# Patient Record
Sex: FEMALE | Race: WHITE | NOT HISPANIC OR LATINO | Employment: FULL TIME | URBAN - METROPOLITAN AREA
[De-identification: names, ages, dates, MRNs, and addresses within clinical notes are randomized per-mention and may not be internally consistent; named-entity substitution may affect disease eponyms.]

---

## 2017-03-16 ENCOUNTER — HOSPITAL ENCOUNTER (EMERGENCY)
Facility: HOSPITAL | Age: 52
Discharge: HOME | End: 2017-03-16
Payer: MEDICAID

## 2017-03-16 VITALS
BODY MASS INDEX: 41.15 KG/M2 | HEART RATE: 97 BPM | HEIGHT: 65 IN | OXYGEN SATURATION: 97 % | WEIGHT: 247 LBS | RESPIRATION RATE: 18 BRPM | SYSTOLIC BLOOD PRESSURE: 169 MMHG | TEMPERATURE: 99 F | DIASTOLIC BLOOD PRESSURE: 102 MMHG

## 2017-03-16 DIAGNOSIS — I10 ESSENTIAL HYPERTENSION: Primary | ICD-10-CM

## 2017-03-16 PROCEDURE — 99283 EMERGENCY DEPT VISIT LOW MDM: CPT | Mod: 25

## 2017-03-16 PROCEDURE — 93005 ELECTROCARDIOGRAM TRACING: CPT

## 2017-03-16 PROCEDURE — 93010 ELECTROCARDIOGRAM REPORT: CPT | Mod: ,,, | Performed by: INTERNAL MEDICINE

## 2017-03-16 RX ORDER — LOSARTAN POTASSIUM 25 MG/1
25 TABLET ORAL DAILY
Qty: 30 TABLET | Refills: 0 | Status: SHIPPED | OUTPATIENT
Start: 2017-03-16 | End: 2017-06-29 | Stop reason: SDUPTHER

## 2017-03-16 NOTE — ED PROVIDER NOTES
SCRIBE #1 NOTE: I, Santhosh Hollis, am scribing for, and in the presence of, ZACK Combs. I have scribed the entire note.      History      Chief Complaint   Patient presents with    Hypertension     pt states she has been having problems getting her BP under control all week, pt reports seen at [facility] and [facility] in the last week       Review of patient's allergies indicates:   Allergen Reactions    Codeine     Latex, natural rubber     Pcn [penicillins]     Tylox [oxycodone-acetaminophen]         HPI   HPI    3/16/2017, 5:20 PM   History obtained from the patient      History of Present Illness: Judy Ruelas is a 51 y.o. female patient who presents to the Emergency Department for hypertension which pt states she has had difficulty managing the past week. Pt states she has been seen at [facility] in [place] and  during this period. Sx are constant and moderate in severity. There are no mitigating or exacerbating factors noted. Pt denies any fever, chills, N/V, CP, SOB, cough, weakness or numbness, and all other sx at this time. She states she was put on atenolol and HCTZ when seen at [facility] last week.  Pt states she has been No further complaints or concerns at this time.       Arrival mode: Personal vehicle     PCP: [facility]       Past Medical History:  Past Medical History:   Diagnosis Date    Anxiety     Depression     Depression with anxiety     Hypertension     Irregular heart beat     Migraine headache        Past Surgical History:  Past Surgical History:   Procedure Laterality Date    APPENDECTOMY       SECTION      HYSTERECTOMY           Family History:  Family History   Problem Relation Age of Onset    Hypertension      Heart disease Father 70    Hypertension Father     Heart attack Mother 40     triple bypass    Diabetes Mother     Hypertension Mother     Stroke Mother     Heart attack Brother 40     CABG x 2       Social  History:  Social History     Social History Main Topics    Smoking status: Never Smoker    Smokeless tobacco: Never Used    Alcohol use No      Comment: denies    Drug use: No    Sexual activity: Not Currently       ROS   Review of Systems   Constitutional: Negative for chills and fever.        + HTN   HENT: Negative for sore throat and trouble swallowing.    Respiratory: Negative for cough and shortness of breath.    Cardiovascular: Negative for chest pain.   Gastrointestinal: Negative for abdominal pain, diarrhea, nausea and vomiting.   Genitourinary: Negative for dysuria.   Musculoskeletal: Negative for back pain.   Skin: Negative for rash and wound.   Neurological: Negative for weakness and numbness.   Hematological: Does not bruise/bleed easily.   All other systems reviewed and are negative.      Physical Exam    Initial Vitals   BP Pulse Resp Temp SpO2   03/16/17 1626 03/16/17 1626 03/16/17 1626 03/16/17 1626 03/16/17 1626   169/102 97 18 98.6 °F (37 °C) 97 %      Physical Exam  Nursing Notes and Vital Signs Reviewed.  Constitutional: Patient is in no acute distress. Awake and alert. Well-developed and well-nourished.  Head: Atraumatic. Normocephalic.  Eyes: PERRL. EOM intact. Conjunctivae are not pale. No scleral icterus.  ENT: Mucous membranes are moist. Oropharynx is clear and symmetric.    Neck: Supple. Full ROM. No lymphadenopathy.  Cardiovascular: Regular rate. Regular rhythm. No murmurs, rubs, or gallops. Distal pulses are 2+ and symmetric.  Pulmonary/Chest: No respiratory distress. Clear to auscultation bilaterally. No wheezing, rales, or rhonchi.  Abdominal: Soft and non-distended.  There is no tenderness.  No rebound, guarding, or rigidity.  Good bowel sounds.    Genitourinary: No CVA tenderness  Musculoskeletal: Moves all extremities. No obvious deformities. No edema. No calf tenderness.  Skin: Warm and dry.  Neurological:  Alert, awake, and appropriate.  Normal speech.  No acute focal  "neurological deficits are appreciated.  Psychiatric: Normal affect. Good eye contact. Appropriate in content.    ED Course    Procedures  ED Vital Signs:  Vitals:    03/16/17 1626   BP: (!) 169/102   Pulse: 97   Resp: 18   Temp: 98.6 °F (37 °C)   TempSrc: Oral   SpO2: 97%   Weight: 112 kg (247 lb)   Height: 5' 5" (1.651 m)        The EKG was ordered, reviewed, and independently interpreted by the ED provider.  Interpretation time: 16:46+  Rate: 94 BPM  Rhythm: normal sinus rhythm  Interpretation: RBBB. No STEMI.         The Emergency Provider reviewed the vital signs and test results, which are outlined above.    ED Discussion     Labs were reviewed from pt's visit at [facility] 2 days ago: BUN creatinine, and other lab work was within normal limits.        5:36 PM:  Discussed with pt all pertinent ED information and results. Discussed pt dx and plan of tx. Gave pt all f/u and return to the ED instructions. Instructed pt to keep log of blood pressure 3x daily for 1 month, and to eat a low salt diet. All questions and concerns were addressed at this time. Pt expresses understanding of information and instructions, and is comfortable with plan to discharge. Pt is stable for discharge.    I discussed with patient and/or family/caretaker that evaluation in the ED does not suggest any emergent or life threatening medical conditions requiring immediate intervention beyond what was provided in the ED, and I believe patient is safe for discharge.  Regardless, an unremarkable evaluation in the ED does not preclude the development or presence of a serious of life threatening condition. As such, patient was instructed to return immediately for any worsening or change in current symptoms.        Pre-hypertension/Hypertension: The pt has been informed that they may have pre-hypertension or hypertension based on a blood pressure reading in the ED. I recommend that the pt call the PCP listed on their discharge instructions or a " physician of their choice this week to arrange f/u for further evaluation of possible pre-hypertension or hypertension.     ED Medication(s):  Medications - No data to display    Discharge Medication List as of 3/16/2017  5:36 PM      START taking these medications    Details   losartan (COZAAR) 25 MG tablet Take 1 tablet (25 mg total) by mouth once daily., Starting 3/16/2017, Until Fri 3/16/18, Print             Follow-up Information     Follow up with [facility] In 2 days.    Contact information:    [address] [address] [id number] [phone]               Medical Decision Making    Medical Decision Making:   History:   Old Medical Records: I decided to obtain old medical records.  Old Records Summarized: records from clinic visits.  Clinical Tests:   Medical Tests: Ordered and Reviewed           Scribe Attestation:   Scribe #1: I performed the above scribed service and the documentation accurately describes the services I performed. I attest to the accuracy of the note.    APC:   APC Attestation Statement for Scribe #1: I, Rashad Hernandez NP, personally performed the services described in this documentation, as scribed by Santhosh Hollis in my presence, and it is both accurate and complete.          Clinical Impression       ICD-10-CM ICD-9-CM   1. Essential hypertension I10 401.9       Disposition:   Disposition: Discharged  Condition: Stable         Rashad Hernandez NP  03/17/17 0209

## 2017-03-16 NOTE — DISCHARGE INSTRUCTIONS
Controlling High Blood Pressure  High blood pressure (hypertension) is often called the silent killer. This is because many people who have it dont know it. High blood pressure is defined as 140/90 mm Hg or higher. Know your blood pressure and remember to check it regularly. Doing so can save your life. Here are some things you can do to help control your blood pressure.    Choose heart-healthy foods  · Select low-salt, low-fat foods. Limit sodium intake to 2,400 mg per day or the amount suggested by your healthcare provider.  · Limit canned, dried, cured, packaged, and fast foods. These can contain a lot of salt.  · Eat 8 to 10 servings of fruits and vegetables every day.  · Choose lean meats, fish, or chicken.  · Eat whole-grain pasta, brown rice, and beans.  · Eat 2 to 3 servings of low-fat or fat-free dairy products.  · Ask your doctor about the DASH eating plan. This plan helps reduce blood pressure.  · When you go to a restaurant, ask that your meal be prepared with no added salt.  Maintain a healthy weight  · Ask your healthcare provider how many calories to eat a day. Then stick to that number.  · Ask your healthcare provider what weight range is healthiest for you. If you are overweight, a weight loss of only 3% to 5% of your body weight can help lower blood pressure. Generally, a good weight loss goal is to lose 10% of your body weight in a year.  · Limit snacks and sweets.  · Get regular exercise.  Get up and get active  · Choose activities you enjoy. Find ones you can do with friends or family. This includes bicycling, dancing, walking, and jogging.  · Park farther away from building entrances.  · Use stairs instead of the elevator.  · When you can, walk or bike instead of driving.  · [place] leaves, garden, or do household repairs.  · Be active at a moderate to vigorous level of physical activity for at least 40 minutes for a minimum of 3 to 4 days a week.   Manage stress  · Make time to relax and enjoy  life. Find time to laugh.  · Communicate your concerns with your loved ones and your healthcare provider.  · Visit with family and friends, and keep up with hobbies.  Limit alcohol and quit smoking  · Men should have no more than 2 drinks per day.  · Women should have no more than 1 drink per day.  · Talk with your healthcare provider about quitting smoking. Smoking significantly increases your risk for heart disease and stroke. Ask your healthcare provider about community smoking cessation programs and other options.  Medicines  If lifestyle changes arent enough, your healthcare provider may prescribe high blood pressure medicine. Take all medicines as prescribed. If you have any questions about your medicines, ask your healthcare provider before stopping or changing them.   Date Last Reviewed: 4/27/2016 © 2000-2016 [employer]. [address] [place] [address], [place] [address]. All rights reserved. This information is not intended as a substitute for professional medical care. Always follow your healthcare professional's instructions.          Discharge Instructions for High Blood Pressure (Hypertension)  You have been diagnosed with high blood pressure (also called hypertension). This means the force of blood against your artery walls is too strong. It also means your heart is working hard to move blood. High blood pressure usually has no symptoms, but over time, it can damage your heart, blood vessels, eyes, kidneys, and other organs. With help from your doctor, you can manage your blood pressure and protect your health.  Taking medicine  · Learn to take your own blood pressure. Keep a record of your results. Ask your doctor which readings mean that you need medical attention.  · Take your blood pressure medicine exactly as directed. Dont skip doses. Missing doses can cause your blood pressure to get out of control.  · If you do miss a dose (or doses) check with your healthcare provider about what to  "do.  · Avoid medicine that contain heart stimulants, including over-the-counter drugs. Check for warnings about high blood pressure on the label. Ask the pharmacist before purchasing something you haven't used before  · Check with your doctor or pharmacist before taking a decongestant. Some decongestants can worsen high blood pressure.  Lifestyle changes  · Maintain a healthy weight. Get help to lose any extra pounds.  · Cut back on salt.  ¨ Limit canned, dried, packaged, and fast foods.  ¨ Dont add salt to your food at the table.  ¨ Season foods with herbs instead of salt when you cook.  ¨ Request no added salt when you go to a restaurant.  ¨ The American Heart Associations (AHA) "ideal" sodium intake recommendation is 1,500 milligrams per day.  However, since American's eat so much salt, the AHA says a positive change can occur by cutting back to even 2,400 milligrams of sodium a day.   · Follow the DASH (Dietary Approaches to Stop Hypertension) eating plan. This plan recommends vegetables, fruits, whole gains, and other heart healthy foods.  · Begin an exercise program. Ask your doctor how to get started. The American Heart Association recommends aerobic exercise 3 to 4 times a week for an average of 40 minutes at a time, with your doctor's approval. Simple activities like walking or gardening can help.  · Break the smoking habit. Enroll in a stop-smoking program to improve your chances of success. Ask your healthcare provider about programs and medicines to help you stop smoking.  · Limit drinks that contain caffeine (coffee, black or green tea, cola) to 2 per day.  · Never take stimulants such as amphetamines or cocaine; these drugs can be deadly for someone with high blood pressure.  · Control your stress. Learn stress-management techniques.  · Limit alcohol to no more than 1 drink a day for women and 2 drinks a day for men.  Follow-up care  Make a follow-up appointment as directed by our staff.     When to " seek medical care  Call your doctor immediately or seek emergency care if you have any of the following:  · Chest pain or shortness of breath (call 911)  · Moderate to severe headache  · Weakness in the muscles of your face, arms, or legs  · Trouble speaking  · Extreme drowsiness  · Confusion  · Fainting or dizziness  · Pulsating or rushing sound in your ears  · Unexplained nosebleed  · Weakness, tingling, or numbness of your face, arms, or legs  · Change in vision  · Blood pressure measured at home that is greater than 180/110   Date Last Reviewed: 4/27/2016  © 2372-8364 [employer]. [address] [place] [address], [place] [address]. All rights reserved. This information is not intended as a substitute for professional medical care. Always follow your healthcare professional's instructions.

## 2017-03-16 NOTE — ED AVS SNAPSHOT
[facility]  [id number] [facility] [address]               Judy Ruelas   3/16/2017  4:28 PM   ED    Description:  Female : 1965   Department:  [facility]            Your Care was Coordinated By:     Provider Role From To    Rashad Hernandez NP Nurse Practitioner [id number] --      Reason for Visit     Hypertension           Diagnoses this Visit        Comments    Essential hypertension    -  Primary       ED Disposition     None           To Do List           Follow-up Information     Follow up with [facility] In 2 days.    Contact information:    [address] [address] [id number] [phone]         These Medications        Disp Refills Start End    losartan (COZAAR) 25 MG tablet 30 tablet 0 3/16/2017 3/16/2018    Take 1 tablet (25 mg total) by mouth once daily. - Oral      [facility]     [facility] On Call Nurse Care Line -  Assistance  Registered nurses in the [facility] On Call Center provide clinical advisement, health education, appointment booking, and other advisory services.  Call for this free service at [phone].             Medications           Message regarding Medications     Verify the changes and/or additions to your medication regime listed below are the same as discussed with your clinician today.  If any of these changes or additions are incorrect, please notify your healthcare provider.        START taking these NEW medications        Refills    losartan (COZAAR) 25 MG tablet 0    Sig: Take 1 tablet (25 mg total) by mouth once daily.    Class: Print    Route: Oral      STOP taking these medications     lisinopril 10 MG tablet Take 10 mg by mouth once daily.           Verify that the below list of medications is an accurate representation of the medications you are currently taking.  If none reported, the list may be blank. If incorrect, please contact your  "healthcare provider. Carry this list with you in case of emergency.           Current Medications     amlodipine (NORVASC) 5 MG tablet Take 2 tablets (10 mg total) by mouth once daily.    aspirin 81 MG Chew Take 1 tablet (81 mg total) by mouth once daily.    atenolol (TENORMIN) 50 MG tablet Take 50 mg by mouth once daily.    busPIRone (BUSPAR) 7.5 MG tablet Take 7.5 mg by mouth 3 (three) times daily.    butalbital-acetaminophen-caffeine -40 mg (FIORICET, ESGIC) -40 mg per tablet Take 1 tablet by mouth every 6 (six) hours as needed for Pain or Headaches.    ferrous sulfate 324 mg (65 mg iron) TbEC Take 325 mg by mouth once daily.    fluoxetine (PROZAC) 20 MG capsule Take 20 mg by mouth once daily.    hydrocodone-acetaminophen 7.5-325mg (NORCO) 7.5-325 mg per tablet Take 1 tablet by mouth every 6 (six) hours as needed for Pain.    hydrOXYzine (VISTARIL) 50 MG Cap Take 50 mg by mouth 4 (four) times daily.    losartan (COZAAR) 25 MG tablet Take 1 tablet (25 mg total) by mouth once daily.    omeprazole (PRILOSEC) 40 MG capsule Take 1 capsule (40 mg total) by mouth once daily.    pravastatin (PRAVACHOL) 20 MG tablet Take 20 mg by mouth once daily.    promethazine (PHENERGAN) 25 MG tablet Take 1 tablet (25 mg total) by mouth every 6 (six) hours as needed for Nausea.    venlafaxine (EFFEXOR-XR) 75 MG 24 hr capsule Take 75 mg by mouth once daily.           Clinical Reference Information           Your Vitals Were     BP Pulse Temp Resp Height Weight    169/102 (BP Location: Right arm, Patient Position: Sitting) 97 98.6 °F (37 °C) (Oral) 18 5' 5" (1.651 m) 112 kg (247 lb)    SpO2 BMI             97% 41.1 kg/m2         Allergies as of 3/16/2017        Reactions    Codeine     Latex, Natural Rubber     Pcn [Penicillins]     Tylox [Oxycodone-acetaminophen]       Immunizations Administered on Date of Encounter - 3/16/2017     None      ED Micro, Lab, POCT     None      ED Imaging Orders     None        Discharge " Instructions         Controlling High Blood Pressure  High blood pressure (hypertension) is often called the silent killer. This is because many people who have it dont know it. High blood pressure is defined as 140/90 mm Hg or higher. Know your blood pressure and remember to check it regularly. Doing so can save your life. Here are some things you can do to help control your blood pressure.    Choose heart-healthy foods  · Select low-salt, low-fat foods. Limit sodium intake to 2,400 mg per day or the amount suggested by your healthcare provider.  · Limit canned, dried, cured, packaged, and fast foods. These can contain a lot of salt.  · Eat 8 to 10 servings of fruits and vegetables every day.  · Choose lean meats, fish, or chicken.  · Eat whole-grain pasta, brown rice, and beans.  · Eat 2 to 3 servings of low-fat or fat-free dairy products.  · Ask your doctor about the DASH eating plan. This plan helps reduce blood pressure.  · When you go to a restaurant, ask that your meal be prepared with no added salt.  Maintain a healthy weight  · Ask your healthcare provider how many calories to eat a day. Then stick to that number.  · Ask your healthcare provider what weight range is healthiest for you. If you are overweight, a weight loss of only 3% to 5% of your body weight can help lower blood pressure. Generally, a good weight loss goal is to lose 10% of your body weight in a year.  · Limit snacks and sweets.  · Get regular exercise.  Get up and get active  · Choose activities you enjoy. Find ones you can do with friends or family. This includes bicycling, dancing, walking, and jogging.  · Park farther away from building entrances.  · Use stairs instead of the elevator.  · When you can, walk or bike instead of driving.  · Roberts leaves, garden, or do household repairs.  · Be active at a moderate to vigorous level of physical activity for at least 40 minutes for a minimum of 3 to 4 days a week.   Manage stress  · Make time  to relax and enjoy life. Find time to laugh.  · Communicate your concerns with your loved ones and your healthcare provider.  · Visit with family and friends, and keep up with hobbies.  Limit alcohol and quit smoking  · Men should have no more than 2 drinks per day.  · Women should have no more than 1 drink per day.  · Talk with your healthcare provider about quitting smoking. Smoking significantly increases your risk for heart disease and stroke. Ask your healthcare provider about community smoking cessation programs and other options.  Medicines  If lifestyle changes arent enough, your healthcare provider may prescribe high blood pressure medicine. Take all medicines as prescribed. If you have any questions about your medicines, ask your healthcare provider before stopping or changing them.   Date Last Reviewed: 4/27/2016 © 2000-2016 [employer]. [address] [place] [address], [place] [address]. All rights reserved. This information is not intended as a substitute for professional medical care. Always follow your healthcare professional's instructions.          Discharge Instructions for High Blood Pressure (Hypertension)  You have been diagnosed with high blood pressure (also called hypertension). This means the force of blood against your artery walls is too strong. It also means your heart is working hard to move blood. High blood pressure usually has no symptoms, but over time, it can damage your heart, blood vessels, eyes, kidneys, and other organs. With help from your doctor, you can manage your blood pressure and protect your health.  Taking medicine  · Learn to take your own blood pressure. Keep a record of your results. Ask your doctor which readings mean that you need medical attention.  · Take your blood pressure medicine exactly as directed. Dont skip doses. Missing doses can cause your blood pressure to get out of control.  · If you do miss a dose (or doses) check with your healthcare  "provider about what to do.  · Avoid medicine that contain heart stimulants, including over-the-counter drugs. Check for warnings about high blood pressure on the label. Ask the pharmacist before purchasing something you haven't used before  · Check with your doctor or pharmacist before taking a decongestant. Some decongestants can worsen high blood pressure.  Lifestyle changes  · Maintain a healthy weight. Get help to lose any extra pounds.  · Cut back on salt.  ¨ Limit canned, dried, packaged, and fast foods.  ¨ Dont add salt to your food at the table.  ¨ Season foods with herbs instead of salt when you cook.  ¨ Request no added salt when you go to a restaurant.  ¨ The American Heart Associations (AHA) "ideal" sodium intake recommendation is 1,500 milligrams per day.  However, since American's eat so much salt, the AHA says a positive change can occur by cutting back to even 2,400 milligrams of sodium a day.   · Follow the DASH (Dietary Approaches to Stop Hypertension) eating plan. This plan recommends vegetables, fruits, whole gains, and other heart healthy foods.  · Begin an exercise program. Ask your doctor how to get started. The American Heart Association recommends aerobic exercise 3 to 4 times a week for an average of 40 minutes at a time, with your doctor's approval. Simple activities like walking or gardening can help.  · Break the smoking habit. Enroll in a stop-smoking program to improve your chances of success. Ask your healthcare provider about programs and medicines to help you stop smoking.  · Limit drinks that contain caffeine (coffee, black or green tea, cola) to 2 per day.  · Never take stimulants such as amphetamines or cocaine; these drugs can be deadly for someone with high blood pressure.  · Control your stress. Learn stress-management techniques.  · Limit alcohol to no more than 1 drink a day for women and 2 drinks a day for men.  Follow-up care  Make a follow-up appointment as directed by " our staff.     When to seek medical care  Call your doctor immediately or seek emergency care if you have any of the following:  · Chest pain or shortness of breath (call 911)  · Moderate to severe headache  · Weakness in the muscles of your face, arms, or legs  · Trouble speaking  · Extreme drowsiness  · Confusion  · Fainting or dizziness  · Pulsating or rushing sound in your ears  · Unexplained nosebleed  · Weakness, tingling, or numbness of your face, arms, or legs  · Change in vision  · Blood pressure measured at home that is greater than 180/110   Date Last Reviewed: 4/27/2016  © 7786-8102 [employer]. [address] [place] [address], [place] [address]. All rights reserved. This information is not intended as a substitute for professional medical care. Always follow your healthcare professional's instructions.          [facility] Sign-Up     Activating your [facility] account is as easy as 1-2-3!     1) Visit [email or web address], select Sign Up Now, enter this activation code and your date of birth, then select Next.  [email or web address]  Expires: 4/30/2017  5:36 PM      2) Create a username and password to use when you visit [facility] in the future and select a security question in case you lose your password and select Next.    3) Enter your e-mail address and click Sign Up!    Additional Information  If you have questions, please e-mail [email or web address] or call [phone] to talk to our [facility] staff. Remember, [facility] is NOT to be used for urgent needs. For medical emergencies, dial 911.          [facility] complies with applicable Federal civil rights laws and does not discriminate on the basis of race, color, national origin, age, disability, or sex.        Language Assistance Services     ATTENTION: Language assistance services are available, free of charge. Please call [phone].      ATENCIÓN: Si habla español, tiene a hanna disposición servicios gratuitos de asistencia  lingüística. [facility] [phone].     DELONTE Ý: N?u b?n nói Ti?ng Vi?t, có các d?ch v? h? tr? ngôn ng? mi?n phí dành cho b?n. G?i s? [phone].

## 2017-06-17 ENCOUNTER — HOSPITAL ENCOUNTER (INPATIENT)
Facility: HOSPITAL | Age: 52
LOS: 1 days | Discharge: HOME | DRG: 281 | End: 2017-06-19
Attending: EMERGENCY MEDICINE | Admitting: INTERNAL MEDICINE
Payer: MEDICAID

## 2017-06-17 DIAGNOSIS — I10 ESSENTIAL (PRIMARY) HYPERTENSION: ICD-10-CM

## 2017-06-17 DIAGNOSIS — R07.9 CHEST PAIN: ICD-10-CM

## 2017-06-17 DIAGNOSIS — I21.4 NSTEMI (NON-ST ELEVATED MYOCARDIAL INFARCTION): Primary | ICD-10-CM

## 2017-06-17 LAB
ALBUMIN SERPL BCP-MCNC: 3.4 G/DL
ALP SERPL-CCNC: 71 U/L
ALT SERPL W/O P-5'-P-CCNC: 24 U/L
ANION GAP SERPL CALC-SCNC: 11 MMOL/L
AST SERPL-CCNC: 18 U/L
BASOPHILS # BLD AUTO: 0.02 K/UL
BASOPHILS NFR BLD: 0.2 %
BILIRUB SERPL-MCNC: 0.4 MG/DL
BUN SERPL-MCNC: 14 MG/DL
CALCIUM SERPL-MCNC: 9 MG/DL
CHLORIDE SERPL-SCNC: 106 MMOL/L
CO2 SERPL-SCNC: 22 MMOL/L
CREAT SERPL-MCNC: 0.7 MG/DL
DIFFERENTIAL METHOD: ABNORMAL
EOSINOPHIL # BLD AUTO: 0.1 K/UL
EOSINOPHIL NFR BLD: 1.3 %
ERYTHROCYTE [DISTWIDTH] IN BLOOD BY AUTOMATED COUNT: 14 %
EST. GFR  (AFRICAN AMERICAN): >60 ML/MIN/1.73 M^2
EST. GFR  (NON AFRICAN AMERICAN): >60 ML/MIN/1.73 M^2
GLUCOSE SERPL-MCNC: 114 MG/DL
HCT VFR BLD AUTO: 36.7 %
HGB BLD-MCNC: 11.8 G/DL
LYMPHOCYTES # BLD AUTO: 1 K/UL
LYMPHOCYTES NFR BLD: 9.7 %
MCH RBC QN AUTO: 27.8 PG
MCHC RBC AUTO-ENTMCNC: 32.2 %
MCV RBC AUTO: 86 FL
MONOCYTES # BLD AUTO: 0.6 K/UL
MONOCYTES NFR BLD: 5.7 %
NEUTROPHILS # BLD AUTO: 8.3 K/UL
NEUTROPHILS NFR BLD: 83.1 %
PLATELET # BLD AUTO: 292 K/UL
PMV BLD AUTO: 10 FL
POTASSIUM SERPL-SCNC: 4.3 MMOL/L
PROT SERPL-MCNC: 7.3 G/DL
RBC # BLD AUTO: 4.25 M/UL
SODIUM SERPL-SCNC: 139 MMOL/L
TROPONIN I SERPL DL<=0.01 NG/ML-MCNC: 0.11 NG/ML
WBC # BLD AUTO: 9.96 K/UL

## 2017-06-17 PROCEDURE — 25000003 PHARM REV CODE 250: Performed by: EMERGENCY MEDICINE

## 2017-06-17 PROCEDURE — 85025 COMPLETE CBC W/AUTO DIFF WBC: CPT

## 2017-06-17 PROCEDURE — 99285 EMERGENCY DEPT VISIT HI MDM: CPT | Mod: 25

## 2017-06-17 PROCEDURE — 84484 ASSAY OF TROPONIN QUANT: CPT

## 2017-06-17 PROCEDURE — 83880 ASSAY OF NATRIURETIC PEPTIDE: CPT

## 2017-06-17 PROCEDURE — 93010 ELECTROCARDIOGRAM REPORT: CPT | Mod: ,,, | Performed by: INTERNAL MEDICINE

## 2017-06-17 PROCEDURE — 93005 ELECTROCARDIOGRAM TRACING: CPT

## 2017-06-17 PROCEDURE — 21400001 HC TELEMETRY ROOM

## 2017-06-17 PROCEDURE — 85730 THROMBOPLASTIN TIME PARTIAL: CPT

## 2017-06-17 PROCEDURE — 80053 COMPREHEN METABOLIC PANEL: CPT

## 2017-06-17 RX ORDER — LOVASTATIN 20 MG/1
20 TABLET ORAL NIGHTLY
Status: ON HOLD | COMMUNITY
End: 2017-06-19 | Stop reason: HOSPADM

## 2017-06-17 RX ORDER — LORAZEPAM 1 MG/1
1 TABLET ORAL
Status: COMPLETED | OUTPATIENT
Start: 2017-06-17 | End: 2017-06-17

## 2017-06-17 RX ORDER — TRAZODONE HYDROCHLORIDE 50 MG/1
100 TABLET ORAL NIGHTLY
COMMUNITY

## 2017-06-17 RX ORDER — ONDANSETRON 4 MG/1
8 TABLET, FILM COATED ORAL 2 TIMES DAILY
COMMUNITY

## 2017-06-17 RX ADMIN — LORAZEPAM 1 MG: 1 TABLET ORAL at 11:06

## 2017-06-18 PROBLEM — I21.4 NSTEMI (NON-ST ELEVATED MYOCARDIAL INFARCTION): Status: ACTIVE | Noted: 2017-06-18

## 2017-06-18 PROBLEM — F41.8 DEPRESSION WITH ANXIETY: Chronic | Status: ACTIVE | Noted: 2017-06-18

## 2017-06-18 LAB
APTT BLDCRRT: 23.5 SEC
APTT BLDCRRT: 53.9 SEC
APTT BLDCRRT: 84.6 SEC
BNP SERPL-MCNC: 120 PG/ML
TROPONIN I SERPL DL<=0.01 NG/ML-MCNC: 0.88 NG/ML
TROPONIN I SERPL DL<=0.01 NG/ML-MCNC: 0.94 NG/ML

## 2017-06-18 PROCEDURE — 25000003 PHARM REV CODE 250: Performed by: EMERGENCY MEDICINE

## 2017-06-18 PROCEDURE — 85730 THROMBOPLASTIN TIME PARTIAL: CPT

## 2017-06-18 PROCEDURE — 25000003 PHARM REV CODE 250: Performed by: INTERNAL MEDICINE

## 2017-06-18 PROCEDURE — 21400001 HC TELEMETRY ROOM

## 2017-06-18 PROCEDURE — 36415 COLL VENOUS BLD VENIPUNCTURE: CPT

## 2017-06-18 PROCEDURE — 63600175 PHARM REV CODE 636 W HCPCS: Performed by: EMERGENCY MEDICINE

## 2017-06-18 PROCEDURE — 93010 ELECTROCARDIOGRAM REPORT: CPT | Mod: ,,, | Performed by: INTERNAL MEDICINE

## 2017-06-18 PROCEDURE — 93005 ELECTROCARDIOGRAM TRACING: CPT

## 2017-06-18 PROCEDURE — 85730 THROMBOPLASTIN TIME PARTIAL: CPT | Mod: 91

## 2017-06-18 PROCEDURE — 84484 ASSAY OF TROPONIN QUANT: CPT

## 2017-06-18 PROCEDURE — 99223 1ST HOSP IP/OBS HIGH 75: CPT | Mod: ,,, | Performed by: INTERNAL MEDICINE

## 2017-06-18 RX ORDER — AMOXICILLIN 250 MG
1 CAPSULE ORAL 2 TIMES DAILY
Status: DISCONTINUED | OUTPATIENT
Start: 2017-06-18 | End: 2017-06-19 | Stop reason: HOSPADM

## 2017-06-18 RX ORDER — ATENOLOL 50 MG/1
50 TABLET ORAL DAILY
Status: DISCONTINUED | OUTPATIENT
Start: 2017-06-18 | End: 2017-06-19 | Stop reason: HOSPADM

## 2017-06-18 RX ORDER — LOSARTAN POTASSIUM 25 MG/1
25 TABLET ORAL DAILY
Status: DISCONTINUED | OUTPATIENT
Start: 2017-06-18 | End: 2017-06-19 | Stop reason: HOSPADM

## 2017-06-18 RX ORDER — HEPARIN SODIUM,PORCINE/D5W 25000/250
16 INTRAVENOUS SOLUTION INTRAVENOUS CONTINUOUS
Status: DISCONTINUED | OUTPATIENT
Start: 2017-06-18 | End: 2017-06-19

## 2017-06-18 RX ORDER — HEPARIN SODIUM,PORCINE/D5W 25000/250
16 INTRAVENOUS SOLUTION INTRAVENOUS CONTINUOUS
Status: DISCONTINUED | OUTPATIENT
Start: 2017-06-18 | End: 2017-06-18

## 2017-06-18 RX ORDER — HYDROXYZINE PAMOATE 50 MG/1
50 CAPSULE ORAL EVERY 6 HOURS PRN
Status: DISCONTINUED | OUTPATIENT
Start: 2017-06-18 | End: 2017-06-18

## 2017-06-18 RX ORDER — IBUPROFEN 200 MG
16 TABLET ORAL
Status: DISCONTINUED | OUTPATIENT
Start: 2017-06-18 | End: 2017-06-19 | Stop reason: HOSPADM

## 2017-06-18 RX ORDER — IBUPROFEN 200 MG
24 TABLET ORAL
Status: DISCONTINUED | OUTPATIENT
Start: 2017-06-18 | End: 2017-06-19 | Stop reason: HOSPADM

## 2017-06-18 RX ORDER — LOVASTATIN 20 MG/1
20 TABLET ORAL NIGHTLY
Status: DISCONTINUED | OUTPATIENT
Start: 2017-06-18 | End: 2017-06-19 | Stop reason: HOSPADM

## 2017-06-18 RX ORDER — TRAZODONE HYDROCHLORIDE 50 MG/1
50 TABLET ORAL NIGHTLY
Status: DISCONTINUED | OUTPATIENT
Start: 2017-06-18 | End: 2017-06-18

## 2017-06-18 RX ORDER — SODIUM CHLORIDE 9 MG/ML
INJECTION, SOLUTION INTRAVENOUS CONTINUOUS
Status: DISCONTINUED | OUTPATIENT
Start: 2017-06-18 | End: 2017-06-19

## 2017-06-18 RX ORDER — HYDROXYZINE PAMOATE 50 MG/1
50 CAPSULE ORAL 4 TIMES DAILY
Status: DISCONTINUED | OUTPATIENT
Start: 2017-06-18 | End: 2017-06-19 | Stop reason: HOSPADM

## 2017-06-18 RX ORDER — MORPHINE SULFATE 4 MG/ML
4 INJECTION, SOLUTION INTRAMUSCULAR; INTRAVENOUS EVERY 4 HOURS PRN
Status: DISCONTINUED | OUTPATIENT
Start: 2017-06-18 | End: 2017-06-19 | Stop reason: HOSPADM

## 2017-06-18 RX ORDER — METOPROLOL SUCCINATE 50 MG/1
50 TABLET, EXTENDED RELEASE ORAL DAILY
Status: DISCONTINUED | OUTPATIENT
Start: 2017-06-18 | End: 2017-06-18

## 2017-06-18 RX ORDER — TRAZODONE HYDROCHLORIDE 50 MG/1
50 TABLET ORAL NIGHTLY PRN
Status: DISCONTINUED | OUTPATIENT
Start: 2017-06-18 | End: 2017-06-19 | Stop reason: HOSPADM

## 2017-06-18 RX ORDER — ONDANSETRON 2 MG/ML
4 INJECTION INTRAMUSCULAR; INTRAVENOUS EVERY 12 HOURS PRN
Status: DISCONTINUED | OUTPATIENT
Start: 2017-06-18 | End: 2017-06-19 | Stop reason: HOSPADM

## 2017-06-18 RX ORDER — GLUCAGON 1 MG
1 KIT INJECTION
Status: DISCONTINUED | OUTPATIENT
Start: 2017-06-18 | End: 2017-06-19 | Stop reason: HOSPADM

## 2017-06-18 RX ORDER — ZOLPIDEM TARTRATE 5 MG/1
5 TABLET ORAL NIGHTLY PRN
Status: DISCONTINUED | OUTPATIENT
Start: 2017-06-18 | End: 2017-06-19 | Stop reason: HOSPADM

## 2017-06-18 RX ORDER — ASPIRIN 325 MG
325 TABLET ORAL DAILY
Status: DISCONTINUED | OUTPATIENT
Start: 2017-06-18 | End: 2017-06-19 | Stop reason: HOSPADM

## 2017-06-18 RX ORDER — ACETAMINOPHEN 500 MG
500 TABLET ORAL EVERY 4 HOURS PRN
Status: DISCONTINUED | OUTPATIENT
Start: 2017-06-18 | End: 2017-06-19 | Stop reason: HOSPADM

## 2017-06-18 RX ORDER — SODIUM CHLORIDE 9 MG/ML
3 INJECTION, SOLUTION INTRAMUSCULAR; INTRAVENOUS; SUBCUTANEOUS EVERY 8 HOURS
Status: DISCONTINUED | OUTPATIENT
Start: 2017-06-18 | End: 2017-06-19 | Stop reason: HOSPADM

## 2017-06-18 RX ADMIN — SODIUM CHLORIDE: 0.9 INJECTION, SOLUTION INTRAVENOUS at 07:06

## 2017-06-18 RX ADMIN — LOSARTAN POTASSIUM 25 MG: 25 TABLET, FILM COATED ORAL at 08:06

## 2017-06-18 RX ADMIN — LOVASTATIN 20 MG: 20 TABLET ORAL at 01:06

## 2017-06-18 RX ADMIN — NITROGLYCERIN 1 INCH: 20 OINTMENT TOPICAL at 11:06

## 2017-06-18 RX ADMIN — HYDROXYZINE PAMOATE 50 MG: 50 CAPSULE ORAL at 05:06

## 2017-06-18 RX ADMIN — MORPHINE SULFATE 4 MG: 4 INJECTION, SOLUTION INTRAMUSCULAR; INTRAVENOUS at 10:06

## 2017-06-18 RX ADMIN — HYDROXYZINE PAMOATE 50 MG: 50 CAPSULE ORAL at 11:06

## 2017-06-18 RX ADMIN — ONDANSETRON 4 MG: 2 INJECTION INTRAMUSCULAR; INTRAVENOUS at 07:06

## 2017-06-18 RX ADMIN — STANDARDIZED SENNA CONCENTRATE AND DOCUSATE SODIUM 1 TABLET: 8.6; 5 TABLET, FILM COATED ORAL at 09:06

## 2017-06-18 RX ADMIN — ATENOLOL 50 MG: 50 TABLET ORAL at 08:06

## 2017-06-18 RX ADMIN — HEPARIN SODIUM AND DEXTROSE 16 UNITS/KG/HR: 10000; 5 INJECTION INTRAVENOUS at 01:06

## 2017-06-18 RX ADMIN — ASPIRIN 325 MG ORAL TABLET 325 MG: 325 PILL ORAL at 08:06

## 2017-06-18 RX ADMIN — STANDARDIZED SENNA CONCENTRATE AND DOCUSATE SODIUM 1 TABLET: 8.6; 5 TABLET, FILM COATED ORAL at 08:06

## 2017-06-18 RX ADMIN — HYDROXYZINE PAMOATE 50 MG: 50 CAPSULE ORAL at 07:06

## 2017-06-18 RX ADMIN — NITROGLYCERIN 1 INCH: 20 OINTMENT TOPICAL at 05:06

## 2017-06-18 RX ADMIN — HEPARIN SODIUM AND DEXTROSE 13.97 UNITS/KG/HR: 10000; 5 INJECTION INTRAVENOUS at 11:06

## 2017-06-18 RX ADMIN — NITROGLYCERIN 1 INCH: 20 OINTMENT TOPICAL at 06:06

## 2017-06-18 RX ADMIN — TRAZODONE HYDROCHLORIDE 50 MG: 50 TABLET ORAL at 01:06

## 2017-06-18 RX ADMIN — MORPHINE SULFATE 4 MG: 4 INJECTION, SOLUTION INTRAMUSCULAR; INTRAVENOUS at 07:06

## 2017-06-18 RX ADMIN — LOVASTATIN 20 MG: 20 TABLET ORAL at 09:06

## 2017-06-18 NOTE — ED PROVIDER NOTES
"SCRIBE #1 NOTE: I, Wang Steiner, am scribing for, and in the presence of, Stella Lam MD. I have scribed the entire note.      History      Chief Complaint   Patient presents with    Chest Pain     x 3 days, worse tonight.       Review of patient's allergies indicates:   Allergen Reactions    Codeine     Latex, natural rubber     Pcn [penicillins]     Tylox [oxycodone-acetaminophen]         HPI   HPI    2017, 10:40 PM   History obtained from the patient      History of Present Illness: Judy Ruelas is a 52 y.o. female patient w/ PMHx of heart catheter placement (5 months ago) presents to the Emergency Department for chest pain which onset gradually 2 days ago, worsening this PM as the pt was driving. Symptoms are intermittent and moderate in severity.  No mitigating or exacerbating factors reported. Pt describes the chest pain as a "stabbing sensation", and states it feels "like someone is sitting on her chest."  Associated sxs include generalized body numbness. Patient denies any shortness of breath, n/v/d, fever, hematuria, dizziness, palpitations, leg swelling/ pain, diaphoresis,and all other sxs at this time. PTA, the ambulance service administered Asprin and nitroglycerine.  No further complaints or concerns at this time.         Arrival mode: Ambulance Service    PCP: [facility]       Past Medical History:  Past Medical History:   Diagnosis Date    Anxiety     Depression     Depression with anxiety     Hypertension     Irregular heart beat     Migraine headache        Past Surgical History:  Past Surgical History:   Procedure Laterality Date    APPENDECTOMY       SECTION      HYSTERECTOMY           Family History:  Family History   Problem Relation Age of Onset    Hypertension      Heart disease Father 70    Hypertension Father     Heart attack Mother 40     triple bypass    Diabetes Mother     Hypertension Mother     Stroke Mother     " Heart attack Brother 40     CABG x 2       Social History:  Social History     Social History Main Topics    Smoking status: Never Smoker    Smokeless tobacco: Never Used    Alcohol use No      Comment: denies    Drug use: No      Comment: denies    Sexual activity: Not Currently       ROS   Review of Systems   Constitutional: Negative for chills, diaphoresis and fever.   HENT: Negative for congestion and sore throat.    Respiratory: Negative for shortness of breath and wheezing.    Cardiovascular: Positive for chest pain. Negative for palpitations and leg swelling.   Gastrointestinal: Negative for abdominal pain, constipation, diarrhea, nausea and vomiting.   Genitourinary: Negative for difficulty urinating, dysuria, frequency, hematuria and urgency.   Musculoskeletal: Negative for back pain.   Skin: Negative for rash.   Neurological: Positive for numbness (Generalized). Negative for dizziness, seizures, syncope, weakness and headaches.   Hematological: Does not bruise/bleed easily.   All other systems reviewed and are negative.      Physical Exam      Initial Vitals [06/17/17 2247]   BP Pulse Resp Temp SpO2   (!) 164/89 68 19 98.1 °F (36.7 °C) 99 %        Physical Exam  Nursing Notes and Vital Signs Reviewed.  Constitutional: Patient is in no apparent distress. Well-developed and well-nourished.  Head: Atraumatic. Normocephalic.  Eyes: PERRL. EOM intact. Conjunctivae are not pale. No scleral icterus.  ENT: Mucous membranes are moist. Oropharynx is clear and symmetric.    Neck: Supple. Full ROM. No lymphadenopathy.  Cardiovascular: Regular rate. Regular rhythm. No murmurs, rubs, or gallops. Distal pulses are 2+ and symmetric.  Pulmonary/Chest: No respiratory distress. Clear to auscultation bilaterally. No wheezing, rales, or rhonchi.  Abdominal: Soft and non-distended.  There is no tenderness.  No rebound, guarding, or rigidity. Good bowel sounds.  Genitourinary: No CVA tenderness  Musculoskeletal: Moves all  "extremities. No obvious deformities. No edema. No calf tenderness.  Skin: Warm and dry.  Neurological:  Alert, awake, and appropriate.  Normal speech.  No acute focal neurological deficits are appreciated.  Psychiatric: Normal affect. Good eye contact. Appropriate in content.    ED Course    Procedures  ED Vital Signs:  Vitals:    06/17/17 2247 06/17/17 2305 06/17/17 2347 06/18/17 0032   BP: (!) 164/89 (!) 142/79 124/63 133/75   Pulse: 68 67 63 61   Resp: 19 17 19 20   Temp: 98.1 °F (36.7 °C)      TempSrc: Oral      SpO2: 99% 98% 98% 97%   Weight: 110.2 kg (243 lb)      Height: 5' 5" (1.651 m)       06/18/17 0052 06/18/17 0100 06/18/17 0416 06/18/17 0700   BP:  123/66 (!) 104/55 (!) 153/87   Pulse:  61 61 67   Resp:  18 18    Temp:  97.7 °F (36.5 °C) 97.9 °F (36.6 °C) 98.3 °F (36.8 °C)   TempSrc:  Oral  Oral   SpO2:  96% 98% 96%   Weight: 113.1 kg (249 lb 4.8 oz)  113.9 kg (251 lb)    Height: 5' 5" (1.651 m)       06/18/17 0927 06/18/17 1100 06/18/17 1611 06/18/17 1932   BP:  124/71 114/68 133/74   Pulse:  64 61 60   Resp:   18 18   Temp:  98.5 °F (36.9 °C) 98 °F (36.7 °C) 97.8 °F (36.6 °C)   TempSrc:  Oral Oral Oral   SpO2: 96% 96% 96% 97%   Weight:       Height:           Abnormal Lab Results:  Labs Reviewed   CBC W/ AUTO DIFFERENTIAL - Abnormal; Notable for the following:        Result Value    Hemoglobin 11.8 (*)     Hematocrit 36.7 (*)     Gran # 8.3 (*)     Gran% 83.1 (*)     Lymph% 9.7 (*)     All other components within normal limits   COMPREHENSIVE METABOLIC PANEL - Abnormal; Notable for the following:     CO2 22 (*)     Glucose 114 (*)     Albumin 3.4 (*)     All other components within normal limits   TROPONIN I - Abnormal; Notable for the following:     Troponin I 0.111 (*)     All other components within normal limits   B-TYPE NATRIURETIC PEPTIDE - Abnormal; Notable for the following:      (*)     All other components within normal limits   APTT   APTT        All Lab Results:  Results for orders " placed or performed during the hospital encounter of 06/17/17   CBC auto differential   Result Value Ref Range    WBC 9.96 3.90 - 12.70 K/uL    RBC 4.25 4.00 - 5.40 M/uL    Hemoglobin 11.8 (L) 12.0 - 16.0 g/dL    Hematocrit 36.7 (L) 37.0 - 48.5 %    MCV 86 82 - 98 fL    MCH 27.8 27.0 - 31.0 pg    MCHC 32.2 32.0 - 36.0 %    RDW 14.0 11.5 - 14.5 %    Platelets 292 150 - 350 K/uL    MPV 10.0 9.2 - 12.9 fL    Gran # 8.3 (H) 1.8 - 7.7 K/uL    Lymph # 1.0 1.0 - 4.8 K/uL    Mono # 0.6 0.3 - 1.0 K/uL    Eos # 0.1 0.0 - 0.5 K/uL    Baso # 0.02 0.00 - 0.20 K/uL    Gran% 83.1 (H) 38.0 - 73.0 %    Lymph% 9.7 (L) 18.0 - 48.0 %    Mono% 5.7 4.0 - 15.0 %    Eosinophil% 1.3 0.0 - 8.0 %    Basophil% 0.2 0.0 - 1.9 %    Differential Method Automated    Comprehensive metabolic panel   Result Value Ref Range    Sodium 139 136 - 145 mmol/L    Potassium 4.3 3.5 - 5.1 mmol/L    Chloride 106 95 - 110 mmol/L    CO2 22 (L) 23 - 29 mmol/L    Glucose 114 (H) 70 - 110 mg/dL    BUN, Bld 14 6 - 20 mg/dL    Creatinine 0.7 0.5 - 1.4 mg/dL    Calcium 9.0 8.7 - 10.5 mg/dL    Total Protein 7.3 6.0 - 8.4 g/dL    Albumin 3.4 (L) 3.5 - 5.2 g/dL    Total Bilirubin 0.4 0.1 - 1.0 mg/dL    Alkaline Phosphatase 71 55 - 135 U/L    AST 18 10 - 40 U/L    ALT 24 10 - 44 U/L    Anion Gap 11 8 - 16 mmol/L    eGFR if African American >60 >60 mL/min/1.73 m^2    eGFR if non African American >60 >60 mL/min/1.73 m^2   Troponin I #1   Result Value Ref Range    Troponin I 0.111 (H) 0.000 - 0.026 ng/mL   B-Type natriuretic peptide (BNP)   Result Value Ref Range     (H) 0 - 99 pg/mL   APTT   Result Value Ref Range    aPTT 23.5 21.0 - 32.0 sec   Troponin I   Result Value Ref Range    Troponin I 0.943 (H) 0.000 - 0.026 ng/mL   APTT   Result Value Ref Range    aPTT 84.6 (H) 21.0 - 32.0 sec   Troponin I   Result Value Ref Range    Troponin I 0.879 (H) 0.000 - 0.026 ng/mL   APTT   Result Value Ref Range    aPTT 53.9 (H) 21.0 - 32.0 sec         Imaging Results:  Imaging  Results          X-Ray Chest AP Portable (Final result)  Result time 06/17/17 23:04:41    Final result by Pankaj Diaz III, MD (06/17/17 23:04:41)                 Impression:     No acute disease identified in the chest.        Electronically signed by: PANKAJ DIAZ MD  Date:     06/17/17  Time:    23:04              Narrative:    XR CHEST AP PORTABLE    Clinical history: Chest Pain.      Findings: Cardiomediastinal silhouette is within normal limits for AP technique. The lungs appear clear of active disease. There is no evidence of pneumonia, pulmonary edema, pleural effusion, pneumothorax or other acute disease.                                  The EKG was ordered, reviewed, and independently interpreted by the ED provider.  Interpretation time: 22:37  Rate: 69 BPM  Rhythm: normal sinus rhythm  Interpretation: Right bundle branch block. Abnormal ECG. No STEMI.      The Emergency Provider reviewed the vital signs and test results, which are outlined above.    ED Discussion     12:00 AM: Discussed case with Dr Balbuena ([facility]). Dr. Peralta agrees with current care and management of pt and accepts admission.   Admitting Service: [facility]   Admitting Physician: Dr. Peralta  Admit to: Tele     12:01 AM: Re-evaluated pt. I have discussed test results, shared treatment plan, and the need for admission with patient and family at bedside. Pt and family express understanding at this time and agree with all information. All questions answered. Pt and family have no further questions or concerns at this time. Pt is ready for admit.      ED Medication(s):  Medications   sodium chloride 0.9% injection 3 mL (3 mLs Intravenous Not Given 6/18/17 1400)   morphine injection 4 mg (4 mg Intravenous Given 6/18/17 1944)   senna-docusate 8.6-50 mg per tablet 1 tablet (1 tablet Oral Given 6/18/17 7644)   ondansetron injection 4 mg (4 mg Intravenous Given 6/18/17 0720)   nitroGLYCERIN 2% TD oint ointment 1  inch (1 inch Topical Given 6/18/17 1800)   aspirin tablet 325 mg (325 mg Oral Given 6/18/17 0844)   heparin 25,000 units in dextrose 5% 250 mL (100 units/mL) bolus from bag; PRN BOLUS (not administered)   heparin 25,000 units in dextrose 5% 250 mL (100 units/mL) bolus from bag; PRN BOLUS (not administered)   heparin 25,000 units in dextrose 5% 250 mL (100 units/mL) infusion; FEMALE (13.97 Units/kg/hr × 113.1 kg Intravenous Handoff 6/18/17 1851)   trazodone tablet 50 mg (50 mg Oral Given 6/18/17 0144)   losartan tablet 25 mg (25 mg Oral Given 6/18/17 0844)   atenolol tablet 50 mg (50 mg Oral Given 6/18/17 0844)   hydrOXYzine pamoate capsule 50 mg (50 mg Oral Given 6/18/17 1916)   lovastatin tablet 20 mg (20 mg Oral Given 6/18/17 0146)   glucose chewable tablet 16 g (not administered)   glucose chewable tablet 24 g (not administered)   dextrose 50% injection 12.5 g (not administered)   dextrose 50% injection 25 g (not administered)   glucagon (human recombinant) injection 1 mg (not administered)   acetaminophen tablet 500 mg (not administered)   zolpidem tablet 5 mg (not administered)   0.9%  NaCl infusion ( Intravenous New Bag 6/18/17 1914)   lorazepam tablet 1 mg (1 mg Oral Given 6/17/17 2328)   heparin 25,000 units in dextrose 5% 250 mL (100 units/mL) bolus from bag; INITIAL BOLUS DOSE (7,917 Units Intravenous Bolus from Bag 6/18/17 0104)       Current Discharge Medication List                Medical Decision Making    Medical Decision Making:   Clinical Tests:   Lab Tests: Ordered and Reviewed  Radiological Study: Ordered and Reviewed  Medical Tests: Ordered and Reviewed           Scribe Attestation:   Scribe #1: I performed the above scribed service and the documentation accurately describes the services I performed. I attest to the accuracy of the note.    Attending:   Physician Attestation Statement for Scribe #1: I, Stella Lam MD, personally performed the services described in this documentation, as scribed  by Wang Steiner, in my presence, and it is both accurate and complete.          Clinical Impression       ICD-10-CM ICD-9-CM   1. NSTEMI (non-ST elevated myocardial infarction) I21.4 410.70   2. Chest pain R07.9 786.50       Disposition:   Disposition: Admitted  Condition: Serious         Si CAITLYN Lam MD  06/18/17 2023

## 2017-06-18 NOTE — PLAN OF CARE
Problem: Patient Care Overview  Goal: Plan of Care Review  Outcome: Ongoing (interventions implemented as appropriate)  Patient remains free from falls. Fall precautions remain in place. Heparin gtt infusing per orders. Next aPTT draw at 0700. Cardiology consult in AM. NPO per orders. VS are stable. No other c/o at this time. Call bell within reach. Reminded to call for assistance.

## 2017-06-18 NOTE — ASSESSMENT & PLAN NOTE
Three days of substernal chest pain radiating to the left with hand numbness.  Mixture of typical and atypical features.  Very strong family history of coronary artery disease.  30% lesion by heart catheterization February this year.  Initial troponin slightly elevated with no ischemic changes apparent on EKG.  Started heparin drip and nothing by mouth.  Cardiology on consult for the morning.  Received aspirin and Nitroglycerin.

## 2017-06-18 NOTE — H&P
[facility]  History & Physical    Patient Name: Judy Ruelas  MRN: [id number]  Admission Date: 2017  Attending Physician: Art Peralta MD   Primary Care Provider: [facility]         Patient information was obtained from patient, past medical records and ER records.     Subjective:     Principal Problem:NSTEMI (non-ST elevated myocardial infarction)    Chief Complaint:   Chief Complaint   Patient presents with    Chest Pain     x 3 days, worse tonight.        HPI: She had a heart catheterization in February for chest pain and a 30% lesion was found and no intervention done.  For the past three days chest pain.  On the way to work today it got really bad.  It felt like something was sitting on her chest and a stabbing pain.  She was given nitroglycerin by EMS which helped with the pain.  Episodes of pain would last all day.  Right now it feels heavy.  She also has some numbness in her hands but no other associated symptoms.  No other recent illness or medication changes or missed medications.  She took some Rolaids which has helped in the past but it did not tonight.  She also rubbed her chest with Icy-Hot which did not help either.  No fevers, chills, nausea, or vomiting.      Past Medical History:   Diagnosis Date    Anxiety     Depression     Depression with anxiety     Hypertension     Irregular heart beat     Migraine headache        Past Surgical History:   Procedure Laterality Date    APPENDECTOMY       SECTION      HYSTERECTOMY         Review of patient's allergies indicates:   Allergen Reactions    Codeine     Latex, natural rubber     Pcn [penicillins]     Tylox [oxycodone-acetaminophen]        No current facility-administered medications on file prior to encounter.      Current Outpatient Prescriptions on File Prior to Encounter   Medication Sig    atenolol (TENORMIN) 50 MG tablet Take 50 mg by mouth once  daily.    busPIRone (BUSPAR) 7.5 MG tablet Take 7.5 mg by mouth 2 (two) times daily.     ferrous sulfate 324 mg (65 mg iron) TbEC Take 325 mg by mouth once daily.    fluoxetine (PROZAC) 20 MG capsule Take 20 mg by mouth once daily.    hydrocodone-acetaminophen 7.5-325mg (NORCO) 7.5-325 mg per tablet Take 1 tablet by mouth every 6 (six) hours as needed for Pain.    hydrOXYzine (VISTARIL) 50 MG Cap Take 50 mg by mouth 4 (four) times daily.    losartan (COZAAR) 25 MG tablet Take 1 tablet (25 mg total) by mouth once daily.    venlafaxine (EFFEXOR-XR) 75 MG 24 hr capsule Take 75 mg by mouth once daily.    amlodipine (NORVASC) 5 MG tablet Take 2 tablets (10 mg total) by mouth once daily.    aspirin 81 MG Chew Take 1 tablet (81 mg total) by mouth once daily.    butalbital-acetaminophen-caffeine -40 mg (FIORICET, ESGIC) -40 mg per tablet Take 1 tablet by mouth every 6 (six) hours as needed for Pain or Headaches.    omeprazole (PRILOSEC) 40 MG capsule Take 1 capsule (40 mg total) by mouth once daily.    pravastatin (PRAVACHOL) 20 MG tablet Take 20 mg by mouth once daily.    promethazine (PHENERGAN) 25 MG tablet Take 1 tablet (25 mg total) by mouth every 6 (six) hours as needed for Nausea.     Family History     Problem Relation (Age of Onset)    Diabetes Mother    Heart attack Mother (40), Brother (40)    Heart disease Father (70)    Hypertension Father, Mother    Stroke Mother        Social History Main Topics    Smoking status: Never Smoker    Smokeless tobacco: Never Used    Alcohol use No      Comment: denies    Drug use: No      Comment: denies    Sexual activity: Not Currently     Review of Systems   Constitutional: Negative for chills and fever.   HENT: Negative for congestion and sore throat.    Eyes: Negative for visual disturbance.   Respiratory: Positive for chest tightness. Negative for cough, shortness of breath and wheezing.    Cardiovascular: Positive for chest pain. Negative for  palpitations and leg swelling.   Gastrointestinal: Negative for abdominal pain, blood in stool, constipation, diarrhea, nausea and vomiting.   Genitourinary: Negative for dysuria and hematuria.   Musculoskeletal: Negative for arthralgias and back pain.   Skin: Negative for rash and wound.   Neurological: Positive for numbness. Negative for dizziness, weakness and light-headedness.   Hematological: Negative for adenopathy.     Objective:     Vital Signs (Most Recent):  Temp: 97.7 °F (36.5 °C) (06/18/17 0100)  Pulse: 61 (06/18/17 0100)  Resp: 18 (06/18/17 0100)  BP: 123/66 (06/18/17 0100)  SpO2: 96 % (06/18/17 0100) Vital Signs (24h Range):  Temp:  [97.7 °F (36.5 °C)-98.1 °F (36.7 °C)] 97.7 °F (36.5 °C)  Pulse:  [61-68] 61  Resp:  [17-20] 18  SpO2:  [96 %-99 %] 96 %  BP: (123-164)/(63-89) 123/66     Weight: 113.1 kg (249 lb 4.8 oz)  Body mass index is 41.49 kg/m².    Physical Exam   Constitutional: She is oriented to person, place, and time. She appears well-developed and well-nourished. No distress.   HENT:   Head: Normocephalic and atraumatic.   Mouth/Throat: Oropharynx is clear and moist.   Eyes: Conjunctivae and EOM are normal. Pupils are equal, round, and reactive to light.   Neck: Neck supple. No JVD present. No thyromegaly present.   Cardiovascular: Normal rate and regular rhythm.  Exam reveals no gallop and no friction rub.    No murmur heard.  Pulmonary/Chest: Effort normal and breath sounds normal. She has no wheezes. She has no rales.   Abdominal: Soft. Bowel sounds are normal. She exhibits no distension. There is no tenderness. There is no rebound and no guarding.   Musculoskeletal: Normal range of motion. She exhibits no edema or deformity.   Lymphadenopathy:     She has no cervical adenopathy.   Neurological: She is alert and oriented to person, place, and time. She has normal reflexes.   Skin: Skin is warm and dry. No rash noted.   Psychiatric: She has a normal mood and affect. Her behavior is normal.  Judgment and thought content normal.   Nursing note and vitals reviewed.       Significant Labs:   CBC:   Recent Labs  Lab 06/17/17  2311   WBC 9.96   HGB 11.8*   HCT 36.7*        CMP:   Recent Labs  Lab 06/17/17  2311      K 4.3      CO2 22*   *   BUN 14   CREATININE 0.7   CALCIUM 9.0   PROT 7.3   ALBUMIN 3.4*   BILITOT 0.4   ALKPHOS 71   AST 18   ALT 24   ANIONGAP 11   EGFRNONAA >60       Significant Imaging: I have reviewed all pertinent imaging results/findings within the past 24 hours.    Assessment/Plan:     Depression with anxiety    Continue Hydroxyzine for anxiety.  Hold Fluoxetine, Venlafaxine, Trazodone, and Buspirone.  Review medications and follow up plan at discharge.  Concern for inappropriate combination and risk.        Essential hypertension    Continue Atenolol and Losartan.        * NSTEMI (non-ST elevated myocardial infarction)    Three days of substernal chest pain radiating to the left with hand numbness.  Mixture of typical and atypical features.  Very strong family history of coronary artery disease.  30% lesion by heart catheterization February this year.  Initial troponin slightly elevated with no ischemic changes apparent on EKG.  Started heparin drip and nothing by mouth.  Cardiology on consult for the morning.  Received aspirin and Nitroglycerin.          VTE Risk Mitigation         Ordered     Low Risk of VTE  Once      06/18/17 0056        Art Peralta MD  Department of Hospital Medicine   [facility]

## 2017-06-18 NOTE — PROGRESS NOTES
Notified Dr. Peralta of bump in troponin from 0.111 to 0.943. No new orders at this time. Cardiology consult for the morning.

## 2017-06-18 NOTE — CONSULTS
[facility] -   Cardiology  Consult Note    Patient Name: Judy Ruelas  MRN: [id number]  Admission Date: 2017  Hospital Length of Stay: 0 days  Code Status: Full Code   Attending Provider: Omari Estes MD   Consulting Provider: Lissette Hopkins MD  Primary Care Physician: [place]  Principal Problem:NSTEMI (non-ST elevated myocardial infarction)    Patient information was obtained from patient and ER records.     Inpatient consult to Cardiology  Consult performed by: LISSETTE HOPKINS  Consult ordered by: EVARISTO WIGGINS       NSTEMI  Subjective:     Chief Complaint:  NSTEMI     HPI: 53 yo female, PMH CAD, LHC done in  showed midLAD 30% and nonobstructive, HTN, obesity, and strong f/h premature CV Dz.  She has had progressively worsening exertional chest pain for weeks, up to shoulder and arm. With REHMAN. Yesterday, call EMS due to prolonged pain and improved after NTG spray. In ER, EKG NSR, RBBB and no acute STT change, troponin up to 0.9 and /89.  Now, some chest sore with tenderness.    Past Medical History:   Diagnosis Date    Anxiety     Depression     Depression with anxiety     Hypertension     Irregular heart beat     Migraine headache        Past Surgical History:   Procedure Laterality Date    APPENDECTOMY       SECTION      HYSTERECTOMY         Review of patient's allergies indicates:   Allergen Reactions    Codeine     Latex, natural rubber     Pcn [penicillins]     Tylox [oxycodone-acetaminophen]        No current facility-administered medications on file prior to encounter.      Current Outpatient Prescriptions on File Prior to Encounter   Medication Sig    atenolol (TENORMIN) 50 MG tablet Take 50 mg by mouth once daily.    busPIRone (BUSPAR) 7.5 MG tablet Take 7.5 mg by mouth 2 (two) times daily.     ferrous sulfate 324 mg (65 mg iron) TbEC Take 325 mg by mouth once daily.    fluoxetine (PROZAC) 20 MG capsule Take 20 mg by  mouth once daily.    hydrocodone-acetaminophen 7.5-325mg (NORCO) 7.5-325 mg per tablet Take 1 tablet by mouth every 6 (six) hours as needed for Pain.    hydrOXYzine (VISTARIL) 50 MG Cap Take 50 mg by mouth 4 (four) times daily.    losartan (COZAAR) 25 MG tablet Take 1 tablet (25 mg total) by mouth once daily.    venlafaxine (EFFEXOR-XR) 75 MG 24 hr capsule Take 75 mg by mouth once daily.    amlodipine (NORVASC) 5 MG tablet Take 2 tablets (10 mg total) by mouth once daily.    aspirin 81 MG Chew Take 1 tablet (81 mg total) by mouth once daily.    butalbital-acetaminophen-caffeine -40 mg (FIORICET, ESGIC) -40 mg per tablet Take 1 tablet by mouth every 6 (six) hours as needed for Pain or Headaches.    omeprazole (PRILOSEC) 40 MG capsule Take 1 capsule (40 mg total) by mouth once daily.    pravastatin (PRAVACHOL) 20 MG tablet Take 20 mg by mouth once daily.    promethazine (PHENERGAN) 25 MG tablet Take 1 tablet (25 mg total) by mouth every 6 (six) hours as needed for Nausea.     Family History     Problem Relation (Age of Onset)    Diabetes Mother    Heart attack Mother (40), Brother (40)    Heart disease Father (70)    Hypertension Father, Mother    Stroke Mother        Social History Main Topics    Smoking status: Never Smoker    Smokeless tobacco: Never Used    Alcohol use No      Comment: denies    Drug use: No      Comment: denies    Sexual activity: Not Currently     Review of Systems   Constitution: Negative for decreased appetite, diaphoresis, fever, weakness, malaise/fatigue and night sweats.   HENT: Negative for headaches and nosebleeds.    Eyes: Negative for blurred vision and double vision.   Cardiovascular: Positive for chest pain and dyspnea on exertion. Negative for claudication, irregular heartbeat, leg swelling, near-syncope, orthopnea, palpitations, paroxysmal nocturnal dyspnea and syncope.   Respiratory: Negative for cough, shortness of breath, sleep disturbances due to  breathing, snoring, sputum production and wheezing.    Endocrine: Negative for cold intolerance and polyuria.   Hematologic/Lymphatic: Does not bruise/bleed easily.   Skin: Negative for rash.   Musculoskeletal: Negative for back pain, falls, joint pain, joint swelling and neck pain.   Gastrointestinal: Negative for abdominal pain, heartburn, nausea and vomiting.   Genitourinary: Negative for dysuria, frequency and hematuria.   Neurological: Negative for difficulty with concentration, dizziness, focal weakness, light-headedness, numbness and seizures.   Psychiatric/Behavioral: Negative for depression, memory loss and substance abuse. The patient does not have insomnia.    Allergic/Immunologic: Negative for HIV exposure and hives.     Objective:     Vital Signs (Most Recent):  Temp: 98.3 °F (36.8 °C) (06/18/17 0700)  Pulse: 67 (06/18/17 0700)  Resp: 18 (06/18/17 0416)  BP: (!) 153/87 (06/18/17 0700)  SpO2: 96 % (06/18/17 0700) Vital Signs (24h Range):  Temp:  [97.7 °F (36.5 °C)-98.3 °F (36.8 °C)] 98.3 °F (36.8 °C)  Pulse:  [61-68] 67  Resp:  [17-20] 18  SpO2:  [96 %-99 %] 96 %  BP: (104-164)/(55-89) 153/87     Weight: 113.9 kg (251 lb)  Body mass index is 41.77 kg/m².    SpO2: 96 %  O2 Device (Oxygen Therapy): room air      Intake/Output Summary (Last 24 hours) at 06/18/17 0915  Last data filed at 06/18/17 0642   Gross per 24 hour   Intake             71.8 ml   Output              500 ml   Net           -428.2 ml       Lines/Drains/Airways     Peripheral Intravenous Line                 Peripheral IV - Single Lumen 06/17/17 Right Forearm 1 day                Physical Exam   Constitutional: She is oriented to person, place, and time. She appears well-nourished.   obses   HENT:   Head: Normocephalic.   Poor teeth hygiene   Eyes: Pupils are equal, round, and reactive to light.   Neck: Normal carotid pulses and no JVD present. Carotid bruit is not present. No thyromegaly present.   Cardiovascular: Normal rate, regular  rhythm, normal heart sounds and normal pulses.   No extrasystoles are present. PMI is not displaced.  Exam reveals no gallop and no S3.    No murmur heard.  Pulmonary/Chest: Breath sounds normal. No stridor. No respiratory distress.   +tenderness on chest   Abdominal: Soft. Bowel sounds are normal. There is no tenderness. There is no rebound.   Musculoskeletal: Normal range of motion.   Neurological: She is alert and oriented to person, place, and time.   Skin: Skin is intact. No rash noted.   Psychiatric: Her behavior is normal.       Significant Labs:   ABG: No results for input(s): PH, PCO2, HCO3, POCSATURATED, BE in the last 48 hours., Blood Culture: No results for input(s): LABBLOO in the last 48 hours., BMP:   Recent Labs  Lab 06/17/17  2311   *      K 4.3      CO2 22*   BUN 14   CREATININE 0.7   CALCIUM 9.0   , CMP   Recent Labs  Lab 06/17/17  2311      K 4.3      CO2 22*   *   BUN 14   CREATININE 0.7   CALCIUM 9.0   PROT 7.3   ALBUMIN 3.4*   BILITOT 0.4   ALKPHOS 71   AST 18   ALT 24   ANIONGAP 11   ESTGFRAFRICA >60   EGFRNONAA >60   , CBC   Recent Labs  Lab 06/17/17  2311   WBC 9.96   HGB 11.8*   HCT 36.7*      , INR No results for input(s): INR, PROTIME in the last 48 hours., Lipid Panel No results for input(s): CHOL, HDL, LDLCALC, TRIG, CHOLHDL in the last 48 hours. and Troponin   Recent Labs  Lab 06/17/17  2311 06/18/17  0436   TROPONINI 0.111* 0.943*       Significant Imaging: X-Ray: CXR: X-Ray Chest 1 View (CXR): No results found for this visit on 06/17/17.  Assessment and Plan:       ACS/NSTEMI  CAD   Obesity  HTN  Strong F/H premature CAD    Plan:  Keep NPO after MN  Continue heparin gtt and IVF  Arrange [facility] Monday morning,   Continue ASA, BB, ARB and statin  Continue NTG paste      Active Diagnoses:    Diagnosis Date Noted POA    PRINCIPAL PROBLEM:  NSTEMI (non-ST elevated myocardial infarction) [I21.4] 06/18/2017 Yes    Depression with anxiety [F41.8]  06/18/2017 Yes     Chronic    Essential hypertension [I10] 01/27/2016 Yes      Problems Resolved During this Admission:    Diagnosis Date Noted Date Resolved POA       VTE Risk Mitigation         Ordered     Low Risk of VTE  Once      06/18/17 0056          Thank you for your consult. I will follow-up with patient. Please contact us if you have any additional questions.    Guanakito Hopkins MD  Cardiology   [facility]

## 2017-06-18 NOTE — PROGRESS NOTES
"Judy Ruelas is a 52 year old female admitted for NSTEMI. Serial troponins 0.111>>0.943. Cardiology consulted and pt is scheduled for [facility] on Monday, 06/19/17. Heparin gtt. Will continue ASA, BB, ARB, and Statin. NPO after midnight. Pt currently c/o substernal chest pain with radiation to left chest and left shoulder. Pt denies SOB. Pt rates 8/10 on pain scale. Pt states, "I didn't know I could request pain medicine." Morphine 4 mg IVP prn to be administered. Will continue to monitor   "

## 2017-06-18 NOTE — ASSESSMENT & PLAN NOTE
Continue Hydroxyzine for anxiety.  Hold Fluoxetine, Venlafaxine, Trazodone, and Buspirone.  Review medications and follow up plan at discharge.  Concern for inappropriate combination and risk.

## 2017-06-18 NOTE — ED NOTES
Patient assisted to the bathroom at this time.  Patient now back in bed and connected back to monitor.  Patient stable.  Family remains at bedside. Bed is in low, locked, position with side rails up x 2.  Will continue to monitor.

## 2017-06-18 NOTE — PROGRESS NOTES
APTT resulted at 84.6. Heparin gtt decreased from 16 units/kg/hr to 14 units/kg/hr per heparin nomogram. Next APTT lab draw scheduled for 1200. Will continue to monitor.

## 2017-06-18 NOTE — PLAN OF CARE
Met with pt for assessment - pt reports that she lives alone at home and is independent with ADLs.  Pt has family support from siblings who live nearby and adult children.  Pt denies having any needs.  CM left contact info for pt to f/u if needed.    D/C plan: home        06/18/17 1419   Discharge Assessment   Assessment Type Discharge Planning Assessment   Confirmed/corrected address and phone number on facesheet? Yes   Assessment information obtained from? Patient;Medical Record   Expected Length of Stay (days) (TBD)   Prior to hospitilization cognitive status: Alert/Oriented   Prior to hospitalization functional status: Independent   Current cognitive status: Alert/Oriented   Current Functional Status: Independent   Arrived From home or self-care   Lives With alone   Able to Return to Prior Arrangements yes   Is patient able to care for self after discharge? Yes   Who are your caregiver(s) and their phone number(s)? Hanny Torres, sister: [phone]   Patient's perception of discharge disposition home or selfcare   Readmission Within The Last 30 Days no previous admission in last 30 days   Patient currently being followed by outpatient case management? No   Patient currently receives home health services? No   Does the patient currently use HME? No   Patient currently receives private duty nursing? No   Patient currently receives any other outside agency services? No   Equipment Currently Used at Home none   Do you have any problems affording any of your prescribed medications? No   Is the patient taking medications as prescribed? yes   Do you have any financial concerns preventing you from receiving the healthcare you need? No   Does the patient have transportation to healthcare appointments? Yes   Transportation Available car;family or friend will provide   On Dialysis? No   Does the patient receive services at the [facility] Clinic? No   Are there any open cases? No   Discharge Plan A Home   Discharge Plan B  Home   Patient/Family In Agreement With Plan yes

## 2017-06-18 NOTE — SUBJECTIVE & OBJECTIVE
Past Medical History:   Diagnosis Date    Anxiety     Depression     Depression with anxiety     Hypertension     Irregular heart beat     Migraine headache        Past Surgical History:   Procedure Laterality Date    APPENDECTOMY       SECTION      HYSTERECTOMY         Review of patient's allergies indicates:   Allergen Reactions    Codeine     Latex, natural rubber     Pcn [penicillins]     Tylox [oxycodone-acetaminophen]        No current facility-administered medications on file prior to encounter.      Current Outpatient Prescriptions on File Prior to Encounter   Medication Sig    atenolol (TENORMIN) 50 MG tablet Take 50 mg by mouth once daily.    busPIRone (BUSPAR) 7.5 MG tablet Take 7.5 mg by mouth 2 (two) times daily.     ferrous sulfate 324 mg (65 mg iron) TbEC Take 325 mg by mouth once daily.    fluoxetine (PROZAC) 20 MG capsule Take 20 mg by mouth once daily.    hydrocodone-acetaminophen 7.5-325mg (NORCO) 7.5-325 mg per tablet Take 1 tablet by mouth every 6 (six) hours as needed for Pain.    hydrOXYzine (VISTARIL) 50 MG Cap Take 50 mg by mouth 4 (four) times daily.    losartan (COZAAR) 25 MG tablet Take 1 tablet (25 mg total) by mouth once daily.    venlafaxine (EFFEXOR-XR) 75 MG 24 hr capsule Take 75 mg by mouth once daily.    amlodipine (NORVASC) 5 MG tablet Take 2 tablets (10 mg total) by mouth once daily.    aspirin 81 MG Chew Take 1 tablet (81 mg total) by mouth once daily.    butalbital-acetaminophen-caffeine -40 mg (FIORICET, ESGIC) -40 mg per tablet Take 1 tablet by mouth every 6 (six) hours as needed for Pain or Headaches.    omeprazole (PRILOSEC) 40 MG capsule Take 1 capsule (40 mg total) by mouth once daily.    pravastatin (PRAVACHOL) 20 MG tablet Take 20 mg by mouth once daily.    promethazine (PHENERGAN) 25 MG tablet Take 1 tablet (25 mg total) by mouth every 6 (six) hours as needed for Nausea.     Family History     Problem Relation (Age of  Onset)    Diabetes Mother    Heart attack Mother (40), Brother (40)    Heart disease Father (70)    Hypertension Father, Mother    Stroke Mother        Social History Main Topics    Smoking status: Never Smoker    Smokeless tobacco: Never Used    Alcohol use No      Comment: denies    Drug use: No      Comment: denies    Sexual activity: Not Currently     Review of Systems   Constitutional: Negative for chills and fever.   HENT: Negative for congestion and sore throat.    Eyes: Negative for visual disturbance.   Respiratory: Positive for chest tightness. Negative for cough, shortness of breath and wheezing.    Cardiovascular: Positive for chest pain. Negative for palpitations and leg swelling.   Gastrointestinal: Negative for abdominal pain, blood in stool, constipation, diarrhea, nausea and vomiting.   Genitourinary: Negative for dysuria and hematuria.   Musculoskeletal: Negative for arthralgias and back pain.   Skin: Negative for rash and wound.   Neurological: Positive for numbness. Negative for dizziness, weakness and light-headedness.   Hematological: Negative for adenopathy.     Objective:     Vital Signs (Most Recent):  Temp: 97.7 °F (36.5 °C) (06/18/17 0100)  Pulse: 61 (06/18/17 0100)  Resp: 18 (06/18/17 0100)  BP: 123/66 (06/18/17 0100)  SpO2: 96 % (06/18/17 0100) Vital Signs (24h Range):  Temp:  [97.7 °F (36.5 °C)-98.1 °F (36.7 °C)] 97.7 °F (36.5 °C)  Pulse:  [61-68] 61  Resp:  [17-20] 18  SpO2:  [96 %-99 %] 96 %  BP: (123-164)/(63-89) 123/66     Weight: 113.1 kg (249 lb 4.8 oz)  Body mass index is 41.49 kg/m².    Physical Exam   Constitutional: She is oriented to person, place, and time. She appears well-developed and well-nourished. No distress.   HENT:   Head: Normocephalic and atraumatic.   Mouth/Throat: Oropharynx is clear and moist.   Eyes: Conjunctivae and EOM are normal. Pupils are equal, round, and reactive to light.   Neck: Neck supple. No JVD present. No thyromegaly present.    Cardiovascular: Normal rate and regular rhythm.  Exam reveals no gallop and no friction rub.    No murmur heard.  Pulmonary/Chest: Effort normal and breath sounds normal. She has no wheezes. She has no rales.   Abdominal: Soft. Bowel sounds are normal. She exhibits no distension. There is no tenderness. There is no rebound and no guarding.   Musculoskeletal: Normal range of motion. She exhibits no edema or deformity.   Lymphadenopathy:     She has no cervical adenopathy.   Neurological: She is alert and oriented to person, place, and time. She has normal reflexes.   Skin: Skin is warm and dry. No rash noted.   Psychiatric: She has a normal mood and affect. Her behavior is normal. Judgment and thought content normal.   Nursing note and vitals reviewed.       Significant Labs:   CBC:   Recent Labs  Lab 06/17/17  2311   WBC 9.96   HGB 11.8*   HCT 36.7*        CMP:   Recent Labs  Lab 06/17/17  2311      K 4.3      CO2 22*   *   BUN 14   CREATININE 0.7   CALCIUM 9.0   PROT 7.3   ALBUMIN 3.4*   BILITOT 0.4   ALKPHOS 71   AST 18   ALT 24   ANIONGAP 11   EGFRNONAA >60       Significant Imaging: I have reviewed all pertinent imaging results/findings within the past 24 hours.

## 2017-06-19 VITALS
TEMPERATURE: 99 F | HEIGHT: 65 IN | DIASTOLIC BLOOD PRESSURE: 60 MMHG | HEART RATE: 59 BPM | BODY MASS INDEX: 41.79 KG/M2 | RESPIRATION RATE: 18 BRPM | WEIGHT: 250.81 LBS | OXYGEN SATURATION: 95 % | SYSTOLIC BLOOD PRESSURE: 112 MMHG

## 2017-06-19 LAB
ANION GAP SERPL CALC-SCNC: 5 MMOL/L
APTT BLDCRRT: 53.8 SEC
BASOPHILS # BLD AUTO: 0.02 K/UL
BASOPHILS NFR BLD: 0.3 %
BUN SERPL-MCNC: 12 MG/DL
CALCIUM SERPL-MCNC: 8.4 MG/DL
CHLORIDE SERPL-SCNC: 106 MMOL/L
CO2 SERPL-SCNC: 27 MMOL/L
CORONARY STENOSIS: ABNORMAL
CREAT SERPL-MCNC: 0.7 MG/DL
DIFFERENTIAL METHOD: ABNORMAL
EOSINOPHIL # BLD AUTO: 0.2 K/UL
EOSINOPHIL NFR BLD: 3 %
ERYTHROCYTE [DISTWIDTH] IN BLOOD BY AUTOMATED COUNT: 14.4 %
EST. GFR  (AFRICAN AMERICAN): >60 ML/MIN/1.73 M^2
EST. GFR  (NON AFRICAN AMERICAN): >60 ML/MIN/1.73 M^2
GLUCOSE SERPL-MCNC: 97 MG/DL
HCT VFR BLD AUTO: 35.2 %
HGB BLD-MCNC: 11.1 G/DL
LYMPHOCYTES # BLD AUTO: 1.3 K/UL
LYMPHOCYTES NFR BLD: 20.4 %
MCH RBC QN AUTO: 28.1 PG
MCHC RBC AUTO-ENTMCNC: 31.5 %
MCV RBC AUTO: 89 FL
MONOCYTES # BLD AUTO: 0.4 K/UL
MONOCYTES NFR BLD: 6.1 %
NEUTROPHILS # BLD AUTO: 4.4 K/UL
NEUTROPHILS NFR BLD: 70.2 %
PLATELET # BLD AUTO: 232 K/UL
PMV BLD AUTO: 10.2 FL
POC ACTIVATED CLOTTING TIME K: 180 SEC (ref 74–137)
POTASSIUM SERPL-SCNC: 4.8 MMOL/L
RBC # BLD AUTO: 3.95 M/UL
SAMPLE: ABNORMAL
SODIUM SERPL-SCNC: 138 MMOL/L
WBC # BLD AUTO: 6.28 K/UL

## 2017-06-19 PROCEDURE — C1887 CATHETER, GUIDING: HCPCS

## 2017-06-19 PROCEDURE — 85025 COMPLETE CBC W/AUTO DIFF WBC: CPT

## 2017-06-19 PROCEDURE — 85730 THROMBOPLASTIN TIME PARTIAL: CPT

## 2017-06-19 PROCEDURE — 25000003 PHARM REV CODE 250: Performed by: INTERNAL MEDICINE

## 2017-06-19 PROCEDURE — 25000003 PHARM REV CODE 250: Performed by: EMERGENCY MEDICINE

## 2017-06-19 PROCEDURE — 25000003 PHARM REV CODE 250

## 2017-06-19 PROCEDURE — 99152 MOD SED SAME PHYS/QHP 5/>YRS: CPT | Mod: ,,, | Performed by: INTERNAL MEDICINE

## 2017-06-19 PROCEDURE — 80048 BASIC METABOLIC PNL TOTAL CA: CPT

## 2017-06-19 PROCEDURE — 63600175 PHARM REV CODE 636 W HCPCS: Performed by: EMERGENCY MEDICINE

## 2017-06-19 PROCEDURE — 93458 L HRT ARTERY/VENTRICLE ANGIO: CPT | Mod: 26,,, | Performed by: INTERNAL MEDICINE

## 2017-06-19 PROCEDURE — 36415 COLL VENOUS BLD VENIPUNCTURE: CPT

## 2017-06-19 PROCEDURE — 4A023N7 MEASUREMENT OF CARDIAC SAMPLING AND PRESSURE, LEFT HEART, PERCUTANEOUS APPROACH: ICD-10-PCS | Performed by: INTERNAL MEDICINE

## 2017-06-19 PROCEDURE — 63600175 PHARM REV CODE 636 W HCPCS

## 2017-06-19 RX ORDER — SODIUM CHLORIDE 9 MG/ML
INJECTION, SOLUTION INTRAVENOUS CONTINUOUS
Status: ACTIVE | OUTPATIENT
Start: 2017-06-19 | End: 2017-06-19

## 2017-06-19 RX ORDER — ISOSORBIDE MONONITRATE 30 MG/1
30 TABLET, EXTENDED RELEASE ORAL DAILY
Qty: 30 TABLET | Refills: 1 | Status: SHIPPED | OUTPATIENT
Start: 2017-06-19 | End: 2017-06-29 | Stop reason: SDUPTHER

## 2017-06-19 RX ORDER — NITROGLYCERIN 0.4 MG/1
0.4 TABLET SUBLINGUAL EVERY 5 MIN PRN
Qty: 12 TABLET | Refills: 0 | Status: SHIPPED | OUTPATIENT
Start: 2017-06-19 | End: 2018-06-19

## 2017-06-19 RX ORDER — ATORVASTATIN CALCIUM 40 MG/1
40 TABLET, FILM COATED ORAL DAILY
Qty: 30 TABLET | Refills: 1 | Status: ON HOLD | OUTPATIENT
Start: 2017-06-19 | End: 2020-12-09 | Stop reason: HOSPADM

## 2017-06-19 RX ORDER — CLOPIDOGREL BISULFATE 75 MG/1
75 TABLET ORAL DAILY
Qty: 30 TABLET | Refills: 1 | Status: SHIPPED | OUTPATIENT
Start: 2017-06-19 | End: 2017-06-29 | Stop reason: SDUPTHER

## 2017-06-19 RX ORDER — ISOSORBIDE MONONITRATE 30 MG/1
30 TABLET, EXTENDED RELEASE ORAL DAILY
Status: DISCONTINUED | OUTPATIENT
Start: 2017-06-19 | End: 2017-06-19 | Stop reason: HOSPADM

## 2017-06-19 RX ORDER — CLOPIDOGREL BISULFATE 75 MG/1
75 TABLET ORAL DAILY
Status: DISCONTINUED | OUTPATIENT
Start: 2017-06-19 | End: 2017-06-19 | Stop reason: HOSPADM

## 2017-06-19 RX ADMIN — HEPARIN SODIUM AND DEXTROSE 14 UNITS/KG/HR: 10000; 5 INJECTION INTRAVENOUS at 03:06

## 2017-06-19 RX ADMIN — SODIUM CHLORIDE: 0.9 INJECTION, SOLUTION INTRAVENOUS at 12:06

## 2017-06-19 RX ADMIN — CLOPIDOGREL BISULFATE 75 MG: 75 TABLET ORAL at 01:06

## 2017-06-19 RX ADMIN — MORPHINE SULFATE 4 MG: 4 INJECTION, SOLUTION INTRAMUSCULAR; INTRAVENOUS at 08:06

## 2017-06-19 RX ADMIN — ACETAMINOPHEN 500 MG: 500 TABLET ORAL at 04:06

## 2017-06-19 RX ADMIN — SODIUM CHLORIDE, PRESERVATIVE FREE 3 ML: 5 INJECTION INTRAVENOUS at 05:06

## 2017-06-19 RX ADMIN — LOSARTAN POTASSIUM 25 MG: 25 TABLET, FILM COATED ORAL at 08:06

## 2017-06-19 RX ADMIN — NITROGLYCERIN 1 INCH: 20 OINTMENT TOPICAL at 05:06

## 2017-06-19 RX ADMIN — HYDROXYZINE PAMOATE 50 MG: 50 CAPSULE ORAL at 01:06

## 2017-06-19 RX ADMIN — ASPIRIN 325 MG ORAL TABLET 325 MG: 325 PILL ORAL at 08:06

## 2017-06-19 RX ADMIN — STANDARDIZED SENNA CONCENTRATE AND DOCUSATE SODIUM 1 TABLET: 8.6; 5 TABLET, FILM COATED ORAL at 08:06

## 2017-06-19 RX ADMIN — NITROGLYCERIN 1 INCH: 20 OINTMENT TOPICAL at 02:06

## 2017-06-19 RX ADMIN — ATENOLOL 50 MG: 50 TABLET ORAL at 08:06

## 2017-06-19 RX ADMIN — HYDROXYZINE PAMOATE 50 MG: 50 CAPSULE ORAL at 05:06

## 2017-06-19 NOTE — OP NOTE
INPATIENT Operative Note         SUMMARY     Surgery Date: 6/19/2017     Surgeon(s) and Role:     * Lux Sommers MD - Primary    ASSISTANT:Audi Diaz    Pre-op Diagnosis:  nstemi      Post-op Diagnosis: nstemi     Procedure(s) (LRB):  HEART CATH-LEFT (Left)    COMPLICATION:none    Anesthesia: RN IV Sedation    Findings/Key Components:  Normal except 50% distal lcx    Estimated Blood Loss: < 50 ML.         SPECIMEN: NONE    Devices/Prostetics: None    PLAN:  Medical therapy

## 2017-06-19 NOTE — PLAN OF CARE
Problem: Pain, Acute (Adult)  Goal: Identify Related Risk Factors and Signs and Symptoms  Related risk factors and signs and symptoms are identified upon initiation of Human Response Clinical Practice Guideline (CPG)  Pt pain controlled by PRN morphine.

## 2017-06-19 NOTE — NURSING TRANSFER
Nursing Transfer Note      6/19/2017   1210 PT TRANSFERRED TO [facility] VIA BED. L RADIAL SITE WDL, DRESSING CDI, L WRIST IMMOBILIZER IN PLACE. CM ON, SR. IVF TO PUMP PER MAR ORDER, R FA IV INTACT, WDL. SR UP X 2, BLP, CBWR, FAMILY AT BS. CARE TO BERTA SONG.

## 2017-06-19 NOTE — DISCHARGE SUMMARY
[facility] Hospital Medicine  Discharge Summary      Patient Name: Judy Ruelas  MRN: [id number]  Admission Date: 6/17/2017  Hospital Length of Stay: 1 days  Discharge Date and Time:  06/19/2017 6:02 PM  Attending Physician: Lisa Posey, *   Discharging Provider: Ahmet Matute NP  Primary Care Provider: [facility]      HPI:   She had a heart catheterization in February for chest pain and a 30% lesion was found and no intervention done.  For the past three days chest pain.  On the way to work today it got really bad.  It felt like something was sitting on her chest and a stabbing pain.  She was given nitroglycerin by EMS which helped with the pain.  Episodes of pain would last all day.  Right now it feels heavy.  She also has some numbness in her hands but no other associated symptoms.  No other recent illness or medication changes or missed medications.  She took some Rolaids which has helped in the past but it did not tonight.  She also rubbed her chest with Icy-Hot which did not help either.  No fevers, chills, nausea, or vomiting.      Procedure(s) (LRB):  HEART CATH-LEFT (Left)      Indwelling Lines/Drains at time of discharge:   Lines/Drains/Airways          No matching active lines, drains, or airways        Hospital Course:   Judy Ruelas is a 52 year old female who was admitted with NSTEMI. Patient presented to ED with chest pain. Troponin 0.111>>>0.879. Patient was seen by Cardiology who performed LHC that revealed Single vessel coronary artery disease (50% distal LCX) Normal LVEF. Diastolic dysfunction. Medical management was therefore recommended. Patient has been prescribed Plavix and Imdur. Lovastatin changed to Atorvastatin. Follow up appt has been made with Herbert Levy NP-Cardiology. Patient seen and examined on date of discharge and deemed suitable.      Consults:   Consults         Status Ordering Provider     Inpatient consult to  Cardiology  Once     Provider:  Guanakito Hopkins MD    Completed EDEL VALLE     Inpatient consult to Hospitalist  Once     Provider:  Art Peralta MD    Acknowledged EDEL VALLE          Significant Diagnostic Studies: Labs:   CMP   Recent Labs  Lab 06/17/17 2311 06/19/17  0518    138   K 4.3 4.8    106   CO2 22* 27   * 97   BUN 14 12   CREATININE 0.7 0.7   CALCIUM 9.0 8.4*   PROT 7.3  --    ALBUMIN 3.4*  --    BILITOT 0.4  --    ALKPHOS 71  --    AST 18  --    ALT 24  --    ANIONGAP 11 5*   ESTGFRAFRICA >60 >60   EGFRNONAA >60 >60    and CBC   Recent Labs  Lab 06/17/17 2311 06/19/17 0518   WBC 9.96 6.28   HGB 11.8* 11.1*   HCT 36.7* 35.2*    232       Pending Diagnostic Studies:     Procedure Component Value Units Date/Time    IR Heart Cath Images [[id number]] Updated:  06/19/17 1029    Order Status:  Sent Lab Status:  In process         Final Active Diagnoses:    Diagnosis Date Noted POA    PRINCIPAL PROBLEM:  NSTEMI (non-ST elevated myocardial infarction) [I21.4] 06/18/2017 Yes    Depression with anxiety [F41.8] 06/18/2017 Yes     Chronic    Essential hypertension [I10] 01/27/2016 Yes      Problems Resolved During this Admission:    Diagnosis Date Noted Date Resolved POA      No new Assessment & Plan notes have been filed under this hospital service since the last note was generated.  Service: Hospital Medicine      Discharged Condition: stable    Disposition: Home or Self Care    Follow Up:  Follow-up Information     Herbert Levy NP On 6/29/2017.    Specialty:  Cardiology  Contact information:  [address]  [place] [address] [phone]                 Patient Instructions:     Diet general     Activity as tolerated       Medications:  Reconciled Home Medications:   Current Discharge Medication List      START taking these medications    Details   atorvastatin (LIPITOR) 40 MG tablet Take 1 tablet (40 mg total) by mouth once daily.  Qty: 30 tablet, Refills: 1       clopidogrel (PLAVIX) 75 mg tablet Take 1 tablet (75 mg total) by mouth once daily.  Qty: 30 tablet, Refills: 1      isosorbide mononitrate (IMDUR) 30 MG 24 hr tablet Take 1 tablet (30 mg total) by mouth once daily.  Qty: 30 tablet, Refills: 1         CONTINUE these medications which have NOT CHANGED    Details   atenolol (TENORMIN) 50 MG tablet Take 50 mg by mouth once daily.      busPIRone (BUSPAR) 7.5 MG tablet Take 7.5 mg by mouth 2 (two) times daily.       ferrous sulfate 324 mg (65 mg iron) TbEC Take 325 mg by mouth once daily.      fluoxetine (PROZAC) 20 MG capsule Take 20 mg by mouth once daily.      hydrocodone-acetaminophen 7.5-325mg (NORCO) 7.5-325 mg per tablet Take 1 tablet by mouth every 6 (six) hours as needed for Pain.  Qty: 20 tablet, Refills: 0      hydrOXYzine (VISTARIL) 50 MG Cap Take 50 mg by mouth 4 (four) times daily.      losartan (COZAAR) 25 MG tablet Take 1 tablet (25 mg total) by mouth once daily.  Qty: 30 tablet, Refills: 0      ranitidine (ZANTAC) 150 MG tablet Take 150 mg by mouth nightly.       trazodone (DESYREL) 50 MG tablet Take 50 mg by mouth every evening.      venlafaxine (EFFEXOR-XR) 75 MG 24 hr capsule Take 75 mg by mouth once daily.      amlodipine (NORVASC) 5 MG tablet Take 2 tablets (10 mg total) by mouth once daily.  Qty: 30 tablet, Refills: 0      aspirin 81 MG Chew Take 1 tablet (81 mg total) by mouth once daily.  Refills: 0      butalbital-acetaminophen-caffeine -40 mg (FIORICET, ESGIC) -40 mg per tablet Take 1 tablet by mouth every 6 (six) hours as needed for Pain or Headaches.  Qty: 10 tablet, Refills: 0      omeprazole (PRILOSEC) 40 MG capsule Take 1 capsule (40 mg total) by mouth once daily.  Qty: 30 capsule, Refills: 0      ondansetron (ZOFRAN) 4 MG tablet Take 8 mg by mouth 2 (two) times daily.      promethazine (PHENERGAN) 25 MG tablet Take 1 tablet (25 mg total) by mouth every 6 (six) hours as needed for Nausea.  Qty: 10 tablet, Refills: 0          STOP taking these medications       lovastatin (MEVACOR) 20 MG tablet Comments:   Reason for Stopping:         pravastatin (PRAVACHOL) 20 MG tablet Comments:   Reason for Stopping:             Time spent on the discharge of patient: >35 minutes    Ahmet Matute NP  Department of Hospital Medicine  [facility]

## 2017-06-19 NOTE — PLAN OF CARE
Problem: Patient Care Overview  Goal: Plan of Care Review  Patient had cath today without intervention.  Came back to room reporting nausea that subsided after having lunch.  No further complaints reported.  Vitals stable, no complaints of chest pain.

## 2017-06-19 NOTE — PLAN OF CARE
Problem: Patient Care Overview  Goal: Plan of Care Review  Outcome: Ongoing (interventions implemented as appropriate)  Patient remains free from falls. Fall precautions remain in place. Morphine given x1 dose for pain. Heparin gtt infusing per orders. Clipped and prepped for LHC in AM. NPO after midnight. VS are stable. No other c/o at this time. Call bell within reach. Reminded to call for assistance.

## 2017-06-19 NOTE — INTERVAL H&P NOTE
The patient has been examined and the H&P has been reviewed:    I concur with the findings and no changes have occurred since H&P was written.  hAS NSTEMI CONTINUES TO HAVE CHEST PAIN WILL PROCEED WITH [facility]  Anesthesia/Surgery risks, benefits and alternative options discussed and understood by patient/family.  I have explained the risks, benefits , and alternatives of the procedure in detail.the patient voices understanding and all questions have been answered.the patient agrees to proceed as planned.          Active Hospital Problems    Diagnosis  POA    *NSTEMI (non-ST elevated myocardial infarction) [I21.4]  Yes    Depression with anxiety [F41.8]  Yes     Chronic    Essential hypertension [I10]  Yes      Resolved Hospital Problems    Diagnosis Date Resolved POA   No resolved problems to display.

## 2017-06-19 NOTE — H&P (VIEW-ONLY)
[facility] -   Cardiology  Consult Note    Patient Name: Judy Ruelas  MRN: [id number]  Admission Date: 2017  Hospital Length of Stay: 0 days  Code Status: Full Code   Attending Provider: Omari Estes MD   Consulting Provider: Lissette Hopkins MD  Primary Care Physician: [facility]  Principal Problem:NSTEMI (non-ST elevated myocardial infarction)    Patient information was obtained from patient and ER records.     Inpatient consult to Cardiology  Consult performed by: LISSETTE HOPKINS  Consult ordered by: EVARISTO WIGGINS       NSTEMI  Subjective:     Chief Complaint:  NSTEMI     HPI: 51 yo female, PMH CAD, LHC done in  showed midLAD 30% and nonobstructive, HTN, obesity, and strong f/h premature CV Dz.  She has had progressively worsening exertional chest pain for weeks, up to shoulder and arm. With REHMAN. Yesterday, call EMS due to prolonged pain and improved after NTG spray. In ER, EKG NSR, RBBB and no acute STT change, troponin up to 0.9 and /89.  Now, some chest sore with tenderness.    Past Medical History:   Diagnosis Date    Anxiety     Depression     Depression with anxiety     Hypertension     Irregular heart beat     Migraine headache        Past Surgical History:   Procedure Laterality Date    APPENDECTOMY       SECTION      HYSTERECTOMY         Review of patient's allergies indicates:   Allergen Reactions    Codeine     Latex, natural rubber     Pcn [penicillins]     Tylox [oxycodone-acetaminophen]        No current facility-administered medications on file prior to encounter.      Current Outpatient Prescriptions on File Prior to Encounter   Medication Sig    atenolol (TENORMIN) 50 MG tablet Take 50 mg by mouth once daily.    busPIRone (BUSPAR) 7.5 MG tablet Take 7.5 mg by mouth 2 (two) times daily.     ferrous sulfate 324 mg (65 mg iron) TbEC Take 325 mg by mouth once daily.    fluoxetine (PROZAC) 20 MG capsule Take 20 mg by  mouth once daily.    hydrocodone-acetaminophen 7.5-325mg (NORCO) 7.5-325 mg per tablet Take 1 tablet by mouth every 6 (six) hours as needed for Pain.    hydrOXYzine (VISTARIL) 50 MG Cap Take 50 mg by mouth 4 (four) times daily.    losartan (COZAAR) 25 MG tablet Take 1 tablet (25 mg total) by mouth once daily.    venlafaxine (EFFEXOR-XR) 75 MG 24 hr capsule Take 75 mg by mouth once daily.    amlodipine (NORVASC) 5 MG tablet Take 2 tablets (10 mg total) by mouth once daily.    aspirin 81 MG Chew Take 1 tablet (81 mg total) by mouth once daily.    butalbital-acetaminophen-caffeine -40 mg (FIORICET, ESGIC) -40 mg per tablet Take 1 tablet by mouth every 6 (six) hours as needed for Pain or Headaches.    omeprazole (PRILOSEC) 40 MG capsule Take 1 capsule (40 mg total) by mouth once daily.    pravastatin (PRAVACHOL) 20 MG tablet Take 20 mg by mouth once daily.    promethazine (PHENERGAN) 25 MG tablet Take 1 tablet (25 mg total) by mouth every 6 (six) hours as needed for Nausea.     Family History     Problem Relation (Age of Onset)    Diabetes Mother    Heart attack Mother (40), Brother (40)    Heart disease Father (70)    Hypertension Father, Mother    Stroke Mother        Social History Main Topics    Smoking status: Never Smoker    Smokeless tobacco: Never Used    Alcohol use No      Comment: denies    Drug use: No      Comment: denies    Sexual activity: Not Currently     Review of Systems   Constitution: Negative for decreased appetite, diaphoresis, fever, weakness, malaise/fatigue and night sweats.   HENT: Negative for headaches and nosebleeds.    Eyes: Negative for blurred vision and double vision.   Cardiovascular: Positive for chest pain and dyspnea on exertion. Negative for claudication, irregular heartbeat, leg swelling, near-syncope, orthopnea, palpitations, paroxysmal nocturnal dyspnea and syncope.   Respiratory: Negative for cough, shortness of breath, sleep disturbances due to  breathing, snoring, sputum production and wheezing.    Endocrine: Negative for cold intolerance and polyuria.   Hematologic/Lymphatic: Does not bruise/bleed easily.   Skin: Negative for rash.   Musculoskeletal: Negative for back pain, falls, joint pain, joint swelling and neck pain.   Gastrointestinal: Negative for abdominal pain, heartburn, nausea and vomiting.   Genitourinary: Negative for dysuria, frequency and hematuria.   Neurological: Negative for difficulty with concentration, dizziness, focal weakness, light-headedness, numbness and seizures.   Psychiatric/Behavioral: Negative for depression, memory loss and substance abuse. The patient does not have insomnia.    Allergic/Immunologic: Negative for HIV exposure and hives.     Objective:     Vital Signs (Most Recent):  Temp: 98.3 °F (36.8 °C) (06/18/17 0700)  Pulse: 67 (06/18/17 0700)  Resp: 18 (06/18/17 0416)  BP: (!) 153/87 (06/18/17 0700)  SpO2: 96 % (06/18/17 0700) Vital Signs (24h Range):  Temp:  [97.7 °F (36.5 °C)-98.3 °F (36.8 °C)] 98.3 °F (36.8 °C)  Pulse:  [61-68] 67  Resp:  [17-20] 18  SpO2:  [96 %-99 %] 96 %  BP: (104-164)/(55-89) 153/87     Weight: 113.9 kg (251 lb)  Body mass index is 41.77 kg/m².    SpO2: 96 %  O2 Device (Oxygen Therapy): room air      Intake/Output Summary (Last 24 hours) at 06/18/17 0915  Last data filed at 06/18/17 0642   Gross per 24 hour   Intake             71.8 ml   Output              500 ml   Net           -428.2 ml       Lines/Drains/Airways     Peripheral Intravenous Line                 Peripheral IV - Single Lumen 06/17/17 Right Forearm 1 day                Physical Exam   Constitutional: She is oriented to person, place, and time. She appears well-nourished.   obses   HENT:   Head: Normocephalic.   Poor teeth hygiene   Eyes: Pupils are equal, round, and reactive to light.   Neck: Normal carotid pulses and no JVD present. Carotid bruit is not present. No thyromegaly present.   Cardiovascular: Normal rate, regular  rhythm, normal heart sounds and normal pulses.   No extrasystoles are present. PMI is not displaced.  Exam reveals no gallop and no S3.    No murmur heard.  Pulmonary/Chest: Breath sounds normal. No stridor. No respiratory distress.   +tenderness on chest   Abdominal: Soft. Bowel sounds are normal. There is no tenderness. There is no rebound.   Musculoskeletal: Normal range of motion.   Neurological: She is alert and oriented to person, place, and time.   Skin: Skin is intact. No rash noted.   Psychiatric: Her behavior is normal.       Significant Labs:   ABG: No results for input(s): PH, PCO2, HCO3, POCSATURATED, BE in the last 48 hours., Blood Culture: No results for input(s): LABBLOO in the last 48 hours., BMP:   Recent Labs  Lab 06/17/17  2311   *      K 4.3      CO2 22*   BUN 14   CREATININE 0.7   CALCIUM 9.0   , CMP   Recent Labs  Lab 06/17/17  2311      K 4.3      CO2 22*   *   BUN 14   CREATININE 0.7   CALCIUM 9.0   PROT 7.3   ALBUMIN 3.4*   BILITOT 0.4   ALKPHOS 71   AST 18   ALT 24   ANIONGAP 11   ESTGFRAFRICA >60   EGFRNONAA >60   , CBC   Recent Labs  Lab 06/17/17  2311   WBC 9.96   HGB 11.8*   HCT 36.7*      , INR No results for input(s): INR, PROTIME in the last 48 hours., Lipid Panel No results for input(s): CHOL, HDL, LDLCALC, TRIG, CHOLHDL in the last 48 hours. and Troponin   Recent Labs  Lab 06/17/17  2311 06/18/17  0436   TROPONINI 0.111* 0.943*       Significant Imaging: X-Ray: CXR: X-Ray Chest 1 View (CXR): No results found for this visit on 06/17/17.  Assessment and Plan:       ACS/NSTEMI  CAD   Obesity  HTN  Strong F/H premature CAD    Plan:  Keep NPO after MN  Continue heparin gtt and IVF  Arrange [facility] Monday morning,   Continue ASA, BB, ARB and statin  Continue NTG paste      Active Diagnoses:    Diagnosis Date Noted POA    PRINCIPAL PROBLEM:  NSTEMI (non-ST elevated myocardial infarction) [I21.4] 06/18/2017 Yes    Depression with anxiety [F41.8]  06/18/2017 Yes     Chronic    Essential hypertension [I10] 01/27/2016 Yes      Problems Resolved During this Admission:    Diagnosis Date Noted Date Resolved POA       VTE Risk Mitigation         Ordered     Low Risk of VTE  Once      06/18/17 0056          Thank you for your consult. I will follow-up with patient. Please contact us if you have any additional questions.    Guanakito Hopkins MD  Cardiology   [facility]

## 2017-06-19 NOTE — HOSPITAL COURSE
Judy Ruelas is a 52 year old female who was admitted with NSTEMI. Patient presented to ED with chest pain. Troponin 0.111>>>0.879. Patient was seen by Cardiology who performed LHC that revealed Single vessel coronary artery disease (50% distal LCX) Normal LVEF. Diastolic dysfunction. Medical management was therefore recommended. Patient has been prescribed Plavix and Imdur. Lovastatin changed to Atorvastatin. Follow up appt has been made with Herbert Levy NP-Cardiology. Patient seen and examined on date of discharge and deemed suitable.

## 2017-06-20 NOTE — PLAN OF CARE
Due to Discharge Summary indicating patient is to follow up with NEGRO Cottrell (and patient's face sheet reflects patient's PCP as NEGRO Cottrell), contacted patient to ensure that she knows how to access [facility] for outpatient follow up as well.  Attempted to contact patient x 3 via phone number listed on face sheet and there was a perpetual busy signal each time phone number was called.       06/20/17 0904   Final Note   Assessment Type Final Discharge Note   Discharge Disposition Home   What phone number can be called within the next 1-3 days to see how you are doing after discharge? (Unknown, as phone number on face sheet is a perpetual busy signal when this  attempted to call x 3)   Referral to Outpatient Case Management complete? n/a   Referral to / orders for Home Health Complete? n/a   Right Care Referral Info   Post Acute Recommendation No Care

## 2017-06-26 ENCOUNTER — HOSPITAL ENCOUNTER (EMERGENCY)
Facility: HOSPITAL | Age: 52
Discharge: HOME | End: 2017-06-27
Attending: EMERGENCY MEDICINE
Payer: MEDICAID

## 2017-06-26 DIAGNOSIS — R07.9 CHEST PAIN, UNSPECIFIED TYPE: Primary | ICD-10-CM

## 2017-06-26 LAB
ALBUMIN SERPL BCP-MCNC: 3.7 G/DL
ALP SERPL-CCNC: 64 U/L
ALT SERPL W/O P-5'-P-CCNC: 23 U/L
ANION GAP SERPL CALC-SCNC: 9 MMOL/L
AST SERPL-CCNC: 19 U/L
BASOPHILS # BLD AUTO: 0.02 K/UL
BASOPHILS NFR BLD: 0.2 %
BILIRUB SERPL-MCNC: 0.4 MG/DL
BUN SERPL-MCNC: 17 MG/DL
CALCIUM SERPL-MCNC: 9.2 MG/DL
CHLORIDE SERPL-SCNC: 103 MMOL/L
CO2 SERPL-SCNC: 23 MMOL/L
CREAT SERPL-MCNC: 0.8 MG/DL
DIFFERENTIAL METHOD: ABNORMAL
EOSINOPHIL # BLD AUTO: 0.1 K/UL
EOSINOPHIL NFR BLD: 1.2 %
ERYTHROCYTE [DISTWIDTH] IN BLOOD BY AUTOMATED COUNT: 14.9 %
EST. GFR  (AFRICAN AMERICAN): >60 ML/MIN/1.73 M^2
EST. GFR  (NON AFRICAN AMERICAN): >60 ML/MIN/1.73 M^2
GLUCOSE SERPL-MCNC: 109 MG/DL
HCT VFR BLD AUTO: 37.3 %
HGB BLD-MCNC: 12.7 G/DL
LIPASE SERPL-CCNC: 23 U/L
LYMPHOCYTES # BLD AUTO: 1 K/UL
LYMPHOCYTES NFR BLD: 10.8 %
MCH RBC QN AUTO: 28.5 PG
MCHC RBC AUTO-ENTMCNC: 34 %
MCV RBC AUTO: 84 FL
MONOCYTES # BLD AUTO: 0.6 K/UL
MONOCYTES NFR BLD: 7.1 %
NEUTROPHILS # BLD AUTO: 7.3 K/UL
NEUTROPHILS NFR BLD: 80.7 %
PLATELET # BLD AUTO: 259 K/UL
PMV BLD AUTO: 10.4 FL
POTASSIUM SERPL-SCNC: 4.5 MMOL/L
PROT SERPL-MCNC: 7.8 G/DL
RBC # BLD AUTO: 4.45 M/UL
SODIUM SERPL-SCNC: 135 MMOL/L
TROPONIN I SERPL DL<=0.01 NG/ML-MCNC: 0.04 NG/ML
WBC # BLD AUTO: 9.01 K/UL

## 2017-06-26 PROCEDURE — 99284 EMERGENCY DEPT VISIT MOD MDM: CPT

## 2017-06-26 PROCEDURE — 93010 ELECTROCARDIOGRAM REPORT: CPT | Mod: ,,, | Performed by: INTERNAL MEDICINE

## 2017-06-26 PROCEDURE — 85025 COMPLETE CBC W/AUTO DIFF WBC: CPT

## 2017-06-26 PROCEDURE — 25000003 PHARM REV CODE 250: Performed by: EMERGENCY MEDICINE

## 2017-06-26 PROCEDURE — 83690 ASSAY OF LIPASE: CPT

## 2017-06-26 PROCEDURE — 80053 COMPREHEN METABOLIC PANEL: CPT

## 2017-06-26 PROCEDURE — 84484 ASSAY OF TROPONIN QUANT: CPT

## 2017-06-26 RX ORDER — NITROGLYCERIN 0.4 MG/1
0.4 TABLET SUBLINGUAL EVERY 5 MIN PRN
Status: COMPLETED | OUTPATIENT
Start: 2017-06-26 | End: 2017-06-26

## 2017-06-26 RX ORDER — ASPIRIN 325 MG
325 TABLET ORAL
Status: COMPLETED | OUTPATIENT
Start: 2017-06-26 | End: 2017-06-26

## 2017-06-26 RX ADMIN — NITROGLYCERIN 0.4 MG: 0.4 TABLET SUBLINGUAL at 09:06

## 2017-06-26 RX ADMIN — ASPIRIN 325 MG ORAL TABLET 325 MG: 325 PILL ORAL at 09:06

## 2017-06-26 RX ADMIN — NITROGLYCERIN 0.4 MG: 0.4 TABLET SUBLINGUAL at 10:06

## 2017-06-26 RX ADMIN — LIDOCAINE HYDROCHLORIDE 50 ML: 20 SOLUTION ORAL; TOPICAL at 09:06

## 2017-06-27 VITALS
OXYGEN SATURATION: 97 % | TEMPERATURE: 98 F | HEIGHT: 65 IN | HEART RATE: 59 BPM | BODY MASS INDEX: 41.48 KG/M2 | DIASTOLIC BLOOD PRESSURE: 79 MMHG | WEIGHT: 249 LBS | SYSTOLIC BLOOD PRESSURE: 143 MMHG | RESPIRATION RATE: 17 BRPM

## 2017-06-27 LAB — TROPONIN I SERPL DL<=0.01 NG/ML-MCNC: 0.03 NG/ML

## 2017-06-27 PROCEDURE — 84484 ASSAY OF TROPONIN QUANT: CPT

## 2017-06-27 NOTE — ED PROVIDER NOTES
"SCRIBE #1 NOTE: I, David Rizvi, am scribing for, and in the presence of, Stella Lam MD. I have scribed the entire note.      History      Chief Complaint   Patient presents with    Chest Pain     midsternal radiating to left arm. improvement with nitro sprays       Review of patient's allergies indicates:   Allergen Reactions    Codeine     Latex, natural rubber     Pcn [penicillins]     Tylox [oxycodone-acetaminophen]         HPI   HPI    2017, 9:20 PM   History obtained from the patient      History of Present Illness: Judy Ruelas is a 52 y.o. female patient who presents to the Emergency Department for mid-sternal CP which onset gradually approximately 3 hours ago. Symptoms are constant and moderate in severity. Pt describes sxs as "heaviness and stabbing". Pt received NTG sprays enroute via EMS. Pt states the pain was initially a 10/10 in severity, and is now improved to a 7/10 after receiving NTG. No mitigating or exacerbating factors reported. Associated sxs include L shoulder paresthesias. Patient denies any fever, chills, diaphoresis, palpitation, leg pain/swelling, SOB, N/V/D, dysuria, weakness, dizziness, HA, and all other sxs at this time. No further complaints or concerns at this time.       Arrival mode: EMS      PCP: [facility]       Past Medical History:  Past Medical History:   Diagnosis Date    Anxiety     Depression     Depression with anxiety     Hypertension     Irregular heart beat     Migraine headache        Past Surgical History:  Past Surgical History:   Procedure Laterality Date    APPENDECTOMY       SECTION      HYSTERECTOMY           Family History:  Family History   Problem Relation Age of Onset    Hypertension      Heart disease Father 70    Hypertension Father     Heart attack Mother 40     triple bypass    Diabetes Mother     Hypertension Mother     Stroke Mother     Heart attack Brother 40     CABG x 2 "       Social History:  Social History     Social History Main Topics    Smoking status: Never Smoker    Smokeless tobacco: Never Used    Alcohol use No      Comment: denies    Drug use: No      Comment: denies    Sexual activity: Not Currently       ROS   Review of Systems   Constitutional: Negative for chills, diaphoresis and fever.   HENT: Negative for sore throat.    Respiratory: Negative for shortness of breath.    Cardiovascular: Positive for chest pain. Negative for palpitations and leg swelling.   Gastrointestinal: Negative for abdominal pain, diarrhea, nausea and vomiting.   Genitourinary: Negative for dysuria.   Musculoskeletal: Negative for back pain.   Skin: Negative for rash.   Neurological: Negative for dizziness and weakness.        (+) L shoulder paresthesias   Hematological: Does not bruise/bleed easily.   All other systems reviewed and are negative.    Physical Exam      Initial Vitals [06/26/17 2105]   BP Pulse Resp Temp SpO2   (!) 164/97 68 20 98.1 °F (36.7 °C) 96 %      MAP       119.33          Physical Exam  Nursing Notes and Vital Signs Reviewed.  Constitutional: Patient is in no acute distress. Well-developed and well-nourished.  Head: Atraumatic. Normocephalic.  Eyes: PERRL. EOM intact. Conjunctivae are not pale. No scleral icterus.  ENT: Mucous membranes are moist. Oropharynx is clear and symmetric.    Neck: Supple. Full ROM. No lymphadenopathy.  Cardiovascular: Regular rate. Regular rhythm. No murmurs, rubs, or gallops. Distal pulses are 2+ and symmetric.  Pulmonary/Chest: No respiratory distress. Clear to auscultation bilaterally. No wheezing, rales, or rhonchi.  Abdominal: Soft and non-distended.  There is epigastric tenderness.  No rebound, guarding, or rigidity. Good bowel sounds.  Genitourinary: No CVA tenderness  Musculoskeletal: Moves all extremities. No obvious deformities. No edema. No calf tenderness.  Skin: Warm and dry.  Neurological:  Alert, awake, and appropriate.   "Normal speech.  No acute focal neurological deficits are appreciated.  Psychiatric: Normal affect. Good eye contact. Appropriate in content.    ED Course    Procedures  ED Vital Signs:  Vitals:    06/26/17 2105 06/26/17 2117 06/26/17 2120 06/26/17 2232   BP: (!) 164/97  (!) 147/78 129/76   Pulse: 68 70 70 74   Resp: 20 18 18 18   Temp: 98.1 °F (36.7 °C)      TempSrc: Oral      SpO2: 96%      Weight: 112.9 kg (249 lb)      Height: 5' 5" (1.651 m)       06/27/17 0051 06/27/17 0132   BP: 133/84 (!) 143/79   Pulse: 65 (!) 59   Resp: 18 17   Temp:     TempSrc:     SpO2: 99% 97%   Weight:     Height:         Abnormal Lab Results:  Labs Reviewed   CBC W/ AUTO DIFFERENTIAL - Abnormal; Notable for the following:        Result Value    RDW 14.9 (*)     Gran% 80.7 (*)     Lymph% 10.8 (*)     All other components within normal limits   COMPREHENSIVE METABOLIC PANEL - Abnormal; Notable for the following:     Sodium 135 (*)     All other components within normal limits   TROPONIN I - Abnormal; Notable for the following:     Troponin I 0.036 (*)     All other components within normal limits   TROPONIN I - Abnormal; Notable for the following:     Troponin I 0.030 (*)     All other components within normal limits   LIPASE        All Lab Results:  Results for orders placed or performed during the hospital encounter of 06/26/17   CBC auto differential   Result Value Ref Range    WBC 9.01 3.90 - 12.70 K/uL    RBC 4.45 4.00 - 5.40 M/uL    Hemoglobin 12.7 12.0 - 16.0 g/dL    Hematocrit 37.3 37.0 - 48.5 %    MCV 84 82 - 98 fL    MCH 28.5 27.0 - 31.0 pg    MCHC 34.0 32.0 - 36.0 %    RDW 14.9 (H) 11.5 - 14.5 %    Platelets 259 150 - 350 K/uL    MPV 10.4 9.2 - 12.9 fL    Gran # 7.3 1.8 - 7.7 K/uL    Lymph # 1.0 1.0 - 4.8 K/uL    Mono # 0.6 0.3 - 1.0 K/uL    Eos # 0.1 0.0 - 0.5 K/uL    Baso # 0.02 0.00 - 0.20 K/uL    Gran% 80.7 (H) 38.0 - 73.0 %    Lymph% 10.8 (L) 18.0 - 48.0 %    Mono% 7.1 4.0 - 15.0 %    Eosinophil% 1.2 0.0 - 8.0 %    " Basophil% 0.2 0.0 - 1.9 %    Differential Method Automated    Comprehensive metabolic panel   Result Value Ref Range    Sodium 135 (L) 136 - 145 mmol/L    Potassium 4.5 3.5 - 5.1 mmol/L    Chloride 103 95 - 110 mmol/L    CO2 23 23 - 29 mmol/L    Glucose 109 70 - 110 mg/dL    BUN, Bld 17 6 - 20 mg/dL    Creatinine 0.8 0.5 - 1.4 mg/dL    Calcium 9.2 8.7 - 10.5 mg/dL    Total Protein 7.8 6.0 - 8.4 g/dL    Albumin 3.7 3.5 - 5.2 g/dL    Total Bilirubin 0.4 0.1 - 1.0 mg/dL    Alkaline Phosphatase 64 55 - 135 U/L    AST 19 10 - 40 U/L    ALT 23 10 - 44 U/L    Anion Gap 9 8 - 16 mmol/L    eGFR if African American >60 >60 mL/min/1.73 m^2    eGFR if non African American >60 >60 mL/min/1.73 m^2   Troponin I #1   Result Value Ref Range    Troponin I 0.036 (H) 0.000 - 0.026 ng/mL   Lipase   Result Value Ref Range    Lipase 23 4 - 60 U/L   Troponin I #2   Result Value Ref Range    Troponin I 0.030 (H) 0.000 - 0.026 ng/mL       The EKG was ordered, reviewed, and independently interpreted by the ED provider.  Interpretation time: 2110  Rate: 66 BPM  Rhythm: normal sinus rhythm  Interpretation: RBBB. No STEMI.         The Emergency Provider reviewed the vital signs and test results, which are outlined above.    ED Discussion     1:37 AM: Reassessed pt at this time. Pt is AAO x3, in NAD, and VSS. Pt states her condition has improved at this time. Discussed with pt all pertinent ED information and results. Discussed pt dx and plan of tx. Gave pt all f/u and return to the ED instructions. All questions and concerns were addressed at this time. Pt expresses understanding of information and instructions, and is comfortable with plan to discharge. Pt is stable for discharge.    I have discussed with patient and/or family/caretaker chest pain precautions, specifically to return for worsening chest pain, shortness of breath, fever, or any concern.  I have low suspicion for cardiopulmonary, vascular, infectious, respiratory, or other  emergent medical condition based on my evaluation in the ED.      ED Medication(s):  Medications   aspirin tablet 325 mg (325 mg Oral Given 6/26/17 2150)   nitroGLYCERIN SL tablet 0.4 mg (0.4 mg Sublingual Given 6/26/17 2215)   GI cocktail (mylanta 30 mL, lidocaine 2 % viscous 10 mL, dicyclomine 10 mL) 50 mL (50 mLs Oral Given 6/26/17 2153)       Discharge Medication List as of 6/27/2017  1:32 AM          Follow-up Information     [facility] In 2 days.    Contact information:  [address] [address] [address]  [phone]             [facility]    Specialty:  Emergency Medicine  Why:  As needed, If symptoms worsen  Contact information:  [address]  [facility] [address] [phone]                   Medical Decision Making    Medical Decision Making:   Clinical Tests:   Lab Tests: Ordered and Reviewed  Medical Tests: Ordered and Reviewed           Scribe Attestation:   Scribe #1: I performed the above scribed service and the documentation accurately describes the services I performed. I attest to the accuracy of the note.    Attending:   Physician Attestation Statement for Scribe #1: I, Stella Lam MD, personally performed the services described in this documentation, as scribed by David Rizvi, in my presence, and it is both accurate and complete.          Clinical Impression       ICD-10-CM ICD-9-CM   1. Chest pain, unspecified type R07.9 786.50       Disposition:   Disposition: Discharged  Condition: Stable         Stella Lam MD  06/28/17 0513

## 2017-06-27 NOTE — ED NOTES
"Pt states that chest pain started around 3pm. Pt states that pain is mid sternal and describes it as "someone sitting on her chest." pt rates pain 8/10 upon arrival. Pt reports nausea and SOB denies vomiting.   "

## 2017-06-29 ENCOUNTER — OFFICE VISIT (OUTPATIENT)
Dept: CARDIOLOGY | Facility: CLINIC | Age: 52
End: 2017-06-29
Payer: MEDICAID

## 2017-06-29 VITALS
HEIGHT: 65 IN | HEART RATE: 66 BPM | DIASTOLIC BLOOD PRESSURE: 98 MMHG | SYSTOLIC BLOOD PRESSURE: 136 MMHG | BODY MASS INDEX: 40.43 KG/M2 | WEIGHT: 242.63 LBS

## 2017-06-29 DIAGNOSIS — R07.9 CHEST PAIN, UNSPECIFIED TYPE: ICD-10-CM

## 2017-06-29 DIAGNOSIS — I10 ESSENTIAL HYPERTENSION: ICD-10-CM

## 2017-06-29 DIAGNOSIS — I21.4 NSTEMI (NON-ST ELEVATED MYOCARDIAL INFARCTION): ICD-10-CM

## 2017-06-29 DIAGNOSIS — Z82.49 FAMILY HISTORY OF HEART DISEASE: ICD-10-CM

## 2017-06-29 DIAGNOSIS — E66.01 MORBID OBESITY WITH BMI OF 40.0-44.9, ADULT: ICD-10-CM

## 2017-06-29 DIAGNOSIS — I25.10 CORONARY ARTERY DISEASE INVOLVING NATIVE CORONARY ARTERY OF NATIVE HEART WITHOUT ANGINA PECTORIS: Primary | ICD-10-CM

## 2017-06-29 PROCEDURE — 99213 OFFICE O/P EST LOW 20 MIN: CPT | Mod: PBBFAC | Performed by: NURSE PRACTITIONER

## 2017-06-29 PROCEDURE — 99999 PR PBB SHADOW E&M-EST. PATIENT-LVL III: CPT | Mod: PBBFAC,,, | Performed by: NURSE PRACTITIONER

## 2017-06-29 PROCEDURE — 99214 OFFICE O/P EST MOD 30 MIN: CPT | Mod: S$PBB,,, | Performed by: NURSE PRACTITIONER

## 2017-06-29 RX ORDER — AMLODIPINE BESYLATE 5 MG/1
10 TABLET ORAL DAILY
Qty: 90 TABLET | Refills: 3 | Status: SHIPPED | OUTPATIENT
Start: 2017-06-29 | End: 2017-06-29 | Stop reason: SDUPTHER

## 2017-06-29 RX ORDER — ISOSORBIDE MONONITRATE 30 MG/1
30 TABLET, EXTENDED RELEASE ORAL DAILY
Qty: 90 TABLET | Refills: 2 | Status: SHIPPED | OUTPATIENT
Start: 2017-06-29 | End: 2018-06-29

## 2017-06-29 RX ORDER — CLOPIDOGREL BISULFATE 75 MG/1
75 TABLET ORAL DAILY
Qty: 90 TABLET | Refills: 3 | Status: SHIPPED | OUTPATIENT
Start: 2017-06-29 | End: 2023-05-08

## 2017-06-29 RX ORDER — ISOSORBIDE MONONITRATE 30 MG/1
30 TABLET, EXTENDED RELEASE ORAL DAILY
Qty: 90 TABLET | Refills: 2 | Status: SHIPPED | OUTPATIENT
Start: 2017-06-29 | End: 2017-06-29 | Stop reason: SDUPTHER

## 2017-06-29 RX ORDER — LOSARTAN POTASSIUM 50 MG/1
50 TABLET ORAL DAILY
Qty: 90 TABLET | Refills: 3 | Status: SHIPPED | OUTPATIENT
Start: 2017-06-29 | End: 2018-06-29

## 2017-06-29 RX ORDER — LOSARTAN POTASSIUM 50 MG/1
50 TABLET ORAL DAILY
Qty: 90 TABLET | Refills: 3 | Status: SHIPPED | OUTPATIENT
Start: 2017-06-29 | End: 2017-06-29 | Stop reason: SDUPTHER

## 2017-06-29 RX ORDER — CLOPIDOGREL BISULFATE 75 MG/1
75 TABLET ORAL DAILY
Qty: 90 TABLET | Refills: 3 | Status: SHIPPED | OUTPATIENT
Start: 2017-06-29 | End: 2017-06-29 | Stop reason: SDUPTHER

## 2017-06-29 RX ORDER — AMLODIPINE BESYLATE 5 MG/1
10 TABLET ORAL DAILY
Qty: 90 TABLET | Refills: 3 | Status: SHIPPED | OUTPATIENT
Start: 2017-06-29 | End: 2022-11-01

## 2017-06-29 NOTE — PROGRESS NOTES
Subjective:    Patient ID:  Judy Ruelas is a 52 y.o. female who presents for follow-up of Coronary Artery Disease and Hypertension      HPI   Mr Ruelas is a 52 year old female with PMHx of HTN, CAD, NSTEMI and morbid obesity. She visits the clinic today for hospital follow up. Patient admitted to [facility] on 6/17/2017 and underwent with NSTEMI. She underwent LHC by Dr Sommers. Results showed Normal coronaries except 50% stenosis of distal LCX. Left radial puncture site intact, no bleeding or hematoma. She has some bruising and tenderness to site. Patient also had an Emergency Room visit for chest pain on 6/28/2017, discharged for the ED after workup.     Review of Systems   Constitution: Negative for diaphoresis, weakness, malaise/fatigue, weight gain and weight loss.   HENT: Negative for congestion and nosebleeds.    Eyes: Negative for blurred vision and double vision.   Cardiovascular: Negative for chest pain, claudication, cyanosis, dyspnea on exertion, irregular heartbeat, leg swelling, near-syncope, orthopnea, palpitations, paroxysmal nocturnal dyspnea and syncope.   Respiratory: Negative for cough, hemoptysis, shortness of breath, sputum production and wheezing.    Hematologic/Lymphatic: Negative for bleeding problem. Does not bruise/bleed easily.   Skin: Negative for rash.   Musculoskeletal: Negative for back pain, falls, joint pain, joint swelling and neck pain.   Gastrointestinal: Negative for abdominal pain, heartburn and vomiting.   Genitourinary: Negative for dysuria, frequency and hematuria.   Neurological: Negative for difficulty with concentration, dizziness, focal weakness, light-headedness, numbness and seizures.   Psychiatric/Behavioral: Negative for depression, memory loss and substance abuse.   Allergic/Immunologic: Negative for HIV exposure and hives.           Past Medical History:   Diagnosis Date    Anxiety     Depression     Depression with anxiety     Hypertension     Irregular heart  beat     Migraine headache      Past Surgical History:   Procedure Laterality Date    APPENDECTOMY       SECTION      HYSTERECTOMY       Family History   Problem Relation Age of Onset    Hypertension      Heart disease Father 70    Hypertension Father     Heart attack Mother 40     triple bypass    Diabetes Mother     Hypertension Mother     Stroke Mother     Heart attack Brother 40     CABG x 2     Social History     Social History    Marital status:      Spouse name: N/A    Number of children: N/A    Years of education: N/A     Social History Main Topics    Smoking status: Never Smoker    Smokeless tobacco: Never Used    Alcohol use No      Comment: denies    Drug use: No      Comment: denies    Sexual activity: Not Currently     Other Topics Concern    Not on file     Social History Narrative    No narrative on file     Review of patient's allergies indicates:   Allergen Reactions    Codeine     Latex, natural rubber     Pcn [penicillins]     Tylox [oxycodone-acetaminophen]      Current Outpatient Prescriptions on File Prior to Visit   Medication Sig Dispense Refill    aspirin 81 MG Chew Take 1 tablet (81 mg total) by mouth once daily.  0    atenolol (TENORMIN) 50 MG tablet Take 50 mg by mouth once daily.      atorvastatin (LIPITOR) 40 MG tablet Take 1 tablet (40 mg total) by mouth once daily. 30 tablet 1    busPIRone (BUSPAR) 7.5 MG tablet Take 7.5 mg by mouth 2 (two) times daily.       butalbital-acetaminophen-caffeine -40 mg (FIORICET, ESGIC) -40 mg per tablet Take 1 tablet by mouth every 6 (six) hours as needed for Pain or Headaches. 10 tablet 0    ferrous sulfate 324 mg (65 mg iron) TbEC Take 325 mg by mouth once daily.      fluoxetine (PROZAC) 20 MG capsule Take 20 mg by mouth once daily.      hydrocodone-acetaminophen 7.5-325mg (NORCO) 7.5-325 mg per tablet Take 1 tablet by mouth every 6 (six) hours as needed for Pain. 20 tablet 0     hydrOXYzine (VISTARIL) 50 MG Cap Take 50 mg by mouth 4 (four) times daily.      nitroGLYCERIN (NITROSTAT) 0.4 MG SL tablet Place 1 tablet (0.4 mg total) under the tongue every 5 (five) minutes as needed for Chest pain. 12 tablet 0    omeprazole (PRILOSEC) 40 MG capsule Take 1 capsule (40 mg total) by mouth once daily. 30 capsule 0    ondansetron (ZOFRAN) 4 MG tablet Take 8 mg by mouth 2 (two) times daily.      promethazine (PHENERGAN) 25 MG tablet Take 1 tablet (25 mg total) by mouth every 6 (six) hours as needed for Nausea. 10 tablet 0    ranitidine (ZANTAC) 150 MG tablet Take 150 mg by mouth nightly.       trazodone (DESYREL) 50 MG tablet Take 50 mg by mouth every evening.      venlafaxine (EFFEXOR-XR) 75 MG 24 hr capsule Take 75 mg by mouth once daily.       No current facility-administered medications on file prior to visit.      .   .  Objective:    Physical Exam   Constitutional: She is oriented to person, place, and time. She appears well-developed and well-nourished.   HENT:   Head: Normocephalic and atraumatic.   Eyes: Right eye exhibits no discharge. Left eye exhibits no discharge.   Neck: No JVD present.   Cardiovascular: Exam reveals no gallop.    No murmur heard.  Pulmonary/Chest: Effort normal and breath sounds normal. No respiratory distress. She has no wheezes. She has no rales. She exhibits no tenderness.   Abdominal: Soft. Bowel sounds are normal. She exhibits no distension and no mass. There is no tenderness. There is no rebound and no guarding.   Musculoskeletal: She exhibits no edema or deformity.   Left radial puncture site intact, no bleeding or hematoma. Some bruising and tenderness to site.      Neurological: She is alert and oriented to person, place, and time.   Skin: Skin is warm and dry.   Nursing note and vitals reviewed.    Lab Results   Component Value Date    CHOL 277 (H) 01/28/2016     Lab Results   Component Value Date    HDL 28 (L) 01/28/2016     Lab Results   Component  "Value Date    LDLCALC Invalid, Trig>400.0 01/28/2016     Lab Results   Component Value Date    TRIG 705 (H) 01/28/2016     Lab Results   Component Value Date    CHOLHDL 10.1 (L) 01/28/2016       Chemistry        Component Value Date/Time     (L) 06/26/2017 2157    K 4.5 06/26/2017 2157     06/26/2017 2157    CO2 23 06/26/2017 2157    BUN 17 06/26/2017 2157    CREATININE 0.8 06/26/2017 2157     06/26/2017 2157        Component Value Date/Time    CALCIUM 9.2 06/26/2017 2157    ALKPHOS 64 06/26/2017 2157    AST 19 06/26/2017 2157    ALT 23 06/26/2017 2157    BILITOT 0.4 06/26/2017 2157    ESTGFRAFRICA >60 06/26/2017 2157    EGFRNONAA >60 06/26/2017 2157          Lab Results   Component Value Date    TSH 1.964 12/25/2015     Lab Results   Component Value Date    INR 1.0 12/24/2016    INR 1.0 01/27/2016    INR 1.0 12/25/2015     Lab Results   Component Value Date    WBC 9.01 06/26/2017    HGB 12.7 06/26/2017    HCT 37.3 06/26/2017    MCV 84 06/26/2017     06/26/2017       BP (!) 136/98 (BP Location: Right arm, Patient Position: Sitting, BP Method: Manual)   Pulse 66 Comment: radial  Ht 5' 5" (1.651 m)   Wt 110.1 kg (242 lb 9.9 oz)   BMI 40.37 kg/m²       Assessment:       1. Coronary artery disease involving native coronary artery of native heart without angina pectoris    2. Family history of heart disease    3. NSTEMI (non-ST elevated myocardial infarction)    4. Essential hypertension    5. Chest pain, unspecified type    6. Morbid obesity with BMI of 40.0-44.9, adult      Patient doing well. Diastolic BP mildly elevated in clinic. No complaint of chest pain or shortness of breath.      Plan:       Will continue current medications and treatment plan  Low sodium diet   Increase losartan to 50 mg daily  Risk modification  BP log at home   Follow up in clinic in 3 month or sooner if needed             "

## 2017-06-30 PROBLEM — E66.01 MORBID OBESITY WITH BMI OF 40.0-44.9, ADULT: Status: ACTIVE | Noted: 2017-06-30

## 2017-07-10 ENCOUNTER — TELEPHONE (OUTPATIENT)
Dept: CARDIOLOGY | Facility: CLINIC | Age: 52
End: 2017-07-10

## 2017-07-10 NOTE — TELEPHONE ENCOUNTER
----- Message from Herbert Levy NP sent at 7/7/2017  3:02 PM CDT -----  Regarding: RE: Refill  Looks like she has been taking this dose of at least a year.   I may need to change the amount of tables.   Can you call the patient and clarify what she is taking?     Thanks      ----- Message -----  From: Diane Cr MA  Sent: 7/7/2017  12:20 PM  To: Herbert Levy NP  Subject: Refill                                           Hey on pt med list its showing amlodipine 5 mg tab take two tablets by mouth once daily was this an error if so please send new script pharmacy is asking for clarification/PA. Please advise    Thanks

## 2017-07-17 ENCOUNTER — HOSPITAL ENCOUNTER (EMERGENCY)
Facility: HOSPITAL | Age: 52
Discharge: HOME | End: 2017-07-17
Attending: INTERNAL MEDICINE
Payer: MEDICAID

## 2017-07-17 VITALS
HEIGHT: 65 IN | HEART RATE: 75 BPM | WEIGHT: 243 LBS | RESPIRATION RATE: 16 BRPM | OXYGEN SATURATION: 98 % | DIASTOLIC BLOOD PRESSURE: 86 MMHG | SYSTOLIC BLOOD PRESSURE: 142 MMHG | TEMPERATURE: 98 F | BODY MASS INDEX: 40.48 KG/M2

## 2017-07-17 DIAGNOSIS — R07.9 CHEST PAIN: ICD-10-CM

## 2017-07-17 DIAGNOSIS — M94.0 COSTOCHONDRITIS, ACUTE: Primary | ICD-10-CM

## 2017-07-17 PROCEDURE — 93005 ELECTROCARDIOGRAM TRACING: CPT

## 2017-07-17 PROCEDURE — 99284 EMERGENCY DEPT VISIT MOD MDM: CPT

## 2017-07-17 PROCEDURE — 93010 ELECTROCARDIOGRAM REPORT: CPT | Mod: ,,, | Performed by: INTERNAL MEDICINE

## 2017-07-17 RX ORDER — KETOROLAC TROMETHAMINE 30 MG/ML
30 INJECTION, SOLUTION INTRAMUSCULAR; INTRAVENOUS ONCE
Status: DISCONTINUED | OUTPATIENT
Start: 2017-07-17 | End: 2017-07-17

## 2017-07-17 RX ORDER — COLCHICINE 0.6 MG/1
0.6 TABLET ORAL DAILY
Qty: 7 TABLET | Refills: 0 | Status: SHIPPED | OUTPATIENT
Start: 2017-07-17 | End: 2018-07-17

## 2017-07-17 RX ORDER — METHYLPREDNISOLONE 4 MG/1
TABLET ORAL
Qty: 1 PACKAGE | Refills: 0 | Status: SHIPPED | OUTPATIENT
Start: 2017-07-17 | End: 2017-08-07

## 2017-07-17 NOTE — ED PROVIDER NOTES
SCRIBE #1 NOTE: I, Naeem Hirsch, am scribing for, and in the presence of, Bill Patterson MD. I have scribed the entire note.      History      Chief Complaint   Patient presents with    Chest Pain     x2 hours       Review of patient's allergies indicates:   Allergen Reactions    Codeine     Latex, natural rubber     Pcn [penicillins]     Tylox [oxycodone-acetaminophen]         HPI   HPI    2017, 12:39 AM   History obtained from the patient      History of Present Illness: Judy Ruelas is a 52 y.o. female patient who presents to the Emergency Department for an evaluation of chest pain  which onset suddenly x2 hours ago. Symptoms are intermittent and moderate in severity. Exacerbated by palpation and relieved by nothing. Patient denies any SOB, diaphoresis, palpitations, extremity weakness/numbness, leg pain/swelling, dizziness, cough, n/v, and all other sxs at this time. No further complaints or concerns at this time.     Arrival mode: EMS    [facility]P: [facility]       Past Medical History:  Past Medical History:   Diagnosis Date    Anxiety     Depression     Depression with anxiety     Hypertension     Irregular heart beat     Migraine headache        Past Surgical History:  Past Surgical History:   Procedure Laterality Date    APPENDECTOMY       SECTION      HYSTERECTOMY           Family History:  Family History   Problem Relation Age of Onset    Hypertension      Heart disease Father 70    Hypertension Father     Heart attack Mother 40     triple bypass    Diabetes Mother     Hypertension Mother     Stroke Mother     Heart attack Brother 40     CABG x 2       Social History:  Social History     Social History Main Topics    Smoking status: Never Smoker    Smokeless tobacco: Never Used    Alcohol use No      Comment: denies    Drug use: No      Comment: denies    Sexual activity: Not Currently       ROS   Review of Systems   Constitutional:  Negative for chills, diaphoresis and fever.   HENT: Negative for sore throat and trouble swallowing.    Respiratory: Negative for chest tightness and shortness of breath.    Cardiovascular: Positive for chest pain. Negative for palpitations and leg swelling.   Gastrointestinal: Negative for abdominal pain, diarrhea, nausea and vomiting.   Genitourinary: Negative for decreased urine volume, difficulty urinating, dysuria and hematuria.   Musculoskeletal: Negative for back pain, neck pain and neck stiffness.   Skin: Negative for rash.   Neurological: Negative for dizziness, weakness, light-headedness and headaches.   Hematological: Does not bruise/bleed easily.     Physical Exam      Initial Vitals [07/17/17 0020]   BP Pulse Resp Temp SpO2   (!) 190/108 66 20 97.9 °F (36.6 °C) 100 %      MAP       135.33          Physical Exam  Nursing Notes and Vital Signs Reviewed.  Constitutional: Patient is in no acute distress. Well-developed and well-nourished.  Head: Atraumatic. Normocephalic.  Eyes: PERRL. EOM intact. Conjunctivae are not pale. No scleral icterus.  ENT: Mucous membranes are moist. Oropharynx is clear and symmetric.    Neck: Supple. Full ROM. No lymphadenopathy.  Cardiovascular: Regular rate. Regular rhythm. No murmurs, rubs, or gallops. Distal pulses are 2+ and symmetric. Pt has x2 heart caths noted, both are normal.   Pulmonary/Chest: No respiratory distress. Clear to auscultation bilaterally. No wheezing, rales, or rhonchi.  Abdominal: Soft and non-distended.  There is no tenderness.  No rebound, guarding, or rigidity. Good bowel sounds.  Genitourinary: No CVA tenderness  Musculoskeletal: Moves all extremities. No obvious deformities. No edema. No calf tenderness. Tenderness to palpation noted to costochondral junction bilaterally.   Skin: Warm and dry.  Neurological:  Alert, awake, and appropriate.  Normal speech.  No acute focal neurological deficits are appreciated.  Psychiatric: Normal affect. Good eye  "contact. Appropriate in content.    ED Course    Procedures  ED Vital Signs:  Vitals:    07/17/17 0020 07/17/17 0027 07/17/17 0036 07/17/17 0129   BP: (!) 190/108  (!) 163/93 (!) 142/86   Pulse: 66 77 76 75   Resp: 20 16 16   Temp: 97.9 °F (36.6 °C)   98.1 °F (36.7 °C)   TempSrc: Oral   Oral   SpO2: 100%  98% 98%   Weight: 110.2 kg (243 lb)      Height: 5' 5" (1.651 m)           The EKG was ordered, reviewed, and independently interpreted by the ED provider.  Interpretation time: 0:29  Rate: 74 BPM  Rhythm: normal sinus rhythm  Interpretation: Right bundle branch block. No STEMI.  When compared to EKG performed 6/2017, there are no significant changes.      The Emergency Provider reviewed the vital signs and test results, which are outlined above.    2 LHC are showing no significant CAD ---this is costochondritis by my assessment and not angina     ED Discussion     1:12 AM: Reassessed pt at this time. Pt is awake, alert, and in NAD. Discussed with pt all pertinent ED information and results. Discussed pt dx of costochondritis and plan of tx. Gave pt all f/u and return to the ED instructions. All questions and concerns were addressed at this time. Pt expresses understanding of information and instructions, and is comfortable with plan to discharge. Pt is stable for discharge.    I discussed with patient and/or family/caretaker that evaluation in the ED does not suggest any emergent or life threatening medical conditions requiring immediate intervention beyond what was provided in the ED, and I believe patient is safe for discharge.  Regardless, an unremarkable evaluation in the ED does not preclude the development or presence of a serious of life threatening condition. As such, patient was instructed to return immediately for any worsening or change in current symptoms.        ED Medication(s):  Medications - No data to display    Discharge Medication List as of 7/17/2017  1:12 AM      START taking these " medications    Details   colchicine 0.6 mg tablet Take 1 tablet (0.6 mg total) by mouth once daily., Starting Mon 7/17/2017, Until Tue 7/17/2018, Print      methylPREDNISolone (MEDROL DOSEPACK) 4 mg tablet As directed on box, Print             Follow-up Information     Go to  [facility] - .    Specialty:  Emergency Medicine  Why:  If symptoms worsen  Contact information:  [address]  [facility] [address] [phone]                   Medical Decision Making    Medical Decision Making:   Clinical Tests:   Medical Tests: Ordered and Reviewed           Scribe Attestation:   Scribe #1: I performed the above scribed service and the documentation accurately describes the services I performed. I attest to the accuracy of the note.    Attending:   Physician Attestation Statement for Scribe #1: I, Bill Patterson MD, personally performed the services described in this documentation, as scribed by Naeem Hirsch, in my presence, and it is both accurate and complete.          Clinical Impression       ICD-10-CM ICD-9-CM   1. Costochondritis, acute M94.0 733.6   2. Chest pain R07.9 786.50       Disposition:   Disposition: Discharged  Condition: Stable         Bill Patterson MD  07/17/17 1242

## 2017-08-05 ENCOUNTER — NURSE TRIAGE (OUTPATIENT)
Dept: ADMINISTRATIVE | Facility: CLINIC | Age: 52
End: 2017-08-05

## 2017-08-06 NOTE — TELEPHONE ENCOUNTER
"Pt called re eating three bites of eggs. CP this evening.     Reason for Disposition   [1] Chest pain lasts > 5 minutes AND [2] history of heart disease  (i.e., heart attack, bypass surgery, angina, angioplasty, CHF; not just a heart murmur)    Answer Assessment - Initial Assessment Questions  1. LOCATION: "Where does it hurt?"       CP since 11 am , tried anxiety med without help   2. RADIATION: "Does the pain go anywhere else?" (e.g., into neck, jaw, arms, back)     amrs bilat   3. ONSET: "When did the chest pain begin?" (Minutes, hours or days)      1100am  4. PATTERN "Does the pain come and go, or has it been constant since it started?"  "Does it get worse with exertion?"      Constant   5. DURATION: "How long does it last" (e.g., seconds, minutes, hours)     +SOB at rest   6. SEVERITY: "How bad is the pain?"  (e.g., Scale 1-10; mild, moderate, or severe)     - MILD (1-3): doesn't interfere with normal activities      - MODERATE (4-7): interferes with normal activities or awakens from sleep     - SEVERE (8-10): excruciating pain, unable to do any normal activities       20, tried GERD , pain meds   7. CARDIAC RISK FACTORS: "Do you have any history of heart problems or risk factors for heart disease?" (e.g., prior heart attack, angina; high blood pressure, diabetes, being overweight, high cholesterol, smoking, or strong family history of heart disease)     Hx MI last month. Pt crying on phone stating that she wants relief. Offered to call EMS due to recent MI, CP all day rated 20 and +SOB- pt refused. Call back with questions    Protocols used: ST CHEST PAIN-A-AH      "

## 2017-08-22 ENCOUNTER — OFFICE VISIT (OUTPATIENT)
Dept: CARDIOLOGY | Facility: CLINIC | Age: 52
End: 2017-08-22
Payer: MEDICAID

## 2017-08-22 ENCOUNTER — LAB VISIT (OUTPATIENT)
Dept: LAB | Facility: HOSPITAL | Age: 52
End: 2017-08-22
Attending: NURSE PRACTITIONER
Payer: MEDICAID

## 2017-08-22 VITALS
HEIGHT: 65 IN | WEIGHT: 244.69 LBS | DIASTOLIC BLOOD PRESSURE: 70 MMHG | BODY MASS INDEX: 40.77 KG/M2 | SYSTOLIC BLOOD PRESSURE: 120 MMHG | HEART RATE: 64 BPM

## 2017-08-22 DIAGNOSIS — I25.10 CORONARY ARTERY DISEASE INVOLVING NATIVE CORONARY ARTERY OF NATIVE HEART WITHOUT ANGINA PECTORIS: ICD-10-CM

## 2017-08-22 DIAGNOSIS — Z01.810 PREOP CARDIOVASCULAR EXAM: Primary | ICD-10-CM

## 2017-08-22 DIAGNOSIS — Z82.49 FAMILY HISTORY OF HEART DISEASE: ICD-10-CM

## 2017-08-22 DIAGNOSIS — E66.01 MORBID OBESITY WITH BMI OF 40.0-44.9, ADULT: ICD-10-CM

## 2017-08-22 DIAGNOSIS — I21.4 NSTEMI (NON-ST ELEVATED MYOCARDIAL INFARCTION): ICD-10-CM

## 2017-08-22 DIAGNOSIS — R10.811 RIGHT UPPER QUADRANT ABDOMINAL TENDERNESS: Primary | ICD-10-CM

## 2017-08-22 DIAGNOSIS — I10 ESSENTIAL HYPERTENSION: ICD-10-CM

## 2017-08-22 DIAGNOSIS — R10.811 RIGHT UPPER QUADRANT ABDOMINAL TENDERNESS: ICD-10-CM

## 2017-08-22 PROCEDURE — 3074F SYST BP LT 130 MM HG: CPT | Mod: ,,, | Performed by: NURSE PRACTITIONER

## 2017-08-22 PROCEDURE — 3008F BODY MASS INDEX DOCD: CPT | Mod: ,,, | Performed by: NURSE PRACTITIONER

## 2017-08-22 PROCEDURE — 83690 ASSAY OF LIPASE: CPT

## 2017-08-22 PROCEDURE — 99999 PR PBB SHADOW E&M-EST. PATIENT-LVL III: CPT | Mod: PBBFAC,,, | Performed by: NURSE PRACTITIONER

## 2017-08-22 PROCEDURE — 3078F DIAST BP <80 MM HG: CPT | Mod: ,,, | Performed by: NURSE PRACTITIONER

## 2017-08-22 PROCEDURE — 80076 HEPATIC FUNCTION PANEL: CPT

## 2017-08-22 PROCEDURE — 99214 OFFICE O/P EST MOD 30 MIN: CPT | Mod: S$PBB,,, | Performed by: NURSE PRACTITIONER

## 2017-08-22 PROCEDURE — 82150 ASSAY OF AMYLASE: CPT

## 2017-08-22 PROCEDURE — 36415 COLL VENOUS BLD VENIPUNCTURE: CPT

## 2017-08-22 NOTE — PROGRESS NOTES
Subjective:   Patient ID:  Judy Ruelas is a 52 y.o. female who presents for follow up of Pre-op Exam      HPI    Patient presents to clinic today seeking cardiac clearance for colonoscopy. She has PMHx of HTN, CAD, depression, migraines, NSTEMI and morbid obesity. She underwent angiogram on 2017 following NSTEMI. Cath showed normal coronaries except 50% stenosis of distal LCX with LVF 60%.  . BP well controlled. No abnormal bleeding on dual antiplatelets. Nonobstructive CAD on angiogram in 2017 . Stable CAD - no angina or equivalent. Patient has had some atypical chest pain that associated with nausea and upset stomach. Is being followed by GI. Has no CNS complaints to suggest TIA or CVA. Has not had any hospital admissions for cardiac related events since NSTEMI in 2017.       Past Medical History:   Diagnosis Date    Anxiety     Depression     Depression with anxiety     Family history of heart disease 2016    Hypertension     Irregular heart beat     Migraine headache     Morbid obesity with BMI of 40.0-44.9, adult 2017    NSTEMI (non-ST elevated myocardial infarction) 2017       Past Surgical History:   Procedure Laterality Date    APPENDECTOMY       SECTION      HYSTERECTOMY         Social History   Substance Use Topics    Smoking status: Never Smoker    Smokeless tobacco: Never Used    Alcohol use No      Comment: denies   .     Family History   Problem Relation Age of Onset    Hypertension      Heart disease Father 70    Hypertension Father     Heart attack Mother 40     triple bypass    Diabetes Mother     Hypertension Mother     Stroke Mother     Heart attack Brother 40     CABG x 2       Current Outpatient Prescriptions   Medication Sig    amlodipine (NORVASC) 5 MG tablet Take 2 tablets (10 mg total) by mouth once daily.    aspirin 81 MG Chew Take 1 tablet (81 mg total) by mouth once daily.    atenolol (TENORMIN) 50 MG tablet Take 50 mg  by mouth once daily.    atorvastatin (LIPITOR) 40 MG tablet Take 1 tablet (40 mg total) by mouth once daily.    busPIRone (BUSPAR) 7.5 MG tablet Take 7.5 mg by mouth 2 (two) times daily.     butalbital-acetaminophen-caffeine -40 mg (FIORICET, ESGIC) -40 mg per tablet Take 1 tablet by mouth every 6 (six) hours as needed for Pain or Headaches.    clopidogrel (PLAVIX) 75 mg tablet Take 1 tablet (75 mg total) by mouth once daily.    colchicine 0.6 mg tablet Take 1 tablet (0.6 mg total) by mouth once daily.    ferrous sulfate 324 mg (65 mg iron) TbEC Take 325 mg by mouth once daily.    fluoxetine (PROZAC) 20 MG capsule Take 20 mg by mouth once daily.    hydrocodone-acetaminophen 7.5-325mg (NORCO) 7.5-325 mg per tablet Take 1 tablet by mouth every 6 (six) hours as needed for Pain.    hydrOXYzine (VISTARIL) 50 MG Cap Take 50 mg by mouth 4 (four) times daily.    isosorbide mononitrate (IMDUR) 30 MG 24 hr tablet Take 1 tablet (30 mg total) by mouth once daily.    losartan (COZAAR) 50 MG tablet Take 1 tablet (50 mg total) by mouth once daily.    nitroGLYCERIN (NITROSTAT) 0.4 MG SL tablet Place 1 tablet (0.4 mg total) under the tongue every 5 (five) minutes as needed for Chest pain.    omeprazole (PRILOSEC) 40 MG capsule Take 1 capsule (40 mg total) by mouth once daily.    ondansetron (ZOFRAN) 4 MG tablet Take 8 mg by mouth 2 (two) times daily.    promethazine (PHENERGAN) 25 MG tablet Take 1 tablet (25 mg total) by mouth every 6 (six) hours as needed for Nausea.    ranitidine (ZANTAC) 150 MG tablet Take 150 mg by mouth nightly.     trazodone (DESYREL) 50 MG tablet Take 50 mg by mouth every evening.    venlafaxine (EFFEXOR-XR) 75 MG 24 hr capsule Take 75 mg by mouth once daily.     No current facility-administered medications for this visit.      Current Outpatient Prescriptions on File Prior to Visit   Medication Sig    amlodipine (NORVASC) 5 MG tablet Take 2 tablets (10 mg total) by mouth once  daily.    aspirin 81 MG Chew Take 1 tablet (81 mg total) by mouth once daily.    atenolol (TENORMIN) 50 MG tablet Take 50 mg by mouth once daily.    atorvastatin (LIPITOR) 40 MG tablet Take 1 tablet (40 mg total) by mouth once daily.    busPIRone (BUSPAR) 7.5 MG tablet Take 7.5 mg by mouth 2 (two) times daily.     butalbital-acetaminophen-caffeine -40 mg (FIORICET, ESGIC) -40 mg per tablet Take 1 tablet by mouth every 6 (six) hours as needed for Pain or Headaches.    clopidogrel (PLAVIX) 75 mg tablet Take 1 tablet (75 mg total) by mouth once daily.    colchicine 0.6 mg tablet Take 1 tablet (0.6 mg total) by mouth once daily.    ferrous sulfate 324 mg (65 mg iron) TbEC Take 325 mg by mouth once daily.    fluoxetine (PROZAC) 20 MG capsule Take 20 mg by mouth once daily.    hydrocodone-acetaminophen 7.5-325mg (NORCO) 7.5-325 mg per tablet Take 1 tablet by mouth every 6 (six) hours as needed for Pain.    hydrOXYzine (VISTARIL) 50 MG Cap Take 50 mg by mouth 4 (four) times daily.    isosorbide mononitrate (IMDUR) 30 MG 24 hr tablet Take 1 tablet (30 mg total) by mouth once daily.    losartan (COZAAR) 50 MG tablet Take 1 tablet (50 mg total) by mouth once daily.    nitroGLYCERIN (NITROSTAT) 0.4 MG SL tablet Place 1 tablet (0.4 mg total) under the tongue every 5 (five) minutes as needed for Chest pain.    omeprazole (PRILOSEC) 40 MG capsule Take 1 capsule (40 mg total) by mouth once daily.    ondansetron (ZOFRAN) 4 MG tablet Take 8 mg by mouth 2 (two) times daily.    promethazine (PHENERGAN) 25 MG tablet Take 1 tablet (25 mg total) by mouth every 6 (six) hours as needed for Nausea.    ranitidine (ZANTAC) 150 MG tablet Take 150 mg by mouth nightly.     trazodone (DESYREL) 50 MG tablet Take 50 mg by mouth every evening.    venlafaxine (EFFEXOR-XR) 75 MG 24 hr capsule Take 75 mg by mouth once daily.     No current facility-administered medications on file prior to visit.        Review of Systems    Constitution: Negative for decreased appetite, weakness, malaise/fatigue, weight gain and weight loss.   HENT: Negative for nosebleeds.    Cardiovascular: Negative for chest pain, claudication, dyspnea on exertion, irregular heartbeat, leg swelling, near-syncope, orthopnea, palpitations, paroxysmal nocturnal dyspnea and syncope.   Respiratory: Negative for cough, shortness of breath, sleep disturbances due to breathing, snoring and wheezing.    Hematologic/Lymphatic: Negative for bleeding problem. Does not bruise/bleed easily.   Skin: Negative for rash.   Musculoskeletal: Negative for arthritis, back pain, falls, joint pain, joint swelling, muscle cramps, muscle weakness and myalgias.   Gastrointestinal: Negative for bloating, abdominal pain, constipation, diarrhea, heartburn, nausea and vomiting.   Genitourinary: Negative for dysuria, hematuria and nocturia.   Neurological: Negative for excessive daytime sleepiness, dizziness, light-headedness, loss of balance, numbness, paresthesias and vertigo.       Objective:   Physical Exam   Constitutional: She is oriented to person, place, and time. She appears well-developed and well-nourished.   Neck: Neck supple. No JVD present.   Cardiovascular: Normal rate, regular rhythm, normal heart sounds and normal pulses.  Exam reveals no friction rub.    No murmur heard.  Pulmonary/Chest: Effort normal and breath sounds normal. No respiratory distress. She has no wheezes. She has no rales.   Abdominal: Soft. Bowel sounds are normal. She exhibits no distension.   Musculoskeletal: She exhibits no edema or tenderness.   Neurological: She is alert and oriented to person, place, and time.   Skin: Skin is warm and dry. No rash noted.   Psychiatric: She has a normal mood and affect. Her behavior is normal.   Nursing note and vitals reviewed.    Vitals:    08/22/17 1505   BP: 120/70   BP Location: Left arm   Patient Position: Sitting   BP Method: Medium (Manual)   Pulse: 64   Weight:  "111 kg (244 lb 11.4 oz)   Height: 5' 5" (1.651 m)     Lab Results   Component Value Date    CHOL 277 (H) 01/28/2016     Lab Results   Component Value Date    HDL 28 (L) 01/28/2016     Lab Results   Component Value Date    LDLCALC Invalid, Trig>400.0 01/28/2016     Lab Results   Component Value Date    TRIG 705 (H) 01/28/2016     Lab Results   Component Value Date    CHOLHDL 10.1 (L) 01/28/2016       Chemistry        Component Value Date/Time     (L) 06/26/2017 2157    K 4.5 06/26/2017 2157     06/26/2017 2157    CO2 23 06/26/2017 2157    BUN 17 06/26/2017 2157    CREATININE 0.8 06/26/2017 2157     06/26/2017 2157        Component Value Date/Time    CALCIUM 9.2 06/26/2017 2157    ALKPHOS 64 06/26/2017 2157    AST 19 06/26/2017 2157    ALT 23 06/26/2017 2157    BILITOT 0.4 06/26/2017 2157    ESTGFRAFRICA >60 06/26/2017 2157    EGFRNONAA >60 06/26/2017 2157          Lab Results   Component Value Date    TSH 1.964 12/25/2015     Lab Results   Component Value Date    INR 1.0 12/24/2016    INR 1.0 01/27/2016    INR 1.0 12/25/2015     Lab Results   Component Value Date    WBC 9.01 06/26/2017    HGB 12.7 06/26/2017    HCT 37.3 06/26/2017    MCV 84 06/26/2017     06/26/2017     BMP  Sodium   Date Value Ref Range Status   06/26/2017 135 (L) 136 - 145 mmol/L Final     Potassium   Date Value Ref Range Status   06/26/2017 4.5 3.5 - 5.1 mmol/L Final     Chloride   Date Value Ref Range Status   06/26/2017 103 95 - 110 mmol/L Final     CO2   Date Value Ref Range Status   06/26/2017 23 23 - 29 mmol/L Final     BUN, Bld   Date Value Ref Range Status   06/26/2017 17 6 - 20 mg/dL Final     Creatinine   Date Value Ref Range Status   06/26/2017 0.8 0.5 - 1.4 mg/dL Final     Calcium   Date Value Ref Range Status   06/26/2017 9.2 8.7 - 10.5 mg/dL Final     Anion Gap   Date Value Ref Range Status   06/26/2017 9 8 - 16 mmol/L Final     eGFR if    Date Value Ref Range Status   06/26/2017 >60 >60 " mL/min/1.73 m^2 Final     eGFR if non    Date Value Ref Range Status   06/26/2017 >60 >60 mL/min/1.73 m^2 Final     Comment:     Calculation used to obtain the estimated glomerular filtration  rate (eGFR) is the CKD-EPI equation. Since race is unknown   in our information system, the eGFR values for   -American and Non--American patients are given   for each creatinine result.       CrCl cannot be calculated (Patient's most recent sCr result is older than the maximum 7 days allowed.).    Assessment:     1. Preop cardiovascular exam    2. Coronary artery disease involving native coronary artery of native heart without angina pectoris    3. Essential hypertension    4. NSTEMI (non-ST elevated myocardial infarction)    5. Morbid obesity with BMI of 40.0-44.9, adult    6. Family history of heart disease    BP well controlled  No abnormal bleeding on dual antiplatelets   Nonobstructive CAD on angiogram in June 2017   Stable CAD - no angina or equivalent  Has good activity tolerance    Plan:     Patient cleared for colonoscopy at low risk for CV event. She is cleared to hold ASA and Plavix for procedure and resume thereafter. Will fax clearance to Ana Quesada NP at [phone]  RTC in 6 months or sooner if needed

## 2017-08-23 LAB
ALBUMIN SERPL BCP-MCNC: 3.4 G/DL
ALP SERPL-CCNC: 90 U/L
ALT SERPL W/O P-5'-P-CCNC: 24 U/L
AMYLASE SERPL-CCNC: 16 U/L
AST SERPL-CCNC: 18 U/L
BILIRUB DIRECT SERPL-MCNC: 0.1 MG/DL
BILIRUB SERPL-MCNC: 0.3 MG/DL
LIPASE SERPL-CCNC: 24 U/L
PROT SERPL-MCNC: 7.2 G/DL

## 2017-11-22 ENCOUNTER — TELEPHONE (OUTPATIENT)
Dept: CARDIOLOGY | Facility: CLINIC | Age: 52
End: 2017-11-22

## 2020-12-04 ENCOUNTER — HOSPITAL ENCOUNTER (INPATIENT)
Facility: HOSPITAL | Age: 55
LOS: 5 days | Discharge: HOME | DRG: 065 | End: 2020-12-09
Attending: FAMILY MEDICINE | Admitting: HOSPITALIST
Payer: MEDICAID

## 2020-12-04 DIAGNOSIS — R07.9 CHEST PAIN, UNSPECIFIED TYPE: ICD-10-CM

## 2020-12-04 DIAGNOSIS — K02.9 DENTAL CARIES: ICD-10-CM

## 2020-12-04 DIAGNOSIS — I21.4 NSTEMI (NON-ST ELEVATED MYOCARDIAL INFARCTION): ICD-10-CM

## 2020-12-04 DIAGNOSIS — E66.01 MORBID OBESITY WITH BMI OF 40.0-44.9, ADULT: ICD-10-CM

## 2020-12-04 DIAGNOSIS — I63.9 STROKE: ICD-10-CM

## 2020-12-04 DIAGNOSIS — R41.82 AMS (ALTERED MENTAL STATUS): ICD-10-CM

## 2020-12-04 DIAGNOSIS — R07.9 CHEST PAIN: ICD-10-CM

## 2020-12-04 DIAGNOSIS — R41.1 ANTEROGRADE AMNESIA: ICD-10-CM

## 2020-12-04 DIAGNOSIS — R00.0 TACHYCARDIA: ICD-10-CM

## 2020-12-04 DIAGNOSIS — R41.82 ALTERED MENTAL STATUS, UNSPECIFIED ALTERED MENTAL STATUS TYPE: ICD-10-CM

## 2020-12-04 DIAGNOSIS — I10 ESSENTIAL HYPERTENSION: ICD-10-CM

## 2020-12-04 DIAGNOSIS — I63.9 STROKE WITH CEREBRAL ISCHEMIA: ICD-10-CM

## 2020-12-04 DIAGNOSIS — N30.01 ACUTE CYSTITIS WITH HEMATURIA: Primary | ICD-10-CM

## 2020-12-04 LAB
ALBUMIN SERPL BCP-MCNC: 4.1 G/DL (ref 3.5–5.2)
ALP SERPL-CCNC: 152 U/L (ref 55–135)
ALT SERPL W/O P-5'-P-CCNC: 35 U/L (ref 10–44)
ANION GAP SERPL CALC-SCNC: 13 MMOL/L (ref 8–16)
APTT BLDCRRT: 23.1 SEC (ref 21–32)
AST SERPL-CCNC: 29 U/L (ref 10–40)
BASOPHILS # BLD AUTO: 0.05 K/UL (ref 0–0.2)
BASOPHILS NFR BLD: 0.5 % (ref 0–1.9)
BILIRUB SERPL-MCNC: 0.4 MG/DL (ref 0.1–1)
BNP SERPL-MCNC: 22 PG/ML (ref 0–99)
BUN SERPL-MCNC: 16 MG/DL (ref 6–20)
CALCIUM SERPL-MCNC: 9.5 MG/DL (ref 8.7–10.5)
CHLORIDE SERPL-SCNC: 102 MMOL/L (ref 95–110)
CO2 SERPL-SCNC: 22 MMOL/L (ref 23–29)
CREAT SERPL-MCNC: 0.7 MG/DL (ref 0.5–1.4)
DIFFERENTIAL METHOD: ABNORMAL
EOSINOPHIL # BLD AUTO: 0.1 K/UL (ref 0–0.5)
EOSINOPHIL NFR BLD: 1.4 % (ref 0–8)
ERYTHROCYTE [DISTWIDTH] IN BLOOD BY AUTOMATED COUNT: 14.6 % (ref 11.5–14.5)
EST. GFR  (AFRICAN AMERICAN): >60 ML/MIN/1.73 M^2
EST. GFR  (NON AFRICAN AMERICAN): >60 ML/MIN/1.73 M^2
GLUCOSE SERPL-MCNC: 92 MG/DL (ref 70–110)
HCT VFR BLD AUTO: 42.5 % (ref 37–48.5)
HCV AB SERPL QL IA: NEGATIVE
HGB BLD-MCNC: 13.4 G/DL (ref 12–16)
HIV 1+2 AB+HIV1 P24 AG SERPL QL IA: NEGATIVE
IMM GRANULOCYTES # BLD AUTO: 0.04 K/UL (ref 0–0.04)
IMM GRANULOCYTES NFR BLD AUTO: 0.4 % (ref 0–0.5)
INR PPP: 1 (ref 0.8–1.2)
LYMPHOCYTES # BLD AUTO: 1.5 K/UL (ref 1–4.8)
LYMPHOCYTES NFR BLD: 15 % (ref 18–48)
MAGNESIUM SERPL-MCNC: 2.2 MG/DL (ref 1.6–2.6)
MCH RBC QN AUTO: 26.6 PG (ref 27–31)
MCHC RBC AUTO-ENTMCNC: 31.5 G/DL (ref 32–36)
MCV RBC AUTO: 84 FL (ref 82–98)
MONOCYTES # BLD AUTO: 0.6 K/UL (ref 0.3–1)
MONOCYTES NFR BLD: 6 % (ref 4–15)
NEUTROPHILS # BLD AUTO: 7.9 K/UL (ref 1.8–7.7)
NEUTROPHILS NFR BLD: 76.7 % (ref 38–73)
NRBC BLD-RTO: 0 /100 WBC
PLATELET # BLD AUTO: 351 K/UL (ref 150–350)
PMV BLD AUTO: 10.3 FL (ref 9.2–12.9)
POCT GLUCOSE: 96 MG/DL (ref 70–110)
POTASSIUM SERPL-SCNC: 4.4 MMOL/L (ref 3.5–5.1)
PROT SERPL-MCNC: 9.1 G/DL (ref 6–8.4)
PROTHROMBIN TIME: 10.5 SEC (ref 9–12.5)
RBC # BLD AUTO: 5.04 M/UL (ref 4–5.4)
SARS-COV-2 RDRP RESP QL NAA+PROBE: NEGATIVE
SODIUM SERPL-SCNC: 137 MMOL/L (ref 136–145)
TROPONIN I SERPL DL<=0.01 NG/ML-MCNC: <0.006 NG/ML (ref 0–0.03)
TSH SERPL DL<=0.005 MIU/L-ACNC: 2.58 UIU/ML (ref 0.4–4)
WBC # BLD AUTO: 10.29 K/UL (ref 3.9–12.7)

## 2020-12-04 PROCEDURE — 99203 OFFICE O/P NEW LOW 30 MIN: CPT | Mod: 95,,, | Performed by: PSYCHIATRY & NEUROLOGY

## 2020-12-04 PROCEDURE — 99291 CRITICAL CARE FIRST HOUR: CPT | Mod: 25

## 2020-12-04 PROCEDURE — 25000003 PHARM REV CODE 250: Performed by: FAMILY MEDICINE

## 2020-12-04 PROCEDURE — 85610 PROTHROMBIN TIME: CPT

## 2020-12-04 PROCEDURE — 80053 COMPREHEN METABOLIC PANEL: CPT

## 2020-12-04 PROCEDURE — 80061 LIPID PANEL: CPT

## 2020-12-04 PROCEDURE — 81000 URINALYSIS NONAUTO W/SCOPE: CPT | Mod: 59

## 2020-12-04 PROCEDURE — 93010 EKG 12-LEAD: ICD-10-PCS | Mod: ,,, | Performed by: INTERNAL MEDICINE

## 2020-12-04 PROCEDURE — 86901 BLOOD TYPING SEROLOGIC RH(D): CPT

## 2020-12-04 PROCEDURE — 85730 THROMBOPLASTIN TIME PARTIAL: CPT

## 2020-12-04 PROCEDURE — 11000001 HC ACUTE MED/SURG PRIVATE ROOM

## 2020-12-04 PROCEDURE — 80307 DRUG TEST PRSMV CHEM ANLYZR: CPT

## 2020-12-04 PROCEDURE — 84484 ASSAY OF TROPONIN QUANT: CPT

## 2020-12-04 PROCEDURE — 36415 COLL VENOUS BLD VENIPUNCTURE: CPT

## 2020-12-04 PROCEDURE — 93005 ELECTROCARDIOGRAM TRACING: CPT

## 2020-12-04 PROCEDURE — 82962 GLUCOSE BLOOD TEST: CPT

## 2020-12-04 PROCEDURE — 93010 ELECTROCARDIOGRAM REPORT: CPT | Mod: ,,, | Performed by: INTERNAL MEDICINE

## 2020-12-04 PROCEDURE — 83735 ASSAY OF MAGNESIUM: CPT

## 2020-12-04 PROCEDURE — 86703 HIV-1/HIV-2 1 RESULT ANTBDY: CPT

## 2020-12-04 PROCEDURE — 99203 PR OFFICE/OUTPT VISIT, NEW, LEVL III, 30-44 MIN: ICD-10-PCS | Mod: 95,,, | Performed by: PSYCHIATRY & NEUROLOGY

## 2020-12-04 PROCEDURE — U0002 COVID-19 LAB TEST NON-CDC: HCPCS

## 2020-12-04 PROCEDURE — 85025 COMPLETE CBC W/AUTO DIFF WBC: CPT

## 2020-12-04 PROCEDURE — 83880 ASSAY OF NATRIURETIC PEPTIDE: CPT

## 2020-12-04 PROCEDURE — 86803 HEPATITIS C AB TEST: CPT

## 2020-12-04 PROCEDURE — 84443 ASSAY THYROID STIM HORMONE: CPT

## 2020-12-04 RX ORDER — ACETAMINOPHEN 325 MG/1
650 TABLET ORAL
Status: COMPLETED | OUTPATIENT
Start: 2020-12-04 | End: 2020-12-04

## 2020-12-04 RX ADMIN — ACETAMINOPHEN 650 MG: 325 TABLET ORAL at 11:12

## 2020-12-05 PROBLEM — G47.33 OBSTRUCTIVE SLEEP APNEA SYNDROME: Status: ACTIVE | Noted: 2020-12-05

## 2020-12-05 PROBLEM — N30.01 ACUTE CYSTITIS WITH HEMATURIA: Status: ACTIVE | Noted: 2020-12-05

## 2020-12-05 LAB
ABO + RH BLD: NORMAL
AMMONIA PLAS-SCNC: 46 UMOL/L (ref 10–50)
AMPHET+METHAMPHET UR QL: NEGATIVE
ANION GAP SERPL CALC-SCNC: 9 MMOL/L (ref 8–16)
BACTERIA #/AREA URNS HPF: ABNORMAL /HPF
BARBITURATES UR QL SCN>200 NG/ML: NEGATIVE
BASOPHILS # BLD AUTO: 0.04 K/UL (ref 0–0.2)
BASOPHILS NFR BLD: 0.4 % (ref 0–1.9)
BENZODIAZ UR QL SCN>200 NG/ML: NEGATIVE
BILIRUB UR QL STRIP: NEGATIVE
BILIRUB UR QL STRIP: NEGATIVE
BLD GP AB SCN CELLS X3 SERPL QL: NORMAL
BUN SERPL-MCNC: 17 MG/DL (ref 6–20)
BZE UR QL SCN: NEGATIVE
CALCIUM SERPL-MCNC: 8.8 MG/DL (ref 8.7–10.5)
CANNABINOIDS UR QL SCN: NEGATIVE
CHLORIDE SERPL-SCNC: 103 MMOL/L (ref 95–110)
CHOLEST SERPL-MCNC: 286 MG/DL (ref 120–199)
CHOLEST/HDLC SERPL: 7 {RATIO} (ref 2–5)
CLARITY UR: CLEAR
CLARITY UR: CLEAR
CO2 SERPL-SCNC: 24 MMOL/L (ref 23–29)
COLOR UR: YELLOW
COLOR UR: YELLOW
CREAT SERPL-MCNC: 0.7 MG/DL (ref 0.5–1.4)
CREAT UR-MCNC: 95.2 MG/DL (ref 15–325)
DIFFERENTIAL METHOD: ABNORMAL
EOSINOPHIL # BLD AUTO: 0.1 K/UL (ref 0–0.5)
EOSINOPHIL NFR BLD: 1.3 % (ref 0–8)
ERYTHROCYTE [DISTWIDTH] IN BLOOD BY AUTOMATED COUNT: 14.9 % (ref 11.5–14.5)
EST. GFR  (AFRICAN AMERICAN): >60 ML/MIN/1.73 M^2
EST. GFR  (NON AFRICAN AMERICAN): >60 ML/MIN/1.73 M^2
FOLATE SERPL-MCNC: 6.6 NG/ML (ref 4–24)
GLUCOSE SERPL-MCNC: 120 MG/DL (ref 70–110)
GLUCOSE UR QL STRIP: NEGATIVE
GLUCOSE UR QL STRIP: NEGATIVE
HCT VFR BLD AUTO: 38.1 % (ref 37–48.5)
HDLC SERPL-MCNC: 41 MG/DL (ref 40–75)
HDLC SERPL: 14.3 % (ref 20–50)
HGB BLD-MCNC: 11.7 G/DL (ref 12–16)
HGB UR QL STRIP: NEGATIVE
HGB UR QL STRIP: NEGATIVE
IMM GRANULOCYTES # BLD AUTO: 0.04 K/UL (ref 0–0.04)
IMM GRANULOCYTES NFR BLD AUTO: 0.4 % (ref 0–0.5)
KETONES UR QL STRIP: NEGATIVE
KETONES UR QL STRIP: NEGATIVE
LDLC SERPL CALC-MCNC: ABNORMAL MG/DL (ref 63–159)
LEUKOCYTE ESTERASE UR QL STRIP: NEGATIVE
LEUKOCYTE ESTERASE UR QL STRIP: NEGATIVE
LYMPHOCYTES # BLD AUTO: 1.2 K/UL (ref 1–4.8)
LYMPHOCYTES NFR BLD: 13.4 % (ref 18–48)
MCH RBC QN AUTO: 26.4 PG (ref 27–31)
MCHC RBC AUTO-ENTMCNC: 30.7 G/DL (ref 32–36)
MCV RBC AUTO: 86 FL (ref 82–98)
METHADONE UR QL SCN>300 NG/ML: NEGATIVE
MICROSCOPIC COMMENT: ABNORMAL
MONOCYTES # BLD AUTO: 0.6 K/UL (ref 0.3–1)
MONOCYTES NFR BLD: 6.1 % (ref 4–15)
NEUTROPHILS # BLD AUTO: 7.1 K/UL (ref 1.8–7.7)
NEUTROPHILS NFR BLD: 78.4 % (ref 38–73)
NITRITE UR QL STRIP: NEGATIVE
NITRITE UR QL STRIP: POSITIVE
NONHDLC SERPL-MCNC: 245 MG/DL
NRBC BLD-RTO: 0 /100 WBC
OPIATES UR QL SCN: NEGATIVE
PCP UR QL SCN>25 NG/ML: NEGATIVE
PH UR STRIP: 6 [PH] (ref 5–8)
PH UR STRIP: 6 [PH] (ref 5–8)
PLATELET # BLD AUTO: 349 K/UL (ref 150–350)
PMV BLD AUTO: 10.3 FL (ref 9.2–12.9)
POTASSIUM SERPL-SCNC: 4.1 MMOL/L (ref 3.5–5.1)
PROT UR QL STRIP: NEGATIVE
PROT UR QL STRIP: NEGATIVE
RBC # BLD AUTO: 4.43 M/UL (ref 4–5.4)
RPR SER QL: NORMAL
SODIUM SERPL-SCNC: 136 MMOL/L (ref 136–145)
SP GR UR STRIP: 1.02 (ref 1–1.03)
SP GR UR STRIP: >=1.03 (ref 1–1.03)
SQUAMOUS #/AREA URNS HPF: 3 /HPF
THYROPEROXIDASE IGG SERPL-ACNC: 23 IU/ML
TOXICOLOGY INFORMATION: NORMAL
TRIGL SERPL-MCNC: 439 MG/DL (ref 30–150)
TROPONIN I SERPL DL<=0.01 NG/ML-MCNC: <0.006 NG/ML (ref 0–0.03)
URN SPEC COLLECT METH UR: ABNORMAL
URN SPEC COLLECT METH UR: ABNORMAL
UROBILINOGEN UR STRIP-ACNC: NEGATIVE EU/DL
UROBILINOGEN UR STRIP-ACNC: NEGATIVE EU/DL
VIT B12 SERPL-MCNC: 295 PG/ML (ref 210–950)
WBC # BLD AUTO: 9.05 K/UL (ref 3.9–12.7)
WBC #/AREA URNS HPF: 7 /HPF (ref 0–5)

## 2020-12-05 PROCEDURE — 99233 PR SUBSEQUENT HOSPITAL CARE,LEVL III: ICD-10-PCS | Mod: ,,, | Performed by: FAMILY MEDICINE

## 2020-12-05 PROCEDURE — 96375 TX/PRO/DX INJ NEW DRUG ADDON: CPT

## 2020-12-05 PROCEDURE — 84484 ASSAY OF TROPONIN QUANT: CPT

## 2020-12-05 PROCEDURE — 93005 ELECTROCARDIOGRAM TRACING: CPT

## 2020-12-05 PROCEDURE — 96372 THER/PROPH/DIAG INJ SC/IM: CPT | Mod: 59

## 2020-12-05 PROCEDURE — 96374 THER/PROPH/DIAG INJ IV PUSH: CPT

## 2020-12-05 PROCEDURE — 82746 ASSAY OF FOLIC ACID SERUM: CPT

## 2020-12-05 PROCEDURE — 96376 TX/PRO/DX INJ SAME DRUG ADON: CPT

## 2020-12-05 PROCEDURE — 84207 ASSAY OF VITAMIN B-6: CPT

## 2020-12-05 PROCEDURE — 84630 ASSAY OF ZINC: CPT

## 2020-12-05 PROCEDURE — 99223 1ST HOSP IP/OBS HIGH 75: CPT | Mod: 25,,, | Performed by: INTERNAL MEDICINE

## 2020-12-05 PROCEDURE — 86376 MICROSOMAL ANTIBODY EACH: CPT

## 2020-12-05 PROCEDURE — 80048 BASIC METABOLIC PNL TOTAL CA: CPT

## 2020-12-05 PROCEDURE — 99233 SBSQ HOSP IP/OBS HIGH 50: CPT | Mod: ,,, | Performed by: FAMILY MEDICINE

## 2020-12-05 PROCEDURE — 25500020 PHARM REV CODE 255: Performed by: FAMILY MEDICINE

## 2020-12-05 PROCEDURE — 63600175 PHARM REV CODE 636 W HCPCS: Performed by: FAMILY MEDICINE

## 2020-12-05 PROCEDURE — 84425 ASSAY OF VITAMIN B-1: CPT

## 2020-12-05 PROCEDURE — 83921 ORGANIC ACID SINGLE QUANT: CPT

## 2020-12-05 PROCEDURE — 85025 COMPLETE CBC W/AUTO DIFF WBC: CPT

## 2020-12-05 PROCEDURE — 83036 HEMOGLOBIN GLYCOSYLATED A1C: CPT

## 2020-12-05 PROCEDURE — 99223 PR INITIAL HOSPITAL CARE,LEVL III: ICD-10-PCS | Mod: 25,,, | Performed by: INTERNAL MEDICINE

## 2020-12-05 PROCEDURE — 96374 THER/PROPH/DIAG INJ IV PUSH: CPT | Mod: 59

## 2020-12-05 PROCEDURE — 82140 ASSAY OF AMMONIA: CPT

## 2020-12-05 PROCEDURE — 25000003 PHARM REV CODE 250: Performed by: FAMILY MEDICINE

## 2020-12-05 PROCEDURE — G0378 HOSPITAL OBSERVATION PER HR: HCPCS

## 2020-12-05 PROCEDURE — 63600175 PHARM REV CODE 636 W HCPCS: Performed by: INTERNAL MEDICINE

## 2020-12-05 PROCEDURE — 93010 EKG 12-LEAD: ICD-10-PCS | Mod: ,,, | Performed by: INTERNAL MEDICINE

## 2020-12-05 PROCEDURE — 99215 OFFICE O/P EST HI 40 MIN: CPT | Mod: ,,, | Performed by: PSYCHIATRY & NEUROLOGY

## 2020-12-05 PROCEDURE — 81003 URINALYSIS AUTO W/O SCOPE: CPT

## 2020-12-05 PROCEDURE — 11000001 HC ACUTE MED/SURG PRIVATE ROOM

## 2020-12-05 PROCEDURE — 63600175 PHARM REV CODE 636 W HCPCS: Performed by: HOSPITALIST

## 2020-12-05 PROCEDURE — C9113 INJ PANTOPRAZOLE SODIUM, VIA: HCPCS | Performed by: INTERNAL MEDICINE

## 2020-12-05 PROCEDURE — 87086 URINE CULTURE/COLONY COUNT: CPT

## 2020-12-05 PROCEDURE — 99215 PR OFFICE/OUTPT VISIT, EST, LEVL V, 40-54 MIN: ICD-10-PCS | Mod: ,,, | Performed by: PSYCHIATRY & NEUROLOGY

## 2020-12-05 PROCEDURE — 86592 SYPHILIS TEST NON-TREP QUAL: CPT

## 2020-12-05 PROCEDURE — 82607 VITAMIN B-12: CPT

## 2020-12-05 PROCEDURE — 84484 ASSAY OF TROPONIN QUANT: CPT | Mod: 91

## 2020-12-05 PROCEDURE — 36415 COLL VENOUS BLD VENIPUNCTURE: CPT

## 2020-12-05 PROCEDURE — 25000003 PHARM REV CODE 250: Performed by: INTERNAL MEDICINE

## 2020-12-05 PROCEDURE — 25000003 PHARM REV CODE 250: Performed by: HOSPITALIST

## 2020-12-05 PROCEDURE — 93010 ELECTROCARDIOGRAM REPORT: CPT | Mod: ,,, | Performed by: INTERNAL MEDICINE

## 2020-12-05 RX ORDER — DIPHENHYDRAMINE HCL 25 MG
25 CAPSULE ORAL EVERY 6 HOURS PRN
Status: DISCONTINUED | OUTPATIENT
Start: 2020-12-05 | End: 2020-12-09 | Stop reason: HOSPADM

## 2020-12-05 RX ORDER — ATENOLOL 50 MG/1
50 TABLET ORAL DAILY
Status: DISCONTINUED | OUTPATIENT
Start: 2020-12-05 | End: 2020-12-05

## 2020-12-05 RX ORDER — LABETALOL HYDROCHLORIDE 5 MG/ML
10 INJECTION, SOLUTION INTRAVENOUS
Status: DISCONTINUED | OUTPATIENT
Start: 2020-12-05 | End: 2020-12-06

## 2020-12-05 RX ORDER — NAPROXEN SODIUM 220 MG/1
81 TABLET, FILM COATED ORAL DAILY
Status: DISCONTINUED | OUTPATIENT
Start: 2020-12-06 | End: 2020-12-05

## 2020-12-05 RX ORDER — SODIUM CHLORIDE 0.9 % (FLUSH) 0.9 %
10 SYRINGE (ML) INJECTION
Status: DISCONTINUED | OUTPATIENT
Start: 2020-12-05 | End: 2020-12-05

## 2020-12-05 RX ORDER — ACETAMINOPHEN 325 MG/1
650 TABLET ORAL EVERY 6 HOURS PRN
Status: DISCONTINUED | OUTPATIENT
Start: 2020-12-05 | End: 2020-12-09 | Stop reason: HOSPADM

## 2020-12-05 RX ORDER — CARVEDILOL 12.5 MG/1
25 TABLET ORAL 2 TIMES DAILY
Status: DISCONTINUED | OUTPATIENT
Start: 2020-12-05 | End: 2020-12-06

## 2020-12-05 RX ORDER — AMLODIPINE BESYLATE 10 MG/1
10 TABLET ORAL DAILY
Status: DISCONTINUED | OUTPATIENT
Start: 2020-12-05 | End: 2020-12-05

## 2020-12-05 RX ORDER — MAG HYDROX/ALUMINUM HYD/SIMETH 200-200-20
30 SUSPENSION, ORAL (FINAL DOSE FORM) ORAL EVERY 6 HOURS PRN
Status: DISCONTINUED | OUTPATIENT
Start: 2020-12-05 | End: 2020-12-09 | Stop reason: HOSPADM

## 2020-12-05 RX ORDER — ISOSORBIDE MONONITRATE 30 MG/1
30 TABLET, EXTENDED RELEASE ORAL DAILY
Status: DISCONTINUED | OUTPATIENT
Start: 2020-12-05 | End: 2020-12-09 | Stop reason: HOSPADM

## 2020-12-05 RX ORDER — LOSARTAN POTASSIUM 50 MG/1
100 TABLET ORAL DAILY
Status: DISCONTINUED | OUTPATIENT
Start: 2020-12-06 | End: 2020-12-06

## 2020-12-05 RX ORDER — SODIUM CHLORIDE 0.9 % (FLUSH) 0.9 %
10 SYRINGE (ML) INJECTION
Status: DISCONTINUED | OUTPATIENT
Start: 2020-12-05 | End: 2020-12-09 | Stop reason: HOSPADM

## 2020-12-05 RX ORDER — PANTOPRAZOLE SODIUM 40 MG/10ML
40 INJECTION, POWDER, LYOPHILIZED, FOR SOLUTION INTRAVENOUS DAILY
Status: DISCONTINUED | OUTPATIENT
Start: 2020-12-05 | End: 2020-12-07

## 2020-12-05 RX ORDER — NAPROXEN SODIUM 220 MG/1
81 TABLET, FILM COATED ORAL DAILY
Status: DISCONTINUED | OUTPATIENT
Start: 2020-12-05 | End: 2020-12-05

## 2020-12-05 RX ORDER — MORPHINE SULFATE 4 MG/ML
4 INJECTION, SOLUTION INTRAMUSCULAR; INTRAVENOUS EVERY 4 HOURS PRN
Status: DISCONTINUED | OUTPATIENT
Start: 2020-12-05 | End: 2020-12-09 | Stop reason: HOSPADM

## 2020-12-05 RX ORDER — TALC
6 POWDER (GRAM) TOPICAL NIGHTLY PRN
Status: DISCONTINUED | OUTPATIENT
Start: 2020-12-05 | End: 2020-12-05

## 2020-12-05 RX ORDER — LIDOCAINE HYDROCHLORIDE 10 MG/ML
1 INJECTION, SOLUTION EPIDURAL; INFILTRATION; INTRACAUDAL; PERINEURAL ONCE
Status: DISCONTINUED | OUTPATIENT
Start: 2020-12-05 | End: 2020-12-05

## 2020-12-05 RX ORDER — FLUOXETINE HYDROCHLORIDE 20 MG/1
20 CAPSULE ORAL DAILY
Status: DISCONTINUED | OUTPATIENT
Start: 2020-12-05 | End: 2020-12-09 | Stop reason: HOSPADM

## 2020-12-05 RX ORDER — ONDANSETRON 2 MG/ML
4 INJECTION INTRAMUSCULAR; INTRAVENOUS EVERY 8 HOURS PRN
Status: DISCONTINUED | OUTPATIENT
Start: 2020-12-05 | End: 2020-12-09 | Stop reason: HOSPADM

## 2020-12-05 RX ORDER — BUTALBITAL, ACETAMINOPHEN AND CAFFEINE 50; 325; 40 MG/1; MG/1; MG/1
1 TABLET ORAL EVERY 6 HOURS PRN
Status: DISCONTINUED | OUTPATIENT
Start: 2020-12-05 | End: 2020-12-09 | Stop reason: HOSPADM

## 2020-12-05 RX ORDER — FERROUS SULFATE 325(65) MG
325 TABLET, DELAYED RELEASE (ENTERIC COATED) ORAL DAILY
Status: DISCONTINUED | OUTPATIENT
Start: 2020-12-05 | End: 2020-12-09 | Stop reason: HOSPADM

## 2020-12-05 RX ORDER — NAPROXEN SODIUM 220 MG/1
81 TABLET, FILM COATED ORAL DAILY
Status: DISCONTINUED | OUTPATIENT
Start: 2020-12-06 | End: 2020-12-07

## 2020-12-05 RX ORDER — SCOLOPAMINE TRANSDERMAL SYSTEM 1 MG/1
1 PATCH, EXTENDED RELEASE TRANSDERMAL
Status: DISCONTINUED | OUTPATIENT
Start: 2020-12-05 | End: 2020-12-09 | Stop reason: HOSPADM

## 2020-12-05 RX ORDER — HYDRALAZINE HYDROCHLORIDE 50 MG/1
50 TABLET, FILM COATED ORAL EVERY 8 HOURS
Status: DISCONTINUED | OUTPATIENT
Start: 2020-12-05 | End: 2020-12-06

## 2020-12-05 RX ORDER — LEVOFLOXACIN 5 MG/ML
750 INJECTION, SOLUTION INTRAVENOUS
Status: COMPLETED | OUTPATIENT
Start: 2020-12-05 | End: 2020-12-05

## 2020-12-05 RX ORDER — MORPHINE SULFATE 2 MG/ML
2 INJECTION, SOLUTION INTRAMUSCULAR; INTRAVENOUS EVERY 4 HOURS PRN
Status: DISCONTINUED | OUTPATIENT
Start: 2020-12-05 | End: 2020-12-09 | Stop reason: HOSPADM

## 2020-12-05 RX ORDER — LOSARTAN POTASSIUM 50 MG/1
50 TABLET ORAL DAILY
Status: DISCONTINUED | OUTPATIENT
Start: 2020-12-05 | End: 2020-12-05

## 2020-12-05 RX ORDER — CLOPIDOGREL BISULFATE 75 MG/1
75 TABLET ORAL DAILY
Status: DISCONTINUED | OUTPATIENT
Start: 2020-12-06 | End: 2020-12-07

## 2020-12-05 RX ORDER — IPRATROPIUM BROMIDE AND ALBUTEROL SULFATE 2.5; .5 MG/3ML; MG/3ML
3 SOLUTION RESPIRATORY (INHALATION) EVERY 4 HOURS PRN
Status: DISCONTINUED | OUTPATIENT
Start: 2020-12-05 | End: 2020-12-09 | Stop reason: HOSPADM

## 2020-12-05 RX ORDER — ATORVASTATIN CALCIUM 40 MG/1
40 TABLET, FILM COATED ORAL DAILY
Status: DISCONTINUED | OUTPATIENT
Start: 2020-12-05 | End: 2020-12-05

## 2020-12-05 RX ORDER — HEPARIN SODIUM 5000 [USP'U]/ML
5000 INJECTION, SOLUTION INTRAVENOUS; SUBCUTANEOUS EVERY 8 HOURS
Status: DISCONTINUED | OUTPATIENT
Start: 2020-12-05 | End: 2020-12-09 | Stop reason: HOSPADM

## 2020-12-05 RX ORDER — GUAIFENESIN 100 MG/5ML
200 SOLUTION ORAL EVERY 4 HOURS PRN
Status: DISCONTINUED | OUTPATIENT
Start: 2020-12-05 | End: 2020-12-09 | Stop reason: HOSPADM

## 2020-12-05 RX ORDER — ATORVASTATIN CALCIUM 40 MG/1
40 TABLET, FILM COATED ORAL DAILY
Status: DISCONTINUED | OUTPATIENT
Start: 2020-12-06 | End: 2020-12-09 | Stop reason: HOSPADM

## 2020-12-05 RX ORDER — VENLAFAXINE HYDROCHLORIDE 75 MG/1
75 CAPSULE, EXTENDED RELEASE ORAL DAILY
Status: DISCONTINUED | OUTPATIENT
Start: 2020-12-05 | End: 2020-12-05

## 2020-12-05 RX ORDER — CLOPIDOGREL BISULFATE 75 MG/1
75 TABLET ORAL DAILY
Status: DISCONTINUED | OUTPATIENT
Start: 2020-12-05 | End: 2020-12-05

## 2020-12-05 RX ORDER — LOSARTAN POTASSIUM 50 MG/1
100 TABLET ORAL DAILY
Status: DISCONTINUED | OUTPATIENT
Start: 2020-12-05 | End: 2020-12-05

## 2020-12-05 RX ADMIN — DIPHENHYDRAMINE HYDROCHLORIDE 25 MG: 25 CAPSULE ORAL at 09:12

## 2020-12-05 RX ADMIN — HEPARIN SODIUM 5000 UNITS: 5000 INJECTION INTRAVENOUS; SUBCUTANEOUS at 09:12

## 2020-12-05 RX ADMIN — PANTOPRAZOLE SODIUM 40 MG: 40 INJECTION, POWDER, FOR SOLUTION INTRAVENOUS at 04:12

## 2020-12-05 RX ADMIN — ONDANSETRON 4 MG: 2 INJECTION INTRAMUSCULAR; INTRAVENOUS at 04:12

## 2020-12-05 RX ADMIN — ATORVASTATIN CALCIUM 40 MG: 40 TABLET, FILM COATED ORAL at 09:12

## 2020-12-05 RX ADMIN — BUTALBITAL, ACETAMINOPHEN AND CAFFEINE 1 TABLET: 50; 325; 40 TABLET ORAL at 05:12

## 2020-12-05 RX ADMIN — SCOPALAMINE 1 PATCH: 1 PATCH, EXTENDED RELEASE TRANSDERMAL at 04:12

## 2020-12-05 RX ADMIN — MORPHINE SULFATE 4 MG: 4 INJECTION, SOLUTION INTRAMUSCULAR; INTRAVENOUS at 04:12

## 2020-12-05 RX ADMIN — HYDRALAZINE HYDROCHLORIDE 50 MG: 50 TABLET, FILM COATED ORAL at 03:12

## 2020-12-05 RX ADMIN — IOHEXOL 100 ML: 350 INJECTION, SOLUTION INTRAVENOUS at 07:12

## 2020-12-05 RX ADMIN — BUSPIRONE HYDROCHLORIDE 7.5 MG: 5 TABLET ORAL at 09:12

## 2020-12-05 RX ADMIN — FLUOXETINE 20 MG: 20 CAPSULE ORAL at 09:12

## 2020-12-05 RX ADMIN — CLOPIDOGREL BISULFATE 75 MG: 75 TABLET, FILM COATED ORAL at 09:12

## 2020-12-05 RX ADMIN — CARVEDILOL 25 MG: 12.5 TABLET, FILM COATED ORAL at 09:12

## 2020-12-05 RX ADMIN — ISOSORBIDE MONONITRATE 30 MG: 30 TABLET, EXTENDED RELEASE ORAL at 11:12

## 2020-12-05 RX ADMIN — CARVEDILOL 25 MG: 12.5 TABLET, FILM COATED ORAL at 03:12

## 2020-12-05 RX ADMIN — ONDANSETRON 4 MG: 2 INJECTION INTRAMUSCULAR; INTRAVENOUS at 12:12

## 2020-12-05 RX ADMIN — LEVOFLOXACIN 750 MG: 750 INJECTION, SOLUTION INTRAVENOUS at 01:12

## 2020-12-05 RX ADMIN — HYDRALAZINE HYDROCHLORIDE 50 MG: 50 TABLET, FILM COATED ORAL at 09:12

## 2020-12-05 RX ADMIN — ASPIRIN 81 MG: 81 TABLET, CHEWABLE ORAL at 09:12

## 2020-12-05 RX ADMIN — BUTALBITAL, ACETAMINOPHEN AND CAFFEINE 1 TABLET: 50; 325; 40 TABLET ORAL at 03:12

## 2020-12-05 RX ADMIN — HEPARIN SODIUM 5000 UNITS: 5000 INJECTION INTRAVENOUS; SUBCUTANEOUS at 03:12

## 2020-12-05 RX ADMIN — DICYCLOMINE HYDROCHLORIDE: 10 SOLUTION ORAL at 05:12

## 2020-12-05 RX ADMIN — LORAZEPAM 1 MG: 2 INJECTION INTRAMUSCULAR; INTRAVENOUS at 02:12

## 2020-12-05 RX ADMIN — FERROUS SULFATE TAB EC 325 MG (65 MG FE EQUIVALENT) 325 MG: 325 (65 FE) TABLET DELAYED RESPONSE at 09:12

## 2020-12-05 RX ADMIN — HEPARIN SODIUM 5000 UNITS: 5000 INJECTION INTRAVENOUS; SUBCUTANEOUS at 05:12

## 2020-12-05 NOTE — HOSPITAL COURSE
12/5 NAD. Continues to c/o amnesia. Denies sig life stressor, changes in meds. Denies smoking, etoh and illicit drug use. Denies metal implants. Plans for MRI. Reports anxiety and clautrophobia.  On buspar and venlafaxine. If stable findings, ok to d/c home with f/u pcp/neuro for further management. Benzo may complicate existing memory concerns. Monitor   12/6 continues to c/o n/v, visual disturbance and HA despite meds. Unable to recall events one week ago. Undergoing swallow eval with no difficulties. Did have significant dental extractions and unable to recall if abx rx  12/7 up and bathing. Denies HA. N/v improving. Continues to c/o memory loss. Reports urine odor and dysuria. Frequency is chronic      12/08/20: No acute issues overnight. LY is pending. Will plan for discharge home in AM pending LY report.      12/09/20: LY showed no intra-cavity thrombus or vegetation visualized. Patient reports that her memory slowly started returning yesterday afternoon/evening. Neurology appointment was made. Patient had no acute events overnight. Patient has been examined at bedside and is deemed medically ready for discharge.

## 2020-12-05 NOTE — CONSULTS
[facility]  Vascular Neurology  Comprehensive Stroke Center  Tele-Consultation Note      Consults    Consulting Provider: DANICA WAKEFIELD  Current Providers  No providers found    Patient Location:  [facility] EMERGENCY DEPARTMENT Emergency Department  Spoke hospital nurse at bedside with patient assisting consultant.     Patient information was obtained from patient.         Assessment/Plan:     STROKE DOCUMENTATION     Acute Stroke Times:   Acute Stroke Times   Last Known Normal Date: 12/02/20  Stroke Team Called Date: 12/04/20  Stroke Team Called Time: 2148  Stroke Team Arrival Time: 2155  CT Interpretation Time: 2200    NIH Scale:  1a. Level of Consciousness: 0-->Alert, keenly responsive  1b. LOC Questions: 2-->Answers neither question correctly  1c. LOC Commands: 0-->Performs both tasks correctly  2. Best Gaze: 0-->Normal  3. Visual: 0-->No visual loss  4. Facial Palsy: 0-->Normal symmetrical movements  5a. Motor Arm, Left: 0-->No drift, limb holds 90 (or 45) degrees for full 10 secs  5b. Motor Arm, Right: 0-->No drift, limb holds 90 (or 45) degrees for full 10 secs  6a. Motor Leg, Left: 0-->No drift, leg holds 30 degree position for full 5 secs  6b. Motor Leg, Right: 0-->No drift, leg holds 30 degree position for full 5 secs  7. Limb Ataxia: 0-->Absent  8. Sensory: 0-->Normal, no sensory loss  9. Best Language: 0-->No aphasia, normal  10. Dysarthria: 0-->Normal  11. Extinction and Inattention (formerly Neglect): 0-->No abnormality  Total (NIH Stroke Scale): 2     Modified Juan    Terrence Coma Scale:    ABCD2 Score:    [id number] Score:   HAS -BLED Score:   ICH Score:   Hunt & Gomes Classification:       Diagnoses:   Anterograde amnesia  56 y/o woman with migraines, CAD, HTN, presenting with amnesia starting 2 days ago.  Symptoms not classic for transient global amnesia (typically remember personal details and generally doesn't last >24 hrs).  Consider encephalopathy due to other  "cause (PRES? BP very elevated).  Recommend MRI brain to further evaluate.        Blood pressure (!) 219/115, pulse 98, temperature 98.7 °F (37.1 °C), temperature source Oral, resp. rate 18, height 5' 5" (1.651 m), weight 118.4 kg (261 lb 0.4 oz).  Alteplase Eligible?: No  Alteplase Recommendation: Alteplase not recommended due to Outside of treatment window  and Suspected stroke mimic   Possible Interventional Revascularization Candidate? No; No significant neurological deficit    Disposition Recommendation: pending further studies    Subjective:     History of Present Illness:  56 y/o woman with CAD, HTN, migraines, obesity who presents with amnesia.  Patient reports she does not remember any of her activities over the last 2 days.  She remembers going to work on Tuesday but doesn't remember the last 2 days she had off.  She thinks she came to the ER via ambulance (no family present to give collateral).  Endorses severe frontal HA.      Woke up with symptoms?: yes    Recent bleeding noted: no  Does the patient take any Blood Thinners? no  Medications: Antiplatelets:  aspirin and clopidogrel/Plavix      Past Medical History: hypertension, diabetes and MI/CAD    Past Surgical History: no relevant surgical history    Family History: no relevant history    Social History: no smoking, no drinking, no drugs    Allergies: Codeine  Latex, Natural Rubber  Pcn [Penicillins]  Tylox [Oxycodone-Acetaminophen] No relevant allergies    Review of Systems   Unable to perform ROS: Mental status change     Objective:   Vitals: Blood pressure (!) 219/115, pulse 98, temperature 98.7 °F (37.1 °C), temperature source Oral, resp. rate 18, height 5' 5" (1.651 m), weight 118.4 kg (261 lb 0.4 oz). Heart Rate: .    CT READ: Yes  No hemmorhage. No mass effect. No early infarct signs.     Physical Exam  Constitutional:       General: She is not in acute distress.  HENT:      Head: Normocephalic and atraumatic.   Eyes:      Pupils: Pupils are " equal, round, and reactive to light.   Pulmonary:      Effort: Pulmonary effort is normal.   Neurological:      Comments: MS: Alert, oriented to her first name, cant remember her middle name or her birthday, doesn't know city or state or president, speech fluent, follows commands, no neglect  CN: PERRL, EOMI, sensation intact, face symmetric, no dysarthria  Motor: no arm or leg drift  Sens: intact to LT  Coord: no ataxia on finger to nose                   Recommended the emergency room physician to have a brief discussion with the patient and/or family if available regarding the risks and benefits of treatment, and to briefly document the occurrence of that discussion in his clinical encounter note.     The attending portion of this evaluation, treatment, and documentation was performed per Padmaja Hodge MD via audiovisual.    Billing code:  (non-intervention mild to moderate stroke, TIA, some mimics)    · This patient has a critical neurological condition/illness, with some potential for high morbidity and mortality.  · There is a moderate probability for acute neurological change leading to clinical and possibly life-threatening deterioration requiring highest level of physician preparedness for urgent intervention.  · Care was coordinated with other physicians involved in the patient's care.  · Radiologic studies and laboratory data were reviewed and interpreted, and plan of care was re-assessed based on the results.  · Diagnosis, treatment options and prognosis may have been discussed with the patient and/or family members or caregiver.      In your opinion, this was a: Tier 2 Van Negative    Consult End Time: 10:21 PM     Padmaja Hodge MD  [facility]  Vascular Neurology   [facility]

## 2020-12-05 NOTE — CONSULTS
Consultation Requested by:  Medicine    Chief Complaint:  Memory problem     Service used: No    HPI: Judy Ruelas is a 55 y.o. female PMHx of NSTEMI, obesity, CAD, migraines, and HTN who presents to the Emergency Department for evaluation of AMS/amnesia yesterday. Patient reports she does not remember any of her activities over the last 2 days.  She remembers going to work on Tuesday but doesn't remember the last 2 days she had off. CT head in the emergency room did not show any acute intracranial events.  When I saw her this morning she was complaining of frontal headache with nausea.  She reported that she is a headache person and takes over-the-counter medication 3 to 4 times a week.  At this point she reported no aggravating or relieving factors and headache is nonradiating.  No associated photophobia or phonophobia.  She also said she feels hot and just uncomfortable overall.  She also reported that yesterday morning he was unable to recall anything that she did in the last couple of days.  She said I am confused and having a hard time remembering things.  She denies any history of stroke or TIA.  She denies any history of drug abuse.  She also reported that she never had these memory problems in the past.  She denies any dizziness, vision changes, focal weakness, numbness, fall or loss of consciousness.       Past Medical History:   Diagnosis Date    Anxiety     Depression     Depression with anxiety     Family history of heart disease 2016    Hypertension     Irregular heart beat     Migraine headache     Morbid obesity with BMI of 40.0-44.9, adult 2017    NSTEMI (non-ST elevated myocardial infarction) 2017       Past Surgical History:   Procedure Laterality Date    APPENDECTOMY       SECTION      HYSTERECTOMY         Review of patient's allergies indicates:   Allergen Reactions    Pcn [penicillins] Swelling    Codeine     Latex, natural rubber     Tylox  [oxycodone-acetaminophen]          Current Facility-Administered Medications:     acetaminophen tablet 650 mg, 650 mg, Oral, Q6H PRN, Junaid Garcia MD    albuterol-ipratropium 2.5 mg-0.5 mg/3 mL nebulizer solution 3 mL, 3 mL, Nebulization, Q4H PRN, Junaid Garcia MD    aluminum-magnesium hydroxide-simethicone 200-200-20 mg/5 mL suspension 30 mL, 30 mL, Oral, Q6H PRN, Junaid Garcia MD    [START ON 12/6/2020] aspirin chewable tablet 81 mg, 81 mg, Oral, Daily, Chastity Orellana MD    [START ON 12/6/2020] atorvastatin tablet 40 mg, 40 mg, Oral, Daily, Chastity Orellana MD    busPIRone split tablet 7.5 mg, 7.5 mg, Oral, BID, Rogelio Lehman MD, 7.5 mg at 12/05/20 0924    butalbital-acetaminophen-caffeine -40 mg per tablet 1 tablet, 1 tablet, Oral, Q6H PRN, Rogelio Lehman MD, 1 tablet at 12/05/20 1736    carvediloL tablet 25 mg, 25 mg, Oral, BID, Rogelio Lehman MD, 25 mg at 12/05/20 0337    [START ON 12/6/2020] clopidogreL tablet 75 mg, 75 mg, Oral, Daily, Chastity Orellana MD    diphenhydrAMINE capsule 25 mg, 25 mg, Oral, Q6H PRN, Junaid Garcia MD    ferrous sulfate EC tablet 325 mg, 325 mg, Oral, Daily, Rogelio Lehman MD, 325 mg at 12/05/20 0924    FLUoxetine capsule 20 mg, 20 mg, Oral, Daily, Rogelio Lehman MD, 20 mg at 12/05/20 0924    guaifenesin 100 mg/5 ml syrup 200 mg, 200 mg, Oral, Q4H PRN, Junaid Garcia MD    heparin (porcine) injection 5,000 Units, 5,000 Units, Subcutaneous, Q8H, Rogelio Lehman MD, 5,000 Units at 12/05/20 1552    hydrALAZINE tablet 50 mg, 50 mg, Oral, Q8H, Rogelio Lehman MD, 50 mg at 12/05/20 1552    influenza (QUADRIVALENT PF) vaccine 0.5 mL, 0.5 mL, Intramuscular, vaccine x 1 dose, Alvarado Anguiano MD    isosorbide mononitrate 24 hr tablet 30 mg, 30 mg, Oral, Daily, Rogelio Lehman MD, 30 mg at 12/05/20 1120    labetaloL injection 10 mg, 10 mg, Intravenous, Q15 Min PRN, Chastity Orellana MD    [START ON 12/6/2020] losartan tablet 100 mg, 100 mg, Oral,  Daily, Rogelio Lehman MD    morphine injection 2 mg, 2 mg, Intravenous, Q4H PRN, Junaid Garcia MD    morphine injection 4 mg, 4 mg, Intravenous, Q4H PRN, Junaid Garcia MD, 4 mg at 12/05/20 0402    ondansetron injection 4 mg, 4 mg, Intravenous, Q8H PRN, Junaid Garcia MD, 4 mg at 12/05/20 1251    pantoprazole injection 40 mg, 40 mg, Intravenous, Daily, Junaid Garcia MD, 40 mg at 12/05/20 0403    pneumoc 13-tanisha conj-dip cr(PF) (PREVNAR 13 (PF)) 0.5 mL, 0.5 mL, Intramuscular, vaccine x 1 dose, Alvarado Anguiano MD    scopolamine 1.3-1.5 mg (1 mg over 3 days) 1 patch, 1 patch, Transdermal, Q3 Days, Chastity Orellana MD, 1 patch at 12/05/20 1634    sodium chloride 0.9% flush 10 mL, 10 mL, Intravenous, PRN, Ambreen Rodriguez MD       Social History     Socioeconomic History    Marital status:      Spouse name: Not on file    Number of children: Not on file    Years of education: Not on file    Highest education level: Not on file   Occupational History    Not on file   Social Needs    Financial resource strain: Not on file    Food insecurity     Worry: Not on file     Inability: Not on file    Transportation needs     Medical: Not on file     Non-medical: Not on file   Tobacco Use    Smoking status: Never Smoker    Smokeless tobacco: Never Used   Substance and Sexual Activity    Alcohol use: No     Alcohol/week: 0.0 standard drinks     Comment: denies    Drug use: No     Comment: denies    Sexual activity: Not Currently   Lifestyle    Physical activity     Days per week: Not on file     Minutes per session: Not on file    Stress: Not on file   Relationships    Social connections     Talks on phone: Not on file     Gets together: Not on file     Attends Jain service: Not on file     Active member of club or organization: Not on file     Attends meetings of clubs or organizations: Not on file     Relationship status: Not on file   Other Topics Concern    Not on file   Social History  Narrative    Not on file       Family History   Problem Relation Age of Onset    Hypertension Unknown     Heart disease Father 70    Hypertension Father     Heart attack Mother 40        triple bypass    Diabetes Mother     Hypertension Mother     Stroke Mother     Heart attack Brother 40        CABG x 2     Review of Systems  Constitutional: no fevers, no weight changes,  No diaphoresis  HEENT: No congestion, no doublevision, no dysphagia, no rhinnorhea, no lacrimation  Cardiovascular: no chest pain or palpitations  Respiratory: no shortness of breath, no cough  Gastrointestinal: no diarrhea, no constipation  Genitourinary: no dysuria  Musculoskeletal: no joint swelling. No myalgia  Skin: no rash  Psychiatric: no hallucinations  Neurologic: as per HPI  All other review of systems is negative and as per HPI.    Vitals:    12/05/20 1555   BP: 109/67   Pulse: 70   Resp: 20   Temp: 98 °F (36.7 °C)        Exam  General: Pleasant, conversant, Well-kempt  HEENT: Head atraumatic. No nasal abnormality. No gaze preference. EOMI.  Cardiovascular: S1S2 present. RRR. No murmurs. No carotid bruit  Lungs: Clear to auscultation b/l  Mental Status: Awake alert and oriented x III. Follows simple commands. No aphasia or dysarthria. Naming and repetition intact.  She was unable to spell the word world backward.  Serial 7 is impaired.  During the interview she feel tired and multiple times set she does not remember.  Cranial Nerve: PERRL. VFF. EOMI. Facial sensation intact. No facial asymmetry. Hearing intact. Palate elevates. Uvula midline. SCM strong. No tongue deviation.  Motor: Normal tone. No cogwheel rigidity. No bradykinesia. No pronator drift.  Right-sided strength is 5/5.  Subtle left-sided weakness with 4+/5 with leg more than arm.  Limited exam because she was nauseated and was overall uncomfortable.  Deep Tendon Reflexes: 1+ in biceps, tricepts, brachioradialis b/l. 1+ in patellar and ankle jerks.  Sensory: Intact  to soft touch, cold, pinprick.  Decreased vibration bilateral lower extremity below ankle.  No neglect.  Cerebellar: Finger to nose intact. Heel to shin intact.   Gait:  Deferred in the setting of nausea and altered mental status.     MRI brain.  Impression:     Small right basal ganglia lacunar infarcts and bilateral thalamic small infarcts.  No associated hemorrhage or mass effect.    Assessment:     Judy Ruelas is a 55 y.o. female PMHx of NSTEMI, obesity, CAD, migraines, and HTN who presents to the Emergency Department for evaluation of AMS/amnesia yesterday. Patient reports she does not remember any of her activities over the last 2 days.CT head in the emergency room did not show any acute intracranial events.  MRI brain showed acute ischemic infarct with cytotoxic edema involving bilateral thalamic and right basal ganglia.  Given her atypical presentation with memory problem, altered mental status, headache and pattern of stroke on MRI there is a high suspicion of vasculitis, infective endocarditis, reversible cerebral vasoconstriction syndrome (RCVS) and other infectious pathology.    Problem List:  -Acute multiple vascular territory ischemic stroke involving bilateral thalamic and right basal ganglia  -Cytotoxic edema  -Headache  -Amnesia  -Altered mental status  -High suspicion of vasculitis and infective endocarditis  -suspected urinary tract infection       Plan:  -- Admit to inpatient  -- Initiate Stroke orderset  -- Place patient on telemetry  -- Activity bed rest  -- HOB 30  -- Normoglycemia  -- Normothermia  -- BP goal less than 180  -- Do not reduce BP more then 10-15% per day  -- ASA 81 mg daily PO   -- DVT prophylaxis  -- Atorvastatin 40 mg daily   -- Order ECHO with bubble to rule out PFO, HbA1c, and Lipid panel  -- Recommend CTA head and neck  -- Recommend ESR, CRP, HIV, RPR, VALDO, cardiolipin antibody, ANCA, anti phospholipid antibody, TSH, thyroid peroxidase antibody, Lyme antibodies,  Vitamin B1, B2, B6, B12, MMA, Folic acid, Urine toxicology screen, C3, C4.  -- Recommend Infectious Disease consult for vasculitis and infective endocarditis workup  -- After basic workup and infectious disease input we will consider lumbar puncture  -- RN swallow screen to be completed by the RN prior to any PO intake. If the patient fails the RN swallow screen then make patient NPO. A formal clinical swallow evaluation by SLP is needed.  -- Follow speech recommendations for starting diet  -- Start 0.9% NS @  cc/ hr unless contraindicated   -- Physical therapy  -- Occupational therapy  -- We had an in depth discussion with patient regarding her atypical symptoms, possible differential diagnosis and our plan  -- Case and plan discussed with primary team    Michael Morgan MD  Neurology   [facility]

## 2020-12-05 NOTE — PLAN OF CARE
Fall precautions maintained. Nausea managed with prn. MRI done today. Ativan given as ordered before the procedure. Pain is well controlled. VSS. NSR on cardiac monitor. All needs met. Will continue to monitor.

## 2020-12-05 NOTE — PLAN OF CARE
Pt  A&Ox4. However, pt unable to answer some questions in relation to last several days. Pt c/o chest pain, nausea, headache, and numbness to LUE and LLE. Bed alarm activated.

## 2020-12-05 NOTE — NURSING
Pt reports midsternum chest pain 10/10. Pt describes pain as sharp. Pt reports numbness to LUE and LLE. Notified Dr. Garcia. New orders placed.

## 2020-12-05 NOTE — ED PROVIDER NOTES
SCRIBE #1 NOTE: I, Lennie Raymond, am scribing for, and in the presence of, Ambreen Rodriguez MD. I have scribed the entire note.       History     Chief Complaint   Patient presents with    Altered Mental Status     last known normal, drove to work and doesn't remember how she got there     Review of patient's allergies indicates:   Allergen Reactions    Pcn [penicillins] Swelling    Codeine     Latex, natural rubber     Tylox [oxycodone-acetaminophen]          History of Present Illness     HPI    12/4/2020, 9:19 PM  History obtained from the patient and [facility]      History of Present Illness: Judy Ruelas is a 55 y.o. female patient with a PMHx of NSTEMI, obesity, CAD, migraines, and HTN who presents to the Emergency Department for evaluation of AMS which onset today.  Pt does not remember the last 2 days of her life. Pt's last known normal is driving to work and doesn't remember how she got there or the last 2 days she had off. She thinks she came to ER via ambulance (no family present to give collateral). Pt showed up to work and her coworkers called 911, per [facility]. Symptoms are constant and moderate in severity. No mitigating or exacerbating factors reported. Associated sxs include Left lower extremity weakness, memory loss, confusion, and severe HA. Pt doesn't remember her birthday or where she lives. Patient denies any facial droop, fever, cough, chills, CP, SOB, n/v/d, neck pain, and all other sxs at this time. Pt is not on any blood thinners. Pt takes aspirin and Plavix. No prior tx reported. Pt lives alone. No further complaints or concerns at this time.       Arrival mode: [facility]    PCP: [facility]        Past Medical History:  Past Medical History:   Diagnosis Date    Anxiety     Depression     Depression with anxiety     Family history of heart disease 1/27/2016    Hypertension     Irregular heart beat     Migraine headache     Morbid obesity with BMI of  40.0-44.9, adult 2017    NSTEMI (non-ST elevated myocardial infarction) 2017       Past Surgical History:  Past Surgical History:   Procedure Laterality Date    APPENDECTOMY       SECTION      HYSTERECTOMY           Family History:  Family History   Problem Relation Age of Onset    Hypertension Unknown     Heart disease Father 70    Hypertension Father     Heart attack Mother 40        triple bypass    Diabetes Mother     Hypertension Mother     Stroke Mother     Heart attack Brother 40        CABG x 2       Social History:  Social History     Tobacco Use    Smoking status: Never Smoker    Smokeless tobacco: Never Used   Substance and Sexual Activity    Alcohol use: No     Alcohol/week: 0.0 standard drinks     Comment: denies    Drug use: No     Comment: denies    Sexual activity: Not Currently        Review of Systems     Review of Systems   Constitutional: Negative for chills and fever.   HENT: Negative for sore throat.    Respiratory: Negative for cough and shortness of breath.    Cardiovascular: Negative for chest pain.   Gastrointestinal: Negative for diarrhea, nausea and vomiting.   Genitourinary: Negative for dysuria.   Musculoskeletal: Negative for back pain and neck pain.   Skin: Negative for rash.   Neurological: Positive for weakness (LLE) and headaches. Negative for facial asymmetry.        (+) memory loss  (+) AMS   Hematological: Does not bruise/bleed easily.   Psychiatric/Behavioral: Positive for confusion.   All other systems reviewed and are negative.       Physical Exam     Initial Vitals   BP Pulse Resp Temp SpO2   20 2110 20 213   (!) 219/115 98 18 98.7 °F (37.1 °C) 98 %      MAP       --                 Physical Exam  Nursing Notes and Vital Signs Reviewed.  Constitutional: Patient is in no acute distress. Obese.  Head: Atraumatic. Normocephalic.  Eyes: PERRL. EOM intact. Conjunctivae are not pale. No  scleral icterus.  ENT: Mucous membranes are moist. Oropharynx is clear and symmetric.    Neck: Supple. Full ROM. No lymphadenopathy.  Cardiovascular: Regular rate. Regular rhythm. No murmurs, rubs, or gallops. Distal pulses are 2+ and symmetric.  Pulmonary/Chest: No respiratory distress. Clear to auscultation bilaterally. No wheezing or rales.  Abdominal: Soft and non-distended.  There is no tenderness.  No rebound, guarding, or rigidity. Good bowel sounds.  Genitourinary: No CVA tenderness  Musculoskeletal: Moves all extremities. No obvious deformities. No edema. No calf tenderness.  Skin: Warm and dry.  Neurological:  AA&O x2 to herself and time. Hesitated before answering time No pronator drift. No slurred speech.  Normal speech.  No weakness. No acute focal neurological deficits are appreciated.  Psychiatric: Normal affect. Good eye contact. Appropriate in content.     ED Course   Critical Care    Date/Time: 12/5/2020 12:47 AM  Performed by: Ambreen Rodriguez MD  Authorized by: Ambreen Rodriguez MD   Direct patient critical care time: 15 minutes  Additional history critical care time: 5 minutes  Ordering / reviewing critical care time: 10 minutes  Documentation critical care time: 5 minutes  Consulting other physicians critical care time: 10 minutes  Total critical care time (exclusive of procedural time) : 45 minutes  Critical care time was exclusive of separately billable procedures and treating other patients and teaching time.  Critical care was necessary to treat or prevent imminent or life-threatening deterioration of the following conditions: CNS failure or compromise (AMS and code stroke).  Critical care was time spent personally by me on the following activities: blood draw for specimens, development of treatment plan with patient or surrogate, discussions with consultants, interpretation of cardiac output measurements, evaluation of patient's response to treatment, examination of patient, obtaining history  from patient or surrogate, ordering and performing treatments and interventions, ordering and review of laboratory studies, ordering and review of radiographic studies, pulse oximetry, re-evaluation of patient's condition and review of old charts.        ED Vital Signs:  Vitals:    12/04/20 2145 12/04/20 2200 12/04/20 2215 12/04/20 2230   BP:  (!) 196/104 (!) 179/75 (!) 165/72   Pulse: 91 91 92 90   Resp: (!) 24 (!) 27 (!) 26 (!) 27   Temp:       TempSrc:       SpO2: 98% 96% 98% 96%   Weight:       Height:        12/04/20 2245 12/04/20 2300 12/04/20 2325 12/04/20 2335   BP: (!) 168/77 (!) 199/86 (!) 198/86 (!) 191/79   Pulse: 92 93 93 95   Resp: (!) 25 (!) 25 (!) 24 (!) 26   Temp:       TempSrc:       SpO2: 95% 97% 96% 95%   Weight:       Height:        12/04/20 2350 12/05/20 0025 12/05/20 0100 12/05/20 0200   BP: (!) 190/86 (!) 166/74 132/60 (!) 150/90   Pulse: 94 87 87 93   Resp: (!) 26 (!) 21 13 (!) 27   Temp:       TempSrc:       SpO2: 98% 97% 96% 97%   Weight:       Height:        12/05/20 0257 12/05/20 0402 12/05/20 0740   BP: (!) 195/104 137/60 134/65   Pulse: 85 85 72   Resp: 20 18 18   Temp: 98.4 °F (36.9 °C) 98.3 °F (36.8 °C) 97.7 °F (36.5 °C)   TempSrc: Oral Oral Oral   SpO2: 97% (!) 94% 96%   Weight:      Height:          Abnormal Lab Results:  Labs Reviewed   CBC W/ AUTO DIFFERENTIAL - Abnormal; Notable for the following components:       Result Value    MCH 26.6 (*)     MCHC 31.5 (*)     RDW 14.6 (*)     Platelets 351 (*)     Gran # (ANC) 7.9 (*)     Gran % 76.7 (*)     Lymph % 15.0 (*)     All other components within normal limits   COMPREHENSIVE METABOLIC PANEL - Abnormal; Notable for the following components:    CO2 22 (*)     Total Protein 9.1 (*)     Alkaline Phosphatase 152 (*)     All other components within normal limits   URINALYSIS, REFLEX TO URINE CULTURE - Abnormal; Notable for the following components:    Nitrite, UA Positive (*)     All other components within normal limits    Narrative:      Specimen Source->Urine   URINALYSIS MICROSCOPIC - Abnormal; Notable for the following components:    WBC, UA 7 (*)     Bacteria Moderate (*)     All other components within normal limits    Narrative:     Specimen Source->Urine   APTT   B-TYPE NATRIURETIC PEPTIDE   DRUG SCREEN PANEL, URINE EMERGENCY    Narrative:     Specimen Source->Urine   MAGNESIUM   PROTIME-INR   TROPONIN I   TSH   SARS-COV-2 RNA AMPLIFICATION, QUAL   HIV 1 / 2 ANTIBODY   HEPATITIS C ANTIBODY   TROPONIN I   TYPE & SCREEN   POCT GLUCOSE        All Lab Results:  Results for orders placed or performed during the hospital encounter of 12/04/20   APTT   Result Value Ref Range    aPTT 23.1 21.0 - 32.0 sec   CBC auto differential   Result Value Ref Range    WBC 10.29 3.90 - 12.70 K/uL    RBC 5.04 4.00 - 5.40 M/uL    Hemoglobin 13.4 12.0 - 16.0 g/dL    Hematocrit 42.5 37.0 - 48.5 %    MCV 84 82 - 98 fL    MCH 26.6 (L) 27.0 - 31.0 pg    MCHC 31.5 (L) 32.0 - 36.0 g/dL    RDW 14.6 (H) 11.5 - 14.5 %    Platelets 351 (H) 150 - 350 K/uL    MPV 10.3 9.2 - 12.9 fL    Immature Granulocytes 0.4 0.0 - 0.5 %    Gran # (ANC) 7.9 (H) 1.8 - 7.7 K/uL    Immature Grans (Abs) 0.04 0.00 - 0.04 K/uL    Lymph # 1.5 1.0 - 4.8 K/uL    Mono # 0.6 0.3 - 1.0 K/uL    Eos # 0.1 0.0 - 0.5 K/uL    Baso # 0.05 0.00 - 0.20 K/uL    nRBC 0 0 /100 WBC    Gran % 76.7 (H) 38.0 - 73.0 %    Lymph % 15.0 (L) 18.0 - 48.0 %    Mono % 6.0 4.0 - 15.0 %    Eosinophil % 1.4 0.0 - 8.0 %    Basophil % 0.5 0.0 - 1.9 %    Differential Method Automated    Comprehensive metabolic panel   Result Value Ref Range    Sodium 137 136 - 145 mmol/L    Potassium 4.4 3.5 - 5.1 mmol/L    Chloride 102 95 - 110 mmol/L    CO2 22 (L) 23 - 29 mmol/L    Glucose 92 70 - 110 mg/dL    BUN 16 6 - 20 mg/dL    Creatinine 0.7 0.5 - 1.4 mg/dL    Calcium 9.5 8.7 - 10.5 mg/dL    Total Protein 9.1 (H) 6.0 - 8.4 g/dL    Albumin 4.1 3.5 - 5.2 g/dL    Total Bilirubin 0.4 0.1 - 1.0 mg/dL    Alkaline Phosphatase 152 (H) 55 - 135 U/L     AST 29 10 - 40 U/L    ALT 35 10 - 44 U/L    Anion Gap 13 8 - 16 mmol/L    eGFR if African American >60 >60 mL/min/1.73 m^2    eGFR if non African American >60 >60 mL/min/1.73 m^2   Brain natriuretic peptide   Result Value Ref Range    BNP 22 0 - 99 pg/mL   Drug screen panel, emergency   Result Value Ref Range    Benzodiazepines Negative     Methadone metabolites Negative     Cocaine (Metab.) Negative     Opiate Scrn, Ur Negative     Barbiturate Screen, Ur Negative     Amphetamine Screen, Ur Negative     THC Negative     Phencyclidine Negative     Creatinine, Urine 95.2 15.0 - 325.0 mg/dL    Toxicology Information SEE COMMENT    Magnesium   Result Value Ref Range    Magnesium 2.2 1.6 - 2.6 mg/dL   Protime-INR   Result Value Ref Range    Prothrombin Time 10.5 9.0 - 12.5 sec    INR 1.0 0.8 - 1.2   Troponin I   Result Value Ref Range    Troponin I <0.006 0.000 - 0.026 ng/mL   TSH   Result Value Ref Range    TSH 2.585 0.400 - 4.000 uIU/mL   Urinalysis, Reflex to Urine Culture Urine, Clean Catch    Specimen: Urine   Result Value Ref Range    Specimen UA Urine, Clean Catch     Color, UA Yellow Yellow, Straw, Pema    Appearance, UA Clear Clear    pH, UA 6.0 5.0 - 8.0    Specific Gravity, UA 1.025 1.005 - 1.030    Protein, UA Negative Negative    Glucose, UA Negative Negative    Ketones, UA Negative Negative    Bilirubin (UA) Negative Negative    Occult Blood UA Negative Negative    Nitrite, UA Positive (A) Negative    Urobilinogen, UA Negative <2.0 EU/dL    Leukocytes, UA Negative Negative   Lipid panel   Result Value Ref Range    Cholesterol 286 (H) 120 - 199 mg/dL    Triglycerides 439 (H) 30 - 150 mg/dL    HDL 41 40 - 75 mg/dL    LDL Cholesterol Invalid, Trig>400.0 63.0 - 159.0 mg/dL    HDL/Cholesterol Ratio 14.3 (L) 20.0 - 50.0 %    Total Cholesterol/HDL Ratio 7.0 (H) 2.0 - 5.0    Non-HDL Cholesterol 245 mg/dL   COVID-19 Rapid Screening   Result Value Ref Range    SARS-CoV-2 RNA, Amplification, Qual Negative Negative    HIV 1/2 Ag/Ab (4th Gen)   Result Value Ref Range    HIV 1/2 Ag/Ab Negative Negative   Hepatitis C antibody   Result Value Ref Range    Hepatitis C Ab Negative Negative   Urinalysis Microscopic   Result Value Ref Range    WBC, UA 7 (H) 0 - 5 /hpf    Bacteria Moderate (A) None-Occ /hpf    Squam Epithel, UA 3 /hpf    Microscopic Comment SEE COMMENT    Troponin I   Result Value Ref Range    Troponin I <0.006 0.000 - 0.026 ng/mL   CBC auto differential   Result Value Ref Range    WBC 9.05 3.90 - 12.70 K/uL    RBC 4.43 4.00 - 5.40 M/uL    Hemoglobin 11.7 (L) 12.0 - 16.0 g/dL    Hematocrit 38.1 37.0 - 48.5 %    MCV 86 82 - 98 fL    MCH 26.4 (L) 27.0 - 31.0 pg    MCHC 30.7 (L) 32.0 - 36.0 g/dL    RDW 14.9 (H) 11.5 - 14.5 %    Platelets 349 150 - 350 K/uL    MPV 10.3 9.2 - 12.9 fL    Immature Granulocytes 0.4 0.0 - 0.5 %    Gran # (ANC) 7.1 1.8 - 7.7 K/uL    Immature Grans (Abs) 0.04 0.00 - 0.04 K/uL    Lymph # 1.2 1.0 - 4.8 K/uL    Mono # 0.6 0.3 - 1.0 K/uL    Eos # 0.1 0.0 - 0.5 K/uL    Baso # 0.04 0.00 - 0.20 K/uL    nRBC 0 0 /100 WBC    Gran % 78.4 (H) 38.0 - 73.0 %    Lymph % 13.4 (L) 18.0 - 48.0 %    Mono % 6.1 4.0 - 15.0 %    Eosinophil % 1.3 0.0 - 8.0 %    Basophil % 0.4 0.0 - 1.9 %    Differential Method Automated    Basic metabolic panel   Result Value Ref Range    Sodium 136 136 - 145 mmol/L    Potassium 4.1 3.5 - 5.1 mmol/L    Chloride 103 95 - 110 mmol/L    CO2 24 23 - 29 mmol/L    Glucose 120 (H) 70 - 110 mg/dL    BUN 17 6 - 20 mg/dL    Creatinine 0.7 0.5 - 1.4 mg/dL    Calcium 8.8 8.7 - 10.5 mg/dL    Anion Gap 9 8 - 16 mmol/L    eGFR if African American >60 >60 mL/min/1.73 m^2    eGFR if non African American >60 >60 mL/min/1.73 m^2   Troponin I   Result Value Ref Range    Troponin I <0.006 0.000 - 0.026 ng/mL   Type & Screen   Result Value Ref Range    Group & Rh O POS     Indirect Nina NEG    POCT glucose   Result Value Ref Range    POCT Glucose 96 70 - 110 mg/dL         Imaging Results:  Imaging Results           CT Head Without Contrast (Final result)  Result time 12/04/20 21:48:03    Final result by Darrell Cole MD (12/04/20 21:48:03)                 Impression:      No acute intracranial CT abnormality.    Left maxillary sinus aerated secretions.  Clinical correlation for sinusitis.    All CT scans at this facility are performed  using dose modulation techniques as appropriate to performed exam including the following:  automated exposure control; adjustment of mA and/or kV according to the patients size (this includes techniques or standardized protocols for targeted exams where dose is matched to indication/reason for exam: i.e. extremities or head);  iterative reconstruction technique.      Electronically signed by: Darrell Cole  Date:    12/04/2020  Time:    21:48             Narrative:    EXAMINATION:  CT HEAD WITHOUT CONTRAST    CLINICAL HISTORY:  stroke;    TECHNIQUE:  Low dose axial CT images obtained throughout the head without intravenous contrast. Sagittal and coronal reconstructions were performed.    COMPARISON:  CT head without contrast 03/06/2015.    FINDINGS:  Intracranial compartment:    Ventricles and sulci are normal in size for age without evidence of hydrocephalus. No extra-axial blood or fluid collections.    Minimal microvascular ischemic change.  No generalized loss of gray-white differentiation.  No parenchymal mass, hemorrhage, edema or major vascular distribution infarct.    Skull/extracranial contents (limited evaluation): No fracture. Mastoid air cells are clear.  Near complete opacification of the left maxillary sinus.  Some aerated secretions.  Clinical correlation for sinusitis.                                 The EKG was ordered, reviewed, and independently interpreted by the ED provider.  Interpretation time: 21:50  Rate: 85 BPM  Rhythm: normal sinus rhythm  Interpretation: RBBB. No STEMI.    Repeat EKG # 1  The EKG was ordered, reviewed, and independently interpreted by the  ED provider.  Interpretation time: 1:53  Rate: 90 BPM  Rhythm: normal sinus rhythm  Interpretation: RBBB. T wave abnormality, consider inferior ischemia. No STEMI.    Repeat EKG # 2  The EKG was ordered, reviewed, and independently interpreted by the ED provider.  Interpretation time: 1:44  Rate: 90 BPM  Rhythm: normal sinus rhythm  Interpretation: RBBB. T wave abnormality, consider inferior ischemia. No STEMI.           The Emergency Provider reviewed the vital signs and test results, which are outlined above.     ED Discussion     10:02 PM: Discussed pt's case with Dr. Hodge ([facility]) who thinks this is not a stroke and recommends a brain MRI w/o contrast.  However she shows concern of a transient global amnesia. She also recommends gentle blood pressure control and to keep systolic under 200.    1:28 AM: Discussed case with Dr. Garcia ([facility]). Dr. Garcia agrees with current care and management of pt and accepts admission.   Admitting Service: [facility]  Admitting Physician: Dr. Garcia  Admit to: [facility]    1:28 AM: Re-evaluated pt. I have discussed test results, shared treatment plan, and the need for admission with patient at bedside. Pt expresses understanding at this time and agree with all information. All questions answered. Pt has no further questions or concerns at this time. Pt is ready for admit.         Medical Decision Making:   Clinical Tests:   Lab Tests: Ordered and Reviewed  Radiological Study: Ordered and Reviewed  Medical Tests: Ordered and Reviewed     Additional MDM:     NIH Stroke Scale:   Level of consciousness = 0 - alert  LOC questions = 2 - answers none correctly  LOC commands = 0 - performs both correctly  Best gaze = 0 - normal  Visual = 0 - no visual loss  Facial palsy = 0 - normal  Motor left arm =  0 - no drift  Motor right arm =  0 - no drift  Motor left leg = 0 - no drift  Motor right leg =  0 - no drift  Limb ataxia = 0 - absent  Sensory = 0 - normal  Best  language = 0 - no aphasia  Dysarthria = 0 - normal articulation  Extinction and inattention = 0 - no neglect  NIH Stroke Scale Total = 2       ED Medication(s):  Medications   sodium chloride 0.9% flush 10 mL (has no administration in time range)   aspirin chewable tablet 81 mg (has no administration in time range)   atorvastatin tablet 40 mg (has no administration in time range)   busPIRone split tablet 7.5 mg (has no administration in time range)   butalbital-acetaminophen-caffeine -40 mg per tablet 1 tablet (1 tablet Oral Given 12/5/20 0338)   clopidogreL tablet 75 mg (has no administration in time range)   ferrous sulfate EC tablet 325 mg (has no administration in time range)   FLUoxetine capsule 20 mg (has no administration in time range)   isosorbide mononitrate 24 hr tablet 30 mg (has no administration in time range)   heparin (porcine) injection 5,000 Units (5,000 Units Subcutaneous Given 12/5/20 0519)   hydrALAZINE tablet 50 mg (50 mg Oral Given 12/5/20 0338)   carvediloL tablet 25 mg (25 mg Oral Given 12/5/20 0337)   losartan tablet 100 mg (has no administration in time range)   acetaminophen tablet 650 mg (has no administration in time range)   ondansetron injection 4 mg (4 mg Intravenous Given 12/5/20 0412)   guaifenesin 100 mg/5 ml syrup 200 mg (has no administration in time range)   aluminum-magnesium hydroxide-simethicone 200-200-20 mg/5 mL suspension 30 mL (has no administration in time range)   albuterol-ipratropium 2.5 mg-0.5 mg/3 mL nebulizer solution 3 mL (has no administration in time range)   diphenhydrAMINE capsule 25 mg (has no administration in time range)   morphine injection 4 mg (4 mg Intravenous Given 12/5/20 0402)   morphine injection 2 mg (has no administration in time range)   pantoprazole injection 40 mg (40 mg Intravenous Given 12/5/20 0403)   pneumoc 13-tanisha conj-dip cr(PF) (PREVNAR 13 (PF)) 0.5 mL (has no administration in time range)   influenza (QUADRIVALENT PF) vaccine 0.5  "mL (has no administration in time range)   lidocaine (PF) 10 mg/ml (1%) injection 10 mg (has no administration in time range)   acetaminophen tablet 650 mg (650 mg Oral Given 12/4/20 2315)   levoFLOXacin 750 mg/150 mL IVPB 750 mg (750 mg Intravenous New Bag 12/5/20 0101)   GI cocktail (mylanta 30 mL, lidocaine 2 % viscous 10 mL, dicyclomine 10 mL) 50 mL ( Oral Given 12/5/20 0519)       Current Discharge Medication List                  Scribe Attestation:   Scribe #1: I performed the above scribed service and the documentation accurately describes the services I performed. I attest to the accuracy of the note.     Attending:   Physician Attestation Statement for Scribe #1: I, Ambreen Rodriguez MD, personally performed the services described in this documentation, as scribed by Lennie Raymond, in my presence, and it is both accurate and complete.           Clinical Impression       ICD-10-CM ICD-9-CM   1. Acute cystitis with hematuria  N30.01 595.0   2. AMS (altered mental status)  R41.82 780.97   3. Tachycardia  R00.0 785.0   4. Chest pain  R07.9 786.50       Disposition:   Disposition: Placed in Observation  Condition: Fair  Portions of this note may have been created with voice recognition software. Occasional "wrong-word" or "sound-a-like" substitutions may have occurred due to the inherent limitations of voice recognition software. Please, read the note carefully and recognize, using context, where substitutions have occurred.            Ambreen Rodriguez MD  12/05/20 0816    "

## 2020-12-05 NOTE — PROGRESS NOTES
Patient seen and evaluated by me    NAD. Continues to c/o amnesia and nausea. Onset yesterday. No longer experiencing CP.    Denies sig life stressors, changes in meds. Denies smoking, etoh and illicit drug use. Denies metal implants. Plans for MRI. Reports anxiety and clautrophobia.  On buspar and venlafaxine.     If stable findings, ok to d/c home with f/u pcp/neuro for further eval and tx. Patient voices understanding    Benzo may complicate existing memory concerns. Monitor     F/u mri findings  D/c home if stable  Monitor bp d/t change from atenolol to coreg and hydralazine

## 2020-12-05 NOTE — SUBJECTIVE & OBJECTIVE
Past Medical History:   Diagnosis Date    Anxiety     Depression     Depression with anxiety     Family history of heart disease 2016    Hypertension     Irregular heart beat     Migraine headache     Morbid obesity with BMI of 40.0-44.9, adult 2017    NSTEMI (non-ST elevated myocardial infarction) 2017       Past Surgical History:   Procedure Laterality Date    APPENDECTOMY       SECTION      HYSTERECTOMY         Review of patient's allergies indicates:   Allergen Reactions    Pcn [penicillins] Swelling    Codeine     Latex, natural rubber     Tylox [oxycodone-acetaminophen]        No current facility-administered medications on file prior to encounter.      Current Outpatient Medications on File Prior to Encounter   Medication Sig    atenolol (TENORMIN) 50 MG tablet Take 50 mg by mouth once daily.    busPIRone (BUSPAR) 7.5 MG tablet Take 7.5 mg by mouth 2 (two) times daily.     amlodipine (NORVASC) 5 MG tablet Take 2 tablets (10 mg total) by mouth once daily.    aspirin 81 MG Chew Take 1 tablet (81 mg total) by mouth once daily.    atorvastatin (LIPITOR) 40 MG tablet Take 1 tablet (40 mg total) by mouth once daily.    butalbital-acetaminophen-caffeine -40 mg (FIORICET, ESGIC) -40 mg per tablet Take 1 tablet by mouth every 6 (six) hours as needed for Pain or Headaches.    clopidogrel (PLAVIX) 75 mg tablet Take 1 tablet (75 mg total) by mouth once daily.    colchicine 0.6 mg tablet Take 1 tablet (0.6 mg total) by mouth once daily.    ferrous sulfate 324 mg (65 mg iron) TbEC Take 325 mg by mouth once daily.    fluoxetine (PROZAC) 20 MG capsule Take 20 mg by mouth once daily.    hydrocodone-acetaminophen 7.5-325mg (NORCO) 7.5-325 mg per tablet Take 1 tablet by mouth every 6 (six) hours as needed for Pain.    hydrOXYzine (VISTARIL) 50 MG Cap Take 50 mg by mouth 4 (four) times daily.    isosorbide mononitrate (IMDUR) 30 MG 24 hr tablet Take 1 tablet (30 mg  total) by mouth once daily.    losartan (COZAAR) 50 MG tablet Take 1 tablet (50 mg total) by mouth once daily.    nitroGLYCERIN (NITROSTAT) 0.4 MG SL tablet Place 1 tablet (0.4 mg total) under the tongue every 5 (five) minutes as needed for Chest pain.    omeprazole (PRILOSEC) 40 MG capsule Take 1 capsule (40 mg total) by mouth once daily.    ondansetron (ZOFRAN) 4 MG tablet Take 8 mg by mouth 2 (two) times daily.    promethazine (PHENERGAN) 25 MG tablet Take 1 tablet (25 mg total) by mouth every 6 (six) hours as needed for Nausea.    trazodone (DESYREL) 50 MG tablet Take 50 mg by mouth every evening.    venlafaxine (EFFEXOR-XR) 75 MG 24 hr capsule Take 75 mg by mouth once daily.     Family History     Problem Relation (Age of Onset)    Diabetes Mother    Heart attack Mother (40), Brother (40)    Heart disease Father (70)    Hypertension Father, Mother    Stroke Mother        Tobacco Use    Smoking status: Never Smoker    Smokeless tobacco: Never Used   Substance and Sexual Activity    Alcohol use: No     Alcohol/week: 0.0 standard drinks     Comment: denies    Drug use: No     Comment: denies    Sexual activity: Not Currently     Review of Systems   Constitutional: Negative.    HENT: Negative.    Eyes: Negative.    Respiratory: Positive for shortness of breath.    Cardiovascular: Negative.    Gastrointestinal: Negative.    Endocrine: Negative.    Genitourinary: Negative.    Neurological: Negative.  Negative for dizziness, tremors, seizures, syncope, facial asymmetry, speech difficulty, weakness, light-headedness and headaches.        MEMORY LOSS   Psychiatric/Behavioral: Negative.      Objective:     Vital Signs (Most Recent):  Temp: 98.4 °F (36.9 °C) (12/05/20 0257)  Pulse: 85 (12/05/20 0257)  Resp: 20 (12/05/20 0257)  BP: (!) 195/104 (12/05/20 0257)  SpO2: 97 % (12/05/20 0257) Vital Signs (24h Range):  Temp:  [98.4 °F (36.9 °C)-98.7 °F (37.1 °C)] 98.4 °F (36.9 °C)  Pulse:  [85-98] 85  Resp:  [13-27]  20  SpO2:  [95 %-98 %] 97 %  BP: (132-219)/() 195/104     Weight: 118.4 kg (261 lb 0.4 oz)  Body mass index is 43.44 kg/m².    Physical Exam  Constitutional:       Appearance: Normal appearance.   HENT:      Head: Normocephalic and atraumatic.      Nose: Nose normal.      Mouth/Throat:      Mouth: Mucous membranes are moist.   Eyes:      Extraocular Movements: Extraocular movements intact.      Pupils: Pupils are equal, round, and reactive to light.   Neck:      Musculoskeletal: Normal range of motion and neck supple.   Cardiovascular:      Rate and Rhythm: Normal rate and regular rhythm.      Pulses: Normal pulses.      Heart sounds: Normal heart sounds.   Pulmonary:      Effort: Pulmonary effort is normal.      Breath sounds: Normal breath sounds.   Abdominal:      General: Abdomen is flat.      Palpations: Abdomen is soft.   Skin:     General: Skin is warm.      Capillary Refill: Capillary refill takes less than 2 seconds.   Neurological:      General: No focal deficit present.      Mental Status: She is alert and oriented to person, place, and time.           CRANIAL NERVES     CN III, IV, VI   Pupils are equal, round, and reactive to light.       Significant Labs: All pertinent labs within the past 24 hours have been reviewed.    Significant Imaging: I have reviewed and interpreted all pertinent imaging results/findings within the past 24 hours.

## 2020-12-05 NOTE — ASSESSMENT & PLAN NOTE
54 y/o woman with migraines, CAD, HTN, presenting with amnesia starting 2 days ago.  Symptoms not classic for transient global amnesia (typically remember personal details and generally doesn't last >24 hrs).  Consider encephalopathy due to other cause (PRES? BP very elevated).  Recommend MRI brain to further evaluate.

## 2020-12-05 NOTE — HPI
54 y/o woman with CAD, HTN, migraines, obesity who presents with amnesia.  Patient reports she does not remember any of her activities over the last 2 days.  She remembers going to work on Tuesday but doesn't remember the last 2 days she had off.  She thinks she came to the ER via ambulance (no family present to give collateral).  Endorses severe frontal HA.

## 2020-12-05 NOTE — CONSULTS
[facility] -   Cardiology  Consult Note    Patient Name: Judy Ruelas  MRN: [id number]  Admission Date: 2020  Hospital Length of Stay: 0 days  Code Status: Full Code   Attending Provider: SONIA Hammonds MD   Consulting Provider: ZACK Tom  Primary Care Physician: Jenny [facility]  Principal Problem:Anterograde amnesia    Patient information was obtained from patient and ER records.     Inpatient consult to Cardiology  Consult performed by: ZACK Albarran  Consult ordered by: Junaid Garcia MD  Reason for consult: CP        Subjective:     Chief Complaint:  AMS     HPI:    Ms. Ruelas is 55-year-old female with past medical history of nonobstructive coronary disease, HTN, HLP morbid obesity, possible underlying sleep apnea, and hypertension and hyperlipidemia presented to the ED with complaints of AMS/confusion. Noted to have significantly elevated BP on arrival 219/115. Is followed by Dr. Tahmina Pro at [facility].   CT head in the emergency room did not show any acute intracranial events  EKG shows no ischemic changes. Patient had nuclear cardiology stress test in May 2018 showing an ejection fraction of 72% and no evidence of ischemia. She had a heart catheterization in  showing patent arteries with the exception of a 50% left circumflex lesion.   Had repeat stress test in 2020 that showed no perfusion defects (care everywhere)   Chest pain reproducible with palpation of chest wall    Past Medical History:   Diagnosis Date    Anxiety     Depression     Depression with anxiety     Family history of heart disease 2016    Hypertension     Irregular heart beat     Migraine headache     Morbid obesity with BMI of 40.0-44.9, adult 2017    NSTEMI (non-ST elevated myocardial infarction) 2017       Past Surgical History:   Procedure Laterality Date    APPENDECTOMY       SECTION      HYSTERECTOMY         Review of  patient's allergies indicates:   Allergen Reactions    Pcn [penicillins] Swelling    Codeine     Latex, natural rubber     Tylox [oxycodone-acetaminophen]        No current facility-administered medications on file prior to encounter.      Current Outpatient Medications on File Prior to Encounter   Medication Sig    atenolol (TENORMIN) 50 MG tablet Take 50 mg by mouth once daily.    busPIRone (BUSPAR) 7.5 MG tablet Take 7.5 mg by mouth 2 (two) times daily.     amlodipine (NORVASC) 5 MG tablet Take 2 tablets (10 mg total) by mouth once daily.    aspirin 81 MG Chew Take 1 tablet (81 mg total) by mouth once daily.    atorvastatin (LIPITOR) 40 MG tablet Take 1 tablet (40 mg total) by mouth once daily.    butalbital-acetaminophen-caffeine -40 mg (FIORICET, ESGIC) -40 mg per tablet Take 1 tablet by mouth every 6 (six) hours as needed for Pain or Headaches.    clopidogrel (PLAVIX) 75 mg tablet Take 1 tablet (75 mg total) by mouth once daily.    colchicine 0.6 mg tablet Take 1 tablet (0.6 mg total) by mouth once daily.    ferrous sulfate 324 mg (65 mg iron) TbEC Take 325 mg by mouth once daily.    fluoxetine (PROZAC) 20 MG capsule Take 20 mg by mouth once daily.    hydrocodone-acetaminophen 7.5-325mg (NORCO) 7.5-325 mg per tablet Take 1 tablet by mouth every 6 (six) hours as needed for Pain.    hydrOXYzine (VISTARIL) 50 MG Cap Take 50 mg by mouth 4 (four) times daily.    isosorbide mononitrate (IMDUR) 30 MG 24 hr tablet Take 1 tablet (30 mg total) by mouth once daily.    losartan (COZAAR) 50 MG tablet Take 1 tablet (50 mg total) by mouth once daily.    nitroGLYCERIN (NITROSTAT) 0.4 MG SL tablet Place 1 tablet (0.4 mg total) under the tongue every 5 (five) minutes as needed for Chest pain.    omeprazole (PRILOSEC) 40 MG capsule Take 1 capsule (40 mg total) by mouth once daily.    ondansetron (ZOFRAN) 4 MG tablet Take 8 mg by mouth 2 (two) times daily.    promethazine (PHENERGAN) 25 MG tablet  Take 1 tablet (25 mg total) by mouth every 6 (six) hours as needed for Nausea.    trazodone (DESYREL) 50 MG tablet Take 50 mg by mouth every evening.    venlafaxine (EFFEXOR-XR) 75 MG 24 hr capsule Take 75 mg by mouth once daily.     Family History     Problem Relation (Age of Onset)    Diabetes Mother    Heart attack Mother (40), Brother (40)    Heart disease Father (70)    Hypertension Father, Mother    Stroke Mother        Tobacco Use    Smoking status: Never Smoker    Smokeless tobacco: Never Used   Substance and Sexual Activity    Alcohol use: No     Alcohol/week: 0.0 standard drinks     Comment: denies    Drug use: No     Comment: denies    Sexual activity: Not Currently     Review of Systems   Constitution: Negative for diaphoresis, malaise/fatigue, weight gain and weight loss.   HENT: Negative for congestion and nosebleeds.    Cardiovascular: Positive for chest pain ( TTP). Negative for claudication, cyanosis, dyspnea on exertion, irregular heartbeat, leg swelling, near-syncope, orthopnea, palpitations, paroxysmal nocturnal dyspnea and syncope.   Respiratory: Negative for cough, hemoptysis, shortness of breath, sleep disturbances due to breathing, snoring, sputum production and wheezing.    Hematologic/Lymphatic: Negative for bleeding problem. Does not bruise/bleed easily.   Skin: Negative for rash.   Musculoskeletal: Negative for arthritis, back pain, falls, joint pain, muscle cramps and muscle weakness.   Gastrointestinal: Negative for abdominal pain, constipation, diarrhea, heartburn, hematemesis, hematochezia, melena, nausea and vomiting.   Genitourinary: Negative for dysuria, hematuria and nocturia.   Neurological: Positive for dizziness and headaches. Negative for excessive daytime sleepiness, light-headedness, loss of balance, numbness, vertigo and weakness.     Objective:     Vital Signs (Most Recent):  Temp: 97.7 °F (36.5 °C) (12/05/20 0740)  Pulse: 72 (12/05/20 0740)  Resp: 18 (12/05/20  0740)  BP: 134/65 (12/05/20 0740)  SpO2: 96 % (12/05/20 0740) Vital Signs (24h Range):  Temp:  [97.7 °F (36.5 °C)-98.7 °F (37.1 °C)] 97.7 °F (36.5 °C)  Pulse:  [72-98] 72  Resp:  [13-27] 18  SpO2:  [94 %-98 %] 96 %  BP: (132-219)/() 134/65     Weight: 118.4 kg (261 lb 0.4 oz)  Body mass index is 43.44 kg/m².    SpO2: 96 %         Intake/Output Summary (Last 24 hours) at 12/5/2020 0841  Last data filed at 12/5/2020 0500  Gross per 24 hour   Intake 240 ml   Output --   Net 240 ml       Lines/Drains/Airways     Drain            Female External Urinary Catheter 12/05/20 0453 less than 1 day          Peripheral Intravenous Line                 Peripheral IV - Single Lumen 12/05/20 0452 22 G Left Forearm less than 1 day                Physical Exam   Constitutional: She is oriented to person, place, and time. She appears well-developed and well-nourished.   Neck: Neck supple. No JVD present.   Cardiovascular: Normal rate, regular rhythm, normal heart sounds and normal pulses. Exam reveals no friction rub.   No murmur heard.  Pulmonary/Chest: Effort normal and breath sounds normal. No respiratory distress. She has no wheezes. She has no rales.   Abdominal: Soft. Bowel sounds are normal. She exhibits no distension.   Musculoskeletal:         General: No tenderness or edema.   Neurological: She is alert and oriented to person, place, and time.   Skin: Skin is warm and dry. No rash noted.   Psychiatric: She has a normal mood and affect. Her behavior is normal.   Nursing note and vitals reviewed.      Significant Labs:   All pertinent lab results from the last 24 hours have been reviewed. and   Recent Lab Results       12/05/20  0554   12/05/20  0553   12/05/20  0140   12/04/20  2338   12/04/20  2321        Benzodiazepines       Negative       Methadone metabolites       Negative       Phencyclidine       Negative       Albumin               Alkaline Phosphatase               ALT               Amphetamine Screen, Ur        Negative       Anion Gap   9           Appearance, UA       Clear       aPTT               AST               Bacteria, UA       Moderate       Barbiturate Screen, Ur       Negative       Baso # 0.04             Basophil % 0.4             Bilirubin (UA)       Negative       BILIRUBIN TOTAL               BNP               BUN   17           Calcium   8.8           Chloride   103           Cholesterol               CO2   24           Cocaine (Metab.)       Negative       Color, UA       Yellow       Creatinine   0.7           Creatinine, Urine       95.2  Comment:  The random urine reference ranges provided were established   for 24 hour urine collections.  No reference ranges exist for  random urine specimens.  Correlate clinically.         Differential Method Automated             eGFR if    >60           eGFR if non    >60  Comment:  Calculation used to obtain the estimated glomerular filtration  rate (eGFR) is the CKD-EPI equation.              Eos # 0.1             Eosinophil % 1.3             Glucose   120           Glucose, UA       Negative       Gran # (ANC) 7.1             Gran % 78.4             Group & Rh               HDL               HDL/Cholesterol Ratio               Hematocrit 38.1             Hemoglobin 11.7             Hepatitis C Ab               HIV 1/2 Ag/Ab               Immature Grans (Abs) 0.04  Comment:  Mild elevation in immature granulocytes is non specific and   can be seen in a variety of conditions including stress response,   acute inflammation, trauma and pregnancy. Correlation with other   laboratory and clinical findings is essential.               Immature Granulocytes 0.4             INDIRECT FRANCIS               INR               Ketones, UA       Negative       LDL Cholesterol External               Leukocytes, UA       Negative       Lymph # 1.2             Lymph % 13.4             Magnesium               MCH 26.4             MCHC 30.7              MCV 86             Microscopic Comment       SEE COMMENT  Comment:  Other formed elements not mentioned in the report are not   present in the microscopic examination.          Mono # 0.6             Mono % 6.1             MPV 10.3             NITRITE UA       Positive       Non-HDL Cholesterol               nRBC 0             Occult Blood UA       Negative       Opiate Scrn, Ur       Negative       pH, UA       6.0       Platelets 349             POCT Glucose               Potassium   4.1           PROTEIN TOTAL               Protein, UA       Negative  Comment:  Recommend a 24 hour urine protein or a urine   protein/creatinine ratio if globulin induced proteinuria is  clinically suspected.         Protime               RBC 4.43             RDW 14.9             SARS-CoV-2 RNA, Amplification, Qual         Negative  Comment:  This test utilizes isothermal nucleic acid amplification   technology to detect the SARS-CoV-2 RdRp nucleic acid segment.   The analytical sensitivity (limit of detection) is 125 genome   equivalents/mL.   A POSITIVE result implies infection with the SARS-CoV-2 virus;  the patient is presumed to be contagious.    A NEGATIVE result means that SARS-CoV-2 nucleic acids are not  present above the limit of detection. A NEGATIVE result should be   treated as presumptive. It does not rule out the possibility of   COVID-19 and should not be the sole basis for treatment decisions.   If COVID-19 is strongly suspected based on clinical and exposure   history, re-testing using an alternate molecular assay should be   considered.   This test is only for use under the Food and Drug   Administration s Emergency Use Authorization (EUA).   Commercial kits are provided by [employer]   Performance characteristics of the EUA have been independently  verified by [facility] Department of  Pathology and Laboratory Medicine.    _________________________________________________________________  The ID NOW COVID-19 Letter of Authorization, along with the   authorized Fact Sheet for Healthcare Providers, the authorized Fact  Sheet for Patients, and authorized labeling are available on the FDA   website:  www.fda.gov/MedicalDevices/Safety/EmergencySituations/whe453891.htm       Sodium   136           Specific [place], UA       1.025       Specimen UA       Urine, Clean Catch       Squam Epithel, UA       3       Marijuana (THC) Metabolite       Negative       Total Cholesterol/HDL Ratio               Toxicology Information       SEE COMMENT  Comment:  This screen includes the following classes of drugs at the   listed cut-off:  Benzodiazepines                  200 ng/ml  Methadone                        300 ng/ml  Cocaine metabolite               300 ng/ml  Opiates                          300 ng/ml  Barbiturates                     200 ng/ml  Amphetamines                    1000 ng/ml  Marijuana metabs (THC)            50 ng/ml  Phencyclidine (PCP)               25 ng/ml  High concentrations of Diphenhydramine may cross-react with  Phencyclidine PCP screening immunoassay giving a false   positive result.  High concentrations of Methylenedioxymethamphetamine (MDMA aka  Ectasy) and other structurally similar compounds may cross-   react with the Amphetamine/Methamphetamine screening   immunoassay giving a false positive result.  A metabolite of the anti-HIV drug Sustiva () may cause  false positive results in the Marijuana metabolite (THC)   screening assay.  Note: This exception list includes only more common   interferants in toxicology screen testing.  Because of many   cross-reactantspositive results on toxicology drug screens   should be confirmed whenever results do not correlate with   clinical presentation.  This report is intended for use in clinical monitoring and  management of patients. It is not intended for use in    employment related drug testing.  Because of any cross-reactants, positive results on toxicology  drug screens should be confirmed whenever results do not  correlate with clinical presentation.  Presumptive positive results are unconfirmed and may be used   only for medical purposes.  Assay Intended Use: This assay provides only a preliminary analytical  test result. A more specific alternate chemical method must be used  to obtain a confirmed analytical result. Gas chromatography/mass  spectrometry (GS/MS)is the preferred confirmatory method. Clinical  consideration and professional judgement should be applied to any   drug of abuse test result, particularly when preliminary results  are used.         Triglycerides               Troponin I <0.006  Comment:  The reference interval for Troponin I represents the 99th percentile   cutoff   for our facility and is consistent with 3rd generation assay   performance.     <0.006  Comment:  The reference interval for Troponin I represents the 99th percentile   cutoff   for our facility and is consistent with 3rd generation assay   performance.           TSH               UROBILINOGEN UA       Negative       WBC, UA       7       WBC 9.05                              12/04/20  2221   12/04/20  2114        Benzodiazepines         Methadone metabolites         Phencyclidine         Albumin 4.1       Alkaline Phosphatase 152       ALT 35       Amphetamine Screen, Ur         Anion Gap 13       Appearance, UA         aPTT 23.1  Comment:  aPTT therapeutic range = 39-69 seconds       AST 29       Bacteria, UA         Barbiturate Screen, Ur         Baso # 0.05       Basophil % 0.5       Bilirubin (UA)         BILIRUBIN TOTAL 0.4  Comment:  For infants and newborns, interpretation of results should be based  on gestational age, weight and in agreement with clinical  observations.  Premature Infant recommended reference ranges:  Up to 24 hours.............<8.0 mg/dL  Up to 48  hours............<12.0 mg/dL  3-5 days..................<15.0 mg/dL  6-29 days.................<15.0 mg/dL         BNP 22  Comment:  Values of less than 100 pg/ml are consistent with non-CHF populations.       BUN 16       Calcium 9.5       Chloride 102       Cholesterol 286  Comment:  The National Cholesterol Education Program (NCEP) has set the  following guidelines (reference ranges) for Cholesterol:  Optimal.....................<200 mg/dL  Borderline High.............200-239 mg/dL  High........................> or = 240 mg/dL         CO2 22       Cocaine (Metab.)         Color, UA         Creatinine 0.7       Creatinine, Urine         Differential Method Automated       eGFR if  >60       eGFR if non  >60  Comment:  Calculation used to obtain the estimated glomerular filtration  rate (eGFR) is the CKD-EPI equation.          Eos # 0.1       Eosinophil % 1.4       Glucose 92       Glucose, UA         Gran # (ANC) 7.9       Gran % 76.7       Group & Rh O POS       HDL 41  Comment:  The National Cholesterol Education Program (NCEP) has set the  following guidelines (reference values) for HDL Cholesterol:  Low...............<40 mg/dL  Optimal...........>60 mg/dL         HDL/Cholesterol Ratio 14.3       Hematocrit 42.5       Hemoglobin 13.4       Hepatitis C Ab Negative       HIV 1/2 Ag/Ab Negative       Immature Grans (Abs) 0.04  Comment:  Mild elevation in immature granulocytes is non specific and   can be seen in a variety of conditions including stress response,   acute inflammation, trauma and pregnancy. Correlation with other   laboratory and clinical findings is essential.         Immature Granulocytes 0.4       INDIRECT FRANCIS NEG       INR 1.0  Comment:  Coumadin Therapy:  2.0 - 3.0 for INR for all indicators except mechanical heart valves  and antiphospholipid syndromes which should use 2.5 - 3.5.         Ketones, UA         LDL Cholesterol External Invalid,  Trig>400.0  Comment:  The National Cholesterol Education Program (NCEP) has set the  following guidelines (reference values) for LDL Cholesterol:  Optimal.......................<130 mg/dL  Borderline High...............130-159 mg/dL  High..........................160-189 mg/dL  Very High.....................>190 mg/dL         Leukocytes, UA         Lymph # 1.5       Lymph % 15.0       Magnesium 2.2       MCH 26.6       MCHC 31.5       MCV 84       Microscopic Comment         Mono # 0.6       Mono % 6.0       MPV 10.3       NITRITE UA         Non-HDL Cholesterol 245  Comment:  Risk category and Non-HDL cholesterol goals:  Coronary heart disease (CHD)or equivalent (10-year risk of CHD >20%):  Non-HDL cholesterol goal     <130 mg/dL  Two or more CHD risk factors and 10-year risk of CHD <= 20%:  Non-HDL cholesterol goal     <160 mg/dL  0 to 1 CHD risk factor:  Non-HDL cholesterol goal     <190 mg/dL         nRBC 0       Occult Blood UA         Opiate Scrn, Ur         pH, UA         Platelets 351       POCT Glucose   96     Potassium 4.4       PROTEIN TOTAL 9.1       Protein, UA         Protime 10.5       RBC 5.04       RDW 14.6       SARS-CoV-2 RNA, Amplification, Qual         Sodium 137       Specific Gravity, UA         Specimen UA         Squam Epithel, UA         Marijuana (THC) Metabolite         Total Cholesterol/HDL Ratio 7.0       Toxicology Information         Triglycerides 439  Comment:  The National Cholesterol Education Program (NCEP) has set the  following guidelines (reference values) for triglycerides:  Normal......................<150 mg/dL  Borderline High.............150-199 mg/dL  High........................200-499 mg/dL         Troponin I <0.006  Comment:  The reference interval for Troponin I represents the 99th percentile   cutoff   for our facility and is consistent with 3rd generation assay   performance.         TSH 2.585       UROBILINOGEN UA         WBC, UA         WBC 10.29              Significant Imaging: STRESS TEST August 2020  FINDINGS: SPECT images at rest and stress studies showed no discrete perfusion defects. Soft tissue attenuation and subdiaphragmatic activity are noted. The gated stress study demonstrated a left ventricular ejection fraction of 68%, end-diastolic volume of 94 mL and end-systolic volume of 30 mL, without wall motion abnormalities.    IMPRESSION:  1. No significant perfusion defects in the left ventricular myocardium at rest or stress, normal study.    2. Normal left ventricular ejection fraction of 68%, EDV normal, no wall motion abnormalities.     ECHO November 2019    Result Narrative   1. Normal left ventricular systolic function.  2. Indeterminate diastolic function.  3. Normal estimated systolic PA pressures.  4. No significant valvular abnormalities.           Assessment and Plan:     Essential hypertension  Agree with  current IP HTN meds     Chest pain  Chest pain in patient who presented with HTN emergency with chest pressure,HA and confusion   Chest pain is reproducible with palpation of chest wall   Had stress test in August 2020 that showed no perfusion defects   Troponin negative x3   Known to have moderate LCX lesion on [facility] in 2017.   Needs BP control   BP improved on current IP meds   Continue current medical therapy   Follow up with primary Cardiologist  Will be available prn         VTE Risk Mitigation (From admission, onward)         Ordered     heparin (porcine) injection 5,000 Units  Every 8 hours      12/05/20 0317     IP VTE HIGH RISK PATIENT  Once      12/05/20 0431     Place sequential compression device  Until discontinued      12/05/20 0431     Place sequential compression device  Until discontinued      12/05/20 0317              Chart reviewed. Patient examined by Dr. Han and agrees with plan that has been outlined.     Thank you for your consult. I will sign off. Please contact us if you have any additional questions.    Faisal  ASHOK Cote, RICHARDP  Cardiology   [facility]

## 2020-12-05 NOTE — SUBJECTIVE & OBJECTIVE
Past Medical History:   Diagnosis Date    Anxiety     Depression     Depression with anxiety     Family history of heart disease 2016    Hypertension     Irregular heart beat     Migraine headache     Morbid obesity with BMI of 40.0-44.9, adult 2017    NSTEMI (non-ST elevated myocardial infarction) 2017       Past Surgical History:   Procedure Laterality Date    APPENDECTOMY       SECTION      HYSTERECTOMY         Review of patient's allergies indicates:   Allergen Reactions    Pcn [penicillins] Swelling    Codeine     Latex, natural rubber     Tylox [oxycodone-acetaminophen]        No current facility-administered medications on file prior to encounter.      Current Outpatient Medications on File Prior to Encounter   Medication Sig    atenolol (TENORMIN) 50 MG tablet Take 50 mg by mouth once daily.    busPIRone (BUSPAR) 7.5 MG tablet Take 7.5 mg by mouth 2 (two) times daily.     amlodipine (NORVASC) 5 MG tablet Take 2 tablets (10 mg total) by mouth once daily.    aspirin 81 MG Chew Take 1 tablet (81 mg total) by mouth once daily.    atorvastatin (LIPITOR) 40 MG tablet Take 1 tablet (40 mg total) by mouth once daily.    butalbital-acetaminophen-caffeine -40 mg (FIORICET, ESGIC) -40 mg per tablet Take 1 tablet by mouth every 6 (six) hours as needed for Pain or Headaches.    clopidogrel (PLAVIX) 75 mg tablet Take 1 tablet (75 mg total) by mouth once daily.    colchicine 0.6 mg tablet Take 1 tablet (0.6 mg total) by mouth once daily.    ferrous sulfate 324 mg (65 mg iron) TbEC Take 325 mg by mouth once daily.    fluoxetine (PROZAC) 20 MG capsule Take 20 mg by mouth once daily.    hydrocodone-acetaminophen 7.5-325mg (NORCO) 7.5-325 mg per tablet Take 1 tablet by mouth every 6 (six) hours as needed for Pain.    hydrOXYzine (VISTARIL) 50 MG Cap Take 50 mg by mouth 4 (four) times daily.    isosorbide mononitrate (IMDUR) 30 MG 24 hr tablet Take 1 tablet (30 mg  total) by mouth once daily.    losartan (COZAAR) 50 MG tablet Take 1 tablet (50 mg total) by mouth once daily.    nitroGLYCERIN (NITROSTAT) 0.4 MG SL tablet Place 1 tablet (0.4 mg total) under the tongue every 5 (five) minutes as needed for Chest pain.    omeprazole (PRILOSEC) 40 MG capsule Take 1 capsule (40 mg total) by mouth once daily.    ondansetron (ZOFRAN) 4 MG tablet Take 8 mg by mouth 2 (two) times daily.    promethazine (PHENERGAN) 25 MG tablet Take 1 tablet (25 mg total) by mouth every 6 (six) hours as needed for Nausea.    trazodone (DESYREL) 50 MG tablet Take 50 mg by mouth every evening.    venlafaxine (EFFEXOR-XR) 75 MG 24 hr capsule Take 75 mg by mouth once daily.     Family History     Problem Relation (Age of Onset)    Diabetes Mother    Heart attack Mother (40), Brother (40)    Heart disease Father (70)    Hypertension Father, Mother    Stroke Mother        Tobacco Use    Smoking status: Never Smoker    Smokeless tobacco: Never Used   Substance and Sexual Activity    Alcohol use: No     Alcohol/week: 0.0 standard drinks     Comment: denies    Drug use: No     Comment: denies    Sexual activity: Not Currently     Review of Systems   Constitution: Negative for diaphoresis, malaise/fatigue, weight gain and weight loss.   HENT: Negative for congestion and nosebleeds.    Cardiovascular: Positive for chest pain ( TTP). Negative for claudication, cyanosis, dyspnea on exertion, irregular heartbeat, leg swelling, near-syncope, orthopnea, palpitations, paroxysmal nocturnal dyspnea and syncope.   Respiratory: Negative for cough, hemoptysis, shortness of breath, sleep disturbances due to breathing, snoring, sputum production and wheezing.    Hematologic/Lymphatic: Negative for bleeding problem. Does not bruise/bleed easily.   Skin: Negative for rash.   Musculoskeletal: Negative for arthritis, back pain, falls, joint pain, muscle cramps and muscle weakness.   Gastrointestinal: Negative for  abdominal pain, constipation, diarrhea, heartburn, hematemesis, hematochezia, melena, nausea and vomiting.   Genitourinary: Negative for dysuria, hematuria and nocturia.   Neurological: Positive for dizziness and headaches. Negative for excessive daytime sleepiness, light-headedness, loss of balance, numbness, vertigo and weakness.     Objective:     Vital Signs (Most Recent):  Temp: 97.7 °F (36.5 °C) (12/05/20 0740)  Pulse: 72 (12/05/20 0740)  Resp: 18 (12/05/20 0740)  BP: 134/65 (12/05/20 0740)  SpO2: 96 % (12/05/20 0740) Vital Signs (24h Range):  Temp:  [97.7 °F (36.5 °C)-98.7 °F (37.1 °C)] 97.7 °F (36.5 °C)  Pulse:  [72-98] 72  Resp:  [13-27] 18  SpO2:  [94 %-98 %] 96 %  BP: (132-219)/() 134/65     Weight: 118.4 kg (261 lb 0.4 oz)  Body mass index is 43.44 kg/m².    SpO2: 96 %         Intake/Output Summary (Last 24 hours) at 12/5/2020 0841  Last data filed at 12/5/2020 0500  Gross per 24 hour   Intake 240 ml   Output --   Net 240 ml       Lines/Drains/Airways     Drain            Female External Urinary Catheter 12/05/20 0453 less than 1 day          Peripheral Intravenous Line                 Peripheral IV - Single Lumen 12/05/20 0452 22 G Left Forearm less than 1 day                Physical Exam   Constitutional: She is oriented to person, place, and time. She appears well-developed and well-nourished.   Neck: Neck supple. No JVD present.   Cardiovascular: Normal rate, regular rhythm, normal heart sounds and normal pulses. Exam reveals no friction rub.   No murmur heard.  Pulmonary/Chest: Effort normal and breath sounds normal. No respiratory distress. She has no wheezes. She has no rales.   Abdominal: Soft. Bowel sounds are normal. She exhibits no distension.   Musculoskeletal:         General: No tenderness or edema.   Neurological: She is alert and oriented to person, place, and time.   Skin: Skin is warm and dry. No rash noted.   Psychiatric: She has a normal mood and affect. Her behavior is normal.    Nursing note and vitals reviewed.      Significant Labs:   All pertinent lab results from the last 24 hours have been reviewed. and   Recent Lab Results       12/05/20  0554   12/05/20  0553   12/05/20  0140   12/04/20  2338   12/04/20  2321        Benzodiazepines       Negative       Methadone metabolites       Negative       Phencyclidine       Negative       Albumin               Alkaline Phosphatase               ALT               Amphetamine Screen, Ur       Negative       Anion Gap   9           Appearance, UA       Clear       aPTT               AST               Bacteria, UA       Moderate       Barbiturate Screen, Ur       Negative       Baso # 0.04             Basophil % 0.4             Bilirubin (UA)       Negative       BILIRUBIN TOTAL               BNP               BUN   17           Calcium   8.8           Chloride   103           Cholesterol               CO2   24           Cocaine (Metab.)       Negative       Color, UA       Yellow       Creatinine   0.7           Creatinine, Urine       95.2  Comment:  The random urine reference ranges provided were established   for 24 hour urine collections.  No reference ranges exist for  random urine specimens.  Correlate clinically.         Differential Method Automated             eGFR if    >60           eGFR if non    >60  Comment:  Calculation used to obtain the estimated glomerular filtration  rate (eGFR) is the CKD-EPI equation.              Eos # 0.1             Eosinophil % 1.3             Glucose   120           Glucose, UA       Negative       Gran # (ANC) 7.1             Gran % 78.4             Group & Rh               HDL               HDL/Cholesterol Ratio               Hematocrit 38.1             Hemoglobin 11.7             Hepatitis C Ab               HIV 1/2 Ag/Ab               Immature Grans (Abs) 0.04  Comment:  Mild elevation in immature granulocytes is non specific and   can be seen in a variety of  conditions including stress response,   acute inflammation, trauma and pregnancy. Correlation with other   laboratory and clinical findings is essential.               Immature Granulocytes 0.4             INDIRECT FRANCIS               INR               Ketones, UA       Negative       LDL Cholesterol External               Leukocytes, UA       Negative       Lymph # 1.2             Lymph % 13.4             Magnesium               MCH 26.4             MCHC 30.7             MCV 86             Microscopic Comment       SEE COMMENT  Comment:  Other formed elements not mentioned in the report are not   present in the microscopic examination.          Mono # 0.6             Mono % 6.1             MPV 10.3             NITRITE UA       Positive       Non-HDL Cholesterol               nRBC 0             Occult Blood UA       Negative       Opiate Scrn, Ur       Negative       pH, UA       6.0       Platelets 349             POCT Glucose               Potassium   4.1           PROTEIN TOTAL               Protein, UA       Negative  Comment:  Recommend a 24 hour urine protein or a urine   protein/creatinine ratio if globulin induced proteinuria is  clinically suspected.         Protime               RBC 4.43             RDW 14.9             SARS-CoV-2 RNA, Amplification, Qual         Negative  Comment:  This test utilizes isothermal nucleic acid amplification   technology to detect the SARS-CoV-2 RdRp nucleic acid segment.   The analytical sensitivity (limit of detection) is 125 genome   equivalents/mL.   A POSITIVE result implies infection with the SARS-CoV-2 virus;  the patient is presumed to be contagious.    A NEGATIVE result means that SARS-CoV-2 nucleic acids are not  present above the limit of detection. A NEGATIVE result should be   treated as presumptive. It does not rule out the possibility of   COVID-19 and should not be the sole basis for treatment decisions.   If COVID-19 is strongly suspected based on clinical  and exposure   history, re-testing using an alternate molecular assay should be   considered.   This test is only for use under the Food and Drug   Administration s Emergency Use Authorization (EUA).   Commercial kits are provided by [employer].   Performance characteristics of the EUA have been independently  verified by [facility] Department of  Pathology and Laboratory Medicine.   _________________________________________________________________  The ID NOW COVID-19 Letter of Authorization, along with the   authorized Fact Sheet for Healthcare Providers, the authorized Fact  Sheet for Patients, and authorized labeling are available on the FDA   website:  www.fda.gov/MedicalDevices/Safety/EmergencySituations/ver037443.htm       Sodium   136           Specific [place], UA       1.025       Specimen UA       Urine, Clean Catch       Squam Epithel, UA       3       Marijuana (THC) Metabolite       Negative       Total Cholesterol/HDL Ratio               Toxicology Information       SEE COMMENT  Comment:  This screen includes the following classes of drugs at the   listed cut-off:  Benzodiazepines                  200 ng/ml  Methadone                        300 ng/ml  Cocaine metabolite               300 ng/ml  Opiates                          300 ng/ml  Barbiturates                     200 ng/ml  Amphetamines                    1000 ng/ml  Marijuana metabs (THC)            50 ng/ml  Phencyclidine (PCP)               25 ng/ml  High concentrations of Diphenhydramine may cross-react with  Phencyclidine PCP screening immunoassay giving a false   positive result.  High concentrations of Methylenedioxymethamphetamine (MDMA aka  Ectasy) and other structurally similar compounds may cross-   react with the Amphetamine/Methamphetamine screening   immunoassay giving a false positive result.  A metabolite of the anti-HIV drug Sustiva () may cause  false positive results in the Marijuana metabolite  (THC)   screening assay.  Note: This exception list includes only more common   interferants in toxicology screen testing.  Because of many   cross-reactantspositive results on toxicology drug screens   should be confirmed whenever results do not correlate with   clinical presentation.  This report is intended for use in clinical monitoring and  management of patients. It is not intended for use in   employment related drug testing.  Because of any cross-reactants, positive results on toxicology  drug screens should be confirmed whenever results do not  correlate with clinical presentation.  Presumptive positive results are unconfirmed and may be used   only for medical purposes.  Assay Intended Use: This assay provides only a preliminary analytical  test result. A more specific alternate chemical method must be used  to obtain a confirmed analytical result. Gas chromatography/mass  spectrometry (GS/MS)is the preferred confirmatory method. Clinical  consideration and professional judgement should be applied to any   drug of abuse test result, particularly when preliminary results  are used.         Triglycerides               Troponin I <0.006  Comment:  The reference interval for Troponin I represents the 99th percentile   cutoff   for our facility and is consistent with 3rd generation assay   performance.     <0.006  Comment:  The reference interval for Troponin I represents the 99th percentile   cutoff   for our facility and is consistent with 3rd generation assay   performance.           TSH               UROBILINOGEN UA       Negative       WBC, UA       7       WBC 9.05                              12/04/20  2221   12/04/20  2114        Benzodiazepines         Methadone metabolites         Phencyclidine         Albumin 4.1       Alkaline Phosphatase 152       ALT 35       Amphetamine Screen, Ur         Anion Gap 13       Appearance, UA         aPTT 23.1  Comment:  aPTT therapeutic range = 39-69 seconds        AST 29       Bacteria, UA         Barbiturate Screen, Ur         Baso # 0.05       Basophil % 0.5       Bilirubin (UA)         BILIRUBIN TOTAL 0.4  Comment:  For infants and newborns, interpretation of results should be based  on gestational age, weight and in agreement with clinical  observations.  Premature Infant recommended reference ranges:  Up to 24 hours.............<8.0 mg/dL  Up to 48 hours............<12.0 mg/dL  3-5 days..................<15.0 mg/dL  6-29 days.................<15.0 mg/dL         BNP 22  Comment:  Values of less than 100 pg/ml are consistent with non-CHF populations.       BUN 16       Calcium 9.5       Chloride 102       Cholesterol 286  Comment:  The National Cholesterol Education Program (NCEP) has set the  following guidelines (reference ranges) for Cholesterol:  Optimal.....................<200 mg/dL  Borderline High.............200-239 mg/dL  High........................> or = 240 mg/dL         CO2 22       Cocaine (Metab.)         Color, UA         Creatinine 0.7       Creatinine, Urine         Differential Method Automated       eGFR if  >60       eGFR if non  >60  Comment:  Calculation used to obtain the estimated glomerular filtration  rate (eGFR) is the CKD-EPI equation.          Eos # 0.1       Eosinophil % 1.4       Glucose 92       Glucose, UA         Gran # (ANC) 7.9       Gran % 76.7       Group & Rh O POS       HDL 41  Comment:  The National Cholesterol Education Program (NCEP) has set the  following guidelines (reference values) for HDL Cholesterol:  Low...............<40 mg/dL  Optimal...........>60 mg/dL         HDL/Cholesterol Ratio 14.3       Hematocrit 42.5       Hemoglobin 13.4       Hepatitis C Ab Negative       HIV 1/2 Ag/Ab Negative       Immature Grans (Abs) 0.04  Comment:  Mild elevation in immature granulocytes is non specific and   can be seen in a variety of conditions including stress response,   acute inflammation, trauma  and pregnancy. Correlation with other   laboratory and clinical findings is essential.         Immature Granulocytes 0.4       INDIRECT FRANCIS NEG       INR 1.0  Comment:  Coumadin Therapy:  2.0 - 3.0 for INR for all indicators except mechanical heart valves  and antiphospholipid syndromes which should use 2.5 - 3.5.         Ketones, UA         LDL Cholesterol External Invalid, Trig>400.0  Comment:  The National Cholesterol Education Program (NCEP) has set the  following guidelines (reference values) for LDL Cholesterol:  Optimal.......................<130 mg/dL  Borderline High...............130-159 mg/dL  High..........................160-189 mg/dL  Very High.....................>190 mg/dL         Leukocytes, UA         Lymph # 1.5       Lymph % 15.0       Magnesium 2.2       MCH 26.6       MCHC 31.5       MCV 84       Microscopic Comment         Mono # 0.6       Mono % 6.0       MPV 10.3       NITRITE UA         Non-HDL Cholesterol 245  Comment:  Risk category and Non-HDL cholesterol goals:  Coronary heart disease (CHD)or equivalent (10-year risk of CHD >20%):  Non-HDL cholesterol goal     <130 mg/dL  Two or more CHD risk factors and 10-year risk of CHD <= 20%:  Non-HDL cholesterol goal     <160 mg/dL  0 to 1 CHD risk factor:  Non-HDL cholesterol goal     <190 mg/dL         nRBC 0       Occult Blood UA         Opiate Scrn, Ur         pH, UA         Platelets 351       POCT Glucose   96     Potassium 4.4       PROTEIN TOTAL 9.1       Protein, UA         Protime 10.5       RBC 5.04       RDW 14.6       SARS-CoV-2 RNA, Amplification, Qual         Sodium 137       Specific Gravity, UA         Specimen UA         Squam Epithel, UA         Marijuana (THC) Metabolite         Total Cholesterol/HDL Ratio 7.0       Toxicology Information         Triglycerides 439  Comment:  The National Cholesterol Education Program (NCEP) has set the  following guidelines (reference values) for  triglycerides:  Normal......................<150 mg/dL  Borderline High.............150-199 mg/dL  High........................200-499 mg/dL         Troponin I <0.006  Comment:  The reference interval for Troponin I represents the 99th percentile   cutoff   for our facility and is consistent with 3rd generation assay   performance.         TSH 2.585       UROBILINOGEN UA         WBC, UA         WBC 10.29             Significant Imaging: STRESS TEST August 2020  FINDINGS: SPECT images at rest and stress studies showed no discrete perfusion defects. Soft tissue attenuation and subdiaphragmatic activity are noted. The gated stress study demonstrated a left ventricular ejection fraction of 68%, end-diastolic volume of 94 mL and end-systolic volume of 30 mL, without wall motion abnormalities.    IMPRESSION:  1. No significant perfusion defects in the left ventricular myocardium at rest or stress, normal study.    2. Normal left ventricular ejection fraction of 68%, EDV normal, no wall motion abnormalities.     ECHO November 2019    Result Narrative   1. Normal left ventricular systolic function.  2. Indeterminate diastolic function.  3. Normal estimated systolic PA pressures.  4. No significant valvular abnormalities.

## 2020-12-05 NOTE — ASSESSMENT & PLAN NOTE
TSH within normal limits  Consult neurology in a.m..  Will check   vitamin B12, RPR for syphilis  Also will order MRI of head without contrast

## 2020-12-05 NOTE — H&P
[facility] Hospital Medicine  History & Physical    Patient Name: Judy Ruelas  MRN: [id number]  Admission Date: 2020  Attending Physician: Rogelio Lehman MD  Primary Care Provider: [facility]         Patient information was obtained from patient and ER records.     Subjective:     Principal Problem:Anterograde amnesia    Chief Complaint:   Chief Complaint   Patient presents with    Altered Mental Status     last known normal, drove to work and doesn't remember how she got there        HPI: 55-year-old female with past medical history of nonobstructive coronary disease, morbid obesity, possible underlying sleep apnea, and hypertension and hyperlipidemia presented to the hospital due to ME sure.  She complains that she is unable to remember things from 2 days back and even things  from a week back too.  Denies having any weakness on 1 side of the body.  CT head in the emergency room did not show any acute intracranial events.  Tele neurology was consulted with the ER recommended MRI of the head.  Her pressures were significantly elevated in the emergency room but denies having any headaches, chest pain, palpitations.  When I asked her about sleep apnea she could not tell me anything about it but she does have diagnosis from her cardiologist.     Past Medical History:   Diagnosis Date    Anxiety     Depression     Depression with anxiety     Family history of heart disease 2016    Hypertension     Irregular heart beat     Migraine headache     Morbid obesity with BMI of 40.0-44.9, adult 2017    NSTEMI (non-ST elevated myocardial infarction) 2017       Past Surgical History:   Procedure Laterality Date    APPENDECTOMY       SECTION      HYSTERECTOMY         Review of patient's allergies indicates:   Allergen Reactions    Pcn [penicillins] Swelling    Codeine     Latex, natural rubber     Tylox [oxycodone-acetaminophen]         No current facility-administered medications on file prior to encounter.      Current Outpatient Medications on File Prior to Encounter   Medication Sig    atenolol (TENORMIN) 50 MG tablet Take 50 mg by mouth once daily.    busPIRone (BUSPAR) 7.5 MG tablet Take 7.5 mg by mouth 2 (two) times daily.     amlodipine (NORVASC) 5 MG tablet Take 2 tablets (10 mg total) by mouth once daily.    aspirin 81 MG Chew Take 1 tablet (81 mg total) by mouth once daily.    atorvastatin (LIPITOR) 40 MG tablet Take 1 tablet (40 mg total) by mouth once daily.    butalbital-acetaminophen-caffeine -40 mg (FIORICET, ESGIC) -40 mg per tablet Take 1 tablet by mouth every 6 (six) hours as needed for Pain or Headaches.    clopidogrel (PLAVIX) 75 mg tablet Take 1 tablet (75 mg total) by mouth once daily.    colchicine 0.6 mg tablet Take 1 tablet (0.6 mg total) by mouth once daily.    ferrous sulfate 324 mg (65 mg iron) TbEC Take 325 mg by mouth once daily.    fluoxetine (PROZAC) 20 MG capsule Take 20 mg by mouth once daily.    hydrocodone-acetaminophen 7.5-325mg (NORCO) 7.5-325 mg per tablet Take 1 tablet by mouth every 6 (six) hours as needed for Pain.    hydrOXYzine (VISTARIL) 50 MG Cap Take 50 mg by mouth 4 (four) times daily.    isosorbide mononitrate (IMDUR) 30 MG 24 hr tablet Take 1 tablet (30 mg total) by mouth once daily.    losartan (COZAAR) 50 MG tablet Take 1 tablet (50 mg total) by mouth once daily.    nitroGLYCERIN (NITROSTAT) 0.4 MG SL tablet Place 1 tablet (0.4 mg total) under the tongue every 5 (five) minutes as needed for Chest pain.    omeprazole (PRILOSEC) 40 MG capsule Take 1 capsule (40 mg total) by mouth once daily.    ondansetron (ZOFRAN) 4 MG tablet Take 8 mg by mouth 2 (two) times daily.    promethazine (PHENERGAN) 25 MG tablet Take 1 tablet (25 mg total) by mouth every 6 (six) hours as needed for Nausea.    trazodone (DESYREL) 50 MG tablet Take 50 mg by mouth every evening.     venlafaxine (EFFEXOR-XR) 75 MG 24 hr capsule Take 75 mg by mouth once daily.     Family History     Problem Relation (Age of Onset)    Diabetes Mother    Heart attack Mother (40), Brother (40)    Heart disease Father (70)    Hypertension Father, Mother    Stroke Mother        Tobacco Use    Smoking status: Never Smoker    Smokeless tobacco: Never Used   Substance and Sexual Activity    Alcohol use: No     Alcohol/week: 0.0 standard drinks     Comment: denies    Drug use: No     Comment: denies    Sexual activity: Not Currently     Review of Systems   Constitutional: Negative.    HENT: Negative.    Eyes: Negative.    Respiratory: Positive for shortness of breath.    Cardiovascular: Negative.    Gastrointestinal: Negative.    Endocrine: Negative.    Genitourinary: Negative.    Neurological: Negative.  Negative for dizziness, tremors, seizures, syncope, facial asymmetry, speech difficulty, weakness, light-headedness and headaches.        MEMORY LOSS   Psychiatric/Behavioral: Negative.      Objective:     Vital Signs (Most Recent):  Temp: 98.4 °F (36.9 °C) (12/05/20 0257)  Pulse: 85 (12/05/20 0257)  Resp: 20 (12/05/20 0257)  BP: (!) 195/104 (12/05/20 0257)  SpO2: 97 % (12/05/20 0257) Vital Signs (24h Range):  Temp:  [98.4 °F (36.9 °C)-98.7 °F (37.1 °C)] 98.4 °F (36.9 °C)  Pulse:  [85-98] 85  Resp:  [13-27] 20  SpO2:  [95 %-98 %] 97 %  BP: (132-219)/() 195/104     Weight: 118.4 kg (261 lb 0.4 oz)  Body mass index is 43.44 kg/m².    Physical Exam  Constitutional:       Appearance: Normal appearance.   HENT:      Head: Normocephalic and atraumatic.      Nose: Nose normal.      Mouth/Throat:      Mouth: Mucous membranes are moist.   Eyes:      Extraocular Movements: Extraocular movements intact.      Pupils: Pupils are equal, round, and reactive to light.   Neck:      Musculoskeletal: Normal range of motion and neck supple.   Cardiovascular:      Rate and Rhythm: Normal rate and regular rhythm.      Pulses:  Normal pulses.      Heart sounds: Normal heart sounds.   Pulmonary:      Effort: Pulmonary effort is normal.      Breath sounds: Normal breath sounds.   Abdominal:      General: Abdomen is flat.      Palpations: Abdomen is soft.   Skin:     General: Skin is warm.      Capillary Refill: Capillary refill takes less than 2 seconds.   Neurological:      General: No focal deficit present.      Mental Status: She is alert and oriented to person, place, and time.           CRANIAL NERVES     CN III, IV, VI   Pupils are equal, round, and reactive to light.       Significant Labs: All pertinent labs within the past 24 hours have been reviewed.    Significant Imaging: I have reviewed and interpreted all pertinent imaging results/findings within the past 24 hours.    Assessment/Plan:      Retrograde amnesia  TSH within normal limits  Consult neurology in a.m..  Will check   vitamin B12, RPR for syphilis  Also will order MRI of head without contrast        Obstructive sleep apnea syndrome    Suspect obstructive sleep apnea with her body habitus  Recommend during sleep study as outpatient.  Will  keep her on CPAP 5 cm of water        Essential hypertension    Hypertensive urgency on presentation.  She is on multiple blood pressure medications at home  Will change her atenolol to Coreg 25 mg b.i.d. and add hydralazine 50 mg Q 8 to her current regimen.      Admit to observation overnight      VTE Risk Mitigation (From admission, onward)         Ordered     heparin (porcine) injection 5,000 Units  Every 8 hours      12/05/20 0317     IP VTE HIGH RISK PATIENT  Once      12/05/20 0317     Place sequential compression device  Until discontinued      12/05/20 0317                   Rogelio Lehman MD  Department of Hospital Medicine   [facility]

## 2020-12-05 NOTE — PROGRESS NOTES
Pt extremely claustrophobic   Pt was given ativan via IV prior to scan. Best images obtained after several repeats.

## 2020-12-05 NOTE — ASSESSMENT & PLAN NOTE
Chest pain in patient who presented with HTN emergency with chest pressure,HA and confusion   Chest pain is reproducible with palpation of chest wall   Had stress test in August 2020 that showed no perfusion defects   Troponin negative x3   Known to have moderate LCX lesion on [facility] in 2017.   Needs BP control   BP improved on current IP meds   Continue current medical therapy   Follow up with primary Cardiologist  Will be available prn

## 2020-12-05 NOTE — HPI
55-year-old female with past medical history of nonobstructive coronary disease, morbid obesity, possible underlying sleep apnea, and hypertension and hyperlipidemia presented to the hospital due to ME sure.  She complains that she is unable to remember things from 2 days back and even things  from a week back too.  Denies having any weakness on 1 side of the body.  CT head in the emergency room did not show any acute intracranial events.  Tele neurology was consulted with the ER recommended MRI of the head.  Her pressures were significantly elevated in the emergency room but denies having any headaches, chest pain, palpitations.  When I asked her about sleep apnea she could not tell me anything about it but she does have diagnosis from her cardiologist.

## 2020-12-05 NOTE — HPI
55-year-old female with past medical history of nonobstructive coronary disease, HTN, HLP morbid obesity, possible underlying sleep apnea, and hypertension and hyperlipidemia presented to the ED with complaints of AMS/confusion. Noted to have significantly elevated BP on arrival 219/115. Is followed by Dr. Tahmina Pro at [facility].   CT head in the emergency room did not show any acute intracranial events  EKG shows no ischemic changes.   She had a heart catheterization in 2017 showing patent arteries with the exception of a 50% left circumflex lesion.   Had repeat stress test in August 2020 that showed no perfusion defects (care everywhere)   Chest pain reproducible with palpation of chest wall    Admitted for amnesia and headache.  MRI has shown multiple, small, bilateral infarcts in basal ganglia, thalami suggestive of arteriolar disease or multiple small emboli.    TTE with bubble study is normal.  normal LA  ekg NSR and RBBB  CTA of precranial and intracranial vessels are normal.    Sed rate and CRP are both modestly elevated.    H&H Cr wnl  Troponin x3 negative

## 2020-12-05 NOTE — ASSESSMENT & PLAN NOTE
Hypertensive urgency on presentation.  She is on multiple blood pressure medications at home  Will change her atenolol to Coreg 25 mg b.i.d. and add hydralazine 50 mg Q 8 to her current regimen.

## 2020-12-05 NOTE — SUBJECTIVE & OBJECTIVE
"  Woke up with symptoms?: yes    Recent bleeding noted: no  Does the patient take any Blood Thinners? no  Medications: Antiplatelets:  aspirin and clopidogrel/Plavix      Past Medical History: hypertension, diabetes and MI/CAD    Past Surgical History: no relevant surgical history    Family History: no relevant history    Social History: no smoking, no drinking, no drugs    Allergies: Codeine  Latex, Natural Rubber  Pcn [Penicillins]  Tylox [Oxycodone-Acetaminophen] No relevant allergies    Review of Systems   Unable to perform ROS: Mental status change     Objective:   Vitals: Blood pressure (!) 219/115, pulse 98, temperature 98.7 °F (37.1 °C), temperature source Oral, resp. rate 18, height 5' 5" (1.651 m), weight 118.4 kg (261 lb 0.4 oz). Heart Rate: .    CT READ: Yes  No hemmorhage. No mass effect. No early infarct signs.     Physical Exam  Constitutional:       General: She is not in acute distress.  HENT:      Head: Normocephalic and atraumatic.   Eyes:      Pupils: Pupils are equal, round, and reactive to light.   Pulmonary:      Effort: Pulmonary effort is normal.   Neurological:      Comments: MS: Alert, oriented to her first name, cant remember her middle name or her birthday, doesn't know city or state or president, speech fluent, follows commands, no neglect  CN: PERRL, EOMI, sensation intact, face symmetric, no dysarthria  Motor: no arm or leg drift  Sens: intact to LT  Coord: no ataxia on finger to nose               "

## 2020-12-05 NOTE — ASSESSMENT & PLAN NOTE
Suspect obstructive sleep apnea with her body habitus  Recommend during sleep study as outpatient.  Will  keep her on CPAP 5 cm of water

## 2020-12-06 PROBLEM — Z01.810 PREOP CARDIOVASCULAR EXAM: Status: RESOLVED | Noted: 2017-08-22 | Resolved: 2020-12-06

## 2020-12-06 PROBLEM — I95.9 HYPOTENSION: Status: ACTIVE | Noted: 2020-12-06

## 2020-12-06 PROBLEM — R11.2 NAUSEA AND VOMITING: Status: ACTIVE | Noted: 2020-12-06

## 2020-12-06 LAB
ALBUMIN SERPL BCP-MCNC: 3.6 G/DL (ref 3.5–5.2)
ALP SERPL-CCNC: 131 U/L (ref 55–135)
ALT SERPL W/O P-5'-P-CCNC: 32 U/L (ref 10–44)
ANION GAP SERPL CALC-SCNC: 11 MMOL/L (ref 8–16)
APTT BLDCRRT: 27.1 SEC (ref 21–32)
AST SERPL-CCNC: 26 U/L (ref 10–40)
AV MEAN GRADIENT: 4 MMHG
AV PEAK GRADIENT: 8 MMHG
BASOPHILS # BLD AUTO: 0.04 K/UL (ref 0–0.2)
BASOPHILS NFR BLD: 0.6 % (ref 0–1.9)
BILIRUB SERPL-MCNC: 0.4 MG/DL (ref 0.1–1)
BUN SERPL-MCNC: 18 MG/DL (ref 6–20)
CALCIUM SERPL-MCNC: 9.3 MG/DL (ref 8.7–10.5)
CHLORIDE SERPL-SCNC: 104 MMOL/L (ref 95–110)
CK MB SERPL-MCNC: 1 NG/ML (ref 0.1–6.5)
CK MB SERPL-RTO: 1.9 % (ref 0–5)
CK SERPL-CCNC: 53 U/L (ref 20–180)
CO2 SERPL-SCNC: 24 MMOL/L (ref 23–29)
CREAT SERPL-MCNC: 0.8 MG/DL (ref 0.5–1.4)
CRP SERPL-MCNC: 10.4 MG/L (ref 0–8.2)
CV ECHO LV RWT: 0.59 CM
DIFFERENTIAL METHOD: ABNORMAL
DOP CALC AO PEAK VEL: 1.39 M/S
DOP CALC AO VTI: 27.83 CM
DOP CALC LVOT AREA: 3.6 CM2
DOP CALC LVOT DIAMETER: 2.15 CM
DOP CALC RVOT PEAK VEL: 0.92 M/S
DOP CALC RVOT VTI: 21.77 CM
E WAVE DECELERATION TIME: 315.29 MS
E/A RATIO: 1.17
ECHO EF ESTIMATED: 60 %
ECHO LV POSTERIOR WALL: 1.1 CM (ref 0.6–1.1)
EOSINOPHIL # BLD AUTO: 0.2 K/UL (ref 0–0.5)
EOSINOPHIL NFR BLD: 2.9 % (ref 0–8)
ERYTHROCYTE [DISTWIDTH] IN BLOOD BY AUTOMATED COUNT: 14.8 % (ref 11.5–14.5)
ERYTHROCYTE [SEDIMENTATION RATE] IN BLOOD BY WESTERGREN METHOD: 37 MM/HR (ref 0–20)
EST. GFR  (AFRICAN AMERICAN): >60 ML/MIN/1.73 M^2
EST. GFR  (NON AFRICAN AMERICAN): >60 ML/MIN/1.73 M^2
ESTIMATED AVG GLUCOSE: 97 MG/DL (ref 68–131)
FRACTIONAL SHORTENING: 32 % (ref 28–44)
GLUCOSE SERPL-MCNC: 99 MG/DL (ref 70–110)
HBA1C MFR BLD HPLC: 5 % (ref 4–5.6)
HCT VFR BLD AUTO: 38.3 % (ref 37–48.5)
HGB BLD-MCNC: 11.5 G/DL (ref 12–16)
IMM GRANULOCYTES # BLD AUTO: 0.04 K/UL (ref 0–0.04)
IMM GRANULOCYTES NFR BLD AUTO: 0.6 % (ref 0–0.5)
INR PPP: 1 (ref 0.8–1.2)
INTERVENTRICULAR SEPTUM: 1.1 CM (ref 0.6–1.1)
IVC OSTIUM: 1.1 CM
IVRT: 121.11 MS
LA MAJOR: 6.41 CM
LA MINOR: 5.91 CM
LEFT ATRIUM SIZE: 3.66 CM
LEFT INTERNAL DIMENSION IN SYSTOLE: 2.55 CM (ref 2.1–4)
LEFT VENTRICLE MASS INDEX: 59 G/M2
LEFT VENTRICULAR INTERNAL DIMENSION IN DIASTOLE: 3.73 CM (ref 3.5–6)
LEFT VENTRICULAR MASS: 130.92 G
LYMPHOCYTES # BLD AUTO: 1.2 K/UL (ref 1–4.8)
LYMPHOCYTES NFR BLD: 17.8 % (ref 18–48)
MAGNESIUM SERPL-MCNC: 2.1 MG/DL (ref 1.6–2.6)
MCH RBC QN AUTO: 25.9 PG (ref 27–31)
MCHC RBC AUTO-ENTMCNC: 30 G/DL (ref 32–36)
MCV RBC AUTO: 86 FL (ref 82–98)
MONOCYTES # BLD AUTO: 0.6 K/UL (ref 0.3–1)
MONOCYTES NFR BLD: 8.1 % (ref 4–15)
MV PEAK A VEL: 0.77 M/S
MV PEAK E VEL: 0.9 M/S
NEUTROPHILS # BLD AUTO: 4.8 K/UL (ref 1.8–7.7)
NEUTROPHILS NFR BLD: 70 % (ref 38–73)
NRBC BLD-RTO: 0 /100 WBC
PHOSPHATE SERPL-MCNC: 4.2 MG/DL (ref 2.7–4.5)
PLATELET # BLD AUTO: 335 K/UL (ref 150–350)
PMV BLD AUTO: 10.4 FL (ref 9.2–12.9)
POTASSIUM SERPL-SCNC: 4.2 MMOL/L (ref 3.5–5.1)
PROT SERPL-MCNC: 7.9 G/DL (ref 6–8.4)
PROTHROMBIN TIME: 10.7 SEC (ref 9–12.5)
RA MAJOR: 5.46 CM
RA PRESSURE: 3 MMHG
RBC # BLD AUTO: 4.44 M/UL (ref 4–5.4)
SODIUM SERPL-SCNC: 139 MMOL/L (ref 136–145)
TROPONIN I SERPL DL<=0.01 NG/ML-MCNC: <0.006 NG/ML (ref 0–0.03)
WBC # BLD AUTO: 6.8 K/UL (ref 3.9–12.7)

## 2020-12-06 PROCEDURE — 99233 SBSQ HOSP IP/OBS HIGH 50: CPT | Mod: ,,, | Performed by: PSYCHIATRY & NEUROLOGY

## 2020-12-06 PROCEDURE — 82553 CREATINE MB FRACTION: CPT

## 2020-12-06 PROCEDURE — 96372 THER/PROPH/DIAG INJ SC/IM: CPT

## 2020-12-06 PROCEDURE — 36415 COLL VENOUS BLD VENIPUNCTURE: CPT

## 2020-12-06 PROCEDURE — 25000003 PHARM REV CODE 250: Performed by: FAMILY MEDICINE

## 2020-12-06 PROCEDURE — 92610 EVALUATE SWALLOWING FUNCTION: CPT

## 2020-12-06 PROCEDURE — 99233 SBSQ HOSP IP/OBS HIGH 50: CPT | Mod: ,,, | Performed by: FAMILY MEDICINE

## 2020-12-06 PROCEDURE — 85610 PROTHROMBIN TIME: CPT

## 2020-12-06 PROCEDURE — 99900038 HC OT GENERIC THERAPY SCREENING (STAT)

## 2020-12-06 PROCEDURE — 63600175 PHARM REV CODE 636 W HCPCS: Performed by: INTERNAL MEDICINE

## 2020-12-06 PROCEDURE — 86140 C-REACTIVE PROTEIN: CPT

## 2020-12-06 PROCEDURE — 83735 ASSAY OF MAGNESIUM: CPT

## 2020-12-06 PROCEDURE — 63600175 PHARM REV CODE 636 W HCPCS: Performed by: HOSPITALIST

## 2020-12-06 PROCEDURE — 99233 PR SUBSEQUENT HOSPITAL CARE,LEVL III: ICD-10-PCS | Mod: ,,, | Performed by: FAMILY MEDICINE

## 2020-12-06 PROCEDURE — 85730 THROMBOPLASTIN TIME PARTIAL: CPT

## 2020-12-06 PROCEDURE — 96372 THER/PROPH/DIAG INJ SC/IM: CPT | Mod: 59

## 2020-12-06 PROCEDURE — 97116 GAIT TRAINING THERAPY: CPT

## 2020-12-06 PROCEDURE — 85025 COMPLETE CBC W/AUTO DIFF WBC: CPT

## 2020-12-06 PROCEDURE — 84100 ASSAY OF PHOSPHORUS: CPT

## 2020-12-06 PROCEDURE — 97162 PT EVAL MOD COMPLEX 30 MIN: CPT

## 2020-12-06 PROCEDURE — 92523 SPEECH SOUND LANG COMPREHEN: CPT

## 2020-12-06 PROCEDURE — 11000001 HC ACUTE MED/SURG PRIVATE ROOM

## 2020-12-06 PROCEDURE — C9113 INJ PANTOPRAZOLE SODIUM, VIA: HCPCS | Performed by: INTERNAL MEDICINE

## 2020-12-06 PROCEDURE — 84484 ASSAY OF TROPONIN QUANT: CPT

## 2020-12-06 PROCEDURE — 25000003 PHARM REV CODE 250: Performed by: HOSPITALIST

## 2020-12-06 PROCEDURE — 25000003 PHARM REV CODE 250: Performed by: INTERNAL MEDICINE

## 2020-12-06 PROCEDURE — 85651 RBC SED RATE NONAUTOMATED: CPT

## 2020-12-06 PROCEDURE — 80053 COMPREHEN METABOLIC PANEL: CPT

## 2020-12-06 PROCEDURE — 99233 PR SUBSEQUENT HOSPITAL CARE,LEVL III: ICD-10-PCS | Mod: ,,, | Performed by: PSYCHIATRY & NEUROLOGY

## 2020-12-06 PROCEDURE — 96376 TX/PRO/DX INJ SAME DRUG ADON: CPT

## 2020-12-06 PROCEDURE — 82550 ASSAY OF CK (CPK): CPT

## 2020-12-06 RX ORDER — MIDODRINE HYDROCHLORIDE 2.5 MG/1
2.5 TABLET ORAL ONCE AS NEEDED
Status: COMPLETED | OUTPATIENT
Start: 2020-12-06 | End: 2020-12-06

## 2020-12-06 RX ADMIN — HEPARIN SODIUM 5000 UNITS: 5000 INJECTION INTRAVENOUS; SUBCUTANEOUS at 01:12

## 2020-12-06 RX ADMIN — HYDRALAZINE HYDROCHLORIDE 50 MG: 50 TABLET, FILM COATED ORAL at 05:12

## 2020-12-06 RX ADMIN — HEPARIN SODIUM 5000 UNITS: 5000 INJECTION INTRAVENOUS; SUBCUTANEOUS at 05:12

## 2020-12-06 RX ADMIN — BUTALBITAL, ACETAMINOPHEN AND CAFFEINE 1 TABLET: 50; 325; 40 TABLET ORAL at 10:12

## 2020-12-06 RX ADMIN — BUTALBITAL, ACETAMINOPHEN AND CAFFEINE 1 TABLET: 50; 325; 40 TABLET ORAL at 06:12

## 2020-12-06 RX ADMIN — BUSPIRONE HYDROCHLORIDE 7.5 MG: 5 TABLET ORAL at 09:12

## 2020-12-06 RX ADMIN — LOSARTAN POTASSIUM 100 MG: 50 TABLET, FILM COATED ORAL at 09:12

## 2020-12-06 RX ADMIN — FLUOXETINE 20 MG: 20 CAPSULE ORAL at 09:12

## 2020-12-06 RX ADMIN — DIPHENHYDRAMINE HYDROCHLORIDE 25 MG: 25 CAPSULE ORAL at 08:12

## 2020-12-06 RX ADMIN — MIDODRINE HYDROCHLORIDE 2.5 MG: 2.5 TABLET ORAL at 01:12

## 2020-12-06 RX ADMIN — PANTOPRAZOLE SODIUM 40 MG: 40 INJECTION, POWDER, FOR SOLUTION INTRAVENOUS at 09:12

## 2020-12-06 RX ADMIN — SODIUM CHLORIDE 500 ML: 0.9 INJECTION, SOLUTION INTRAVENOUS at 05:12

## 2020-12-06 RX ADMIN — BUSPIRONE HYDROCHLORIDE 7.5 MG: 5 TABLET ORAL at 08:12

## 2020-12-06 RX ADMIN — CARVEDILOL 25 MG: 12.5 TABLET, FILM COATED ORAL at 09:12

## 2020-12-06 RX ADMIN — FERROUS SULFATE TAB EC 325 MG (65 MG FE EQUIVALENT) 325 MG: 325 (65 FE) TABLET DELAYED RESPONSE at 09:12

## 2020-12-06 RX ADMIN — ISOSORBIDE MONONITRATE 30 MG: 30 TABLET, EXTENDED RELEASE ORAL at 09:12

## 2020-12-06 RX ADMIN — MORPHINE SULFATE 4 MG: 4 INJECTION, SOLUTION INTRAMUSCULAR; INTRAVENOUS at 11:12

## 2020-12-06 RX ADMIN — ASPIRIN 81 MG: 81 TABLET, CHEWABLE ORAL at 09:12

## 2020-12-06 RX ADMIN — SODIUM CHLORIDE 500 ML: 0.9 INJECTION, SOLUTION INTRAVENOUS at 02:12

## 2020-12-06 RX ADMIN — CLOPIDOGREL BISULFATE 75 MG: 75 TABLET, FILM COATED ORAL at 09:12

## 2020-12-06 RX ADMIN — HEPARIN SODIUM 5000 UNITS: 5000 INJECTION INTRAVENOUS; SUBCUTANEOUS at 09:12

## 2020-12-06 RX ADMIN — ATORVASTATIN CALCIUM 40 MG: 40 TABLET, FILM COATED ORAL at 09:12

## 2020-12-06 NOTE — PLAN OF CARE
Remains free from harm, no c/o pain, positioned every 2 hours, no acute distress noted. 24 hour chart check complete.

## 2020-12-06 NOTE — PROGRESS NOTES
[facility] Hospital Medicine  Progress Note    Patient Name: Judy Ruelas  MRN: [id number]  Patient Class: OP- Observation   Admission Date: 12/4/2020  Length of Stay: 0 days  Attending Physician: SONIA Hammonds MD  Primary Care Provider: [facility]        Subjective:     Principal Problem:Anterograde amnesia        HPI:  55-year-old female with past medical history of nonobstructive coronary disease, morbid obesity, possible underlying sleep apnea, and hypertension and hyperlipidemia presented to the hospital due to ME sure.  She complains that she is unable to remember things from 2 days back and even things  from a week back too.  Denies having any weakness on 1 side of the body.  CT head in the emergency room did not show any acute intracranial events.  Tele neurology was consulted with the ER recommended MRI of the head.  Her pressures were significantly elevated in the emergency room but denies having any headaches, chest pain, palpitations.  When I asked her about sleep apnea she could not tell me anything about it but she does have diagnosis from her cardiologist.     Overview/Hospital Course:  12/5 NAD. Continues to c/o amnesia. Denies sig life stressor, changes in meds. Denies smoking, etoh and illicit drug use. Denies metal implants. Plans for MRI. Reports anxiety and clautrophobia.  On buspar and venlafaxine. If stable findings, ok to d/c home with f/u pcp/neuro for further management. Benzo may complicate existing memory concerns. Monitor   12/6 continues to c/o n/v, visual disturbance and HA despite meds. Unable to recall events one week ago. Undergoing swallow eval with no difficulties. Did have significant dental extractions and unable to recall if abx rx    Interval History: continues to c/o n/v/visual disturbance, and HA despite meds. Hypotensive after 3 antihtn meds this AM. Undergoing swallow eval with no difficulties. Workup underway for IE or  vasculitis     Review of Systems   Constitutional: Negative for appetite change, chills, diaphoresis and fever.   HENT: Positive for dental problem. Negative for congestion, facial swelling, hearing loss, sore throat and trouble swallowing.    Eyes: Positive for visual disturbance.   Respiratory: Negative for cough.    Gastrointestinal: Positive for nausea and vomiting. Negative for blood in stool and diarrhea.   Genitourinary: Positive for frequency (chronic since giving birth to twins). Negative for dysuria and hematuria.   Skin: Negative for color change, pallor, rash and wound.   Neurological: Positive for headaches. Negative for dizziness, facial asymmetry, speech difficulty and weakness.   Psychiatric/Behavioral: Negative for sleep disturbance.     Objective:     Vital Signs (Most Recent):  Temp: 98.2 °F (36.8 °C) (12/06/20 1214)  Pulse: 60 (12/06/20 1214)  Resp: 18 (12/06/20 1214)  BP: (!) 81/46 (12/06/20 1214)  SpO2: (!) 91 % (12/06/20 1214) Vital Signs (24h Range):  Temp:  [97.8 °F (36.6 °C)-98.5 °F (36.9 °C)] 98.2 °F (36.8 °C)  Pulse:  [60-87] 60  Resp:  [14-20] 18  SpO2:  [91 %-98 %] 91 %  BP: ()/(46-86) 81/46     Weight: 118.4 kg (261 lb 0.4 oz)  Body mass index is 43.44 kg/m².    Intake/Output Summary (Last 24 hours) at 12/6/2020 1241  Last data filed at 12/6/2020 0800  Gross per 24 hour   Intake 240 ml   Output 300 ml   Net -60 ml      Physical Exam  Vitals signs reviewed.   Constitutional:       General: She is not in acute distress.  HENT:      Head: Normocephalic and atraumatic.      Mouth/Throat:      Mouth: Mucous membranes are moist.      Dentition: Abnormal dentition.   Neck:      Musculoskeletal: Normal range of motion.   Cardiovascular:      Rate and Rhythm: Normal rate.   Pulmonary:      Effort: Pulmonary effort is normal. No respiratory distress.   Abdominal:      Palpations: Abdomen is soft.      Tenderness: There is no abdominal tenderness.      Comments: Abdominal obesity      Skin:     General: Skin is warm and dry.   Neurological:      Mental Status: She is alert and oriented to person, place, and time.         Significant Labs: All pertinent labs within the past 24 hours have been reviewed.    Significant Imaging: I have reviewed all pertinent imaging results/findings within the past 24 hours.      Assessment/Plan:      * Anterograde amnesia  TSH within normal limits  Consult neurology in a.m..  Will check   vitamin B12, RPR for syphilis  Also will order MRI of head without contrast  12/6   Sx persist  Initial workup revealed acute small infarcts on mri, started on statin, asa, and plavix  CTA head and neck with no significant occlusion or stenosis  H/o significant dental work  Neuro consulted  Urine drug screen neg  Tsh, rpr, hiv, hba1c, folate wnl  Slightly elevated thyroid peroxidase ab  ID consulted for possible IE or vasculitis, mildly elevated crp and esr     Nausea and vomiting  scopolamine patch prn      Hypotension  Sx with visual disturbance  Discontinue all antihtn meds  Repeat q2h  Place in trendelburg position      Obstructive sleep apnea syndrome    Suspect obstructive sleep apnea with her body habitus  Recommend during sleep study as outpatient.  Will  keep her on CPAP 5 cm of water  12/6  Continue cpap qhs    Essential hypertension    Hypertensive urgency on presentation.  She is on multiple blood pressure medications at home  Will change her atenolol to Coreg 25 mg b.i.d. and add hydralazine 50 mg Q 8 to her current regimen.  12/6  Hypotensive after 3 antihtn  Repeat low  Placed in trendelenburg position  Mri with acute ischemia  Allow permissive htn  Discontinued antihtn         VTE Risk Mitigation (From admission, onward)         Ordered     heparin (porcine) injection 5,000 Units  Every 8 hours      12/05/20 0317     IP VTE HIGH RISK PATIENT  Once      12/05/20 0431     Place sequential compression device  Until discontinued      12/05/20 0431     Place sequential  compression device  Until discontinued      12/05/20 0317                Discharge Planning   JERE:      Code Status: Full Code   Is the patient medically ready for discharge?:     Reason for patient still in hospital (select all that apply): Laboratory test  Discharge Plan A: Home                  Chastity Orellana MD  Department of Hospital Medicine   [facility]

## 2020-12-06 NOTE — PT/OT/SLP EVAL
Physical Therapy Evaluation    Patient Name:  Judy Ruelas   MRN:  [id number]    Recommendations:     Discharge Recommendations:  rehabilitation facility, home health PT   Discharge Equipment Recommendations: walker, rolling   Barriers to discharge: Decreased caregiver support    Assessment:     Judy Ruelas is a 55 y.o. female admitted with a medical diagnosis of Anterograde amnesia.  She presents with the following impairments/functional limitations:  weakness, impaired functional mobilty, gait instability, impaired balance, impaired cognition, decreased coordination, decreased lower extremity function. Will benefit from acute care PT.    Rehab Prognosis: Good; patient would benefit from acute skilled PT services to address these deficits and reach maximum level of function.    Recent Surgery: * No surgery found *      Plan:     During this hospitalization, patient to be seen 5 x/week to address the identified rehab impairments via gait training, therapeutic activities, therapeutic exercises and progress toward the following goals:    · Plan of Care Expires:  12/13/20    Subjective     Chief Complaint: weakness in L leg  Patient/Family Comments/goals: improve mobility  Pain/Comfort:  · Pain Rating 1: 0/10    Patients cultural, spiritual, Confucianism conflicts given the current situation:      Living Environment:  Pt lives with daughter who works full time. Pt was IND with all mobility and ADLS. Driving and working  Prior to admission, patients level of function was IND.  Equipment used at home: none.  DME owned (not currently used): none.  Upon discharge, patient will have assistance from daughter/rehab facility.    Objective:     Communicated with Liz and EPIC prior to session.  Patient found supine with    upon PT entry to room.    General Precautions: Standard,     Orthopedic Precautions:    Braces:       Exams:  · RLE ROM: WFL  · RLE Strength: WFL  · LLE ROM: WFL  · LLE Strength: Deficits: impaired-  3+/5    Functional Mobility:  · Bed Mobility:     · Supine to Sit: stand by assistance  · Transfers:     · Bed to Chair: stand by assistance with  rolling walker  using  Stand Pivot  · Gait: 30 ft with RW with CGA    Therapeutic Activities and Exercises:   Amb with decreased foot clearance with diminished push off and knee flexion. CGA for safety with turning. Educated in seated and supine LE ROM and MMT exercises    AM-PAC 6 CLICK MOBILITY  Total Score:18     Patient left supine with all lines intact, call button in reach and chair alarm on.    GOALS:   Multidisciplinary Problems     Physical Therapy Goals        Problem: Physical Therapy Goal    Goal Priority Disciplines Outcome Goal Variances Interventions   Physical Therapy Goal     PT, PT/OT                      History:     Past Medical History:   Diagnosis Date    Anxiety     Depression     Depression with anxiety     Family history of heart disease 2016    Hypertension     Irregular heart beat     Migraine headache     Morbid obesity with BMI of 40.0-44.9, adult 2017    NSTEMI (non-ST elevated myocardial infarction) 2017       Past Surgical History:   Procedure Laterality Date    APPENDECTOMY       SECTION      HYSTERECTOMY         Time Tracking:     PT Received On: 20  PT Start Time: 0730     PT Stop Time: 0755  PT Total Time (min): 25 min     Billable Minutes: Evaluation 15 and Gait Training 10      Jacob Ruiz, PT  2020

## 2020-12-06 NOTE — PT/OT/SLP EVAL
Speech Language Pathology Evaluation  Cognitive/Bedside Swallow    Patient Name:  Judy Ruelas   MRN:  [id number]  Admitting Diagnosis: Anterograde amnesia    Recommendations:                  General Recommendations:  Speech language evaluation and Cognitive-linguistic evaluation  Diet recommendations:  Mechanical soft, Thin   Aspiration Precautions: Standard aspiration precautions   General Precautions: Standard,    Communication strategies:  none    History:     Past Medical History:   Diagnosis Date    Anxiety     Depression     Depression with anxiety     Family history of heart disease 2016    Hypertension     Irregular heart beat     Migraine headache     Morbid obesity with BMI of 40.0-44.9, adult 2017    NSTEMI (non-ST elevated myocardial infarction) 2017       Past Surgical History:   Procedure Laterality Date    APPENDECTOMY       SECTION      HYSTERECTOMY       Subjective     Patient alert, cooperative, and seen at bedside.    Pain/Comfort:  · Pain Rating 1: 0/10  · Pain Rating Post-Intervention 1: 0/10    Objective:       Oral Musculature Evaluation  · Oral Musculature: WNL  · Dentition: scattered dentition    Bedside Swallow Eval:   Consistencies Assessed:  · Thin liquids via straw  · Puree textured  · Solids regular     Oral Phase:   · WFL    Pharyngeal Phase:   · no overt clinical signs/symptoms of aspiration  · no overt clinical signs/symptoms of pharyngeal dysphagia    Compensatory Strategies  · None    Treatment: Bedside swallowing evaluation completed. Patient with c/o HA, nausea, and blurred vision.  Oral mech unremarkable. Patient tolerated po trials of thin liquids via straw, textured puree, and regular consistency solid without any overt s/s of aspiration. Patient oriented to person and situation. She was disoriented to place([facility]) and time (month). Patient exhibited difficulty maintaining attention to tasks due to c/o headache and blurred vision. Speech  and language evaluation halted and nursing notified. ST to follow up.    Assessment:     Goals:   Multidisciplinary Problems     SLP Goals        Problem: SLP Goal    Goal Priority Disciplines Outcome   SLP Goal     SLP    Description: Patient will complete speech, language, and cognitive bedside evaluation to determine current level of funtioning                     Plan:     · Patient to be seen:  3 x/week   · Plan of Care expires:     · Plan of Care reviewed with:  patient   · SLP Follow-Up:  Yes        Time Tracking:     SLP Treatment Date:   12/06/20  Speech Start Time:  1219  Speech Stop Time:  1240     Speech Total Time (min):  21 min    Billable Minutes: Eval Swallow and Oral Function 15    Diane Cameron CCC-SLP  12/06/2020

## 2020-12-06 NOTE — ASSESSMENT & PLAN NOTE
Sx with visual disturbance  Discontinue all antihtn meds  Repeat q2h  Place in [place] position

## 2020-12-06 NOTE — PLAN OF CARE
PT eval complete. The following goals will be met  in 7 days  Pt will be IND with bed mobility  Pt will be mod IND with transfers  Pt ambulate 100 ft with RW and SPV

## 2020-12-06 NOTE — PROGRESS NOTES
Consultation Requested by:  Medicine    Chief Complaint:  Memory problem     Service used: No    Interval History: (12/6/2020)  NO acute events overnight. Reported no improvement in memory problem. She denies any dizziness, vision changes, focal weakness, numbness, fall or loss of consciousness.       HPI: Judy Ruelas is a 55 y.o. female PMHx of NSTEMI, obesity, CAD, migraines, and HTN who presents to the Emergency Department for evaluation of AMS/amnesia yesterday. Patient reports she does not remember any of her activities over the last 2 days.  She remembers going to work on Tuesday but doesn't remember the last 2 days she had off. CT head in the emergency room did not show any acute intracranial events.  When I saw her this morning she was complaining of frontal headache with nausea.  She reported that she is a headache person and takes over-the-counter medication 3 to 4 times a week.  At this point she reported no aggravating or relieving factors and headache is nonradiating.  No associated photophobia or phonophobia.  She also said she feels hot and just uncomfortable overall.  She also reported that yesterday morning he was unable to recall anything that she did in the last couple of days.  She said I am confused and having a hard time remembering things.  She denies any history of stroke or TIA.  She denies any history of drug abuse.  She also reported that she never had these memory problems in the past.  She denies any dizziness, vision changes, focal weakness, numbness, fall or loss of consciousness.       Past Medical History:   Diagnosis Date    Anxiety     Depression     Depression with anxiety     Family history of heart disease 1/27/2016    Hypertension     Irregular heart beat     Migraine headache     Morbid obesity with BMI of 40.0-44.9, adult 6/30/2017    NSTEMI (non-ST elevated myocardial infarction) 6/18/2017       Past Surgical History:   Procedure Laterality Date     APPENDECTOMY       SECTION      HYSTERECTOMY         Review of patient's allergies indicates:   Allergen Reactions    Pcn [penicillins] Swelling    Codeine     Latex, natural rubber     Tylox [oxycodone-acetaminophen]          Current Facility-Administered Medications:     acetaminophen tablet 650 mg, 650 mg, Oral, Q6H PRN, Junaid Garcia MD    albuterol-ipratropium 2.5 mg-0.5 mg/3 mL nebulizer solution 3 mL, 3 mL, Nebulization, Q4H PRN, Junaid Garcia MD    aluminum-magnesium hydroxide-simethicone 200-200-20 mg/5 mL suspension 30 mL, 30 mL, Oral, Q6H PRN, Junaid Garcia MD    aspirin chewable tablet 81 mg, 81 mg, Oral, Daily, Chastity Orellana MD, 81 mg at 20    atorvastatin tablet 40 mg, 40 mg, Oral, Daily, Chastity Orellana MD, 40 mg at 20    busPIRone split tablet 7.5 mg, 7.5 mg, Oral, BID, Rogelio Lehman MD, 7.5 mg at 20    butalbital-acetaminophen-caffeine -40 mg per tablet 1 tablet, 1 tablet, Oral, Q6H PRN, Rogelio Lehman MD, 1 tablet at 20 1028    carvediloL tablet 25 mg, 25 mg, Oral, BID, Rogelio Lehman MD, 25 mg at 20    clopidogreL tablet 75 mg, 75 mg, Oral, Daily, Chastity Orellana MD, 75 mg at 20    diphenhydrAMINE capsule 25 mg, 25 mg, Oral, Q6H PRN, Junaid Garcia MD, 25 mg at 20    ferrous sulfate EC tablet 325 mg, 325 mg, Oral, Daily, Rogelio Lehman MD, 325 mg at 20    FLUoxetine capsule 20 mg, 20 mg, Oral, Daily, Rogelio Lehman MD, 20 mg at 20    guaifenesin 100 mg/5 ml syrup 200 mg, 200 mg, Oral, Q4H PRN, Junaid Garcia MD    heparin (porcine) injection 5,000 Units, 5,000 Units, Subcutaneous, Q8H, Rogelio Lehman MD, 5,000 Units at 20 0515    hydrALAZINE tablet 50 mg, 50 mg, Oral, Q8H, Rogelio Lehman MD, 50 mg at 20 0515    influenza (QUADRIVALENT PF) vaccine 0.5 mL, 0.5 mL, Intramuscular, vaccine x 1 dose, Alvarado Anguiano MD    isosorbide  mononitrate 24 hr tablet 30 mg, 30 mg, Oral, Daily, Rogelio Lehman MD, 30 mg at 12/06/20 0913    labetaloL injection 10 mg, 10 mg, Intravenous, Q15 Min PRN, Chastity Orellana MD    losartan tablet 100 mg, 100 mg, Oral, Daily, Rogelio Lehman MD, 100 mg at 12/06/20 0913    morphine injection 2 mg, 2 mg, Intravenous, Q4H PRN, Junaid Garcia MD    morphine injection 4 mg, 4 mg, Intravenous, Q4H PRN, Junaid Garcia MD, 4 mg at 12/06/20 1150    ondansetron injection 4 mg, 4 mg, Intravenous, Q8H PRN, Junaid Garcia MD, 4 mg at 12/05/20 1251    pantoprazole injection 40 mg, 40 mg, Intravenous, Daily, Junaid Garcia MD, 40 mg at 12/06/20 0913    pneumoc 13-tanisha conj-dip cr(PF) (PREVNAR 13 (PF)) 0.5 mL, 0.5 mL, Intramuscular, vaccine x 1 dose, Alvarado Anguiano MD    scopolamine 1.3-1.5 mg (1 mg over 3 days) 1 patch, 1 patch, Transdermal, Q3 Days, Chastity Orellana MD, 1 patch at 12/05/20 1634    sodium chloride 0.9% flush 10 mL, 10 mL, Intravenous, PRN, Ambreen Rodriguez MD       Social History     Socioeconomic History    Marital status:      Spouse name: Not on file    Number of children: Not on file    Years of education: Not on file    Highest education level: Not on file   Occupational History    Not on file   Social Needs    Financial resource strain: Not on file    Food insecurity     Worry: Not on file     Inability: Not on file    Transportation needs     Medical: Not on file     Non-medical: Not on file   Tobacco Use    Smoking status: Never Smoker    Smokeless tobacco: Never Used   Substance and Sexual Activity    Alcohol use: No     Alcohol/week: 0.0 standard drinks     Comment: denies    Drug use: No     Comment: denies    Sexual activity: Not Currently   Lifestyle    Physical activity     Days per week: Not on file     Minutes per session: Not on file    Stress: Not on file   Relationships    Social connections     Talks on phone: Not on file     Gets together: Not on file      Attends Denominational service: Not on file     Active member of club or organization: Not on file     Attends meetings of clubs or organizations: Not on file     Relationship status: Not on file   Other Topics Concern    Not on file   Social History Narrative    Not on file       Family History   Problem Relation Age of Onset    Hypertension Unknown     Heart disease Father 70    Hypertension Father     Heart attack Mother 40        triple bypass    Diabetes Mother     Hypertension Mother     Stroke Mother     Heart attack Brother 40        CABG x 2     Review of Systems  Constitutional: no fevers, no weight changes,  No diaphoresis  HEENT: No congestion, no doublevision, no dysphagia, no rhinnorhea, no lacrimation  Cardiovascular: no chest pain or palpitations  Respiratory: no shortness of breath, no cough  Gastrointestinal: no diarrhea, no constipation  Genitourinary: no dysuria  Musculoskeletal: no joint swelling. No myalgia  Skin: no rash  Psychiatric: no hallucinations  Neurologic: as per HPI  All other review of systems is negative and as per HPI.    Vitals:    12/06/20 1214   BP: (!) 81/46   Pulse: 60   Resp: 18   Temp: 98.2 °F (36.8 °C)        Exam  General: Pleasant, conversant, Well-kempt  HEENT: Head atraumatic. No nasal abnormality. No gaze preference. EOMI.  Cardiovascular: S1S2 present. RRR. No murmurs. No carotid bruit  Lungs: Clear to auscultation b/l  Mental Status: Awake alert and oriented x III. Follows simple commands. No aphasia or dysarthria. Naming and repetition intact.  She was unable to spell the word world backward.  Serial 7 is impaired.  During the interview she feel tired and multiple times set she does not remember.  Cranial Nerve: PERRL. VFF. EOMI. Facial sensation intact. No facial asymmetry. Hearing intact. Palate elevates. Uvula midline. SCM strong. No tongue deviation.  Motor: Normal tone. No cogwheel rigidity. No bradykinesia. No pronator drift.  Right-sided strength is  5/5.  Subtle left-sided weakness with 4+/5 with leg more than arm.  Limited exam because she was nauseated and was overall uncomfortable.  Deep Tendon Reflexes: 1+ in biceps, tricepts, brachioradialis b/l. 1+ in patellar and ankle jerks.  Sensory: Intact to soft touch, cold, pinprick.  Decreased vibration bilateral lower extremity below ankle.  No neglect.  Cerebellar: Finger to nose intact. Heel to shin intact.   Gait:  Deferred in the setting of nausea and altered mental status.     MRI brain.  Impression:     Small right basal ganglia lacunar infarcts and bilateral thalamic small infarcts.  No associated hemorrhage or mass effect.    CTA Head and Neck:  Impression:     No large vessel occlusion, significant stenosis, or aneurysm.    LDL - Invalid, Trig>400.0   HbA1c: 5.0    Assessment:      Judy Ruelas is a 55 y.o. female PMHx of NSTEMI, obesity, CAD, migraines, and HTN who presents to the Emergency Department for evaluation of AMS/amnesia yesterday. Patient reports she does not remember any of her activities over the last 2 days.CT head in the emergency room did not show any acute intracranial events.  MRI brain showed acute ischemic infarct with cytotoxic edema involving bilateral thalamic and right basal ganglia.  Given her atypical presentation with memory problem, altered mental status, headache and pattern of stroke on MRI there is a high suspicion of vasculitis, infective endocarditis, reversible cerebral vasoconstriction syndrome (RCVS) and other infectious pathology. If all work up is negative the stroke etiology is  in a setting of multiple uncontrolled vascular risk factors. Work up in progress.      Problem List:  -Acute multiple vascular territory ischemic stroke involving bilateral thalamic and right basal ganglia  -Cytotoxic edema  -Headache  -Amnesia  -Altered mental status  -High suspicion of vasculitis and infective endocarditis  -suspected urinary tract infection       Plan:  --  Inpatient  -- Initiate Stroke orderset  -- Place patient on telemetry  -- Activity as tolerated   -- Normoglycemia  -- Normothermia  -- BP goal normotensive   -- Do not reduce BP more then 10-15% per day  -- ASA 81 mg daily PO   -- DVT prophylaxis  -- Atorvastatin 80 mg daily - LDL goal <70   -- Follow ECHO with bubble to rule out PFO and IE  -- Recommend ESR, CRP, HIV, RPR, VALDO, cardiolipin antibody, ANCA, anti phospholipid antibody, TSH, thyroid peroxidase antibody, Lyme antibodies, Vitamin B1, B2, B6, B12, MMA, Folic acid, Urine toxicology screen, C3, C4.  -- Recommend Infectious Disease consult for vasculitis and infective endocarditis workup  -- RN swallow screen to be completed by the RN prior to any PO intake. If the patient fails the RN swallow screen then make patient NPO. A formal clinical swallow evaluation by SLP is needed.  -- Follow speech recommendations for starting diet  -- Start 0.9% NS @  cc/ hr unless contraindicated   -- Physical therapy  -- Occupational therapy  -- We had an in depth discussion with patient regarding her atypical symptoms, possible differential diagnosis and our plan  -- Case and plan discussed with primary team    Michael Morgan MD  Neurology   [facility]

## 2020-12-06 NOTE — ASSESSMENT & PLAN NOTE
Suspect obstructive sleep apnea with her body habitus  Recommend during sleep study as outpatient.  Will  keep her on CPAP 5 cm of water  12/6  Continue cpap qhs

## 2020-12-06 NOTE — PT/OT/SLP PROGRESS
Occupational Therapy      Patient Name:  Judy Ruelas   MRN:  [id number]    Patient not seen today secondary to Other (Comment).  NURSE REQUESTED NO EVAL AT 1:40 PM  DUE TO BP = 74/47 MM HG. Will follow-up.    Silvia Barnes OT  12/6/2020

## 2020-12-06 NOTE — PLAN OF CARE
Rowdy spoke with patient via phone call to complete discharge assessment.  Patient lives at home with her adult daughter and grandchild. Patient reports that she manages her own health. Patient's transportation for D/C will be her daughter. No D/C needs identified at this time.   Patient is independent with ADLs.  SW discussed with patient advance directives and provided a transitional care folder, with information on advanced directives, information on pharmacy bedside delivery, and discharge planning begins on admission with contact information for any needs/questions in patients chart located at the nurses station.     Patient's PCP: [facility]  Patient's Preferred Pharmacy:   [facility] [address] [place], [place] - [address] [address]  [address] [address]  [facility] [address]  Phone: [phone] Fax: [phone]       12/06/20 1053   Discharge Assessment   Assessment Type Discharge Planning Assessment   Confirmed/corrected address and phone number on facesheet? Yes   Assessment information obtained from? Patient   Communicated expected length of stay with patient/caregiver yes   Prior to hospitilization cognitive status: Alert/Oriented   Prior to hospitalization functional status: Independent   Current cognitive status: Alert/Oriented   Current Functional Status: Independent   Lives With child(shahrzad), adult;grandchild(shahrzad)   Able to Return to Prior Arrangements yes   Is patient able to care for self after discharge? Yes   Who are your caregiver(s) and their phone number(s)? Josiane Felton [phone]   Patient's perception of discharge disposition home or selfcare   Readmission Within the Last 30 Days no previous admission in last 30 days   Patient currently being followed by outpatient case management? No   Patient currently receives any other outside agency services? No   Equipment Currently Used at Home none   Do you have any problems affording any of your  prescribed medications? No   Is the patient taking medications as prescribed? yes   Does the patient have transportation home? Yes   Transportation Anticipated family or friend will provide   Does the patient receive services at the [facility]? No   Discharge Plan A Home   DME Needed Upon Discharge  none   Patient/Family in Agreement with Plan yes   Readmission Questionnaire   Have you felt down, depressed, or hopeless? 2   Shira Byrnes LMSW 12/6/2020 11:05 AM

## 2020-12-06 NOTE — ASSESSMENT & PLAN NOTE
TSH within normal limits  Consult neurology in a.m..  Will check   vitamin B12, RPR for syphilis  Also will order MRI of head without contrast  12/6   Sx persist  Initial workup revealed acute small infarcts on mri, started on statin, asa, and plavix  CTA head and neck with no significant occlusion or stenosis  H/o significant dental work  Neuro consulted  Urine drug screen neg  Tsh, rpr, hiv, hba1c, folate wnl  Slightly elevated thyroid peroxidase ab  ID consulted for possible IE or vasculitis, mildly elevated crp and esr

## 2020-12-06 NOTE — SUBJECTIVE & OBJECTIVE
Interval History: continues to c/o n/v/visual disturbance, and HA despite meds. Hypotensive after 3 antihtn meds this AM. Undergoing swallow eval with no difficulties. Workup underway for IE or vasculitis     Review of Systems   Constitutional: Negative for appetite change, chills, diaphoresis and fever.   HENT: Positive for dental problem. Negative for congestion, facial swelling, hearing loss, sore throat and trouble swallowing.    Eyes: Positive for visual disturbance.   Respiratory: Negative for cough.    Gastrointestinal: Positive for nausea and vomiting. Negative for blood in stool and diarrhea.   Genitourinary: Positive for frequency (chronic since giving birth to twins). Negative for dysuria and hematuria.   Skin: Negative for color change, pallor, rash and wound.   Neurological: Positive for headaches. Negative for dizziness, facial asymmetry, speech difficulty and weakness.   Psychiatric/Behavioral: Negative for sleep disturbance.     Objective:     Vital Signs (Most Recent):  Temp: 98.2 °F (36.8 °C) (12/06/20 1214)  Pulse: 60 (12/06/20 1214)  Resp: 18 (12/06/20 1214)  BP: (!) 81/46 (12/06/20 1214)  SpO2: (!) 91 % (12/06/20 1214) Vital Signs (24h Range):  Temp:  [97.8 °F (36.6 °C)-98.5 °F (36.9 °C)] 98.2 °F (36.8 °C)  Pulse:  [60-87] 60  Resp:  [14-20] 18  SpO2:  [91 %-98 %] 91 %  BP: ()/(46-86) 81/46     Weight: 118.4 kg (261 lb 0.4 oz)  Body mass index is 43.44 kg/m².    Intake/Output Summary (Last 24 hours) at 12/6/2020 1241  Last data filed at 12/6/2020 0800  Gross per 24 hour   Intake 240 ml   Output 300 ml   Net -60 ml      Physical Exam  Vitals signs reviewed.   Constitutional:       General: She is not in acute distress.  HENT:      Head: Normocephalic and atraumatic.      Mouth/Throat:      Mouth: Mucous membranes are moist.      Dentition: Abnormal dentition.   Neck:      Musculoskeletal: Normal range of motion.   Cardiovascular:      Rate and Rhythm: Normal rate.   Pulmonary:      Effort:  Pulmonary effort is normal. No respiratory distress.   Abdominal:      Palpations: Abdomen is soft.      Tenderness: There is no abdominal tenderness.      Comments: Abdominal obesity     Skin:     General: Skin is warm and dry.   Neurological:      Mental Status: She is alert and oriented to person, place, and time.         Significant Labs: All pertinent labs within the past 24 hours have been reviewed.    Significant Imaging: I have reviewed all pertinent imaging results/findings within the past 24 hours.

## 2020-12-06 NOTE — ASSESSMENT & PLAN NOTE
Sx with visual disturbance  Discontinue all antihtn meds  Repeat q2h  Place in [place] position  And one time dose midodrine  12/7  resolved

## 2020-12-07 LAB
ALBUMIN SERPL BCP-MCNC: 3.3 G/DL (ref 3.5–5.2)
ALP SERPL-CCNC: 112 U/L (ref 55–135)
ALT SERPL W/O P-5'-P-CCNC: 31 U/L (ref 10–44)
ANION GAP SERPL CALC-SCNC: 4 MMOL/L (ref 8–16)
AST SERPL-CCNC: 22 U/L (ref 10–40)
BACTERIA UR CULT: NORMAL
BASOPHILS # BLD AUTO: 0.05 K/UL (ref 0–0.2)
BASOPHILS NFR BLD: 0.9 % (ref 0–1.9)
BILIRUB SERPL-MCNC: 0.4 MG/DL (ref 0.1–1)
BUN SERPL-MCNC: 21 MG/DL (ref 6–20)
C3 SERPL-MCNC: 141 MG/DL (ref 50–180)
C4 SERPL-MCNC: 26 MG/DL (ref 11–44)
CALCIUM SERPL-MCNC: 8.7 MG/DL (ref 8.7–10.5)
CHLORIDE SERPL-SCNC: 110 MMOL/L (ref 95–110)
CO2 SERPL-SCNC: 25 MMOL/L (ref 23–29)
CREAT SERPL-MCNC: 0.8 MG/DL (ref 0.5–1.4)
CRP SERPL-MCNC: 12.4 MG/L (ref 0–8.2)
DIFFERENTIAL METHOD: ABNORMAL
EOSINOPHIL # BLD AUTO: 0.2 K/UL (ref 0–0.5)
EOSINOPHIL NFR BLD: 3.6 % (ref 0–8)
ERYTHROCYTE [DISTWIDTH] IN BLOOD BY AUTOMATED COUNT: 14.9 % (ref 11.5–14.5)
ERYTHROCYTE [SEDIMENTATION RATE] IN BLOOD BY WESTERGREN METHOD: 4 MM/HR (ref 0–20)
EST. GFR  (AFRICAN AMERICAN): >60 ML/MIN/1.73 M^2
EST. GFR  (NON AFRICAN AMERICAN): >60 ML/MIN/1.73 M^2
GLUCOSE SERPL-MCNC: 108 MG/DL (ref 70–110)
HCT VFR BLD AUTO: 35.9 % (ref 37–48.5)
HGB BLD-MCNC: 10.7 G/DL (ref 12–16)
IMM GRANULOCYTES # BLD AUTO: 0.02 K/UL (ref 0–0.04)
IMM GRANULOCYTES NFR BLD AUTO: 0.4 % (ref 0–0.5)
LYMPHOCYTES # BLD AUTO: 1.2 K/UL (ref 1–4.8)
LYMPHOCYTES NFR BLD: 22.2 % (ref 18–48)
MCH RBC QN AUTO: 26.2 PG (ref 27–31)
MCHC RBC AUTO-ENTMCNC: 29.8 G/DL (ref 32–36)
MCV RBC AUTO: 88 FL (ref 82–98)
MONOCYTES # BLD AUTO: 0.4 K/UL (ref 0.3–1)
MONOCYTES NFR BLD: 7 % (ref 4–15)
NEUTROPHILS # BLD AUTO: 3.5 K/UL (ref 1.8–7.7)
NEUTROPHILS NFR BLD: 65.9 % (ref 38–73)
NRBC BLD-RTO: 0 /100 WBC
PLATELET # BLD AUTO: 292 K/UL (ref 150–350)
PMV BLD AUTO: 10.7 FL (ref 9.2–12.9)
POTASSIUM SERPL-SCNC: 4 MMOL/L (ref 3.5–5.1)
PROT SERPL-MCNC: 7.3 G/DL (ref 6–8.4)
RBC # BLD AUTO: 4.09 M/UL (ref 4–5.4)
SODIUM SERPL-SCNC: 139 MMOL/L (ref 136–145)
WBC # BLD AUTO: 5.32 K/UL (ref 3.9–12.7)

## 2020-12-07 PROCEDURE — 86255 FLUORESCENT ANTIBODY SCREEN: CPT

## 2020-12-07 PROCEDURE — 86235 NUCLEAR ANTIGEN ANTIBODY: CPT | Mod: 59

## 2020-12-07 PROCEDURE — 80053 COMPREHEN METABOLIC PANEL: CPT

## 2020-12-07 PROCEDURE — 25000003 PHARM REV CODE 250: Performed by: HOSPITALIST

## 2020-12-07 PROCEDURE — 86146 BETA-2 GLYCOPROTEIN ANTIBODY: CPT | Mod: 59

## 2020-12-07 PROCEDURE — 86160 COMPLEMENT ANTIGEN: CPT

## 2020-12-07 PROCEDURE — 84252 ASSAY OF VITAMIN B-2: CPT

## 2020-12-07 PROCEDURE — 97530 THERAPEUTIC ACTIVITIES: CPT | Performed by: PHYSICAL THERAPIST

## 2020-12-07 PROCEDURE — 97110 THERAPEUTIC EXERCISES: CPT

## 2020-12-07 PROCEDURE — 25000003 PHARM REV CODE 250: Performed by: FAMILY MEDICINE

## 2020-12-07 PROCEDURE — 99233 PR SUBSEQUENT HOSPITAL CARE,LEVL III: ICD-10-PCS | Mod: ,,, | Performed by: FAMILY MEDICINE

## 2020-12-07 PROCEDURE — 99233 PR SUBSEQUENT HOSPITAL CARE,LEVL III: ICD-10-PCS | Mod: ,,, | Performed by: INTERNAL MEDICINE

## 2020-12-07 PROCEDURE — 85025 COMPLETE CBC W/AUTO DIFF WBC: CPT

## 2020-12-07 PROCEDURE — 63600175 PHARM REV CODE 636 W HCPCS: Performed by: HOSPITALIST

## 2020-12-07 PROCEDURE — 96372 THER/PROPH/DIAG INJ SC/IM: CPT

## 2020-12-07 PROCEDURE — 36415 COLL VENOUS BLD VENIPUNCTURE: CPT

## 2020-12-07 PROCEDURE — 97116 GAIT TRAINING THERAPY: CPT

## 2020-12-07 PROCEDURE — 86160 COMPLEMENT ANTIGEN: CPT | Mod: 59

## 2020-12-07 PROCEDURE — 86140 C-REACTIVE PROTEIN: CPT

## 2020-12-07 PROCEDURE — 86039 ANTINUCLEAR ANTIBODIES (ANA): CPT

## 2020-12-07 PROCEDURE — 99233 SBSQ HOSP IP/OBS HIGH 50: CPT | Mod: ,,, | Performed by: PSYCHIATRY & NEUROLOGY

## 2020-12-07 PROCEDURE — 21400001 HC TELEMETRY ROOM

## 2020-12-07 PROCEDURE — 85651 RBC SED RATE NONAUTOMATED: CPT

## 2020-12-07 PROCEDURE — 11000001 HC ACUTE MED/SURG PRIVATE ROOM

## 2020-12-07 PROCEDURE — 99233 SBSQ HOSP IP/OBS HIGH 50: CPT | Mod: ,,, | Performed by: FAMILY MEDICINE

## 2020-12-07 PROCEDURE — 86038 ANTINUCLEAR ANTIBODIES: CPT

## 2020-12-07 PROCEDURE — 99233 SBSQ HOSP IP/OBS HIGH 50: CPT | Mod: ,,, | Performed by: INTERNAL MEDICINE

## 2020-12-07 PROCEDURE — 63600175 PHARM REV CODE 636 W HCPCS: Performed by: INTERNAL MEDICINE

## 2020-12-07 PROCEDURE — 99233 PR SUBSEQUENT HOSPITAL CARE,LEVL III: ICD-10-PCS | Mod: ,,, | Performed by: PSYCHIATRY & NEUROLOGY

## 2020-12-07 PROCEDURE — 87476 LYME DIS DNA AMP PROBE: CPT

## 2020-12-07 PROCEDURE — 97165 OT EVAL LOW COMPLEX 30 MIN: CPT | Performed by: PHYSICAL THERAPIST

## 2020-12-07 PROCEDURE — 86147 CARDIOLIPIN ANTIBODY EA IG: CPT | Mod: 59

## 2020-12-07 RX ORDER — PANTOPRAZOLE SODIUM 40 MG/1
40 TABLET, DELAYED RELEASE ORAL DAILY
Status: DISCONTINUED | OUTPATIENT
Start: 2020-12-07 | End: 2020-12-09 | Stop reason: HOSPADM

## 2020-12-07 RX ORDER — SULFAMETHOXAZOLE AND TRIMETHOPRIM 800; 160 MG/1; MG/1
1 TABLET ORAL 2 TIMES DAILY
Status: COMPLETED | OUTPATIENT
Start: 2020-12-07 | End: 2020-12-09

## 2020-12-07 RX ADMIN — MORPHINE SULFATE 2 MG: 2 INJECTION, SOLUTION INTRAMUSCULAR; INTRAVENOUS at 04:12

## 2020-12-07 RX ADMIN — MORPHINE SULFATE 4 MG: 4 INJECTION, SOLUTION INTRAMUSCULAR; INTRAVENOUS at 08:12

## 2020-12-07 RX ADMIN — HEPARIN SODIUM 5000 UNITS: 5000 INJECTION INTRAVENOUS; SUBCUTANEOUS at 05:12

## 2020-12-07 RX ADMIN — SULFAMETHOXAZOLE AND TRIMETHOPRIM 1 TABLET: 800; 160 TABLET ORAL at 09:12

## 2020-12-07 RX ADMIN — PANTOPRAZOLE SODIUM 40 MG: 40 TABLET, DELAYED RELEASE ORAL at 08:12

## 2020-12-07 RX ADMIN — SULFAMETHOXAZOLE AND TRIMETHOPRIM 1 TABLET: 800; 160 TABLET ORAL at 12:12

## 2020-12-07 RX ADMIN — FERROUS SULFATE TAB EC 325 MG (65 MG FE EQUIVALENT) 325 MG: 325 (65 FE) TABLET DELAYED RESPONSE at 08:12

## 2020-12-07 RX ADMIN — ATORVASTATIN CALCIUM 40 MG: 40 TABLET, FILM COATED ORAL at 08:12

## 2020-12-07 RX ADMIN — ISOSORBIDE MONONITRATE 30 MG: 30 TABLET, EXTENDED RELEASE ORAL at 08:12

## 2020-12-07 RX ADMIN — HEPARIN SODIUM 5000 UNITS: 5000 INJECTION INTRAVENOUS; SUBCUTANEOUS at 09:12

## 2020-12-07 RX ADMIN — BUSPIRONE HYDROCHLORIDE 7.5 MG: 5 TABLET ORAL at 09:12

## 2020-12-07 RX ADMIN — FLUOXETINE 20 MG: 20 CAPSULE ORAL at 08:12

## 2020-12-07 RX ADMIN — MORPHINE SULFATE 2 MG: 2 INJECTION, SOLUTION INTRAMUSCULAR; INTRAVENOUS at 09:12

## 2020-12-07 RX ADMIN — BUSPIRONE HYDROCHLORIDE 7.5 MG: 5 TABLET ORAL at 08:12

## 2020-12-07 RX ADMIN — CLOPIDOGREL BISULFATE 75 MG: 75 TABLET, FILM COATED ORAL at 08:12

## 2020-12-07 RX ADMIN — HEPARIN SODIUM 5000 UNITS: 5000 INJECTION INTRAVENOUS; SUBCUTANEOUS at 02:12

## 2020-12-07 RX ADMIN — LACTOBACILLUS TAB 4 TABLET: TAB at 12:12

## 2020-12-07 RX ADMIN — ASPIRIN 81 MG: 81 TABLET, CHEWABLE ORAL at 08:12

## 2020-12-07 RX ADMIN — BUTALBITAL, ACETAMINOPHEN AND CAFFEINE 1 TABLET: 50; 325; 40 TABLET ORAL at 12:12

## 2020-12-07 RX ADMIN — LACTOBACILLUS TAB 4 TABLET: TAB at 04:12

## 2020-12-07 NOTE — ASSESSMENT & PLAN NOTE
12/7  Sx include dysuria and odor  No growth on cx   Start bactrim  Monitor response  Allergy to pcn

## 2020-12-07 NOTE — PLAN OF CARE
"Recommendations     Recommendation:   1. Recommend adding "Low sodium" to diet restrictions.   2. Continue with consistency per SLP.   3. Weekly weights.   4. RD to follow up.      Goals: Po intake continued at % within the next week   Nutrition Goal Status: new  Communication of RD Recs: (Plan of Care)    Mellissa Serrano RD, LDN   "

## 2020-12-07 NOTE — CONSULTS
"  [facility]  Adult Nutrition  Consult Note    SUMMARY     Recommendations    Recommendation/Intervention:   1. Recommend adding "Low sodium" to diet restrictions.   2. Continue with consistency per SLP.   3. Weekly weights.   4. RD to follow up.     Goals: Po intake continued at % within the next week   Nutrition Goal Status: new  Communication of RD Recs: (Plan of Care)    Reason for Assessment    Reason For Assessment: consult  Diagnosis: (Anterograde amnesia )  Relevant Medical History: htn, depression, morbid obesity     General Information Comments: RD consulted to assess patient's nutrition status. SLP following and recommends a mechanical soft diet. Per EMR, she is experiencing nausea with po intake @ 100% per documentation. She reported nausea at breakfast this morning and stated she was only able to consume "2 bites of grits." No complaints of nausea during RD visit and she verbalizes hunger. Poor dentition noted with intermittent missing teeth. UTD LBM. Reported swelling in lower bilateral extremities daily-RD briefly encouraged lowering sodium content in food. Patient unable to report foods that are high in sodium-RD attaching education material to discharge packet. She states that her family cooks for her and often does contribute to meal choices.      Nutrition Discharge Planning: Discharge goal: Home on soft, low sodium diet as tolerated.     Nutrition Risk Screen    Nutrition Risk Screen: no indicators present    Nutrition/Diet History    Food Allergies: NKFA    Anthropometrics    Temp: 98.4 °F (36.9 °C)  Height Method: Stated  Height: 5' 5" (165.1 cm)  Height (inches): 65 in  Weight Method: Bed Scale  Weight: 118.4 kg (261 lb)  Weight (lb): 261 lb  Ideal Body Weight (IBW), Female: 125 lb  % Ideal Body Weight, Female (lb): 208.8 %  BMI (Calculated): 43.4  BMI Grade: greater than 40 - morbid obesity       Lab/Procedures/Meds    Pertinent Labs Reviewed: reviewed  Pertinent Labs " Comments: BUN 21H; Alb 3.3L; CRP 12.4H  Pertinent Medications Reviewed: reviewed  Pertinent Medications Comments: Atorvastatin, Ferrous sulfate, Protonix.     Physical Findings/Assessment     poor dentition,mild edema present in lower bilateral extremeties per RD observation     Estimated/Assessed Needs    Weight Used For Calorie Calculations: 118.4 kg (261 lb 0.4 oz)  Energy Calorie Requirements (kcal): 1775  Energy Need Method: Wise-St Jeor  Protein Requirements: 60-80gm(0.8-1.1gm/kg)  Weight Used For Protein Calculations: 73 kg (160 lb 15 oz)(Adjusted body weight= ((CBW-IBW)*.25) + IBW )  Fluid Requirements (mL): 1775  Estimated Fluid Requirement Method: RDA Method(or PER MD/NP)  RDA Method (mL): 1775  CHO Requirement: 220gm      Nutrition Prescription Ordered    Current Diet Order: Regular. Mechanical Soft. IDDSI Level 5  Nutrition Order Comments: SLP following for diet consistency    Evaluation of Received Nutrient/Fluid Intake          Intake/Output Summary (Last 24 hours) at 12/7/2020 1047  Last data filed at 12/7/2020 0400  Gross per 24 hour   Intake 2020 ml   Output 100 ml   Net 1920 ml       % Intake of Estimated Energy Needs: 75%  % Meal Intake: 75 - 100 %    Nutrition Risk    Level of Risk/Frequency of Follow-up: (1x week)     Assessment and Plan    Nutrition Problem  Food-nutrition related knowledge deficit     Related to (etiology):   Exposure to inaccurate information     Signs and Symptoms (as evidenced by):   Pt unable to report 3 examples of foods with high sodium content.     Interventions/Recommendations (treatment strategy):  Nutrition prescription  Patient education-handouts   Collaboration with other providers     Nutrition Diagnosis Status:   New        Monitor and Evaluation    Food and Nutrient Intake: energy intake, food and beverage intake  Food and Nutrient Adminstration: diet order  Anthropometric Measurements: weight  Biochemical Data, Medical Tests and Procedures: electrolyte and  renal panel, gastrointestinal profile, glucose/endocrine profile, inflammatory profile, lipid profile  Nutrition-Focused Physical Findings: overall appearance     Malnutrition Assessment                 Orbital Region (Subcutaneous Fat Loss): well nourished  Upper Arm Region (Subcutaneous Fat Loss): well nourished  Thoracic and Lumbar Region: well nourished   [place] Region (Muscle Loss): well nourished  Clavicle Bone Region (Muscle Loss): well nourished  Clavicle and Acromion Bone Region (Muscle Loss): well nourished  Posterior Calf Region (Muscle Loss): well nourished   Edema (Fluid Accumulation): 1-->trace   Subcutaneous Fat Loss (Final Summary): well nourished  Muscle Loss Evaluation (Final Summary): well nourished         Nutrition Follow-Up    RD Follow-up?: Yes     Mellissa Serrano RD,[facility]

## 2020-12-07 NOTE — SUBJECTIVE & OBJECTIVE
Past Medical History:   Diagnosis Date    Anxiety     Depression     Depression with anxiety     Family history of heart disease 2016    Hypertension     Irregular heart beat     Migraine headache     Morbid obesity with BMI of 40.0-44.9, adult 2017    NSTEMI (non-ST elevated myocardial infarction) 2017       Past Surgical History:   Procedure Laterality Date    APPENDECTOMY       SECTION      HYSTERECTOMY         Review of patient's allergies indicates:   Allergen Reactions    Pcn [penicillins] Swelling    Codeine     Latex, natural rubber     Tylox [oxycodone-acetaminophen]        No current facility-administered medications on file prior to encounter.      Current Outpatient Medications on File Prior to Encounter   Medication Sig    atenolol (TENORMIN) 50 MG tablet Take 50 mg by mouth once daily.    busPIRone (BUSPAR) 7.5 MG tablet Take 7.5 mg by mouth 2 (two) times daily.     amlodipine (NORVASC) 5 MG tablet Take 2 tablets (10 mg total) by mouth once daily.    aspirin 81 MG Chew Take 1 tablet (81 mg total) by mouth once daily.    atorvastatin (LIPITOR) 40 MG tablet Take 1 tablet (40 mg total) by mouth once daily.    butalbital-acetaminophen-caffeine -40 mg (FIORICET, ESGIC) -40 mg per tablet Take 1 tablet by mouth every 6 (six) hours as needed for Pain or Headaches.    clopidogrel (PLAVIX) 75 mg tablet Take 1 tablet (75 mg total) by mouth once daily.    colchicine 0.6 mg tablet Take 1 tablet (0.6 mg total) by mouth once daily.    ferrous sulfate 324 mg (65 mg iron) TbEC Take 325 mg by mouth once daily.    fluoxetine (PROZAC) 20 MG capsule Take 20 mg by mouth once daily.    hydrocodone-acetaminophen 7.5-325mg (NORCO) 7.5-325 mg per tablet Take 1 tablet by mouth every 6 (six) hours as needed for Pain.    hydrOXYzine (VISTARIL) 50 MG Cap Take 50 mg by mouth 4 (four) times daily.    isosorbide mononitrate (IMDUR) 30 MG 24 hr tablet Take 1 tablet (30 mg  total) by mouth once daily.    losartan (COZAAR) 50 MG tablet Take 1 tablet (50 mg total) by mouth once daily.    nitroGLYCERIN (NITROSTAT) 0.4 MG SL tablet Place 1 tablet (0.4 mg total) under the tongue every 5 (five) minutes as needed for Chest pain.    omeprazole (PRILOSEC) 40 MG capsule Take 1 capsule (40 mg total) by mouth once daily.    ondansetron (ZOFRAN) 4 MG tablet Take 8 mg by mouth 2 (two) times daily.    promethazine (PHENERGAN) 25 MG tablet Take 1 tablet (25 mg total) by mouth every 6 (six) hours as needed for Nausea.    trazodone (DESYREL) 50 MG tablet Take 50 mg by mouth every evening.    venlafaxine (EFFEXOR-XR) 75 MG 24 hr capsule Take 75 mg by mouth once daily.     Family History     Problem Relation (Age of Onset)    Diabetes Mother    Heart attack Mother (40), Brother (40)    Heart disease Father (70)    Hypertension Father, Mother    Stroke Mother        Tobacco Use    Smoking status: Never Smoker    Smokeless tobacco: Never Used   Substance and Sexual Activity    Alcohol use: No     Alcohol/week: 0.0 standard drinks     Comment: denies    Drug use: No     Comment: denies    Sexual activity: Not Currently     Review of Systems   Constitution: Negative for decreased appetite, diaphoresis, fever, malaise/fatigue and night sweats.   HENT: Negative for nosebleeds.    Eyes: Negative for blurred vision and double vision.   Cardiovascular: Negative for chest pain, claudication, dyspnea on exertion, irregular heartbeat, leg swelling, near-syncope, orthopnea, palpitations, paroxysmal nocturnal dyspnea and syncope.   Respiratory: Negative for cough, shortness of breath, sleep disturbances due to breathing, snoring, sputum production and wheezing.    Endocrine: Negative for cold intolerance and polyuria.   Hematologic/Lymphatic: Does not bruise/bleed easily.   Skin: Negative for rash.   Musculoskeletal: Negative for back pain, falls, joint pain, joint swelling and neck pain.    Gastrointestinal: Negative for abdominal pain, heartburn, nausea and vomiting.   Genitourinary: Negative for dysuria, frequency and hematuria.   Neurological: Negative for difficulty with concentration, dizziness, focal weakness, headaches, light-headedness, numbness, seizures and weakness.   Psychiatric/Behavioral: Positive for memory loss. Negative for depression and substance abuse. The patient does not have insomnia.    Allergic/Immunologic: Negative for HIV exposure and hives.     Objective:     Vital Signs (Most Recent):  Temp: 98 °F (36.7 °C) (12/07/20 1639)  Pulse: 67 (12/07/20 1639)  Resp: 18 (12/07/20 1639)  BP: (!) 171/81 (12/07/20 1639)  SpO2: (!) 94 % (12/07/20 1639) Vital Signs (24h Range):  Temp:  [97.9 °F (36.6 °C)-98.5 °F (36.9 °C)] 98 °F (36.7 °C)  Pulse:  [58-74] 67  Resp:  [18] 18  SpO2:  [94 %-98 %] 94 %  BP: (111-171)/(55-81) 171/81     Weight: 118.4 kg (261 lb)  Body mass index is 43.43 kg/m².    SpO2: (!) 94 %         Intake/Output Summary (Last 24 hours) at 12/7/2020 1650  Last data filed at 12/7/2020 1400  Gross per 24 hour   Intake 2080 ml   Output 100 ml   Net 1980 ml       Lines/Drains/Airways     Drain            Female External Urinary Catheter 12/05/20 0453 2 days          Peripheral Intravenous Line                 Peripheral IV - Single Lumen 12/05/20 0452 22 G Left Forearm 2 days                Physical Exam   Constitutional: She appears well-nourished.   HENT:   Head: Normocephalic.   Eyes: Pupils are equal, round, and reactive to light.   Neck: Normal carotid pulses and no JVD present. Carotid bruit is not present. No thyromegaly present.   Cardiovascular: Normal rate, regular rhythm, normal heart sounds and normal pulses.  No extrasystoles are present. PMI is not displaced. Exam reveals no gallop and no S3.   No murmur heard.  Pulmonary/Chest: Breath sounds normal. No stridor. No respiratory distress.   Abdominal: Soft. Bowel sounds are normal. There is no abdominal  tenderness. There is no rebound.   Musculoskeletal: Normal range of motion.   Neurological: She is alert.   Skin: Skin is intact. No rash noted.   Psychiatric: Her behavior is normal.       Significant Labs:   ABG: No results for input(s): PH, PCO2, HCO3, POCSATURATED, BE in the last 48 hours., Blood Culture: No results for input(s): LABBLOO in the last 48 hours., BMP:   Recent Labs   Lab 12/06/20  0712 12/07/20  0808   GLU 99 108    139   K 4.2 4.0    110   CO2 24 25   BUN 18 21*   CREATININE 0.8 0.8   CALCIUM 9.3 8.7   MG 2.1  --    , CMP   Recent Labs   Lab 12/06/20  0712 12/07/20  0808    139   K 4.2 4.0    110   CO2 24 25   GLU 99 108   BUN 18 21*   CREATININE 0.8 0.8   CALCIUM 9.3 8.7   PROT 7.9 7.3   ALBUMIN 3.6 3.3*   BILITOT 0.4 0.4   ALKPHOS 131 112   AST 26 22   ALT 32 31   ANIONGAP 11 4*   ESTGFRAFRICA >60 >60   EGFRNONAA >60 >60   , CBC   Recent Labs   Lab 12/06/20  0712 12/07/20  0806   WBC 6.80 5.32   HGB 11.5* 10.7*   HCT 38.3 35.9*    292   , INR   Recent Labs   Lab 12/06/20  0712   INR 1.0   , Lipid Panel No results for input(s): CHOL, HDL, LDLCALC, TRIG, CHOLHDL in the last 48 hours. and Troponin   Recent Labs   Lab 12/06/20  0712   TROPONINI <0.006       Significant Imaging: EKG:

## 2020-12-07 NOTE — PLAN OF CARE
Fall precautions maintained. C/o headache and blurred vision, md aware. Pain is well controlled.  C/o dizziness when getting up to commode. Low blood pressure managed with fluids. Otherwise VSS. NSR on cardiac monitor. All needs met. Will continue to monitor.

## 2020-12-07 NOTE — PLAN OF CARE
D/C PT FROM [facility]. [facility] TO [facility] FOR CONT. GAIT/TE, PT SBA FOR BED MOBILITY, TF'S, AND GAIT WITH RW

## 2020-12-07 NOTE — PT/OT/SLP EVAL
Occupational Therapy   Evaluation and Discharge Note    Name: Judy Ruelas  MRN: [id number]  Admitting Diagnosis:  Anterograde amnesia      Recommendations:     Discharge Recommendations: home with home health  Discharge Equipment Recommendations:  walker, rolling  Barriers to discharge:   UNKNOWN    Assessment:     Judy Ruelas is a 55 y.o. female with a medical diagnosis of Anterograde amnesia. At this time, patient is functioning at their prior level of function and does not require further acute OT services.     Plan:     During this hospitalization, patient does not require further acute OT services.  Please re-consult if situation changes.    · Plan of Care Reviewed with: patient    Subjective     Chief Complaint: (L) arm, (L) Knee, chest pain  Patient/Family Comments/goals: to go home    Occupational Profile:  Living Environment: pt's daughter and son live with her in 1 story house with no steps  Previous level of function: (I) with ADLs and Ambulation  Roles and Routines: Drives, Works as a   Equipment Used at home:  none  Assistance upon Discharge: family    Pain/Comfort:  · Pain Rating 1: 6/10  · Location - Side 1: Left  · Location 1: arm((L) Knee)  · Pain Addressed 1: Reposition, Distraction  · Pain Rating Post-Intervention 1: 6/10  · Pain Rating 2: 7/10  · Location 2: chest  · Pain Addressed 2: Reposition, Distraction  · Pain Rating Post-Intervention 2: 7/10    Patients cultural, spiritual, Mosque conflicts given the current situation:      Objective:     Communicated with: Nurse Meza and epic chart review prior to session.  Patient found supine with peripheral IV, bed alarm upon OT entry to room.    General Precautions: Standard,     Orthopedic Precautions:N/A   Braces: N/A     Occupational Performance:    Bed Mobility:    · Patient completed Rolling/Turning to Left with  modified independence  · Patient completed Rolling/Turning to Right with modified independence  · Patient completed  Scooting/Bridging with supervision  · Patient completed Supine to Sit with supervision    Functional Mobility/Transfers:  · Patient completed Sit <> Stand Transfer with stand by assistance  with  rolling walker   · Patient completed Bed <> Chair Transfer using Step Transfer technique with stand by assistance with rolling walker  · Functional Mobility: ~~75 ft using RW with SBA/ Supervision    Activities of Daily Living:  · Upper Body Dressing: modified independence .  · Lower Body Dressing: modified independence .    Cognitive/Visual Perceptual:  Cognitive/Psychosocial Skills:     -       Oriented to: Person, Place and Situation   -       Follows Commands/attention:Follows two-step commands  -       Communication: clear/fluent  -       Memory: Impaired STM and Poor immediate recall  -       Safety awareness/insight to disability: impaired   Visual/Perceptual:      -Intact .    Physical Exam:  Dominant hand:    -       left  Upper Extremity Range of Motion:     -       Right Upper Extremity: WFL  -       Left Upper Extremity: WFL  Upper Extremity Strength:    -       Right Upper Extremity: WFL  -       Left Upper Extremity: WFL   Strength:    -       Right Upper Extremity: WFL  -       Left Upper Extremity: WFL    AMPAC 6 Click ADL:  AMPAC Total Score: 23    Treatment & Education:  OT Eval completed as listed above. Pt functioning at high level and does not require skilled OT services, therefore is discharged to [facility].   Education:    Patient left up in chair with all lines intact, call button in reach, chair alarm on and Nurse Susana notified    GOALS:   Multidisciplinary Problems     Occupational Therapy Goals     Not on file                History:     Past Medical History:   Diagnosis Date    Anxiety     Depression     Depression with anxiety     Family history of heart disease 1/27/2016    Hypertension     Irregular heart beat     Migraine headache     Morbid obesity with BMI of  40.0-44.9, adult 2017    NSTEMI (non-ST elevated myocardial infarction) 2017       Past Surgical History:   Procedure Laterality Date    APPENDECTOMY       SECTION      HYSTERECTOMY         Time Tracking:     OT Date of Treatment: 20  OT Start Time: 825  OT Stop Time: 50  OT Total Time (min): 25 min    Billable Minutes:Evaluation 15 min  Therapeutic Activity 10 min    Vy Alfaro, PT/OT  2020

## 2020-12-07 NOTE — PLAN OF CARE
OT eval completed. Pt functioning at high level and does not require skilled OT services at this time, therefore is discharged to [facility].

## 2020-12-07 NOTE — PLAN OF CARE
Free from falls and injuries this shift. Patient oriented x 4 to abdomen. Patient seems to forget easily. Cardiology consulted for LY, antergrade amnesia. Oral antibiotics ordered. Chart check completed.

## 2020-12-07 NOTE — PT/OT/SLP PROGRESS
Physical Therapy  Treatment    Judy Ruelas   MRN: [id number]   Admitting Diagnosis: Anterograde amnesia    PT Received On: 12/07/20  PT Start Time: 0810     PT Stop Time: 0835    PT Total Time (min): 25 min       Billable Minutes:  Gait Training 15 and Therapeutic Exercise 10    Treatment Type: Treatment  PT/PTA: PT     PTA Visit Number: 0       General Precautions: Standard  Orthopedic Precautions: N/A   Braces: N/A    Subjective:  Communicated with NURSE GARLAND prior to session.  Pain/Comfort  Pain Rating 1: 6/10  Location - Side 1: Left  Location 1: arm  Pain Rating 2: 7/10  Location 2: chest    Objective:   Patient found with: peripheral IV, bed alarm    Functional Mobility:  Therapeutic Activities and Exercises:  PT FOUND SUPINE IN BED UPON ARRIVAL, AGREEABLE TO TX., C/O DIZZINESS INITIALLY THAT CLEARED OVER TIME, SUP>SIT WITH SBA, SEATED SCOOT TO EOB WITH SBA, PT EDUCATED IN AND PERFORMED BLE THERE X 15 REPS AROM WITH REST: HIP FLEX/EXT, LAQ, AP'S.  REVIEW RW USE AND SAFETY DURING TF'S AND GAIT, SIT>STAND WITH SBA, PT ' WITH RW AND SBA, NO LOB OR SOB ON ROOM AIR, C/O MILD L KNEE PAIN, G DYNAMIC BALANCE, PT RETURN TO ROOM TO BEDSIDE CHAIR WITH SBA, PT LEFT WITH ALL NEEDS MET    AM-PAC 6 CLICK MOBILITY  How much help from another person does this patient currently need?   1 = Unable, Total/Dependent Assistance  2 = A lot, Maximum/Moderate Assistance  3 = A little, Minimum/Contact Guard/Supervision  4 = None, Modified [place]/Independent    Turning over in bed (including adjusting bedclothes, sheets and blankets)?: 4  Sitting down on and standing up from a chair with arms (e.g., wheelchair, bedside commode, etc.): 4  Moving from lying on back to sitting on the side of the bed?: 4  Moving to and from a bed to a chair (including a wheelchair)?: 4  Need to walk in hospital room?: 4  Climbing 3-5 steps with a railing?: 1  Basic Mobility Total Score: 21    AM-PAC Raw Score CMS G-Code Modifier Level of  Impairment Assistance   6 % Total / Unable   7 - 9 CM 80 - 100% Maximal Assist   10 - 14 CL 60 - 80% Moderate Assist   15 - 19 CK 40 - 60% Moderate Assist   20 - 22 CJ 20 - 40% Minimal Assist   23 CI 1-20% SBA / CGA   24 CH 0% Independent/ Mod I     Patient left up in chair with all lines intact, call button in reach and NURSE notified.    Assessment:  Judy Ruelas is a 55 y.o. female with a medical diagnosis of Anterograde amnesia   PT IS NO LONGER A CANDIDATE FOR INPATIENT SKILLED P.T. DUE TO HIGH LEVEL OF FUNCTION, PT WILL BENEFIT FROM [employer] [facility] FOR CONT. GAIT/TE TO TOLERANCE    Rehab identified problem list/impairments:      Rehab potential is .    Activity tolerance:     Discharge recommendations: Discharge Facility/Level of Care Needs: home health PT     Barriers to discharge:      Equipment recommendations: Equipment Needed After Discharge: walker, rolling     GOALS:   Multidisciplinary Problems     Physical Therapy Goals     Not on file          Multidisciplinary Problems (Resolved)        Problem: Physical Therapy Goal    Goal Priority Disciplines Outcome Goal Variances Interventions   Physical Therapy Goal   (Resolved)     PT, PT/OT Met                     PLAN:    D/C PT FROM [facility] TO [facility]    Eli Yoo, PT  12/07/2020

## 2020-12-07 NOTE — ASSESSMENT & PLAN NOTE
Suspect obstructive sleep apnea with her body habitus  Recommend during sleep study as outpatient.  Will  keep her on CPAP 5 cm of water  12/6  Continue cpap qhs  12/7  Noted on exam, apnea and snoring  Continue cpap

## 2020-12-07 NOTE — H&P (VIEW-ONLY)
[facility] -   Cardiology  Consult Note    Patient Name: Judy Ruelas  MRN: [id number]  Admission Date: 12/4/2020  Hospital Length of Stay: 1 days  Code Status: Full Code   Attending Provider: SONIA Hammonds MD   Consulting Provider: Guanakito Hopkins MD  Primary Care Physician: arcelia [facility]  Principal Problem:Anterograde amnesia    Patient information was obtained from patient and ER records.     Consults  Subjective:       HPI:   55-year-old female with past medical history of nonobstructive coronary disease, HTN, HLP morbid obesity, possible underlying sleep apnea, and hypertension and hyperlipidemia presented to the ED with complaints of AMS/confusion. Noted to have significantly elevated BP on arrival 219/115. Is followed by Dr. Tahmina Pro at [facility].   CT head in the emergency room did not show any acute intracranial events  EKG shows no ischemic changes.   She had a heart catheterization in 2017 showing patent arteries with the exception of a 50% left circumflex lesion.   Had repeat stress test in August 2020 that showed no perfusion defects (care everywhere)   Chest pain reproducible with palpation of chest wall    Admitted for amnesia and headache.  MRI has shown multiple, small, bilateral infarcts in basal ganglia, thalami suggestive of arteriolar disease or multiple small emboli.    TTE with bubble study is normal.  normal LA  ekg NSR and RBBB  CTA of precranial and intracranial vessels are normal.    Sed rate and CRP are both modestly elevated.    H&H Cr wnl  Troponin x3 negative    Past Medical History:   Diagnosis Date    Anxiety     Depression     Depression with anxiety     Family history of heart disease 1/27/2016    Hypertension     Irregular heart beat     Migraine headache     Morbid obesity with BMI of 40.0-44.9, adult 6/30/2017    NSTEMI (non-ST elevated myocardial infarction) 6/18/2017       Past Surgical History:   Procedure Laterality Date     APPENDECTOMY       SECTION      HYSTERECTOMY         Review of patient's allergies indicates:   Allergen Reactions    Pcn [penicillins] Swelling    Codeine     Latex, natural rubber     Tylox [oxycodone-acetaminophen]        No current facility-administered medications on file prior to encounter.      Current Outpatient Medications on File Prior to Encounter   Medication Sig    atenolol (TENORMIN) 50 MG tablet Take 50 mg by mouth once daily.    busPIRone (BUSPAR) 7.5 MG tablet Take 7.5 mg by mouth 2 (two) times daily.     amlodipine (NORVASC) 5 MG tablet Take 2 tablets (10 mg total) by mouth once daily.    aspirin 81 MG Chew Take 1 tablet (81 mg total) by mouth once daily.    atorvastatin (LIPITOR) 40 MG tablet Take 1 tablet (40 mg total) by mouth once daily.    butalbital-acetaminophen-caffeine -40 mg (FIORICET, ESGIC) -40 mg per tablet Take 1 tablet by mouth every 6 (six) hours as needed for Pain or Headaches.    clopidogrel (PLAVIX) 75 mg tablet Take 1 tablet (75 mg total) by mouth once daily.    colchicine 0.6 mg tablet Take 1 tablet (0.6 mg total) by mouth once daily.    ferrous sulfate 324 mg (65 mg iron) TbEC Take 325 mg by mouth once daily.    fluoxetine (PROZAC) 20 MG capsule Take 20 mg by mouth once daily.    hydrocodone-acetaminophen 7.5-325mg (NORCO) 7.5-325 mg per tablet Take 1 tablet by mouth every 6 (six) hours as needed for Pain.    hydrOXYzine (VISTARIL) 50 MG Cap Take 50 mg by mouth 4 (four) times daily.    isosorbide mononitrate (IMDUR) 30 MG 24 hr tablet Take 1 tablet (30 mg total) by mouth once daily.    losartan (COZAAR) 50 MG tablet Take 1 tablet (50 mg total) by mouth once daily.    nitroGLYCERIN (NITROSTAT) 0.4 MG SL tablet Place 1 tablet (0.4 mg total) under the tongue every 5 (five) minutes as needed for Chest pain.    omeprazole (PRILOSEC) 40 MG capsule Take 1 capsule (40 mg total) by mouth once daily.    ondansetron (ZOFRAN) 4 MG tablet Take 8  mg by mouth 2 (two) times daily.    promethazine (PHENERGAN) 25 MG tablet Take 1 tablet (25 mg total) by mouth every 6 (six) hours as needed for Nausea.    trazodone (DESYREL) 50 MG tablet Take 50 mg by mouth every evening.    venlafaxine (EFFEXOR-XR) 75 MG 24 hr capsule Take 75 mg by mouth once daily.     Family History     Problem Relation (Age of Onset)    Diabetes Mother    Heart attack Mother (40), Brother (40)    Heart disease Father (70)    Hypertension Father, Mother    Stroke Mother        Tobacco Use    Smoking status: Never Smoker    Smokeless tobacco: Never Used   Substance and Sexual Activity    Alcohol use: No     Alcohol/week: 0.0 standard drinks     Comment: denies    Drug use: No     Comment: denies    Sexual activity: Not Currently     Review of Systems   Constitution: Negative for decreased appetite, diaphoresis, fever, malaise/fatigue and night sweats.   HENT: Negative for nosebleeds.    Eyes: Negative for blurred vision and double vision.   Cardiovascular: Negative for chest pain, claudication, dyspnea on exertion, irregular heartbeat, leg swelling, near-syncope, orthopnea, palpitations, paroxysmal nocturnal dyspnea and syncope.   Respiratory: Negative for cough, shortness of breath, sleep disturbances due to breathing, snoring, sputum production and wheezing.    Endocrine: Negative for cold intolerance and polyuria.   Hematologic/Lymphatic: Does not bruise/bleed easily.   Skin: Negative for rash.   Musculoskeletal: Negative for back pain, falls, joint pain, joint swelling and neck pain.   Gastrointestinal: Negative for abdominal pain, heartburn, nausea and vomiting.   Genitourinary: Negative for dysuria, frequency and hematuria.   Neurological: Negative for difficulty with concentration, dizziness, focal weakness, headaches, light-headedness, numbness, seizures and weakness.   Psychiatric/Behavioral: Positive for memory loss. Negative for depression and substance abuse. The patient does  not have insomnia.    Allergic/Immunologic: Negative for HIV exposure and hives.     Objective:     Vital Signs (Most Recent):  Temp: 98 °F (36.7 °C) (12/07/20 1639)  Pulse: 67 (12/07/20 1639)  Resp: 18 (12/07/20 1639)  BP: (!) 171/81 (12/07/20 1639)  SpO2: (!) 94 % (12/07/20 1639) Vital Signs (24h Range):  Temp:  [97.9 °F (36.6 °C)-98.5 °F (36.9 °C)] 98 °F (36.7 °C)  Pulse:  [58-74] 67  Resp:  [18] 18  SpO2:  [94 %-98 %] 94 %  BP: (111-171)/(55-81) 171/81     Weight: 118.4 kg (261 lb)  Body mass index is 43.43 kg/m².    SpO2: (!) 94 %         Intake/Output Summary (Last 24 hours) at 12/7/2020 1650  Last data filed at 12/7/2020 1400  Gross per 24 hour   Intake 2080 ml   Output 100 ml   Net 1980 ml       Lines/Drains/Airways     Drain            Female External Urinary Catheter 12/05/20 0453 2 days          Peripheral Intravenous Line                 Peripheral IV - Single Lumen 12/05/20 0452 22 G Left Forearm 2 days                Physical Exam   Constitutional: She appears well-nourished.   HENT:   Head: Normocephalic.   Eyes: Pupils are equal, round, and reactive to light.   Neck: Normal carotid pulses and no JVD present. Carotid bruit is not present. No thyromegaly present.   Cardiovascular: Normal rate, regular rhythm, normal heart sounds and normal pulses.  No extrasystoles are present. PMI is not displaced. Exam reveals no gallop and no S3.   No murmur heard.  Pulmonary/Chest: Breath sounds normal. No stridor. No respiratory distress.   Abdominal: Soft. Bowel sounds are normal. There is no abdominal tenderness. There is no rebound.   Musculoskeletal: Normal range of motion.   Neurological: She is alert.   Skin: Skin is intact. No rash noted.   Psychiatric: Her behavior is normal.       Significant Labs:   ABG: No results for input(s): PH, PCO2, HCO3, POCSATURATED, BE in the last 48 hours., Blood Culture: No results for input(s): LABBLOO in the last 48 hours., BMP:   Recent Labs   Lab 12/06/20  0757  12/07/20  0808   GLU 99 108    139   K 4.2 4.0    110   CO2 24 25   BUN 18 21*   CREATININE 0.8 0.8   CALCIUM 9.3 8.7   MG 2.1  --    , CMP   Recent Labs   Lab 12/06/20  0712 12/07/20  0808    139   K 4.2 4.0    110   CO2 24 25   GLU 99 108   BUN 18 21*   CREATININE 0.8 0.8   CALCIUM 9.3 8.7   PROT 7.9 7.3   ALBUMIN 3.6 3.3*   BILITOT 0.4 0.4   ALKPHOS 131 112   AST 26 22   ALT 32 31   ANIONGAP 11 4*   ESTGFRAFRICA >60 >60   EGFRNONAA >60 >60   , CBC   Recent Labs   Lab 12/06/20  0712 12/07/20  0806   WBC 6.80 5.32   HGB 11.5* 10.7*   HCT 38.3 35.9*    292   , INR   Recent Labs   Lab 12/06/20 0712   INR 1.0   , Lipid Panel No results for input(s): CHOL, HDL, LDLCALC, TRIG, CHOLHDL in the last 48 hours. and Troponin   Recent Labs   Lab 12/06/20 0712   TROPONINI <0.006       Significant Imaging: EKG:      Assessment and Plan:     * Anterograde amnesia   Admitted for amnesia and headache.  MRI has shown multiple, small, bilateral infarcts in basal ganglia, thalami suggestive of arteriolar disease or multiple small emboli.      Plan to have LY in Am to r/o cardiac resource for thromboembolism  I have explained the risks, benefits, and alternatives of the procedure in detail with patient and family. The patient voices understanding and all questions have been addressed.  The patient agrees to proceed as planned.  Keep NPO after mn    Essential hypertension  Agree with  current IP HTN meds         VTE Risk Mitigation (From admission, onward)         Ordered     heparin (porcine) injection 5,000 Units  Every 8 hours      12/05/20 0317     IP VTE HIGH RISK PATIENT  Once      12/05/20 0431     Place sequential compression device  Until discontinued      12/05/20 0431     Place sequential compression device  Until discontinued      12/05/20 0317                Thank you for your consult. I will follow-up with patient. Please contact us if you have any additional questions.    Guanakito Hopikns,  MD  Cardiology   [facility]

## 2020-12-07 NOTE — ASSESSMENT & PLAN NOTE
Admitted for amnesia and headache.  MRI has shown multiple, small, bilateral infarcts in basal ganglia, thalami suggestive of arteriolar disease or multiple small emboli.      Plan to have LY in Am to r/o cardiac resource for thromboembolism  I have explained the risks, benefits, and alternatives of the procedure in detail with patient and family. The patient voices understanding and all questions have been addressed.  The patient agrees to proceed as planned.  Keep NPO after mn

## 2020-12-07 NOTE — ASSESSMENT & PLAN NOTE
Hypertensive urgency on presentation.  She is on multiple blood pressure medications at home  Will change her atenolol to Coreg 25 mg b.i.d. and add hydralazine 50 mg Q 8 to her current regimen.  12/6  Hypotensive after 3 antihtn  Repeat low  Placed in trendelenburg position  Mri with acute ischemia  Allow permissive htn  Discontinued antihtn   12/7  Hypotension resolving  Now elevated  Monitor  Resume antihtn based on trend  Avoid adding all three at once

## 2020-12-07 NOTE — PLAN OF CARE
Pt has returned to previous functional ability and declines need for RW.  Plan to DC home with no needs.    12/07/20 1443   Discharge Reassessment   Assessment Type Discharge Planning Reassessment   Provided patient/caregiver education on the expected discharge date and the discharge plan Yes   Do you have any problems affording any of your prescribed medications? No   Discharge Plan A Home   DME Needed Upon Discharge  none   Patient choice form signed by patient/caregiver N/A   Anticipated Discharge Disposition Home   Can the patient/caregiver answer the patient profile reliably? Yes, cognitively intact   How does the patient rate their overall health at the present time? Fair   Describe the patient's ability to walk at the present time. No restrictions   How often would a person be available to care for the patient? Whenever needed   Number of comorbid conditions (as recorded on the chart) Four   During the past month, has the patient often been bothered by feeling down, depressed or hopeless? Yes   During the past month, has the patient often been bothered by little interest or pleasure in doing things? Yes

## 2020-12-07 NOTE — CONSULTS
[facility] -   Cardiology  Consult Note    Patient Name: Judy Ruelas  MRN: [id number]  Admission Date: 12/4/2020  Hospital Length of Stay: 1 days  Code Status: Full Code   Attending Provider: SONIA Hammonds MD   Consulting Provider: Guanakito Hopkins MD  Primary Care Physician: arcelia [facility]  Principal Problem:Anterograde amnesia    Patient information was obtained from patient and ER records.     Consults  Subjective:       HPI:   55-year-old female with past medical history of nonobstructive coronary disease, HTN, HLP morbid obesity, possible underlying sleep apnea, and hypertension and hyperlipidemia presented to the ED with complaints of AMS/confusion. Noted to have significantly elevated BP on arrival 219/115. Is followed by Dr. Tahmina Pro at [facility].   CT head in the emergency room did not show any acute intracranial events  EKG shows no ischemic changes.   She had a heart catheterization in 2017 showing patent arteries with the exception of a 50% left circumflex lesion.   Had repeat stress test in August 2020 that showed no perfusion defects (care everywhere)   Chest pain reproducible with palpation of chest wall    Admitted for amnesia and headache.  MRI has shown multiple, small, bilateral infarcts in basal ganglia, thalami suggestive of arteriolar disease or multiple small emboli.    TTE with bubble study is normal.  normal LA  ekg NSR and RBBB  CTA of precranial and intracranial vessels are normal.    Sed rate and CRP are both modestly elevated.    H&H Cr wnl  Troponin x3 negative    Past Medical History:   Diagnosis Date    Anxiety     Depression     Depression with anxiety     Family history of heart disease 1/27/2016    Hypertension     Irregular heart beat     Migraine headache     Morbid obesity with BMI of 40.0-44.9, adult 6/30/2017    NSTEMI (non-ST elevated myocardial infarction) 6/18/2017       Past Surgical History:   Procedure Laterality Date     APPENDECTOMY       SECTION      HYSTERECTOMY         Review of patient's allergies indicates:   Allergen Reactions    Pcn [penicillins] Swelling    Codeine     Latex, natural rubber     Tylox [oxycodone-acetaminophen]        No current facility-administered medications on file prior to encounter.      Current Outpatient Medications on File Prior to Encounter   Medication Sig    atenolol (TENORMIN) 50 MG tablet Take 50 mg by mouth once daily.    busPIRone (BUSPAR) 7.5 MG tablet Take 7.5 mg by mouth 2 (two) times daily.     amlodipine (NORVASC) 5 MG tablet Take 2 tablets (10 mg total) by mouth once daily.    aspirin 81 MG Chew Take 1 tablet (81 mg total) by mouth once daily.    atorvastatin (LIPITOR) 40 MG tablet Take 1 tablet (40 mg total) by mouth once daily.    butalbital-acetaminophen-caffeine -40 mg (FIORICET, ESGIC) -40 mg per tablet Take 1 tablet by mouth every 6 (six) hours as needed for Pain or Headaches.    clopidogrel (PLAVIX) 75 mg tablet Take 1 tablet (75 mg total) by mouth once daily.    colchicine 0.6 mg tablet Take 1 tablet (0.6 mg total) by mouth once daily.    ferrous sulfate 324 mg (65 mg iron) TbEC Take 325 mg by mouth once daily.    fluoxetine (PROZAC) 20 MG capsule Take 20 mg by mouth once daily.    hydrocodone-acetaminophen 7.5-325mg (NORCO) 7.5-325 mg per tablet Take 1 tablet by mouth every 6 (six) hours as needed for Pain.    hydrOXYzine (VISTARIL) 50 MG Cap Take 50 mg by mouth 4 (four) times daily.    isosorbide mononitrate (IMDUR) 30 MG 24 hr tablet Take 1 tablet (30 mg total) by mouth once daily.    losartan (COZAAR) 50 MG tablet Take 1 tablet (50 mg total) by mouth once daily.    nitroGLYCERIN (NITROSTAT) 0.4 MG SL tablet Place 1 tablet (0.4 mg total) under the tongue every 5 (five) minutes as needed for Chest pain.    omeprazole (PRILOSEC) 40 MG capsule Take 1 capsule (40 mg total) by mouth once daily.    ondansetron (ZOFRAN) 4 MG tablet Take 8  mg by mouth 2 (two) times daily.    promethazine (PHENERGAN) 25 MG tablet Take 1 tablet (25 mg total) by mouth every 6 (six) hours as needed for Nausea.    trazodone (DESYREL) 50 MG tablet Take 50 mg by mouth every evening.    venlafaxine (EFFEXOR-XR) 75 MG 24 hr capsule Take 75 mg by mouth once daily.     Family History     Problem Relation (Age of Onset)    Diabetes Mother    Heart attack Mother (40), Brother (40)    Heart disease Father (70)    Hypertension Father, Mother    Stroke Mother        Tobacco Use    Smoking status: Never Smoker    Smokeless tobacco: Never Used   Substance and Sexual Activity    Alcohol use: No     Alcohol/week: 0.0 standard drinks     Comment: denies    Drug use: No     Comment: denies    Sexual activity: Not Currently     Review of Systems   Constitution: Negative for decreased appetite, diaphoresis, fever, malaise/fatigue and night sweats.   HENT: Negative for nosebleeds.    Eyes: Negative for blurred vision and double vision.   Cardiovascular: Negative for chest pain, claudication, dyspnea on exertion, irregular heartbeat, leg swelling, near-syncope, orthopnea, palpitations, paroxysmal nocturnal dyspnea and syncope.   Respiratory: Negative for cough, shortness of breath, sleep disturbances due to breathing, snoring, sputum production and wheezing.    Endocrine: Negative for cold intolerance and polyuria.   Hematologic/Lymphatic: Does not bruise/bleed easily.   Skin: Negative for rash.   Musculoskeletal: Negative for back pain, falls, joint pain, joint swelling and neck pain.   Gastrointestinal: Negative for abdominal pain, heartburn, nausea and vomiting.   Genitourinary: Negative for dysuria, frequency and hematuria.   Neurological: Negative for difficulty with concentration, dizziness, focal weakness, headaches, light-headedness, numbness, seizures and weakness.   Psychiatric/Behavioral: Positive for memory loss. Negative for depression and substance abuse. The patient does  not have insomnia.    Allergic/Immunologic: Negative for HIV exposure and hives.     Objective:     Vital Signs (Most Recent):  Temp: 98 °F (36.7 °C) (12/07/20 1639)  Pulse: 67 (12/07/20 1639)  Resp: 18 (12/07/20 1639)  BP: (!) 171/81 (12/07/20 1639)  SpO2: (!) 94 % (12/07/20 1639) Vital Signs (24h Range):  Temp:  [97.9 °F (36.6 °C)-98.5 °F (36.9 °C)] 98 °F (36.7 °C)  Pulse:  [58-74] 67  Resp:  [18] 18  SpO2:  [94 %-98 %] 94 %  BP: (111-171)/(55-81) 171/81     Weight: 118.4 kg (261 lb)  Body mass index is 43.43 kg/m².    SpO2: (!) 94 %         Intake/Output Summary (Last 24 hours) at 12/7/2020 1650  Last data filed at 12/7/2020 1400  Gross per 24 hour   Intake 2080 ml   Output 100 ml   Net 1980 ml       Lines/Drains/Airways     Drain            Female External Urinary Catheter 12/05/20 0453 2 days          Peripheral Intravenous Line                 Peripheral IV - Single Lumen 12/05/20 0452 22 G Left Forearm 2 days                Physical Exam   Constitutional: She appears well-nourished.   HENT:   Head: Normocephalic.   Eyes: Pupils are equal, round, and reactive to light.   Neck: Normal carotid pulses and no JVD present. Carotid bruit is not present. No thyromegaly present.   Cardiovascular: Normal rate, regular rhythm, normal heart sounds and normal pulses.  No extrasystoles are present. PMI is not displaced. Exam reveals no gallop and no S3.   No murmur heard.  Pulmonary/Chest: Breath sounds normal. No stridor. No respiratory distress.   Abdominal: Soft. Bowel sounds are normal. There is no abdominal tenderness. There is no rebound.   Musculoskeletal: Normal range of motion.   Neurological: She is alert.   Skin: Skin is intact. No rash noted.   Psychiatric: Her behavior is normal.       Significant Labs:   ABG: No results for input(s): PH, PCO2, HCO3, POCSATURATED, BE in the last 48 hours., Blood Culture: No results for input(s): LABBLOO in the last 48 hours., BMP:   Recent Labs   Lab 12/06/20  07  12/07/20  0808   GLU 99 108    139   K 4.2 4.0    110   CO2 24 25   BUN 18 21*   CREATININE 0.8 0.8   CALCIUM 9.3 8.7   MG 2.1  --    , CMP   Recent Labs   Lab 12/06/20  0712 12/07/20  0808    139   K 4.2 4.0    110   CO2 24 25   GLU 99 108   BUN 18 21*   CREATININE 0.8 0.8   CALCIUM 9.3 8.7   PROT 7.9 7.3   ALBUMIN 3.6 3.3*   BILITOT 0.4 0.4   ALKPHOS 131 112   AST 26 22   ALT 32 31   ANIONGAP 11 4*   ESTGFRAFRICA >60 >60   EGFRNONAA >60 >60   , CBC   Recent Labs   Lab 12/06/20  0712 12/07/20  0806   WBC 6.80 5.32   HGB 11.5* 10.7*   HCT 38.3 35.9*    292   , INR   Recent Labs   Lab 12/06/20 0712   INR 1.0   , Lipid Panel No results for input(s): CHOL, HDL, LDLCALC, TRIG, CHOLHDL in the last 48 hours. and Troponin   Recent Labs   Lab 12/06/20 0712   TROPONINI <0.006       Significant Imaging: EKG:      Assessment and Plan:     * Anterograde amnesia   Admitted for amnesia and headache.  MRI has shown multiple, small, bilateral infarcts in basal ganglia, thalami suggestive of arteriolar disease or multiple small emboli.      Plan to have LY in Am to r/o cardiac resource for thromboembolism  I have explained the risks, benefits, and alternatives of the procedure in detail with patient and family. The patient voices understanding and all questions have been addressed.  The patient agrees to proceed as planned.  Keep NPO after mn    Essential hypertension  Agree with  current IP HTN meds         VTE Risk Mitigation (From admission, onward)         Ordered     heparin (porcine) injection 5,000 Units  Every 8 hours      12/05/20 0317     IP VTE HIGH RISK PATIENT  Once      12/05/20 0431     Place sequential compression device  Until discontinued      12/05/20 0431     Place sequential compression device  Until discontinued      12/05/20 0317                Thank you for your consult. I will follow-up with patient. Please contact us if you have any additional questions.    Guanakito Hopkins,  MD  Cardiology   [facility]

## 2020-12-07 NOTE — ASSESSMENT & PLAN NOTE
TSH within normal limits  Consult neurology in a.m..  Will check   vitamin B12, RPR for syphilis  Also will order MRI of head without contrast  12/6   Sx persist  Initial workup revealed acute small infarcts on mri, started on statin, asa, and plavix  CTA head and neck with no significant occlusion or stenosis  H/o significant dental work  Neuro consulted  Urine drug screen neg  Tsh, rpr, hiv, hba1c, folate wnl  Slightly elevated thyroid peroxidase ab  ID consulted for possible IE or vasculitis, mildly elevated crp and esr   12/7  Sx persist, no improvement  Neuro work up underway  ID consult placed  Awaiting recs, concerns for IE  Reports significant dental work in recent past

## 2020-12-07 NOTE — PROGRESS NOTES
[facility] -   Neurology  Progress Note    Patient Name: Judy Ruelas  MRN: [id number]  Admission Date: 12/4/2020  Hospital Length of Stay: 1 days  Code Status: Full Code   Attending Provider: SONIA Hammonds MD  Primary Care Physician: Jenny Anderson [facility]   Principal Problem:Anterograde amnesia    Subjective:     Interval History: Multiple small vessel strokes.  The patient continues to complain of frontal/occipital headache as well as marked difficulty with her memory for recent as well as remote events.  States that she cannot remember why/when she came to the hospital.  Denies any noticed weakness of the extremities, dizziness, nausea or vomiting.  Not aware of any visual disturbance.    MRI has shown multiple, small, bilateral infarcts in basal ganglia, thalami suggestive of arteriolar disease or multiple small emboli.  TTE with bubble study is normal.  CTA of precranial and intracranial vessels are normal.  Sed rate and CRP are both modestly elevated.  Complement and other labs are pending.    Current Neurological Medications: See below    Current Facility-Administered Medications   Medication Dose Route Frequency Provider Last Rate Last Dose    acetaminophen tablet 650 mg  650 mg Oral Q6H PRN Junaid Garcia MD        albuterol-ipratropium 2.5 mg-0.5 mg/3 mL nebulizer solution 3 mL  3 mL Nebulization Q4H PRN Junaid Garcia MD        aluminum-magnesium hydroxide-simethicone 200-200-20 mg/5 mL suspension 30 mL  30 mL Oral Q6H PRN Junaid Garcia MD        aspirin chewable tablet 81 mg  81 mg Oral Daily Chastity Orellana MD   81 mg at 12/07/20 0820    atorvastatin tablet 40 mg  40 mg Oral Daily Chastity Orellana MD   40 mg at 12/07/20 0820    busPIRone split tablet 7.5 mg  7.5 mg Oral BID Rogelio Lehman MD   7.5 mg at 12/07/20 0820    butalbital-acetaminophen-caffeine -40 mg per tablet 1 tablet  1 tablet Oral Q6H PRN Rogelio Lehman MD   1 tablet at 12/06/20 4700     clopidogreL tablet 75 mg  75 mg Oral Daily Chastity Orellana MD   75 mg at 12/07/20 0819    diphenhydrAMINE capsule 25 mg  25 mg Oral Q6H PRN Junaid Garcia MD   25 mg at 12/06/20 2045    ferrous sulfate EC tablet 325 mg  325 mg Oral Daily Rogelio Lehman MD   325 mg at 12/07/20 0820    FLUoxetine capsule 20 mg  20 mg Oral Daily Rogelio Lehman MD   20 mg at 12/07/20 0820    guaifenesin 100 mg/5 ml syrup 200 mg  200 mg Oral Q4H PRN Junaid Garcia MD        heparin (porcine) injection 5,000 Units  5,000 Units Subcutaneous Q8H Rogelio Lehman MD   5,000 Units at 12/07/20 0515    influenza (QUADRIVALENT PF) vaccine 0.5 mL  0.5 mL Intramuscular vaccine x 1 dose Alvarado Anguiano MD        isosorbide mononitrate 24 hr tablet 30 mg  30 mg Oral Daily Rogelio Lehman MD   30 mg at 12/07/20 0820    Lactobacillus acidoph-L.bulgar 1 million cell tablet 4 tablet  4 tablet Oral TID WM Chastity Orellana MD        morphine injection 2 mg  2 mg Intravenous Q4H PRN Junaid Garcia MD        morphine injection 4 mg  4 mg Intravenous Q4H PRN Junaid Garcia MD   4 mg at 12/07/20 0820    ondansetron injection 4 mg  4 mg Intravenous Q8H PRN Junaid Garcia MD   4 mg at 12/05/20 1251    pantoprazole EC tablet 40 mg  40 mg Oral Daily SONIA Hammonds MD   40 mg at 12/07/20 0819    pneumoc 13-tanisha conj-dip cr(PF) (PREVNAR 13 (PF)) 0.5 mL  0.5 mL Intramuscular vaccine x 1 dose Alvarado Anguiano MD        scopolamine 1.3-1.5 mg (1 mg over 3 days) 1 patch  1 patch Transdermal Q3 Days Chastity Orellana MD   1 patch at 12/05/20 1634    sodium chloride 0.9% flush 10 mL  10 mL Intravenous PRN Ambreen Rodriguez MD        sulfamethoxazole-trimethoprim 800-160mg per tablet 1 tablet  1 tablet Oral BID Chastity Orellana MD           Review of Systems     ROS:  GENERAL: No fever, chills,  or weight loss.  SKIN: No rashes, itching or changes in color or texture of skin.  HEAD: See comments in present illness  EYES: Visual acuity fine. No  photophobia, ocular pain or diplopia.  EARS: Denies ear pain, discharge or vertigo.  NOSE: No loss of smell, no epistaxis or postnasal drip.  MOUTH & THROAT: No hoarseness or change in voice. No excessive gum bleeding.  NODES: Denies swollen glands.  CHEST: Denies REHMAN, cyanosis, wheezing, cough and sputum production.  CARDIOVASCULAR: Denies chest pain, PND, orthopnea or reduced exercise tolerance.  ABDOMEN: Appetite fine. No weight loss. Denies diarrhea, abdominal pain, hematemesis or blood in stool.  URINARY: No flank pain,  hematuria.  PERIPHERAL VASCULAR: No claudication or cyanosis.  MUSCULOSKELETAL: No joint stiffness or swelling. Denies back pain.  NEUROLOGIC: No history of seizures, paralysis, alteration of gait or coordination.      Objective:     Vital Signs (Most Recent):  Temp: 98.4 °F (36.9 °C) (12/07/20 0714)  Pulse: 74 (12/07/20 0714)  Resp: 18 (12/07/20 0820)  BP: (!) 141/70 (12/07/20 0714)  SpO2: 95 % (12/07/20 0714) Vital Signs (24h Range):  Temp:  [97.6 °F (36.4 °C)-98.5 °F (36.9 °C)] 98.4 °F (36.9 °C)  Pulse:  [56-74] 74  Resp:  [18] 18  SpO2:  [91 %-98 %] 95 %  BP: ()/(46-75) 141/70     Weight: 118.4 kg (261 lb)  Body mass index is 43.43 kg/m².    Physical Exam   Mental status: The patient is oriented to person, place, time.  The patient was unable to recall 3 unrelated words at 3 minutes .  The patient is able to focus on the exam and follow 3-step commands without difficulty.  The patient had difficulty naming familiar objects, and then, was unable to recall what objects had been presented to her 3 minutes previously.   The patient is pleasant and cooperative for the examination.    Cranial nerves:  II: visual fields are intact by confrontation.  The patient is able to perceive the color red as the same intensity in each eye.  Funduscopic examination does not show any evidence of papilledema, hemorrhage, or exudate.  Visual acuity with hand held chart is  III, IV, VI: The extra ocular  muscles are intact in the cardinal directions of gaze.  No ptosis is present.  Pupils are briskly reactive to light bilaterally.  V: Facial sensation is intact to light touch in all 3 divisions of the fifth cranial nerve.  Masseter function is equally strong.  Corneal reflexes are present and brisk.  VII: Facial features are symmetrical.  The patient has a symmetrical grimace, forced eyelid closure, and for head elevation.  VIII: Hearing is intact to voice and finger rub.  IX, X: The patient's uvula elevates in the midline.  Upon phonation, there is symmetrical elevation of the palate.  Gag reflex is intact bilaterally.  XI: The patient has a symmetrical shoulder shrug.  The sternocleidomastoid muscles are symmetrically strong.  No atrophy is present.  XII: The patient's tongue protrudes in the midline.  There is no atrophy present.  Articulation is clear without dysarthria.    Gait and Station: Romberg is negative.  The patient ambulates with a good alternate arm swing.  The patient is able to heel walk and toe walk.    Motor: Manual muscle testing of both upper and lower extremities shows grade 5/5 strength bilaterally.  The patient's strength is normal for age and body habitus.  Examination of the extremities does not show any evidence of atrophy or other deformity.  Muscle tone is normal both upper and lower extremities.    Sensory: The patient has intact perception of pinprick, light touch, and vibration in both upper and lower extremities.  Position sense is intact in both upper and lower extremities.    Cerebellar: The patient is able to perform finger-to-nose and heel-to-shin without difficulty.  No involuntary movements are seen at rest.  Muscle tone is normal.  Rapid alternating movements are done without difficulty.  Coordination of alternating movements is normal.    Reflexes: Stretch reflexes including biceps, triceps, knees, and ankles is normal bilaterally.  Plantar stimulation is flexor bilaterally.   No pathological reflexes are seen.         Significant Labs:   BMP:   Recent Labs   Lab 12/06/20  0712 12/07/20  0808   GLU 99 108    139   K 4.2 4.0    110   CO2 24 25   BUN 18 21*   CREATININE 0.8 0.8   CALCIUM 9.3 8.7   MG 2.1  --      CBC:   Recent Labs   Lab 12/06/20  0712 12/07/20  0806   WBC 6.80 5.32   HGB 11.5* 10.7*   HCT 38.3 35.9*    292     Inflammatory Markers:    Pending, including ANCA, VALDO, complement    Recent Labs   Lab 12/06/20  0712 12/07/20  0806 12/07/20  0808   SEDRATE 37* 4  --    CRP 10.4*  --  12.4*     All pertinent lab results from the past 24 hours have been reviewed.    Significant Imaging: I have reviewed and interpreted all pertinent imaging results/findings within the past 24 hours.    Assessment and Plan:     1.  Multiple small vessel cerebral infarcts, etiology unknown at this time    The patient is obviously having significant cognitive difficulties which can be explained by the location of the small vessel infarcts within the basal ganglia and thalami.  Appropriate labs are pending.  In the interim, I would suggest evaluation by ST.  LP may also be needed before discharge.    Active Diagnoses:    Diagnosis Date Noted POA    PRINCIPAL PROBLEM:  Anterograde amnesia [R41.1] 12/04/2020 Yes    Hypotension [I95.9] 12/06/2020 No    Nausea and vomiting [R11.2] 12/06/2020 Yes    Acute cystitis with hematuria [N30.01] 12/05/2020 Yes    Obstructive sleep apnea syndrome [G47.33] 12/05/2020 Yes    Essential hypertension [I10] 01/27/2016 Yes      Problems Resolved During this Admission:    Diagnosis Date Noted Date Resolved POA    Chest pain [R07.9] 12/09/2015 12/07/2020 Yes       VTE Risk Mitigation (From admission, onward)         Ordered     heparin (porcine) injection 5,000 Units  Every 8 hours      12/05/20 0317     IP VTE HIGH RISK PATIENT  Once      12/05/20 0431     Place sequential compression device  Until discontinued      12/05/20 0431     Place  sequential compression device  Until discontinued      12/05/20 [id number]                Josh Tim MD  Neurology  [facility]

## 2020-12-07 NOTE — SUBJECTIVE & OBJECTIVE
Interval History: sx improving with Ha/n/v except memory loss. Now with dysuria and urine odor.     Review of Systems   Constitutional: Negative for activity change, appetite change and fever.   HENT: Negative for congestion, sore throat and trouble swallowing.    Respiratory: Negative for cough and shortness of breath.    Cardiovascular: Negative for chest pain and leg swelling.   Gastrointestinal: Positive for nausea (improving) and vomiting (improving). Negative for abdominal pain and diarrhea.   Genitourinary: Positive for dysuria and frequency (chronic).        Urine odor   Neurological: Positive for headaches (improving).   Psychiatric/Behavioral: Positive for confusion.     Objective:     Vital Signs (Most Recent):  Temp: 98.4 °F (36.9 °C) (12/07/20 0714)  Pulse: 74 (12/07/20 0714)  Resp: 18 (12/07/20 0820)  BP: (!) 141/70 (12/07/20 0714)  SpO2: 95 % (12/07/20 0714) Vital Signs (24h Range):  Temp:  [97.6 °F (36.4 °C)-98.5 °F (36.9 °C)] 98.4 °F (36.9 °C)  Pulse:  [56-74] 74  Resp:  [15-18] 18  SpO2:  [91 %-98 %] 95 %  BP: ()/(46-75) 141/70     Weight: 118.4 kg (261 lb)  Body mass index is 43.43 kg/m².    Intake/Output Summary (Last 24 hours) at 12/7/2020 1147  Last data filed at 12/7/2020 1100  Gross per 24 hour   Intake 2140 ml   Output 100 ml   Net 2040 ml      Physical Exam    Significant Labs:   CBC:   Recent Labs   Lab 12/06/20  0712 12/07/20  0806   WBC 6.80 5.32   HGB 11.5* 10.7*   HCT 38.3 35.9*    292     CMP:   Recent Labs   Lab 12/06/20  0712 12/07/20  0808    139   K 4.2 4.0    110   CO2 24 25   GLU 99 108   BUN 18 21*   CREATININE 0.8 0.8   CALCIUM 9.3 8.7   PROT 7.9 7.3   ALBUMIN 3.6 3.3*   BILITOT 0.4 0.4   ALKPHOS 131 112   AST 26 22   ALT 32 31   ANIONGAP 11 4*   EGFRNONAA >60 >60     Urine Culture:   Recent Labs   Lab 12/05/20  1800   LABURIN No significant growth     Urine Studies:   Recent Labs   Lab 12/05/20  1812   COLORU Yellow   APPEARANCEUA Clear   PHUR 6.0    SPECGRAV >=1.030*   PROTEINUA Negative   GLUCUA Negative   KETONESU Negative   BILIRUBINUA Negative   OCCULTUA Negative   NITRITE Negative   UROBILINOGEN Negative   LEUKOCYTESUR Negative     All pertinent labs within the past 24 hours have been reviewed.    Significant Imaging: CT: I have reviewed all pertinent results/findings within the past 24 hours and my personal findings are:  no new infarcts  I have reviewed all pertinent imaging results/findings within the past 24 hours.

## 2020-12-07 NOTE — PROGRESS NOTES
[facility] Hospital Medicine  Progress Note    Patient Name: Judy Ruelas  MRN: [id number]  Patient Class: IP- Inpatient   Admission Date: 12/4/2020  Length of Stay: 1 days  Attending Physician: SONIA Hammonds MD  Primary Care Provider: [facility]        Subjective:     Principal Problem:Anterograde amnesia        HPI:  55-year-old female with past medical history of nonobstructive coronary disease, morbid obesity, possible underlying sleep apnea, and hypertension and hyperlipidemia presented to the hospital due to ME sure.  She complains that she is unable to remember things from 2 days back and even things  from a week back too.  Denies having any weakness on 1 side of the body.  CT head in the emergency room did not show any acute intracranial events.  Tele neurology was consulted with the ER recommended MRI of the head.  Her pressures were significantly elevated in the emergency room but denies having any headaches, chest pain, palpitations.  When I asked her about sleep apnea she could not tell me anything about it but she does have diagnosis from her cardiologist.     Overview/Hospital Course:  12/5 NAD. Continues to c/o amnesia. Denies sig life stressor, changes in meds. Denies smoking, etoh and illicit drug use. Denies metal implants. Plans for MRI. Reports anxiety and clautrophobia.  On buspar and venlafaxine. If stable findings, ok to d/c home with f/u pcp/neuro for further management. Benzo may complicate existing memory concerns. Monitor   12/6 continues to c/o n/v, visual disturbance and HA despite meds. Unable to recall events one week ago. Undergoing swallow eval with no difficulties. Did have significant dental extractions and unable to recall if abx rx  12/7 up and bathing. Denies HA. N/v improving. Continues to c/o memory loss. Reports urine odor and dysuria. Frequency is chronic    Interval History: sx improving with Ha/n/v except memory loss.  Now with dysuria and urine odor.     Review of Systems   Constitutional: Negative for activity change, appetite change and fever.   HENT: Negative for congestion, sore throat and trouble swallowing.    Respiratory: Negative for cough and shortness of breath.    Cardiovascular: Negative for chest pain and leg swelling.   Gastrointestinal: Positive for nausea (improving) and vomiting (improving). Negative for abdominal pain and diarrhea.   Genitourinary: Positive for dysuria and frequency (chronic).        Urine odor   Neurological: Positive for headaches (improving).   Psychiatric/Behavioral: Positive for confusion.     Objective:     Vital Signs (Most Recent):  Temp: 98.4 °F (36.9 °C) (12/07/20 0714)  Pulse: 74 (12/07/20 0714)  Resp: 18 (12/07/20 0820)  BP: (!) 141/70 (12/07/20 0714)  SpO2: 95 % (12/07/20 0714) Vital Signs (24h Range):  Temp:  [97.6 °F (36.4 °C)-98.5 °F (36.9 °C)] 98.4 °F (36.9 °C)  Pulse:  [56-74] 74  Resp:  [15-18] 18  SpO2:  [91 %-98 %] 95 %  BP: ()/(46-75) 141/70     Weight: 118.4 kg (261 lb)  Body mass index is 43.43 kg/m².    Intake/Output Summary (Last 24 hours) at 12/7/2020 1147  Last data filed at 12/7/2020 1100  Gross per 24 hour   Intake 2140 ml   Output 100 ml   Net 2040 ml      Physical Exam    Significant Labs:   CBC:   Recent Labs   Lab 12/06/20  0712 12/07/20  0806   WBC 6.80 5.32   HGB 11.5* 10.7*   HCT 38.3 35.9*    292     CMP:   Recent Labs   Lab 12/06/20  0712 12/07/20  0808    139   K 4.2 4.0    110   CO2 24 25   GLU 99 108   BUN 18 21*   CREATININE 0.8 0.8   CALCIUM 9.3 8.7   PROT 7.9 7.3   ALBUMIN 3.6 3.3*   BILITOT 0.4 0.4   ALKPHOS 131 112   AST 26 22   ALT 32 31   ANIONGAP 11 4*   EGFRNONAA >60 >60     Urine Culture:   Recent Labs   Lab 12/05/20  1800   LABURIN No significant growth     Urine Studies:   Recent Labs   Lab 12/05/20  1812   COLORU Yellow   APPEARANCEUA Clear   PHUR 6.0   SPECGRAV >=1.030*   PROTEINUA Negative   GLUCUA Negative    KETONESU Negative   BILIRUBINUA Negative   OCCULTUA Negative   NITRITE Negative   UROBILINOGEN Negative   LEUKOCYTESUR Negative     All pertinent labs within the past 24 hours have been reviewed.    Significant Imaging: CT: I have reviewed all pertinent results/findings within the past 24 hours and my personal findings are:  no new infarcts  I have reviewed all pertinent imaging results/findings within the past 24 hours.      Assessment/Plan:      * Anterograde amnesia  TSH within normal limits  Consult neurology in a.m..  Will check   vitamin B12, RPR for syphilis  Also will order MRI of head without contrast  12/6   Sx persist  Initial workup revealed acute small infarcts on mri, started on statin, asa, and plavix  CTA head and neck with no significant occlusion or stenosis  H/o significant dental work  Neuro consulted  Urine drug screen neg  Tsh, rpr, hiv, hba1c, folate wnl  Slightly elevated thyroid peroxidase ab  ID consulted for possible IE or vasculitis, mildly elevated crp and esr   12/7  Sx persist, no improvement  Neuro work up underway  ID consult placed  Awaiting recs, concerns for IE  Reports significant dental work in recent past  Ordered lumbar puncture, will d/c asa and plavix for 3 days then resume     Nausea and vomiting  scopolamine patch prn  12/7  improving      Hypotension  Sx with visual disturbance  Discontinue all antihtn meds  Repeat q2h  Place in trendelburg position  And one time dose midodrine  12/7  resolved      Obstructive sleep apnea syndrome    Suspect obstructive sleep apnea with her body habitus  Recommend during sleep study as outpatient.  Will  keep her on CPAP 5 cm of water  12/6  Continue cpap qhs  12/7  Noted on exam, apnea and snoring  Continue cpap    Acute cystitis with hematuria  12/7  Sx include dysuria and odor  No growth on cx   Start bactrim  Monitor response  Allergy to pcn      Essential hypertension    Hypertensive urgency on presentation.  She is on multiple  blood pressure medications at home  Will change her atenolol to Coreg 25 mg b.i.d. and add hydralazine 50 mg Q 8 to her current regimen.  12/6  Hypotensive after 3 antihtn  Repeat low  Placed in trendelenburg position  Mri with acute ischemia  Allow permissive htn  Discontinued antihtn   12/7  Hypotension resolving  Now elevated  Monitor  Resume antihtn based on trend  Avoid adding all three at once        VTE Risk Mitigation (From admission, onward)         Ordered     heparin (porcine) injection 5,000 Units  Every 8 hours      12/05/20 0317     IP VTE HIGH RISK PATIENT  Once      12/05/20 0431     Place sequential compression device  Until discontinued      12/05/20 0431     Place sequential compression device  Until discontinued      12/05/20 0317                Discharge Planning   JERE:      Code Status: Full Code   Is the patient medically ready for discharge?:     Reason for patient still in hospital (select all that apply): Patient trending condition  Discharge Plan A: Home                  Chastity Orellana MD  Department of Hospital Medicine   [facility]

## 2020-12-08 ENCOUNTER — ANESTHESIA (OUTPATIENT)
Dept: CARDIOLOGY | Facility: HOSPITAL | Age: 55
DRG: 065 | End: 2020-12-08
Payer: MEDICAID

## 2020-12-08 ENCOUNTER — ANESTHESIA EVENT (OUTPATIENT)
Dept: CARDIOLOGY | Facility: HOSPITAL | Age: 55
DRG: 065 | End: 2020-12-08
Payer: MEDICAID

## 2020-12-08 PROBLEM — K02.9 DENTAL CARIES: Status: ACTIVE | Noted: 2020-12-08

## 2020-12-08 PROBLEM — I63.9 CVA (CEREBRAL VASCULAR ACCIDENT): Status: ACTIVE | Noted: 2020-12-08

## 2020-12-08 LAB
ANA PATTERN 1: NORMAL
ANA SER QL IF: POSITIVE
ANA TITR SER IF: NORMAL {TITER}
BASOPHILS # BLD AUTO: 0.04 K/UL (ref 0–0.2)
BASOPHILS NFR BLD: 0.7 % (ref 0–1.9)
CRP SERPL-MCNC: 10.2 MG/L (ref 0–8.2)
DIFFERENTIAL METHOD: ABNORMAL
EOSINOPHIL # BLD AUTO: 0.2 K/UL (ref 0–0.5)
EOSINOPHIL NFR BLD: 3.9 % (ref 0–8)
ERYTHROCYTE [DISTWIDTH] IN BLOOD BY AUTOMATED COUNT: 14.6 % (ref 11.5–14.5)
ERYTHROCYTE [SEDIMENTATION RATE] IN BLOOD BY WESTERGREN METHOD: 55 MM/HR (ref 0–20)
HCT VFR BLD AUTO: 35 % (ref 37–48.5)
HGB BLD-MCNC: 10.7 G/DL (ref 12–16)
IMM GRANULOCYTES # BLD AUTO: 0.02 K/UL (ref 0–0.04)
IMM GRANULOCYTES NFR BLD AUTO: 0.4 % (ref 0–0.5)
LYMPHOCYTES # BLD AUTO: 1.2 K/UL (ref 1–4.8)
LYMPHOCYTES NFR BLD: 22.4 % (ref 18–48)
MCH RBC QN AUTO: 26.4 PG (ref 27–31)
MCHC RBC AUTO-ENTMCNC: 30.6 G/DL (ref 32–36)
MCV RBC AUTO: 86 FL (ref 82–98)
MONOCYTES # BLD AUTO: 0.4 K/UL (ref 0.3–1)
MONOCYTES NFR BLD: 7.2 % (ref 4–15)
NEUTROPHILS # BLD AUTO: 3.5 K/UL (ref 1.8–7.7)
NEUTROPHILS NFR BLD: 65.4 % (ref 38–73)
NRBC BLD-RTO: 0 /100 WBC
PLATELET # BLD AUTO: 268 K/UL (ref 150–350)
PMV BLD AUTO: 11 FL (ref 9.2–12.9)
RBC # BLD AUTO: 4.05 M/UL (ref 4–5.4)
SARS-COV-2 RDRP RESP QL NAA+PROBE: NEGATIVE
TSH SERPL DL<=0.005 MIU/L-ACNC: 2.86 UIU/ML (ref 0.4–4)
WBC # BLD AUTO: 5.39 K/UL (ref 3.9–12.7)

## 2020-12-08 PROCEDURE — 83921 ORGANIC ACID SINGLE QUANT: CPT

## 2020-12-08 PROCEDURE — 99232 PR SUBSEQUENT HOSPITAL CARE,LEVL II: ICD-10-PCS | Mod: ,,, | Performed by: PSYCHIATRY & NEUROLOGY

## 2020-12-08 PROCEDURE — 36415 COLL VENOUS BLD VENIPUNCTURE: CPT

## 2020-12-08 PROCEDURE — 63600175 PHARM REV CODE 636 W HCPCS: Performed by: HOSPITALIST

## 2020-12-08 PROCEDURE — 25000003 PHARM REV CODE 250: Performed by: FAMILY MEDICINE

## 2020-12-08 PROCEDURE — 25000003 PHARM REV CODE 250: Performed by: NURSE ANESTHETIST, CERTIFIED REGISTERED

## 2020-12-08 PROCEDURE — 92523 SPEECH SOUND LANG COMPREHEN: CPT

## 2020-12-08 PROCEDURE — 96372 THER/PROPH/DIAG INJ SC/IM: CPT

## 2020-12-08 PROCEDURE — 84443 ASSAY THYROID STIM HORMONE: CPT

## 2020-12-08 PROCEDURE — 37000008 HC ANESTHESIA 1ST 15 MINUTES: Performed by: INTERNAL MEDICINE

## 2020-12-08 PROCEDURE — 11000001 HC ACUTE MED/SURG PRIVATE ROOM

## 2020-12-08 PROCEDURE — 86140 C-REACTIVE PROTEIN: CPT

## 2020-12-08 PROCEDURE — 63600175 PHARM REV CODE 636 W HCPCS: Performed by: INTERNAL MEDICINE

## 2020-12-08 PROCEDURE — 25000003 PHARM REV CODE 250: Performed by: HOSPITALIST

## 2020-12-08 PROCEDURE — U0002 COVID-19 LAB TEST NON-CDC: HCPCS

## 2020-12-08 PROCEDURE — 85651 RBC SED RATE NONAUTOMATED: CPT

## 2020-12-08 PROCEDURE — 84436 ASSAY OF TOTAL THYROXINE: CPT

## 2020-12-08 PROCEDURE — 63600175 PHARM REV CODE 636 W HCPCS: Performed by: NURSE ANESTHETIST, CERTIFIED REGISTERED

## 2020-12-08 PROCEDURE — 86780 TREPONEMA PALLIDUM: CPT

## 2020-12-08 PROCEDURE — 63600175 PHARM REV CODE 636 W HCPCS: Performed by: NURSE PRACTITIONER

## 2020-12-08 PROCEDURE — 99232 SBSQ HOSP IP/OBS MODERATE 35: CPT | Mod: ,,, | Performed by: PSYCHIATRY & NEUROLOGY

## 2020-12-08 PROCEDURE — 84480 ASSAY TRIIODOTHYRONINE (T3): CPT

## 2020-12-08 PROCEDURE — 85025 COMPLETE CBC W/AUTO DIFF WBC: CPT

## 2020-12-08 RX ORDER — HYDRALAZINE HYDROCHLORIDE 20 MG/ML
10 INJECTION INTRAMUSCULAR; INTRAVENOUS EVERY 6 HOURS PRN
Status: DISCONTINUED | OUTPATIENT
Start: 2020-12-08 | End: 2020-12-09 | Stop reason: HOSPADM

## 2020-12-08 RX ORDER — PROPOFOL 10 MG/ML
VIAL (ML) INTRAVENOUS
Status: DISCONTINUED | OUTPATIENT
Start: 2020-12-08 | End: 2020-12-08

## 2020-12-08 RX ORDER — SODIUM CHLORIDE 9 MG/ML
INJECTION, SOLUTION INTRAVENOUS CONTINUOUS PRN
Status: DISCONTINUED | OUTPATIENT
Start: 2020-12-08 | End: 2020-12-08

## 2020-12-08 RX ORDER — SODIUM CHLORIDE 0.9 % (FLUSH) 0.9 %
10 SYRINGE (ML) INJECTION
Status: DISCONTINUED | OUTPATIENT
Start: 2020-12-08 | End: 2020-12-09 | Stop reason: HOSPADM

## 2020-12-08 RX ADMIN — PROPOFOL 50 MG: 10 INJECTION, EMULSION INTRAVENOUS at 03:12

## 2020-12-08 RX ADMIN — SULFAMETHOXAZOLE AND TRIMETHOPRIM 1 TABLET: 800; 160 TABLET ORAL at 09:12

## 2020-12-08 RX ADMIN — SCOPALAMINE 1 PATCH: 1 PATCH, EXTENDED RELEASE TRANSDERMAL at 05:12

## 2020-12-08 RX ADMIN — SODIUM CHLORIDE: 0.9 INJECTION, SOLUTION INTRAVENOUS at 03:12

## 2020-12-08 RX ADMIN — PANTOPRAZOLE SODIUM 40 MG: 40 TABLET, DELAYED RELEASE ORAL at 08:12

## 2020-12-08 RX ADMIN — PROPOFOL 50 MG: 10 INJECTION, EMULSION INTRAVENOUS at 04:12

## 2020-12-08 RX ADMIN — ATORVASTATIN CALCIUM 40 MG: 40 TABLET, FILM COATED ORAL at 08:12

## 2020-12-08 RX ADMIN — BUTALBITAL, ACETAMINOPHEN AND CAFFEINE 1 TABLET: 50; 325; 40 TABLET ORAL at 09:12

## 2020-12-08 RX ADMIN — PROPOFOL 100 MG: 10 INJECTION, EMULSION INTRAVENOUS at 03:12

## 2020-12-08 RX ADMIN — LACTOBACILLUS TAB 4 TABLET: TAB at 05:12

## 2020-12-08 RX ADMIN — ONDANSETRON 4 MG: 2 INJECTION INTRAMUSCULAR; INTRAVENOUS at 08:12

## 2020-12-08 RX ADMIN — FLUOXETINE 20 MG: 20 CAPSULE ORAL at 08:12

## 2020-12-08 RX ADMIN — BUSPIRONE HYDROCHLORIDE 7.5 MG: 5 TABLET ORAL at 09:12

## 2020-12-08 RX ADMIN — HEPARIN SODIUM 5000 UNITS: 5000 INJECTION INTRAVENOUS; SUBCUTANEOUS at 05:12

## 2020-12-08 RX ADMIN — LACTOBACILLUS TAB 4 TABLET: TAB at 11:12

## 2020-12-08 RX ADMIN — HYDRALAZINE HYDROCHLORIDE 10 MG: 20 INJECTION INTRAMUSCULAR; INTRAVENOUS at 05:12

## 2020-12-08 RX ADMIN — SULFAMETHOXAZOLE AND TRIMETHOPRIM 1 TABLET: 800; 160 TABLET ORAL at 08:12

## 2020-12-08 RX ADMIN — BUTALBITAL, ACETAMINOPHEN AND CAFFEINE 1 TABLET: 50; 325; 40 TABLET ORAL at 10:12

## 2020-12-08 RX ADMIN — HEPARIN SODIUM 5000 UNITS: 5000 INJECTION INTRAVENOUS; SUBCUTANEOUS at 09:12

## 2020-12-08 RX ADMIN — FERROUS SULFATE TAB EC 325 MG (65 MG FE EQUIVALENT) 325 MG: 325 (65 FE) TABLET DELAYED RESPONSE at 08:12

## 2020-12-08 RX ADMIN — BUSPIRONE HYDROCHLORIDE 7.5 MG: 5 TABLET ORAL at 08:12

## 2020-12-08 RX ADMIN — ISOSORBIDE MONONITRATE 30 MG: 30 TABLET, EXTENDED RELEASE ORAL at 08:12

## 2020-12-08 RX ADMIN — LACTOBACILLUS TAB 4 TABLET: TAB at 08:12

## 2020-12-08 NOTE — ASSESSMENT & PLAN NOTE
Will send FTA, infectious etiology will include meningitis but her clinical features are not suggestive of meningitis .  Will follow LP studies.  Neurology studies.

## 2020-12-08 NOTE — OP NOTE
[facility]   General Surgery  Operative Note    SUMMARY     Date of Procedure: 12/8/2020     Procedure: Procedure(s) (LRB):  ECHOCARDIOGRAM,TRANSESOPHAGEAL (N/A)       Surgeon(s) and Role:     * Guanakito Hopkins MD - Primary    Assisting Surgeon: None    Pre-Operative Diagnosis: * No pre-op diagnosis entered *    Post-Operative Diagnosis: Post-Op Diagnosis Codes:     * NSTEMI (non-ST elevated myocardial infarction) [I21.4]    Anesthesia: Monitor Anesthesia Care      Procedure Description: The risks, benefits, and alternatives of the procedure expalined in detail with patient and family. The patient voices understanding and all questions have been addressed. The patient agrees to proceed as planned.   The patient was sedated by anesthesiology service. The probe was advanced via throat into esophagus smoothly. The patient tolerated the procedure well and there was no complications. The probed was withdrawal at the end of study. The patient was hemodynamically stable.   Estimated Blood Loss: none.   Findings / Operative Note:   No intracardiac thrombus visualized. No Right-to-Left shunt per color doppler and bubble study  Normal EF  The cardiology service was notified.     Ok to transfer to the floor once hemodynamically stable.        Complications: No    Estimated Blood Loss (EBL): * No values recorded between 12/8/2020 12:00 AM and 12/8/2020  5:46 PM *           Implants: * No implants in log *    Specimens:   Specimen (12h ago, onward)    None                  Condition: Good    Disposition: PACU - hemodynamically stable.    Attestation: I was present and scrubbed for the entire procedure.

## 2020-12-08 NOTE — ANESTHESIA PREPROCEDURE EVALUATION
12/08/2020  Judy Ruelas is a 55 y.o., female with past medical history of nonobstructive coronary disease, HTN, HLP morbid obesity, possible underlying sleep apnea, and hypertension and hyperlipidemia presented to the ED with complaints of AMS/confusion. Noted to have significantly elevated BP on arrival 219/115. Is followed by Dr. Tahmina Pro at [facility].   CT head in the emergency room did not show any acute intracranial events  EKG shows no ischemic changes.   She had a heart catheterization in 2017 showing patent arteries with the exception of a 50% left circumflex lesion.   Had repeat stress test in August 2020 that showed no perfusion defects (care everywhere)   Chest pain reproducible with palpation of chest wall    Anesthesia Evaluation    I have reviewed the Patient Summary Reports.    I have reviewed the Nursing Notes. I have reviewed the NPO Status.   I have reviewed the Medications.     Review of Systems  Hematology/Oncology:  Hematology Normal        Cardiovascular:   Hypertension Past MI (NSTEMI) CAD   hyperlipidemia    Pulmonary:   Sleep Apnea    Renal/:  Renal/ Normal     Hepatic/GI:  Hepatic/GI Normal    Musculoskeletal:  Musculoskeletal Normal    Neurological:   CVA (short term memory loss), residual symptoms Headaches Admitted for amnesia and headache.  MRI has shown multiple, small, bilateral infarcts in basal ganglia, thalami suggestive of arteriolar disease or multiple small emboli.    Psych:   Psychiatric History depression          Physical Exam  General:  Morbid Obesity       Chest/Lungs:  Chest/Lungs Findings: Normal Respiratory Rate     Heart/Vascular:  Heart Findings: Rate: Normal  Rhythm: Regular Rhythm  Sounds: Normal        Mental Status:  Mental Status Findings:  Cooperative         Anesthesia Plan  Type of Anesthesia, risks & benefits discussed:  Anesthesia Type:   MAC  Patient's Preference:   Intra-op Monitoring Plan: standard ASA monitors  Intra-op Monitoring Plan Comments:   Post Op Pain Control Plan: per primary service following discharge from PACU  Post Op Pain Control Plan Comments:   Induction:   IV  Beta Blocker:  Patient is on a Beta-Blocker and has received one dose within the past 24 hours (No further documentation required).       Informed Consent: Patient understands risks and agrees with Anesthesia plan.  Questions answered. Anesthesia consent signed with patient.  ASA Score: 3     Day of Surgery Review of History & Physical:  There are no significant changes.  H&P update referred to the surgeon.         Ready For Surgery From Anesthesia Perspective.     Vitals:    12/08/20 1200   BP: (!) 167/96   Pulse:    Resp:    Temp:

## 2020-12-08 NOTE — PROGRESS NOTES
Waiting on TTE.  Note the markedly elevated thyroid peroxidase antibodies and relatively low B12.  Will need further check on thyroid function and MMA to confirm the low B12 level.  Complement is normal.  Other labs pending.    The patient is unchanged neurologically--unable to recall my visit yesterday, unable to recall breakfast items or events leading to her hospitalization.  No focal or lateralizing motor deficits.    Have ordered appropriate follow up labs.  Will continue to follow.

## 2020-12-08 NOTE — SUBJECTIVE & OBJECTIVE
Interval History: Patient was up and moving around, states she is not having any issues moving around. Still complains of memory loss. States she also has a headache.     Review of Systems   Constitutional: Positive for activity change. Negative for appetite change, chills, diaphoresis, fatigue, fever and unexpected weight change.   HENT: Negative for congestion, drooling, ear pain, facial swelling, rhinorrhea, sinus pressure, sneezing and sore throat.    Eyes: Negative for pain, discharge, redness, itching and visual disturbance.   Respiratory: Negative for apnea, cough, choking, chest tightness, shortness of breath, wheezing and stridor.    Cardiovascular: Negative for chest pain, palpitations and leg swelling.   Gastrointestinal: Negative for abdominal distention, abdominal pain, anal bleeding, blood in stool, constipation, diarrhea, nausea and vomiting.   Endocrine: Negative for polydipsia and polyphagia.   Genitourinary: Negative for decreased urine volume, difficulty urinating, dysuria, frequency, genital sores, hematuria, pelvic pain, urgency, vaginal bleeding and vaginal discharge.   Musculoskeletal: Negative for arthralgias, back pain, gait problem, joint swelling, myalgias, neck pain and neck stiffness.   Skin: Negative for color change, pallor, rash and wound.   Neurological: Positive for headaches. Negative for dizziness, seizures, facial asymmetry, speech difficulty, weakness, light-headedness and numbness.        Memory loss   Hematological: Negative for adenopathy.   Psychiatric/Behavioral: Negative for agitation, confusion, decreased concentration, hallucinations and suicidal ideas.   All other systems reviewed and are negative.    Objective:     Vital Signs (Most Recent):  Temp: 97.6 °F (36.4 °C) (12/08/20 1156)  Pulse: 68 (12/08/20 1156)  Resp: 18 (12/08/20 1156)  BP: (!) 167/96 (12/08/20 1200)  SpO2: 95 % (12/08/20 1156) Vital Signs (24h Range):  Temp:  [97.6 °F (36.4 °C)-98.1 °F (36.7 °C)] 97.6  °F (36.4 °C)  Pulse:  [62-78] 68  Resp:  [18-20] 18  SpO2:  [93 %-97 %] 95 %  BP: (144-187)/(67-96) 167/96     Weight: 118.4 kg (261 lb)  Body mass index is 43.43 kg/m².    Intake/Output Summary (Last 24 hours) at 12/8/2020 1522  Last data filed at 12/8/2020 0628  Gross per 24 hour   Intake 0 ml   Output --   Net 0 ml      Physical Exam  Vitals signs and nursing note reviewed.   Constitutional:       Appearance: She is well-developed.   HENT:      Head: Normocephalic and atraumatic.      Mouth/Throat:      Comments: dental caries  Eyes:      Conjunctiva/sclera: Conjunctivae normal.      Pupils: Pupils are equal, round, and reactive to light.   Neck:      Musculoskeletal: Normal range of motion and neck supple.      Thyroid: No thyroid mass or thyromegaly.   Cardiovascular:      Rate and Rhythm: Normal rate and regular rhythm.      Heart sounds: Normal heart sounds. No murmur.   Pulmonary:      Effort: Pulmonary effort is normal. No accessory muscle usage or respiratory distress.      Breath sounds: Normal breath sounds.   Abdominal:      General: Bowel sounds are normal. There is no distension.      Palpations: Abdomen is soft. There is no mass.      Tenderness: There is no abdominal tenderness.   Musculoskeletal: Normal range of motion.         General: No tenderness or deformity.   Skin:     General: Skin is warm and dry.      Findings: No erythema or rash.   Neurological:      Mental Status: She is alert and oriented to person, place, and time. Mental status is at baseline.      Deep Tendon Reflexes: Reflexes are normal and symmetric.   Psychiatric:         Behavior: Behavior normal.       Significant Labs:   CBC:   Recent Labs   Lab 12/07/20  0806 12/08/20  0710   WBC 5.32 5.39   HGB 10.7* 10.7*   HCT 35.9* 35.0*    268     CMP:   Recent Labs   Lab 12/07/20  0808      K 4.0      CO2 25      BUN 21*   CREATININE 0.8   CALCIUM 8.7   PROT 7.3   ALBUMIN 3.3*   BILITOT 0.4   ALKPHOS 112    AST 22   ALT 31   ANIONGAP 4*   EGFRNONAA >60     TSH:   Recent Labs   Lab 12/08/20  1210   TSH 2.858     ESR (12/08/20): 55  CRP (12/08/20): 10.2    All pertinent labs within the past 24 hours have been reviewed.    Significant Imaging: I have reviewed all pertinent imaging results/findings within the past 24 hours.     CT Head without Contrast (12/06/20):  Impression:     Temporal progression of known infarcts, similar in size allowing for differences in modality.  No significant hemorrhage or mass effect.   No new infarct.      CTA Head and Neck (12/05/20):  Impression:     No large vessel occlusion, significant stenosis, or aneurysm.      MRI Brain without Contrast (12/05/20):  Impression:     Small right basal ganglia lacunar infarcts and bilateral thalamic small infarcts.  No associated hemorrhage or mass effect.

## 2020-12-08 NOTE — HPI
55-year-old female with past medical history of nonobstructive coronary disease, morbid obesity, possible underlying sleep apnea, and hypertension and hyperlipidemia due to amnesia .  She complains that she is unable to remember things from 2 days back and even things  from a week back too.    CT head in the emergency room did not show any acute intracranial events.  MRI of brain -Small right basal ganglia lacunar infarcts and bilateral thalamic small infarcts. No associated hemorrhage or mass effect.   She denies fever or chills.   TTE did not show any vegetation  .

## 2020-12-08 NOTE — PROGRESS NOTES
[facility] Hospital Medicine  Progress Note    Patient Name: Judy Ruelas  MRN: [id number]  Patient Class: IP- Inpatient   Admission Date: 12/4/2020  Length of Stay: 2 days  Attending Physician: SONIA Hammonds MD  Primary Care Provider: [facility]        Subjective:     Principal Problem:Anterograde amnesia        HPI:  55-year-old female with past medical history of nonobstructive coronary disease, morbid obesity, possible underlying sleep apnea, and hypertension and hyperlipidemia presented to the hospital due to ME sure.  She complains that she is unable to remember things from 2 days back and even things  from a week back too.  Denies having any weakness on 1 side of the body.  CT head in the emergency room did not show any acute intracranial events.  Tele neurology was consulted with the ER recommended MRI of the head.  Her pressures were significantly elevated in the emergency room but denies having any headaches, chest pain, palpitations.  When I asked her about sleep apnea she could not tell me anything about it but she does have diagnosis from her cardiologist.     Overview/Hospital Course:  12/5 NAD. Continues to c/o amnesia. Denies sig life stressor, changes in meds. Denies smoking, etoh and illicit drug use. Denies metal implants. Plans for MRI. Reports anxiety and clautrophobia.  On buspar and venlafaxine. If stable findings, ok to d/c home with f/u pcp/neuro for further management. Benzo may complicate existing memory concerns. Monitor   12/6 continues to c/o n/v, visual disturbance and HA despite meds. Unable to recall events one week ago. Undergoing swallow eval with no difficulties. Did have significant dental extractions and unable to recall if abx rx  12/7 up and bathing. Denies HA. N/v improving. Continues to c/o memory loss. Reports urine odor and dysuria. Frequency is chronic      12/08/20: No acute issues overnight. LY is pending. Will plan  for discharge home in AM pending LY report.      Interval History: Patient was up and moving around, states she is not having any issues moving around. Still complains of memory loss. States she also has a headache.     Review of Systems   Constitutional: Positive for activity change. Negative for appetite change, chills, diaphoresis, fatigue, fever and unexpected weight change.   HENT: Negative for congestion, drooling, ear pain, facial swelling, rhinorrhea, sinus pressure, sneezing and sore throat.    Eyes: Negative for pain, discharge, redness, itching and visual disturbance.   Respiratory: Negative for apnea, cough, choking, chest tightness, shortness of breath, wheezing and stridor.    Cardiovascular: Negative for chest pain, palpitations and leg swelling.   Gastrointestinal: Negative for abdominal distention, abdominal pain, anal bleeding, blood in stool, constipation, diarrhea, nausea and vomiting.   Endocrine: Negative for polydipsia and polyphagia.   Genitourinary: Negative for decreased urine volume, difficulty urinating, dysuria, frequency, genital sores, hematuria, pelvic pain, urgency, vaginal bleeding and vaginal discharge.   Musculoskeletal: Negative for arthralgias, back pain, gait problem, joint swelling, myalgias, neck pain and neck stiffness.   Skin: Negative for color change, pallor, rash and wound.   Neurological: Positive for headaches. Negative for dizziness, seizures, facial asymmetry, speech difficulty, weakness, light-headedness and numbness.        Memory loss   Hematological: Negative for adenopathy.   Psychiatric/Behavioral: Negative for agitation, confusion, decreased concentration, hallucinations and suicidal ideas.   All other systems reviewed and are negative.    Objective:     Vital Signs (Most Recent):  Temp: 97.6 °F (36.4 °C) (12/08/20 1156)  Pulse: 68 (12/08/20 1156)  Resp: 18 (12/08/20 1156)  BP: (!) 167/96 (12/08/20 1200)  SpO2: 95 % (12/08/20 1156) Vital Signs (24h  Range):  Temp:  [97.6 °F (36.4 °C)-98.1 °F (36.7 °C)] 97.6 °F (36.4 °C)  Pulse:  [62-78] 68  Resp:  [18-20] 18  SpO2:  [93 %-97 %] 95 %  BP: (144-187)/(67-96) 167/96     Weight: 118.4 kg (261 lb)  Body mass index is 43.43 kg/m².    Intake/Output Summary (Last 24 hours) at 12/8/2020 1522  Last data filed at 12/8/2020 0628  Gross per 24 hour   Intake 0 ml   Output --   Net 0 ml      Physical Exam  Vitals signs and nursing note reviewed.   Constitutional:       Appearance: She is well-developed.   HENT:      Head: Normocephalic and atraumatic.      Mouth/Throat:      Comments: dental caries  Eyes:      Conjunctiva/sclera: Conjunctivae normal.      Pupils: Pupils are equal, round, and reactive to light.   Neck:      Musculoskeletal: Normal range of motion and neck supple.      Thyroid: No thyroid mass or thyromegaly.   Cardiovascular:      Rate and Rhythm: Normal rate and regular rhythm.      Heart sounds: Normal heart sounds. No murmur.   Pulmonary:      Effort: Pulmonary effort is normal. No accessory muscle usage or respiratory distress.      Breath sounds: Normal breath sounds.   Abdominal:      General: Bowel sounds are normal. There is no distension.      Palpations: Abdomen is soft. There is no mass.      Tenderness: There is no abdominal tenderness.   Musculoskeletal: Normal range of motion.         General: No tenderness or deformity.   Skin:     General: Skin is warm and dry.      Findings: No erythema or rash.   Neurological:      Mental Status: She is alert and oriented to person, place, and time. Mental status is at baseline.      Deep Tendon Reflexes: Reflexes are normal and symmetric.   Psychiatric:         Behavior: Behavior normal.       Significant Labs:   CBC:   Recent Labs   Lab 12/07/20  0806 12/08/20  0710   WBC 5.32 5.39   HGB 10.7* 10.7*   HCT 35.9* 35.0*    268     CMP:   Recent Labs   Lab 12/07/20  0808      K 4.0      CO2 25      BUN 21*   CREATININE 0.8   CALCIUM 8.7    PROT 7.3   ALBUMIN 3.3*   BILITOT 0.4   ALKPHOS 112   AST 22   ALT 31   ANIONGAP 4*   EGFRNONAA >60     TSH:   Recent Labs   Lab 12/08/20  1210   TSH 2.858     ESR (12/08/20): 55  CRP (12/08/20): 10.2    All pertinent labs within the past 24 hours have been reviewed.    Significant Imaging: I have reviewed all pertinent imaging results/findings within the past 24 hours.     CT Head without Contrast (12/06/20):  Impression:     Temporal progression of known infarcts, similar in size allowing for differences in modality.  No significant hemorrhage or mass effect.   No new infarct.      CTA Head and Neck (12/05/20):  Impression:     No large vessel occlusion, significant stenosis, or aneurysm.      MRI Brain without Contrast (12/05/20):  Impression:     Small right basal ganglia lacunar infarcts and bilateral thalamic small infarcts.  No associated hemorrhage or mass effect.            Assessment/Plan:      * Anterograde amnesia  TSH within normal limits  Consult neurology in a.m..  Will check   vitamin B12, RPR for syphilis  Also will order MRI of head without contrast  12/6   Sx persist  Initial workup revealed acute small infarcts on mri, started on statin, asa, and plavix  CTA head and neck with no significant occlusion or stenosis  H/o significant dental work  Neuro consulted  Urine drug screen neg  Tsh, rpr, hiv, hba1c, folate wnl  Slightly elevated thyroid peroxidase ab  ID consulted for possible IE or vasculitis, mildly elevated crp and esr   12/7  Sx persist, no improvement  Neuro work up underway  ID consult placed  Awaiting recs, concerns for IE  Reports significant dental work in recent past  12/8/20 Awaiting LY per Cardiology. Plan for discharge home tomorrow.     CVA (cerebral vascular accident)        Dental caries        Nausea and vomiting  scopolamine patch prn  12/7  improving      Hypotension  Sx with visual disturbance  Discontinue all antihtn meds  Repeat q2h  Place in trendelburg position  And  one time dose midodrine  12/7  resolved      Obstructive sleep apnea syndrome    Suspect obstructive sleep apnea with her body habitus  Recommend during sleep study as outpatient.  Will  keep her on CPAP 5 cm of water  12/6  Continue cpap qhs  12/7  Noted on exam, apnea and snoring  Continue cpap    Acute cystitis with hematuria  12/7  Sx include dysuria and odor  No growth on cx   Start bactrim  Monitor response  Allergy to pcn      Essential hypertension    Hypertensive urgency on presentation.  She is on multiple blood pressure medications at home  Will change her atenolol to Coreg 25 mg b.i.d. and add hydralazine 50 mg Q 8 to her current regimen.  12/6  Hypotensive after 3 antihtn  Repeat low  Placed in trendelenburg position  Mri with acute ischemia  Allow permissive htn  Discontinued antihtn   12/7  Hypotension resolving  Now elevated  Monitor  Resume antihtn based on trend  Avoid adding all three at once        VTE Risk Mitigation (From admission, onward)         Ordered     heparin (porcine) injection 5,000 Units  Every 8 hours      12/05/20 0317     IP VTE HIGH RISK PATIENT  Once      12/05/20 0431     Place sequential compression device  Until discontinued      12/05/20 0431     Place sequential compression device  Until discontinued      12/05/20 0317                Discharge Planning   JERE:      Code Status: Full Code   Is the patient medically ready for discharge?:     Reason for patient still in hospital (select all that apply): Patient new problem, Patient trending condition, Treatment and Consult recommendations  Discharge Plan A: Home                  Mello Luciano NP  Department of Hospital Medicine   [facility]

## 2020-12-08 NOTE — SUBJECTIVE & OBJECTIVE
Past Medical History:   Diagnosis Date    Anxiety     Depression     Depression with anxiety     Family history of heart disease 2016    Hypertension     Irregular heart beat     Migraine headache     Morbid obesity with BMI of 40.0-44.9, adult 2017    NSTEMI (non-ST elevated myocardial infarction) 2017       Past Surgical History:   Procedure Laterality Date    APPENDECTOMY       SECTION      HYSTERECTOMY         Review of patient's allergies indicates:   Allergen Reactions    Pcn [penicillins] Swelling    Codeine     Latex, natural rubber     Tylox [oxycodone-acetaminophen]        Medications:  Medications Prior to Admission   Medication Sig    atenolol (TENORMIN) 50 MG tablet Take 50 mg by mouth once daily.    busPIRone (BUSPAR) 7.5 MG tablet Take 7.5 mg by mouth 2 (two) times daily.     amlodipine (NORVASC) 5 MG tablet Take 2 tablets (10 mg total) by mouth once daily.    aspirin 81 MG Chew Take 1 tablet (81 mg total) by mouth once daily.    atorvastatin (LIPITOR) 40 MG tablet Take 1 tablet (40 mg total) by mouth once daily.    butalbital-acetaminophen-caffeine -40 mg (FIORICET, ESGIC) -40 mg per tablet Take 1 tablet by mouth every 6 (six) hours as needed for Pain or Headaches.    clopidogrel (PLAVIX) 75 mg tablet Take 1 tablet (75 mg total) by mouth once daily.    colchicine 0.6 mg tablet Take 1 tablet (0.6 mg total) by mouth once daily.    ferrous sulfate 324 mg (65 mg iron) TbEC Take 325 mg by mouth once daily.    fluoxetine (PROZAC) 20 MG capsule Take 20 mg by mouth once daily.    hydrocodone-acetaminophen 7.5-325mg (NORCO) 7.5-325 mg per tablet Take 1 tablet by mouth every 6 (six) hours as needed for Pain.    hydrOXYzine (VISTARIL) 50 MG Cap Take 50 mg by mouth 4 (four) times daily.    isosorbide mononitrate (IMDUR) 30 MG 24 hr tablet Take 1 tablet (30 mg total) by mouth once daily.    losartan (COZAAR) 50 MG tablet Take 1 tablet (50 mg total)  by mouth once daily.    nitroGLYCERIN (NITROSTAT) 0.4 MG SL tablet Place 1 tablet (0.4 mg total) under the tongue every 5 (five) minutes as needed for Chest pain.    omeprazole (PRILOSEC) 40 MG capsule Take 1 capsule (40 mg total) by mouth once daily.    ondansetron (ZOFRAN) 4 MG tablet Take 8 mg by mouth 2 (two) times daily.    promethazine (PHENERGAN) 25 MG tablet Take 1 tablet (25 mg total) by mouth every 6 (six) hours as needed for Nausea.    trazodone (DESYREL) 50 MG tablet Take 50 mg by mouth every evening.    venlafaxine (EFFEXOR-XR) 75 MG 24 hr capsule Take 75 mg by mouth once daily.     Antibiotics (From admission, onward)    Start     Stop Route Frequency Ordered    12/07/20 1245  sulfamethoxazole-trimethoprim 800-160mg per tablet 1 tablet      12/09 2059 Oral 2 times daily 12/07/20 1144        Antifungals (From admission, onward)    None        Antivirals (From admission, onward)    None           There is no immunization history for the selected administration types on file for this patient.    Family History     Problem Relation (Age of Onset)    Diabetes Mother    Heart attack Mother (40), Brother (40)    Heart disease Father (70)    Hypertension Father, Mother    Stroke Mother        Social History     Socioeconomic History    Marital status:      Spouse name: Not on file    Number of children: Not on file    Years of education: Not on file    Highest education level: Not on file   Occupational History    Not on file   Social Needs    Financial resource strain: Not on file    Food insecurity     Worry: Not on file     Inability: Not on file    Transportation needs     Medical: Not on file     Non-medical: Not on file   Tobacco Use    Smoking status: Never Smoker    Smokeless tobacco: Never Used   Substance and Sexual Activity    Alcohol use: No     Alcohol/week: 0.0 standard drinks     Comment: denies    Drug use: No     Comment: denies    Sexual activity: Not Currently    Lifestyle    Physical activity     Days per week: Not on file     Minutes per session: Not on file    Stress: Not on file   Relationships    Social connections     Talks on phone: Not on file     Gets together: Not on file     Attends Holiness service: Not on file     Active member of club or organization: Not on file     Attends meetings of clubs or organizations: Not on file     Relationship status: Not on file   Other Topics Concern    Not on file   Social History Narrative    Not on file     Review of Systems   Constitutional: Positive for activity change. Negative for chills and fatigue.   HENT: Negative for congestion, ear pain, facial swelling, sinus pressure and sore throat.    Eyes: Negative for pain.   Respiratory: Negative for apnea, chest tightness, shortness of breath and stridor.    Cardiovascular: Negative for chest pain, palpitations and leg swelling.   Gastrointestinal: Negative for abdominal distention, abdominal pain, diarrhea and nausea.   Endocrine: Negative for polydipsia and polyphagia.   Genitourinary: Negative for decreased urine volume, difficulty urinating, frequency and genital sores.   Musculoskeletal: Negative for arthralgias and gait problem.   Neurological: Negative for light-headedness and headaches.        Memory loss   Hematological: Negative for adenopathy.   Psychiatric/Behavioral: Negative for agitation, confusion and decreased concentration.     Objective:     Vital Signs (Most Recent):  Temp: 98.1 °F (36.7 °C) (12/08/20 0333)  Pulse: 68 (12/08/20 0555)  Resp: 20 (12/08/20 0333)  BP: (!) 144/70 (12/08/20 0333)  SpO2: (!) 94 % (12/08/20 0333) Vital Signs (24h Range):  Temp:  [97.8 °F (36.6 °C)-98.4 °F (36.9 °C)] 98.1 °F (36.7 °C)  Pulse:  [62-78] 68  Resp:  [18-20] 20  SpO2:  [93 %-96 %] 94 %  BP: (136-171)/(67-81) 144/70     Weight: 118.4 kg (261 lb)  Body mass index is 43.43 kg/m².    Estimated Creatinine Clearance: 101.2 mL/min (based on SCr of 0.8 mg/dL).    Physical  Exam  Vitals signs and nursing note reviewed.   Constitutional:       Appearance: She is well-developed.   HENT:      Head: Normocephalic and atraumatic.      Mouth/Throat:      Comments: dental caries  Eyes:      Conjunctiva/sclera: Conjunctivae normal.      Pupils: Pupils are equal, round, and reactive to light.   Neck:      Musculoskeletal: Normal range of motion and neck supple.      Thyroid: No thyroid mass or thyromegaly.   Cardiovascular:      Rate and Rhythm: Normal rate.      Heart sounds: Normal heart sounds.   Pulmonary:      Effort: Pulmonary effort is normal. No accessory muscle usage or respiratory distress.      Breath sounds: Normal breath sounds.   Abdominal:      General: Bowel sounds are normal.      Palpations: Abdomen is soft. There is no mass.      Tenderness: There is no abdominal tenderness.   Musculoskeletal: Normal range of motion.   Skin:     Findings: No rash.   Neurological:      Mental Status: She is alert. Mental status is at baseline.         Significant Labs:   BMP:   Recent Labs   Lab 12/06/20  0712 12/07/20  0808   GLU 99 108    139   K 4.2 4.0    110   CO2 24 25   BUN 18 21*   CREATININE 0.8 0.8   CALCIUM 9.3 8.7   MG 2.1  --      CBC:   Recent Labs   Lab 12/06/20  0712 12/07/20  0806   WBC 6.80 5.32   HGB 11.5* 10.7*   HCT 38.3 35.9*    292     CMP:   Recent Labs   Lab 12/06/20  0712 12/07/20  0808    139   K 4.2 4.0    110   CO2 24 25   GLU 99 108   BUN 18 21*   CREATININE 0.8 0.8   CALCIUM 9.3 8.7   PROT 7.9 7.3   ALBUMIN 3.6 3.3*   BILITOT 0.4 0.4   ALKPHOS 131 112   AST 26 22   ALT 32 31   ANIONGAP 11 4*   EGFRNONAA >60 >60     All pertinent labs within the past 24 hours have been reviewed.    Significant Imaging: I have reviewed all pertinent imaging results/findings within the past 24 hours.

## 2020-12-08 NOTE — ASSESSMENT & PLAN NOTE
TSH within normal limits  Consult neurology in a.m..  Will check   vitamin B12, RPR for syphilis  Also will order MRI of head without contrast  12/6   Sx persist  Initial workup revealed acute small infarcts on mri, started on statin, asa, and plavix  CTA head and neck with no significant occlusion or stenosis  H/o significant dental work  Neuro consulted  Urine drug screen neg  Tsh, rpr, hiv, hba1c, folate wnl  Slightly elevated thyroid peroxidase ab  ID consulted for possible IE or vasculitis, mildly elevated crp and esr   12/7  Sx persist, no improvement  Neuro work up underway  ID consult placed  Awaiting recs, concerns for IE  Reports significant dental work in recent past  12/8/20 Awaiting LY per Cardiology. Plan for discharge home tomorrow.

## 2020-12-08 NOTE — PT/OT/SLP EVAL
Speech Language Pathology Evaluation  Cognitive Communication    Patient Name:  Judy Ruelas   MRN:  [id number]  Admitting Diagnosis: Anterograde amnesia    Recommendations:     Recommendations:                General Recommendations:  Cognitive-linguistic therapy  Diet recommendations:  Regular, Thin   Aspiration Precautions: HOB to 90 degrees and Remain upright 30 minutes post meal   General Precautions: Standard, fall    History:     Past Medical History:   Diagnosis Date    Anxiety     Depression     Depression with anxiety     Family history of heart disease 2016    Hypertension     Irregular heart beat     Migraine headache     Morbid obesity with BMI of 40.0-44.9, adult 2017    NSTEMI (non-ST elevated myocardial infarction) 2017       Past Surgical History:   Procedure Laterality Date    APPENDECTOMY       SECTION      HYSTERECTOMY         Subjective     Pt was seen sitting in a chair at bedside with no family present.  She is awake, alert, and cooperative.    Patient goals: none stated    Pain/Comfort:  · Pain Rating 1: 0/10    Objective:   Cognitive Status:    Cognitive skills fluctuated throughout eval.  Pt at one one point in eval reported she could not state her name nor could she ID her name in a field of 3, however she was able to explain the birth order of her children.. She reported not being able to remember anything or being able to complete simple sequencing tasks.  She was able to participate in a conversation and reveal details about events of her past and her current admission plan.       Receptive Language:   Comprehension:      WFL    Pragmatics:    possible attention seaking behaviors    Expressive Language:  Verbal:    No word finding difficulties       Motor Speech:  WFL    Voice:   WFL    Visual-Spatial:  WFL      Assessment:   Judy Ruelas is a 55 y.o. female with an SLP diagnosis of Cognitive-Linguistic Impairment.  She presents with fluctuating  cognitive abilities with her portraying significant to severe memory deficits.  She will benefit from ST for further testing of cognition and cognitive therapy.     Goals:   Multidisciplinary Problems     SLP Goals        Problem: SLP Goal    Goal Priority Disciplines Outcome   SLP Goal     SLP Ongoing, Progressing   Description: Patient will complete speech, language, and cognitive bedside evaluation to determine current level of funtioning                     Plan:   · Patient to be seen:  3 x/week   · Plan of Care expires:   1 week  · Plan of Care reviewed with:  patient   · SLP Follow-Up:  Yes         Time Tracking:   SLP Treatment Date:   12/08/20  Speech Start Time:  1030  Speech Stop Time:  1050     Speech Total Time (min):  20 min    Billable Minutes: Eval 20     Maribel Marquez CCC-SLP  12/08/2020

## 2020-12-08 NOTE — PLAN OF CARE
Spoke to pt's sister Hanny who will be her transportation if DC today.  Pt lives with her 2 adult children and grand child.  He son Kam has a mild developmental delay per Hanny, but is able to assist pt at home once DC.  Attending updated that Hanny would like to review DC with provider.  Awaiting LY results.

## 2020-12-08 NOTE — PLAN OF CARE
ST completed a speech and language evaluation with pt exhibiting fluctuating cognitive deficits.  Her swallowing skills are WFL.

## 2020-12-08 NOTE — ASSESSMENT & PLAN NOTE
Will follow LY.  Brain MRI  showed   Small right basal ganglia lacunar infarcts and bilateral thalamic small infarcts. No associated hemorrhage or mass effect.   Neurology follow up

## 2020-12-08 NOTE — PLAN OF CARE
Pt awakened after LY; remains AAOx3 at time of transfer.  Transferred back to 540, via hospital bed in no apparent distress. Report given to NOHEMI Meza.

## 2020-12-08 NOTE — ANESTHESIA POSTPROCEDURE EVALUATION
Anesthesia Post Evaluation    Patient: Judy Ruelas    Procedure(s) Performed: Procedure(s) (LRB):  ECHOCARDIOGRAM,TRANSESOPHAGEAL (N/A)    Final Anesthesia Type: MAC    Patient location: [facility].  Patient participation: Yes- Able to Participate  Level of consciousness: awake and alert  Post-procedure vital signs: reviewed and stable  Pain management: adequate  Airway patency: patent    PONV status at discharge: No PONV  Anesthetic complications: no      Cardiovascular status: blood pressure returned to baseline  Respiratory status: unassisted and spontaneous ventilation  Hydration status: euvolemic  Follow-up not needed.          Vitals Value Taken Time   /96 12/08/20 1200   Temp 36.4 °C (97.6 °F) 12/08/20 1156   Pulse 68 12/08/20 1156   Resp 18 12/08/20 1156   SpO2 95 % 12/08/20 1156         No case tracking events are documented in the log.      Pain/Phan Score: Pain Rating Prior to Med Admin: 3 (12/8/2020 10:58 AM)  Pain Rating Post Med Admin: 3 (12/7/2020  9:49 PM)

## 2020-12-08 NOTE — TRANSFER OF CARE
"Anesthesia Transfer of Care Note    Patient: Judy Ruelas    Procedure(s) Performed: Procedure(s) (LRB):  ECHOCARDIOGRAM,TRANSESOPHAGEAL (N/A)    Patient location: [facility].    Anesthesia Type: MAC    Transport from OR: Transported from OR on room air with adequate spontaneous ventilation    Post pain: adequate analgesia    Post assessment: no apparent anesthetic complications    Post vital signs: stable    Level of consciousness: awake, alert and oriented    Nausea/Vomiting: no nausea/vomiting    Complications: none    Transfer of care protocol was followed      Last vitals:   Visit Vitals  BP (!) 167/96   Pulse 68   Temp 36.4 °C (97.6 °F) (Oral)   Resp 18   Ht 5' 5" (1.651 m)   Wt 118.4 kg (261 lb)   SpO2 95%   BMI 43.43 kg/m²     "

## 2020-12-08 NOTE — INTERVAL H&P NOTE
The patient has been examined and the H&P has been reviewed:    I concur with the findings and no changes have occurred since H&P was written.    Anesthesia/Surgery risks, benefits and alternative options discussed and understood by patient/family.          Active Hospital Problems    Diagnosis  POA    *Anterograde amnesia [R41.1]  Yes    Dental caries [K02.9]  Yes    CVA (cerebral vascular accident) [I63.9]  Yes    Hypotension [I95.9]  No    Nausea and vomiting [R11.2]  Yes    Acute cystitis with hematuria [N30.01]  Yes    Obstructive sleep apnea syndrome [G47.33]  Yes    Essential hypertension [I10]  Yes      Resolved Hospital Problems    Diagnosis Date Resolved POA    Chest pain [R07.9] 12/07/2020 Yes

## 2020-12-08 NOTE — PLAN OF CARE
Free from falls and injuries this shift. Pain controlled. Up in chair. Confused at times. LY scheduled for today around 2pm. NPO. Will monitor. Chart check completed.

## 2020-12-08 NOTE — PLAN OF CARE
Problem: Adult Inpatient Plan of Care  Goal: Plan of Care Review  Outcome: Ongoing, Progressing  Flowsheets (Taken 12/8/2020 1930)  Plan of Care Reviewed With: patient  Pt had no adverse events during shift. Pt free from falls. Call light in reach. SR's x2. Pain well controlled w/ PRN meds. Pt AAO x 4, short term memory loss present. Pt repositions independently and with encouragement. IV saline locked as ordered. VTE prophylaxis - SCD's in use, ambulation encouraged, fluids promoted. NPO diet in use, pt tolerating well. Heart monitor in use (NSR. 80's - 90's). Other VSS. Chart reviewed. Will continue to monitor.

## 2020-12-08 NOTE — CONSULTS
[facility]  Infectious Disease  Consult Note    Patient Name: Judy Ruelas  MRN: [id number]  Admission Date: 12/4/2020  Hospital Length of Stay: 2 days  Attending Physician: SONIA Hammonds MD  Primary Care Provider: [facility]     Isolation Status: No active isolations    Patient information was obtained from patient, past medical records and ER records.      Consults  Assessment/Plan:     * Anterograde amnesia  Will send FTA, infectious etiology will include meningitis but her clinical features are not suggestive of meningitis .  Will follow LP studies.  Neurology studies.       CVA (cerebral vascular accident)  Will follow LY.  Brain MRI  showed   Small right basal ganglia lacunar infarcts and bilateral thalamic small infarcts. No associated hemorrhage or mass effect.   Neurology follow up     Dental caries  Will follow dentist on discharge     Obstructive sleep apnea syndrome  CPAP as tolerated    Acute cystitis with hematuria  Will complete therapy with Bactrim     Essential hypertension  BP control as per primary team         Thank you for your consult. I will follow-up with patient. Please contact us if you have any additional questions.    Maninder Archer MD  Infectious Disease  [facility]    Subjective:     Principal Problem: Anterograde amnesia    HPI: 55-year-old female with past medical history of nonobstructive coronary disease, morbid obesity, possible underlying sleep apnea, and hypertension and hyperlipidemia due to amnesia .  She complains that she is unable to remember things from 2 days back and even things  from a week back too.    CT head in the emergency room did not show any acute intracranial events.  MRI of brain -Small right basal ganglia lacunar infarcts and bilateral thalamic small infarcts. No associated hemorrhage or mass effect.   She denies fever or chills.   TTE did not show any vegetation  .    Past Medical History:    Diagnosis Date    Anxiety     Depression     Depression with anxiety     Family history of heart disease 2016    Hypertension     Irregular heart beat     Migraine headache     Morbid obesity with BMI of 40.0-44.9, adult 2017    NSTEMI (non-ST elevated myocardial infarction) 2017       Past Surgical History:   Procedure Laterality Date    APPENDECTOMY       SECTION      HYSTERECTOMY         Review of patient's allergies indicates:   Allergen Reactions    Pcn [penicillins] Swelling    Codeine     Latex, natural rubber     Tylox [oxycodone-acetaminophen]        Medications:  Medications Prior to Admission   Medication Sig    atenolol (TENORMIN) 50 MG tablet Take 50 mg by mouth once daily.    busPIRone (BUSPAR) 7.5 MG tablet Take 7.5 mg by mouth 2 (two) times daily.     amlodipine (NORVASC) 5 MG tablet Take 2 tablets (10 mg total) by mouth once daily.    aspirin 81 MG Chew Take 1 tablet (81 mg total) by mouth once daily.    atorvastatin (LIPITOR) 40 MG tablet Take 1 tablet (40 mg total) by mouth once daily.    butalbital-acetaminophen-caffeine -40 mg (FIORICET, ESGIC) -40 mg per tablet Take 1 tablet by mouth every 6 (six) hours as needed for Pain or Headaches.    clopidogrel (PLAVIX) 75 mg tablet Take 1 tablet (75 mg total) by mouth once daily.    colchicine 0.6 mg tablet Take 1 tablet (0.6 mg total) by mouth once daily.    ferrous sulfate 324 mg (65 mg iron) TbEC Take 325 mg by mouth once daily.    fluoxetine (PROZAC) 20 MG capsule Take 20 mg by mouth once daily.    hydrocodone-acetaminophen 7.5-325mg (NORCO) 7.5-325 mg per tablet Take 1 tablet by mouth every 6 (six) hours as needed for Pain.    hydrOXYzine (VISTARIL) 50 MG Cap Take 50 mg by mouth 4 (four) times daily.    isosorbide mononitrate (IMDUR) 30 MG 24 hr tablet Take 1 tablet (30 mg total) by mouth once daily.    losartan (COZAAR) 50 MG tablet Take 1 tablet (50 mg total) by mouth once daily.     nitroGLYCERIN (NITROSTAT) 0.4 MG SL tablet Place 1 tablet (0.4 mg total) under the tongue every 5 (five) minutes as needed for Chest pain.    omeprazole (PRILOSEC) 40 MG capsule Take 1 capsule (40 mg total) by mouth once daily.    ondansetron (ZOFRAN) 4 MG tablet Take 8 mg by mouth 2 (two) times daily.    promethazine (PHENERGAN) 25 MG tablet Take 1 tablet (25 mg total) by mouth every 6 (six) hours as needed for Nausea.    trazodone (DESYREL) 50 MG tablet Take 50 mg by mouth every evening.    venlafaxine (EFFEXOR-XR) 75 MG 24 hr capsule Take 75 mg by mouth once daily.     Antibiotics (From admission, onward)    Start     Stop Route Frequency Ordered    12/07/20 1245  sulfamethoxazole-trimethoprim 800-160mg per tablet 1 tablet      12/09 2059 Oral 2 times daily 12/07/20 1144        Antifungals (From admission, onward)    None        Antivirals (From admission, onward)    None           There is no immunization history for the selected administration types on file for this patient.    Family History     Problem Relation (Age of Onset)    Diabetes Mother    Heart attack Mother (40), Brother (40)    Heart disease Father (70)    Hypertension Father, Mother    Stroke Mother        Social History     Socioeconomic History    Marital status:      Spouse name: Not on file    Number of children: Not on file    Years of education: Not on file    Highest education level: Not on file   Occupational History    Not on file   Social Needs    Financial resource strain: Not on file    Food insecurity     Worry: Not on file     Inability: Not on file    Transportation needs     Medical: Not on file     Non-medical: Not on file   Tobacco Use    Smoking status: Never Smoker    Smokeless tobacco: Never Used   Substance and Sexual Activity    Alcohol use: No     Alcohol/week: 0.0 standard drinks     Comment: denies    Drug use: No     Comment: denies    Sexual activity: Not Currently   Lifestyle    Physical  activity     Days per week: Not on file     Minutes per session: Not on file    Stress: Not on file   Relationships    Social connections     Talks on phone: Not on file     Gets together: Not on file     Attends Hoahaoism service: Not on file     Active member of club or organization: Not on file     Attends meetings of clubs or organizations: Not on file     Relationship status: Not on file   Other Topics Concern    Not on file   Social History Narrative    Not on file     Review of Systems   Constitutional: Positive for activity change. Negative for chills and fatigue.   HENT: Negative for congestion, ear pain, facial swelling, sinus pressure and sore throat.    Eyes: Negative for pain.   Respiratory: Negative for apnea, chest tightness, shortness of breath and stridor.    Cardiovascular: Negative for chest pain, palpitations and leg swelling.   Gastrointestinal: Negative for abdominal distention, abdominal pain, diarrhea and nausea.   Endocrine: Negative for polydipsia and polyphagia.   Genitourinary: Negative for decreased urine volume, difficulty urinating, frequency and genital sores.   Musculoskeletal: Negative for arthralgias and gait problem.   Neurological: Negative for light-headedness and headaches.        Memory loss   Hematological: Negative for adenopathy.   Psychiatric/Behavioral: Negative for agitation, confusion and decreased concentration.     Objective:     Vital Signs (Most Recent):  Temp: 98.1 °F (36.7 °C) (12/08/20 0333)  Pulse: 68 (12/08/20 0555)  Resp: 20 (12/08/20 0333)  BP: (!) 144/70 (12/08/20 0333)  SpO2: (!) 94 % (12/08/20 0333) Vital Signs (24h Range):  Temp:  [97.8 °F (36.6 °C)-98.4 °F (36.9 °C)] 98.1 °F (36.7 °C)  Pulse:  [62-78] 68  Resp:  [18-20] 20  SpO2:  [93 %-96 %] 94 %  BP: (136-171)/(67-81) 144/70     Weight: 118.4 kg (261 lb)  Body mass index is 43.43 kg/m².    Estimated Creatinine Clearance: 101.2 mL/min (based on SCr of 0.8 mg/dL).    Physical Exam  Vitals signs and  nursing note reviewed.   Constitutional:       Appearance: She is well-developed.   HENT:      Head: Normocephalic and atraumatic.      Mouth/Throat:      Comments: dental caries  Eyes:      Conjunctiva/sclera: Conjunctivae normal.      Pupils: Pupils are equal, round, and reactive to light.   Neck:      Musculoskeletal: Normal range of motion and neck supple.      Thyroid: No thyroid mass or thyromegaly.   Cardiovascular:      Rate and Rhythm: Normal rate.      Heart sounds: Normal heart sounds.   Pulmonary:      Effort: Pulmonary effort is normal. No accessory muscle usage or respiratory distress.      Breath sounds: Normal breath sounds.   Abdominal:      General: Bowel sounds are normal.      Palpations: Abdomen is soft. There is no mass.      Tenderness: There is no abdominal tenderness.   Musculoskeletal: Normal range of motion.   Skin:     Findings: No rash.   Neurological:      Mental Status: She is alert. Mental status is at baseline.         Significant Labs:   BMP:   Recent Labs   Lab 12/06/20  0712 12/07/20  0808   GLU 99 108    139   K 4.2 4.0    110   CO2 24 25   BUN 18 21*   CREATININE 0.8 0.8   CALCIUM 9.3 8.7   MG 2.1  --      CBC:   Recent Labs   Lab 12/06/20  0712 12/07/20  0806   WBC 6.80 5.32   HGB 11.5* 10.7*   HCT 38.3 35.9*    292     CMP:   Recent Labs   Lab 12/06/20  0712 12/07/20  0808    139   K 4.2 4.0    110   CO2 24 25   GLU 99 108   BUN 18 21*   CREATININE 0.8 0.8   CALCIUM 9.3 8.7   PROT 7.9 7.3   ALBUMIN 3.6 3.3*   BILITOT 0.4 0.4   ALKPHOS 131 112   AST 26 22   ALT 32 31   ANIONGAP 11 4*   EGFRNONAA >60 >60     All pertinent labs within the past 24 hours have been reviewed.    Significant Imaging: I have reviewed all pertinent imaging results/findings within the past 24 hours.

## 2020-12-09 VITALS
HEART RATE: 67 BPM | OXYGEN SATURATION: 98 % | DIASTOLIC BLOOD PRESSURE: 68 MMHG | WEIGHT: 261 LBS | RESPIRATION RATE: 14 BRPM | SYSTOLIC BLOOD PRESSURE: 148 MMHG | HEIGHT: 65 IN | BODY MASS INDEX: 43.49 KG/M2 | TEMPERATURE: 98 F

## 2020-12-09 LAB
ALBUMIN SERPL BCP-MCNC: 3.7 G/DL (ref 3.5–5.2)
ALP SERPL-CCNC: 130 U/L (ref 55–135)
ALT SERPL W/O P-5'-P-CCNC: 32 U/L (ref 10–44)
ANION GAP SERPL CALC-SCNC: 13 MMOL/L (ref 8–16)
AST SERPL-CCNC: 26 U/L (ref 10–40)
B BURGDOR DNA BLD QL NAA+PROBE: NEGATIVE
B. GARINII/B. AFZELII PCR, BLOOD: NEGATIVE
B. MAYONII PCR, BLOOD: NEGATIVE
BASOPHILS # BLD AUTO: 0.04 K/UL (ref 0–0.2)
BASOPHILS NFR BLD: 0.6 % (ref 0–1.9)
BILIRUB SERPL-MCNC: 0.4 MG/DL (ref 0.1–1)
BSA FOR ECHO PROCEDURE: 2.33 M2
BUN SERPL-MCNC: 10 MG/DL (ref 6–20)
CALCIUM SERPL-MCNC: 8.9 MG/DL (ref 8.7–10.5)
CHLORIDE SERPL-SCNC: 104 MMOL/L (ref 95–110)
CO2 SERPL-SCNC: 23 MMOL/L (ref 23–29)
CREAT SERPL-MCNC: 0.7 MG/DL (ref 0.5–1.4)
DIFFERENTIAL METHOD: ABNORMAL
EOSINOPHIL # BLD AUTO: 0.3 K/UL (ref 0–0.5)
EOSINOPHIL NFR BLD: 4.4 % (ref 0–8)
ERYTHROCYTE [DISTWIDTH] IN BLOOD BY AUTOMATED COUNT: 14.8 % (ref 11.5–14.5)
ERYTHROCYTE [SEDIMENTATION RATE] IN BLOOD BY WESTERGREN METHOD: 51 MM/HR (ref 0–20)
EST. GFR  (AFRICAN AMERICAN): >60 ML/MIN/1.73 M^2
EST. GFR  (NON AFRICAN AMERICAN): >60 ML/MIN/1.73 M^2
GLUCOSE SERPL-MCNC: 93 MG/DL (ref 70–110)
HCT VFR BLD AUTO: 37 % (ref 37–48.5)
HGB BLD-MCNC: 11.6 G/DL (ref 12–16)
IMM GRANULOCYTES # BLD AUTO: 0.02 K/UL (ref 0–0.04)
IMM GRANULOCYTES NFR BLD AUTO: 0.3 % (ref 0–0.5)
LYMPHOCYTES # BLD AUTO: 1.2 K/UL (ref 1–4.8)
LYMPHOCYTES NFR BLD: 18.6 % (ref 18–48)
MCH RBC QN AUTO: 26.9 PG (ref 27–31)
MCHC RBC AUTO-ENTMCNC: 31.4 G/DL (ref 32–36)
MCV RBC AUTO: 86 FL (ref 82–98)
MICROBIOLOGIST REVIEW: NORMAL
MONOCYTES # BLD AUTO: 0.4 K/UL (ref 0.3–1)
MONOCYTES NFR BLD: 6.8 % (ref 4–15)
NEUTROPHILS # BLD AUTO: 4.3 K/UL (ref 1.8–7.7)
NEUTROPHILS NFR BLD: 69.3 % (ref 38–73)
NRBC BLD-RTO: 0 /100 WBC
PLATELET # BLD AUTO: 319 K/UL (ref 150–350)
PMV BLD AUTO: 10.7 FL (ref 9.2–12.9)
POTASSIUM SERPL-SCNC: 4.3 MMOL/L (ref 3.5–5.1)
PROT SERPL-MCNC: 7.9 G/DL (ref 6–8.4)
PROX AORTA: 3.6 CM
RBC # BLD AUTO: 4.32 M/UL (ref 4–5.4)
SINUS: 3.5 CM
SODIUM SERPL-SCNC: 140 MMOL/L (ref 136–145)
T PALLIDUM AB SER QL IF: NORMAL
T3 SERPL-MCNC: 93 NG/DL (ref 60–180)
T4 SERPL-MCNC: 9.3 UG/DL (ref 4.5–11.5)
WBC # BLD AUTO: 6.18 K/UL (ref 3.9–12.7)
ZINC SERPL-MCNC: 85 UG/DL (ref 60–130)

## 2020-12-09 PROCEDURE — 85025 COMPLETE CBC W/AUTO DIFF WBC: CPT

## 2020-12-09 PROCEDURE — 63600175 PHARM REV CODE 636 W HCPCS: Performed by: HOSPITALIST

## 2020-12-09 PROCEDURE — 25000003 PHARM REV CODE 250: Performed by: FAMILY MEDICINE

## 2020-12-09 PROCEDURE — 25000003 PHARM REV CODE 250: Performed by: HOSPITALIST

## 2020-12-09 PROCEDURE — 80053 COMPREHEN METABOLIC PANEL: CPT

## 2020-12-09 PROCEDURE — 92507 TX SP LANG VOICE COMM INDIV: CPT

## 2020-12-09 PROCEDURE — 85651 RBC SED RATE NONAUTOMATED: CPT

## 2020-12-09 RX ORDER — ATORVASTATIN CALCIUM 80 MG/1
80 TABLET, FILM COATED ORAL DAILY
Qty: 30 TABLET | Refills: 1 | Status: SHIPPED | OUTPATIENT
Start: 2020-12-09 | End: 2023-05-08

## 2020-12-09 RX ORDER — ATORVASTATIN CALCIUM 40 MG/1
80 TABLET, FILM COATED ORAL DAILY
Qty: 30 TABLET | Refills: 1 | Status: SHIPPED | OUTPATIENT
Start: 2020-12-09 | End: 2020-12-09

## 2020-12-09 RX ADMIN — FERROUS SULFATE TAB EC 325 MG (65 MG FE EQUIVALENT) 325 MG: 325 (65 FE) TABLET DELAYED RESPONSE at 08:12

## 2020-12-09 RX ADMIN — PANTOPRAZOLE SODIUM 40 MG: 40 TABLET, DELAYED RELEASE ORAL at 08:12

## 2020-12-09 RX ADMIN — HEPARIN SODIUM 5000 UNITS: 5000 INJECTION INTRAVENOUS; SUBCUTANEOUS at 05:12

## 2020-12-09 RX ADMIN — ISOSORBIDE MONONITRATE 30 MG: 30 TABLET, EXTENDED RELEASE ORAL at 08:12

## 2020-12-09 RX ADMIN — LACTOBACILLUS TAB 4 TABLET: TAB at 11:12

## 2020-12-09 RX ADMIN — LACTOBACILLUS TAB 4 TABLET: TAB at 08:12

## 2020-12-09 RX ADMIN — BUSPIRONE HYDROCHLORIDE 7.5 MG: 5 TABLET ORAL at 08:12

## 2020-12-09 RX ADMIN — ATORVASTATIN CALCIUM 40 MG: 40 TABLET, FILM COATED ORAL at 08:12

## 2020-12-09 RX ADMIN — FLUOXETINE 20 MG: 20 CAPSULE ORAL at 08:12

## 2020-12-09 RX ADMIN — SULFAMETHOXAZOLE AND TRIMETHOPRIM 1 TABLET: 800; 160 TABLET ORAL at 08:12

## 2020-12-09 NOTE — PLAN OF CARE
Pt in with amnesia, reinforced the importance of calling for assistance, pain medication given for a headache.

## 2020-12-09 NOTE — PT/OT/SLP PROGRESS
Speech Language Pathology Treatment    Patient Name:  Judy Ruelas   MRN:  [id number]  Admitting Diagnosis: Anterograde amnesia    Recommendations:                 General Recommendations:  Cognitive-linguistic therapy  Diet recommendations:  Regular, Liquid Diet Level: Thin   Aspiration Precautions: HOB to 90 degrees   General Precautions: Standard, fall  Communication strategies:  none    Subjective     Pt pleasant  Patient goals: none stated    Pain/Comfort:  · Pain Rating 1: 0/10  · Pain Rating Post-Intervention 1: 0/10    Objective:     Has the patient been evaluated by SLP for swallowing?   Yes  Keep patient NPO? No   Current Respiratory Status: room air      Pt was oriented to date at 25% acc and place at 66% acc. She recalled breakfast items 1 hour after meal with 66% acc. Immediate recall of 3 sentences with 33% acc and delayed recall of 4 min with 66% acc.     Assessment:     Judy Ruelas is a 55 y.o. female with an SLP diagnosis of Cognitive-Linguistic Impairment.  She presents with impaired short term recall.    Goals:   Multidisciplinary Problems     SLP Goals        Problem: SLP Goal    Goal Priority Disciplines Outcome   SLP Goal     SLP Ongoing, Progressing   Description: Patient will complete speech, language, and cognitive bedside evaluation to determine current level of funtioning                     Plan:     · Patient to be seen:  3 x/week   · Plan of Care expires:     · Plan of Care reviewed with:  patient   · SLP Follow-Up:  Yes       Discharge recommendations:      Barriers to Discharge:  None    Time Tracking:     SLP Treatment Date:   12/09/20  Speech Start Time:  0933  Speech Stop Time:  0950     Speech Total Time (min):  17 min    Billable Minutes: Speech Therapy Individual 17    Gisel Altman CCC-SLP  12/09/2020

## 2020-12-09 NOTE — PLAN OF CARE
12/09/20 1717   Final Note   Assessment Type Final Discharge Note   Anticipated Discharge Disposition Home   Right Care Referral Info   Post Acute Recommendation No Care

## 2020-12-09 NOTE — DISCHARGE SUMMARY
[facility] Hospital Medicine  Discharge Summary      Patient Name: Judy Ruelas  MRN: [id number]  Admission Date: 12/4/2020  Hospital Length of Stay: 3 days  Discharge Date and Time:  12/09/2020 12:03 PM  Attending Physician: SONIA Hammonds MD   Discharging Provider: Mello Luciano NP  Primary Care Provider: [facility]    HPI:   55-year-old female with past medical history of nonobstructive coronary disease, morbid obesity, possible underlying sleep apnea, and hypertension and hyperlipidemia presented to the hospital due to ME sure.  She complains that she is unable to remember things from 2 days back and even things  from a week back too.  Denies having any weakness on 1 side of the body.  CT head in the emergency room did not show any acute intracranial events.  Tele neurology was consulted with the ER recommended MRI of the head.  Her pressures were significantly elevated in the emergency room but denies having any headaches, chest pain, palpitations.  When I asked her about sleep apnea she could not tell me anything about it but she does have diagnosis from her cardiologist.     Procedure(s) (LRB):  ECHOCARDIOGRAM,TRANSESOPHAGEAL (N/A)      Hospital Course:   12/5 NAD. Continues to c/o amnesia. Denies sig life stressor, changes in meds. Denies smoking, etoh and illicit drug use. Denies metal implants. Plans for MRI. Reports anxiety and clautrophobia.  On buspar and venlafaxine. If stable findings, ok to d/c home with f/u pcp/neuro for further management. Benzo may complicate existing memory concerns. Monitor   12/6 continues to c/o n/v, visual disturbance and HA despite meds. Unable to recall events one week ago. Undergoing swallow eval with no difficulties. Did have significant dental extractions and unable to recall if abx rx  12/7 up and bathing. Denies HA. N/v improving. Continues to c/o memory loss. Reports urine odor and dysuria. Frequency is  chronic      12/08/20: No acute issues overnight. LY is pending. Will plan for discharge home in AM pending LY report.      12/09/20: LY showed no intra-cavity thrombus or vegetation visualized. Patient reports that her memory slowly started returning yesterday afternoon/evening. Neurology referral was sent to [facility]. Patient had no acute events overnight. Patient has been examined at bedside and is deemed medically ready for discharge.      Consults:   Consults (From admission, onward)        Status Ordering Provider     Inpatient consult to Cardiology  Once     Provider:  Ariel Han MD    Completed DUKE SCHULTZ     Inpatient consult to Infectious Diseases  Once     Provider:  Maninder Archer MD    Acknowledged LEO BURNS     Inpatient consult to Neurology  Once     Provider:  (Not yet assigned)    Completed JENNIFER FONTAINE     Inpatient consult to Registered Dietitian/Nutritionist  Once     Provider:  (Not yet assigned)    Completed LEO BURNS     Inpatient consult to Social Work  Once     Provider:  (Not yet assigned)    Acknowledged MARIAELENA SANTAMARIA     Inpatient consult to Telemedicine-Stroke  Once     Provider:  (Not yet assigned)    Acknowledged DANICA WAKEFIELD     IP consult to case management/social work  Once     Provider:  (Not yet assigned)    Completed LEO BURNS          No new Assessment & Plan notes have been filed under this hospital service since the last note was generated.  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] 12/04/2020 Yes    Dental caries [K02.9] 12/08/2020 Yes    CVA (cerebral vascular accident) [I63.9] 12/08/2020 Yes    Hypotension [I95.9] 12/06/2020 No    Nausea and vomiting [R11.2] 12/06/2020 Yes    Acute cystitis with hematuria [N30.01] 12/05/2020 Yes    Obstructive sleep apnea syndrome [G47.33] 12/05/2020 Yes    Essential hypertension [I10] 01/27/2016 Yes       Problems Resolved During this Admission:    Diagnosis Date Noted Date Resolved POA    Chest pain [R07.9] 12/09/2015 12/07/2020 Yes       Discharged Condition: stable    Disposition: Home or Self Care    Follow Up:  Follow-up Information     [facility].    Contact information:  [address] [address] [address] [phone]                 Patient Instructions:   No discharge procedures on file.    Significant Diagnostic Studies: Labs:   CMP   Recent Labs   Lab 12/09/20  0723      K 4.3      CO2 23   GLU 93   BUN 10   CREATININE 0.7   CALCIUM 8.9   PROT 7.9   ALBUMIN 3.7   BILITOT 0.4   ALKPHOS 130   AST 26   ALT 32   ANIONGAP 13   ESTGFRAFRICA >60   EGFRNONAA >60   , CBC   Recent Labs   Lab 12/08/20  0710 12/09/20  0723   WBC 5.39 6.18   HGB 10.7* 11.6*   HCT 35.0* 37.0    319    and All labs within the past 24 hours have been reviewed    Cardiac Graphics: Echocardiogram:   2D echo with color flow doppler: No results found for this or any previous visit. and Transthoracic echo (TTE) complete (Cupid Only):   Results for orders placed or performed during the hospital encounter of 12/04/20   Echo Color Flow Doppler? Yes; Bubble Contrast? Yes   Result Value Ref Range    Echo EF Estimated 60 %    IVS 1.10 (A) 0.6 - 1.1 cm    LVIDd 3.73 3.5 - 6.0 cm    LVIDs 2.55 2.1 - 4.0 cm    LVOT diameter 2.15 cm    Posterior Wall 1.10 0.6 - 1.1 cm    IVC ostium 1.10 cm    IVRT 121.11 ms    RVOT peak bar 0.92 m/s    RVOT peak VTI 21.77 cm    LA size 3.66 cm    Left Atrium Major Axis 6.41 cm    Left Atrium Minor Axis 5.91 cm    RA Major Axis 5.46 cm    AV mean gradient 4 mmHg    Ao peak bar 1.39 m/s    Ao VTI 27.83 cm    MV Peak A Bar 0.77 m/s    E wave decelartion time 315.29 ms    MV Peak E Bar 0.90 m/s    E/A ratio 1.17     FS 32 28 - 44 %    LV mass 130.92 g    Left Ventricle Relative Wall Thickness 0.59 cm    LVOT area 3.6 cm2    AV peak gradient 8 mmHg    LV Mass  Index 59 g/m2    Right Atrial Pressure (from IVC) 3 mmHg    Narrative    · There is no evidence of intracardiac shunting.  · There is left ventricular concentric remodeling.  · The left ventricle is normal in size with normal systolic function. The   estimated ejection fraction is 55%.  · Indeterminate diastolic function.  · Normal right ventricular size with normal right ventricular systolic   function.  · Normal central venous pressure (3 mmHg).        Pending Diagnostic Studies:     Procedure Component Value Units Date/Time    Anti-neutrophilic cytoplasmic antibody [[id number]] Collected: 12/07/20 0805    Order Status: Sent Lab Status: In process Updated: 12/07/20 2018    Specimen: Blood     Beta-2 glycoprotein antibodies [[id number]] Collected: 12/07/20 0805    Order Status: Sent Lab Status: In process Updated: 12/07/20 2018    Specimen: Blood     CTA Neck [[id number]]     Order Status: Sent Lab Status: No result     Cardiolipin antibody [[id number]] Collected: 12/07/20 0805    Order Status: Sent Lab Status: In process Updated: 12/07/20 2018    Specimen: Blood     FTA antibodies, IgG and IgM [[id number]] Collected: 12/08/20 0710    Order Status: Sent Lab Status: In process Updated: 12/08/20 1623    Specimen: Blood     Lyme disease DNA probe, direct [[id number]] Collected: 12/07/20 0805    Order Status: Sent Lab Status: In process Updated: 12/07/20 1556    Specimen: Blood     Methylmalonic acid, serum [[id number]] Collected: 12/08/20 1210    Order Status: Sent Lab Status: In process Updated: 12/08/20 2105    Specimen: Blood     Methylmalonic acid, serum [[id number]] Collected: 12/05/20 1138    Order Status: Sent Lab Status: In process Updated: 12/05/20 1929    Specimen: Blood     Transesophageal echo (LY) with possible cardioversion [[id number]]     Order Status: Sent Lab Status: No result     Vitamin B1 [[id number]] Collected: 12/05/20 1138    Order Status: Sent Lab Status: In process Updated: 12/05/20 1929    Specimen:  Blood     Vitamin B2 [[id number]] Collected: 12/07/20 0805    Order Status: Sent Lab Status: In process Updated: 12/07/20 2018    Specimen: Blood          Medications:  Reconciled Home Medications:      Medication List      START taking these medications    atorvastatin 80 MG tablet  Commonly known as: LIPITOR  Take 1 tablet (80 mg total) by mouth once daily.        CONTINUE taking these medications    amLODIPine 5 MG tablet  Commonly known as: NORVASC  Take 2 tablets (10 mg total) by mouth once daily.     aspirin 81 MG Chew  Take 1 tablet (81 mg total) by mouth once daily.     atenoloL 50 MG tablet  Commonly known as: TENORMIN  Take 50 mg by mouth once daily.     busPIRone 7.5 MG tablet  Commonly known as: BUSPAR  Take 7.5 mg by mouth 2 (two) times daily.     butalbital-acetaminophen-caffeine -40 mg -40 mg per tablet  Commonly known as: FIORICET, ESGIC  Take 1 tablet by mouth every 6 (six) hours as needed for Pain or Headaches.     clopidogreL 75 mg tablet  Commonly known as: PLAVIX  Take 1 tablet (75 mg total) by mouth once daily.     colchicine 0.6 mg tablet  Commonly known as: COLCRYS  Take 1 tablet (0.6 mg total) by mouth once daily.     ferrous sulfate 324 mg (65 mg iron) Tbec  Take 325 mg by mouth once daily.     FLUoxetine 20 MG capsule  Take 20 mg by mouth once daily.     HYDROcodone-acetaminophen 7.5-325 mg per tablet  Commonly known as: NORCO  Take 1 tablet by mouth every 6 (six) hours as needed for Pain.     hydrOXYzine pamoate 50 MG Cap  Commonly known as: VISTARIL  Take 50 mg by mouth 4 (four) times daily.     isosorbide mononitrate 30 MG 24 hr tablet  Commonly known as: IMDUR  Take 1 tablet (30 mg total) by mouth once daily.     losartan 50 MG tablet  Commonly known as: COZAAR  Take 1 tablet (50 mg total) by mouth once daily.     nitroGLYCERIN 0.4 MG SL tablet  Commonly known as: NITROSTAT  Place 1 tablet (0.4 mg total) under the tongue every 5 (five) minutes as needed for Chest pain.      omeprazole 40 MG capsule  Commonly known as: PRILOSEC  Take 1 capsule (40 mg total) by mouth once daily.     ondansetron 4 MG tablet  Commonly known as: ZOFRAN  Take 8 mg by mouth 2 (two) times daily.     promethazine 25 MG tablet  Commonly known as: PHENERGAN  Take 1 tablet (25 mg total) by mouth every 6 (six) hours as needed for Nausea.     traZODone 50 MG tablet  Commonly known as: DESYREL  Take 50 mg by mouth every evening.     venlafaxine 75 MG 24 hr capsule  Commonly known as: EFFEXOR-XR  Take 75 mg by mouth once daily.          Indwelling Lines/Drains at time of discharge:   Lines/Drains/Airways     None               Time spent on the discharge of patient: >35 minutes  Patient was seen and examined on the date of discharge and determined to be suitable for discharge.      Mello Luciano NP  Department of Hospital Medicine  [facility]

## 2020-12-09 NOTE — PLAN OF CARE
Pt was oriented to date at 25% acc and place at 66% acc. She recalled breakfast items 1 hour after meal with 66% acc. Immediate recall of 3 sentences with 33% acc and delayed recall of 4 min with 66% acc.

## 2020-12-10 ENCOUNTER — PATIENT OUTREACH (OUTPATIENT)
Dept: ADMINISTRATIVE | Facility: CLINIC | Age: 55
End: 2020-12-10

## 2020-12-10 LAB
ANCA AB TITR SER IF: NORMAL TITER
ANTI SM ANTIBODY: 0.06 RATIO (ref 0–0.99)
ANTI SM/RNP ANTIBODY: 0.07 RATIO (ref 0–0.99)
ANTI-SM INTERPRETATION: NEGATIVE
ANTI-SM/RNP INTERPRETATION: NEGATIVE
ANTI-SSA ANTIBODY: 0.06 RATIO (ref 0–0.99)
ANTI-SSA INTERPRETATION: NEGATIVE
ANTI-SSB ANTIBODY: 0.06 RATIO (ref 0–0.99)
ANTI-SSB INTERPRETATION: NEGATIVE
B2 GLYCOPROT1 IGA SER QL: <9 SAU
B2 GLYCOPROT1 IGG SER QL: <9 SGU
B2 GLYCOPROT1 IGM SER QL: <9 SMU
CARDIOLIPIN IGG SER IA-ACNC: <9.4 GPL (ref 0–14.99)
CARDIOLIPIN IGM SER IA-ACNC: 10.6 MPL (ref 0–12.49)
DSDNA AB SER-ACNC: NORMAL [IU]/ML
METHYLMALONATE SERPL-SCNC: 0.19 UMOL/L
P-ANCA TITR SER IF: NORMAL TITER
VIT B1 BLD-MCNC: 39 UG/L (ref 38–122)

## 2020-12-10 NOTE — TELEPHONE ENCOUNTER
C3 nurse attempted to contact patient. No answer. The following message was left for the patient to return the call:  Good morning I am a nurse calling on behalf of [email or web address] from the Care Coordination Center.  This is a Transitional Care Call for  Judy Ruelas . When you have a moment please contact us at [phone] or [phone] Monday through Friday, between the hours of 8 am to 4 pm. We look forward to speaking with you. On behalf of [facility] have a nice day.    The patient does not have a scheduled HOSFU appointment within 7-14 days post hospital discharge date 12/9/20.

## 2020-12-12 LAB — VIT B2 SERPL-MCNC: 7 MCG/L (ref 1–19)

## 2020-12-14 LAB — METHYLMALONATE SERPL-SCNC: 0.34 UMOL/L

## 2020-12-19 ENCOUNTER — HOSPITAL ENCOUNTER (EMERGENCY)
Facility: HOSPITAL | Age: 55
Discharge: HOME | End: 2020-12-19
Attending: EMERGENCY MEDICINE
Payer: MEDICAID

## 2020-12-19 VITALS
OXYGEN SATURATION: 96 % | HEART RATE: 84 BPM | RESPIRATION RATE: 16 BRPM | DIASTOLIC BLOOD PRESSURE: 83 MMHG | TEMPERATURE: 99 F | SYSTOLIC BLOOD PRESSURE: 159 MMHG | HEIGHT: 65 IN | BODY MASS INDEX: 43.43 KG/M2

## 2020-12-19 DIAGNOSIS — R10.9 ABDOMINAL PAIN: ICD-10-CM

## 2020-12-19 DIAGNOSIS — R11.10 VOMITING, INTRACTABILITY OF VOMITING NOT SPECIFIED, PRESENCE OF NAUSEA NOT SPECIFIED, UNSPECIFIED VOMITING TYPE: ICD-10-CM

## 2020-12-19 DIAGNOSIS — R19.7 DIARRHEA, UNSPECIFIED TYPE: Primary | ICD-10-CM

## 2020-12-19 LAB
ALBUMIN SERPL BCP-MCNC: 4 G/DL (ref 3.5–5.2)
ALP SERPL-CCNC: 144 U/L (ref 55–135)
ALT SERPL W/O P-5'-P-CCNC: 33 U/L (ref 10–44)
ANION GAP SERPL CALC-SCNC: 11 MMOL/L (ref 8–16)
AST SERPL-CCNC: 21 U/L (ref 10–40)
BASOPHILS # BLD AUTO: 0.02 K/UL (ref 0–0.2)
BASOPHILS NFR BLD: 0.1 % (ref 0–1.9)
BILIRUB SERPL-MCNC: 0.6 MG/DL (ref 0.1–1)
BUN SERPL-MCNC: 17 MG/DL (ref 6–20)
CALCIUM SERPL-MCNC: 9.5 MG/DL (ref 8.7–10.5)
CHLORIDE SERPL-SCNC: 102 MMOL/L (ref 95–110)
CO2 SERPL-SCNC: 25 MMOL/L (ref 23–29)
CREAT SERPL-MCNC: 0.6 MG/DL (ref 0.5–1.4)
DIFFERENTIAL METHOD: ABNORMAL
EOSINOPHIL # BLD AUTO: 0.1 K/UL (ref 0–0.5)
EOSINOPHIL NFR BLD: 0.6 % (ref 0–8)
ERYTHROCYTE [DISTWIDTH] IN BLOOD BY AUTOMATED COUNT: 14.8 % (ref 11.5–14.5)
EST. GFR  (AFRICAN AMERICAN): >60 ML/MIN/1.73 M^2
EST. GFR  (NON AFRICAN AMERICAN): >60 ML/MIN/1.73 M^2
GLUCOSE SERPL-MCNC: 128 MG/DL (ref 70–110)
HCT VFR BLD AUTO: 42.9 % (ref 37–48.5)
HGB BLD-MCNC: 13.7 G/DL (ref 12–16)
IMM GRANULOCYTES # BLD AUTO: 0.08 K/UL (ref 0–0.04)
IMM GRANULOCYTES NFR BLD AUTO: 0.6 % (ref 0–0.5)
LIPASE SERPL-CCNC: 14 U/L (ref 4–60)
LYMPHOCYTES # BLD AUTO: 0.8 K/UL (ref 1–4.8)
LYMPHOCYTES NFR BLD: 5.7 % (ref 18–48)
MCH RBC QN AUTO: 26.7 PG (ref 27–31)
MCHC RBC AUTO-ENTMCNC: 31.9 G/DL (ref 32–36)
MCV RBC AUTO: 84 FL (ref 82–98)
MONOCYTES # BLD AUTO: 0.5 K/UL (ref 0.3–1)
MONOCYTES NFR BLD: 3.8 % (ref 4–15)
NEUTROPHILS # BLD AUTO: 12.5 K/UL (ref 1.8–7.7)
NEUTROPHILS NFR BLD: 89.2 % (ref 38–73)
NRBC BLD-RTO: 0 /100 WBC
PLATELET # BLD AUTO: 351 K/UL (ref 150–350)
PMV BLD AUTO: 10.5 FL (ref 9.2–12.9)
POTASSIUM SERPL-SCNC: 4.2 MMOL/L (ref 3.5–5.1)
PROT SERPL-MCNC: 8.5 G/DL (ref 6–8.4)
RBC # BLD AUTO: 5.14 M/UL (ref 4–5.4)
SODIUM SERPL-SCNC: 138 MMOL/L (ref 136–145)
WBC # BLD AUTO: 14.07 K/UL (ref 3.9–12.7)

## 2020-12-19 PROCEDURE — 99285 EMERGENCY DEPT VISIT HI MDM: CPT | Mod: 25

## 2020-12-19 PROCEDURE — 93010 ELECTROCARDIOGRAM REPORT: CPT | Mod: ,,, | Performed by: INTERNAL MEDICINE

## 2020-12-19 PROCEDURE — 83690 ASSAY OF LIPASE: CPT

## 2020-12-19 PROCEDURE — 93010 EKG 12-LEAD: ICD-10-PCS | Mod: ,,, | Performed by: INTERNAL MEDICINE

## 2020-12-19 PROCEDURE — 93005 ELECTROCARDIOGRAM TRACING: CPT

## 2020-12-19 PROCEDURE — 25000003 PHARM REV CODE 250: Performed by: NURSE PRACTITIONER

## 2020-12-19 PROCEDURE — 63600175 PHARM REV CODE 636 W HCPCS: Performed by: NURSE PRACTITIONER

## 2020-12-19 PROCEDURE — 80053 COMPREHEN METABOLIC PANEL: CPT

## 2020-12-19 PROCEDURE — 85025 COMPLETE CBC W/AUTO DIFF WBC: CPT

## 2020-12-19 PROCEDURE — 96374 THER/PROPH/DIAG INJ IV PUSH: CPT

## 2020-12-19 PROCEDURE — 96375 TX/PRO/DX INJ NEW DRUG ADDON: CPT

## 2020-12-19 RX ORDER — PROMETHAZINE HYDROCHLORIDE 12.5 MG/1
12.5 TABLET ORAL EVERY 6 HOURS PRN
Qty: 18 TABLET | Refills: 0 | OUTPATIENT
Start: 2020-12-19 | End: 2022-01-30

## 2020-12-19 RX ORDER — METOCLOPRAMIDE HYDROCHLORIDE 5 MG/ML
10 INJECTION INTRAMUSCULAR; INTRAVENOUS
Status: COMPLETED | OUTPATIENT
Start: 2020-12-19 | End: 2020-12-19

## 2020-12-19 RX ORDER — ONDANSETRON 2 MG/ML
8 INJECTION INTRAMUSCULAR; INTRAVENOUS
Status: COMPLETED | OUTPATIENT
Start: 2020-12-19 | End: 2020-12-19

## 2020-12-19 RX ORDER — DIPHENHYDRAMINE HYDROCHLORIDE 50 MG/ML
12.5 INJECTION INTRAMUSCULAR; INTRAVENOUS
Status: DISCONTINUED | OUTPATIENT
Start: 2020-12-19 | End: 2020-12-19 | Stop reason: HOSPADM

## 2020-12-19 RX ORDER — SODIUM CHLORIDE 9 MG/ML
INJECTION, SOLUTION INTRAVENOUS
Status: COMPLETED | OUTPATIENT
Start: 2020-12-19 | End: 2020-12-19

## 2020-12-19 RX ADMIN — METOCLOPRAMIDE 10 MG: 5 INJECTION, SOLUTION INTRAMUSCULAR; INTRAVENOUS at 01:12

## 2020-12-19 RX ADMIN — SODIUM CHLORIDE 1000 ML/HR: 0.9 INJECTION, SOLUTION INTRAVENOUS at 12:12

## 2020-12-19 RX ADMIN — ONDANSETRON 8 MG: 2 INJECTION INTRAMUSCULAR; INTRAVENOUS at 12:12

## 2020-12-19 NOTE — ED PROVIDER NOTES
55 year old female with complaint of nausea, vomiting, and upper abdominal pain since last night.

## 2020-12-19 NOTE — ED PROVIDER NOTES
Encounter Date: 2020       History     Chief Complaint   Patient presents with    Abdominal Pain     started last night, points to middle of abd, +nausea and vomiting, +diarrhea, denies fever.     55 year old female with complaint of epigastric pain nausea, vomiting, and diarrhea X one day.  No fever or chills. Moderate nausea and vomiting. No pain radiation.  No other complaints.         Review of patient's allergies indicates:   Allergen Reactions    Pcn [penicillins] Swelling    Codeine     Latex, natural rubber     Tylox [oxycodone-acetaminophen]      Past Medical History:   Diagnosis Date    Anxiety     Depression     Depression with anxiety     Family history of heart disease 2016    Hypertension     Irregular heart beat     Migraine headache     Morbid obesity with BMI of 40.0-44.9, adult 2017    NSTEMI (non-ST elevated myocardial infarction) 2017     Past Surgical History:   Procedure Laterality Date    APPENDECTOMY       SECTION      HYSTERECTOMY       Family History   Problem Relation Age of Onset    Hypertension Unknown     Heart disease Father 70    Hypertension Father     Heart attack Mother 40        triple bypass    Diabetes Mother     Hypertension Mother     Stroke Mother     Heart attack Brother 40        CABG x 2     Social History     Tobacco Use    Smoking status: Never Smoker    Smokeless tobacco: Never Used   Substance Use Topics    Alcohol use: No     Alcohol/week: 0.0 standard drinks     Comment: denies    Drug use: No     Comment: denies     Review of Systems   Constitutional: Negative for fever.   HENT: Negative for sore throat.    Respiratory: Negative for shortness of breath.    Cardiovascular: Negative for chest pain.   Gastrointestinal: Positive for diarrhea, nausea and vomiting.   Genitourinary: Negative for dysuria.   Musculoskeletal: Negative for back pain.   Skin: Negative for rash.   Neurological: Negative for weakness.    Hematological: Does not bruise/bleed easily.       Physical Exam     Initial Vitals [12/19/20 1044]   BP Pulse Resp Temp SpO2   (!) 148/76 72 18 97.8 °F (36.6 °C) 97 %      MAP       --         Physical Exam    Nursing note and vitals reviewed.  Constitutional: She appears well-developed and well-nourished.   HENT:   Head: Normocephalic and atraumatic.   Eyes: Conjunctivae and EOM are normal. Pupils are equal, round, and reactive to light.   Neck: Normal range of motion. Neck supple.   Cardiovascular: Normal rate, regular rhythm, normal heart sounds and intact distal pulses.   Pulmonary/Chest: Breath sounds normal.   Abdominal: Soft. There is no abdominal tenderness. There is no rebound and no guarding.   Musculoskeletal: Normal range of motion.   Neurological: She is alert and oriented to person, place, and time. She has normal strength and normal reflexes.   Skin: Skin is warm and dry.   Psychiatric: She has a normal mood and affect. Her behavior is normal. Thought content normal.         ED Course   Procedures  Labs Reviewed   CBC W/ AUTO DIFFERENTIAL - Abnormal; Notable for the following components:       Result Value    WBC 14.07 (*)     MCH 26.7 (*)     MCHC 31.9 (*)     RDW 14.8 (*)     Platelets 351 (*)     Immature Granulocytes 0.6 (*)     Gran # (ANC) 12.5 (*)     Immature Grans (Abs) 0.08 (*)     Lymph # 0.8 (*)     Gran % 89.2 (*)     Lymph % 5.7 (*)     Mono % 3.8 (*)     All other components within normal limits   COMPREHENSIVE METABOLIC PANEL - Abnormal; Notable for the following components:    Glucose 128 (*)     Total Protein 8.5 (*)     Alkaline Phosphatase 144 (*)     All other components within normal limits   LIPASE          Imaging Results          CT Renal Stone Study Abd Pelvis WO (Final result)  Result time 12/19/20 14:17:49    Final result by Maninder Hernandez MD (12/19/20 14:17:49)                 Impression:      Fairly long segment of what is likely jejunum demonstrating marked wall  thickening with extensive edema in the adjacent fat and fluid in the adjacent mesentery suggesting a nonspecific enteritis.    All CT scans at this facility use dose modulation, iterative reconstruction, and/or weight based dosing when appropriate to reduce radiation dose to as low as reasonably achievable.      Electronically signed by: Maninder Hernandez  Date:    12/19/2020  Time:    14:17             Narrative:    EXAMINATION:  CT RENAL STONE STUDY ABD PELVIS WO    CLINICAL HISTORY:  Abdominal pain, acute, nonlocalized;    FINDINGS:  Portion of the liver and spleen excluded from the study.  Mild fatty infiltration of the liver suspected.  The spleen appears enlarged.  The gallbladder is been removed.  The pancreas, adrenal glands and kidneys are unremarkable.  The aorta is nondilated.  Fairly long segment of contiguous marked small bowel wall thickening with perienteric edema and some fluid in the mesentery noted in the mid abdomen.  This appears to predominantly involve the jejunum.  No bowel dilatation is identified.  The bladder appears normal.  No acute osseous abnormality is identified.                               X-Ray Abdomen Flat And Erect (Final result)  Result time 12/19/20 11:33:00    Final result by Art Ayers MD (12/19/20 11:33:00)                 Impression:      No acute radiographic abnormality of the abdomen.      Electronically signed by: Art Ayers  Date:    12/19/2020  Time:    11:33             Narrative:    EXAMINATION:  XR ABDOMEN FLAT AND ERECT    CLINICAL HISTORY:  Unspecified abdominal pain    TECHNIQUE:  Flat and erect AP views of the abdomen were performed.    COMPARISON:  CT abdomen pelvis 03/27/2013; abdominal radiograph 03/28/2013    FINDINGS:  Right upper quadrant cholecystectomy clips.  No gross intraperitoneal free air.  No dilated loops of small bowel or colon to suggest obstruction or ileus.  No radiopaque or ureteral calcifications identified.  No pathologic mass  identified in the abdomen.  The visualized osseous structures appear intact.                                        Imaging Results          CT Renal Stone Study Abd Pelvis WO (Final result)  Result time 12/19/20 14:17:49    Final result by Maninder Hernandez MD (12/19/20 14:17:49)                 Impression:      Fairly long segment of what is likely jejunum demonstrating marked wall thickening with extensive edema in the adjacent fat and fluid in the adjacent mesentery suggesting a nonspecific enteritis.    All CT scans at this facility use dose modulation, iterative reconstruction, and/or weight based dosing when appropriate to reduce radiation dose to as low as reasonably achievable.      Electronically signed by: Maninder Hernandez  Date:    12/19/2020  Time:    14:17             Narrative:    EXAMINATION:  CT RENAL STONE STUDY ABD PELVIS WO    CLINICAL HISTORY:  Abdominal pain, acute, nonlocalized;    FINDINGS:  Portion of the liver and spleen excluded from the study.  Mild fatty infiltration of the liver suspected.  The spleen appears enlarged.  The gallbladder is been removed.  The pancreas, adrenal glands and kidneys are unremarkable.  The aorta is nondilated.  Fairly long segment of contiguous marked small bowel wall thickening with perienteric edema and some fluid in the mesentery noted in the mid abdomen.  This appears to predominantly involve the jejunum.  No bowel dilatation is identified.  The bladder appears normal.  No acute osseous abnormality is identified.                               X-Ray Abdomen Flat And Erect (Final result)  Result time 12/19/20 11:33:00    Final result by Art Ayers MD (12/19/20 11:33:00)                 Impression:      No acute radiographic abnormality of the abdomen.      Electronically signed by: Art Ayers  Date:    12/19/2020  Time:    11:33             Narrative:    EXAMINATION:  XR ABDOMEN FLAT AND ERECT    CLINICAL HISTORY:  Unspecified abdominal  pain    TECHNIQUE:  Flat and erect AP views of the abdomen were performed.    COMPARISON:  CT abdomen pelvis 03/27/2013; abdominal radiograph 03/28/2013    FINDINGS:  Right upper quadrant cholecystectomy clips.  No gross intraperitoneal free air.  No dilated loops of small bowel or colon to suggest obstruction or ileus.  No radiopaque or ureteral calcifications identified.  No pathologic mass identified in the abdomen.  The visualized osseous structures appear intact.                                 2:31 PM  No vomiting in ER, h/p consistent with gastroenteritis, no abdominal tenderness, discussed plan with pt, pt will return for worsening                     Clinical Impression:       ICD-10-CM ICD-9-CM   1. Diarrhea, unspecified type  R19.7 787.91   2. Abdominal pain  R10.9 789.00   3. Vomiting, intractability of vomiting not specified, presence of nausea not specified, unspecified vomiting type  R11.10 787.03                          ED Disposition Condition    Discharge Stable        ED Prescriptions     Medication Sig Dispense Start Date End Date Auth. Provider    promethazine (PHENERGAN) 12.5 MG Tab Take 1 tablet (12.5 mg total) by mouth every 6 (six) hours as needed (nausea and vomiting). 18 tablet 12/19/2020  Girma Jones NP        Follow-up Information     Follow up With Specialties Details Why Contact Info    PCP  Schedule an appointment as soon as possible for a visit in 2 days                                         Girma Jones NP  12/19/20 0019

## 2020-12-19 NOTE — ED NOTES
Pt became nauseated after zofran administration, but later felt relief and closed eyes and is resting.

## 2021-05-24 ENCOUNTER — HOSPITAL ENCOUNTER (EMERGENCY)
Facility: HOSPITAL | Age: 56
Discharge: HOME | End: 2021-05-24
Attending: EMERGENCY MEDICINE
Payer: MEDICAID

## 2021-05-24 VITALS
HEIGHT: 65 IN | DIASTOLIC BLOOD PRESSURE: 80 MMHG | RESPIRATION RATE: 18 BRPM | BODY MASS INDEX: 43.45 KG/M2 | HEART RATE: 106 BPM | SYSTOLIC BLOOD PRESSURE: 140 MMHG | OXYGEN SATURATION: 98 % | WEIGHT: 260.81 LBS | TEMPERATURE: 98 F

## 2021-05-24 DIAGNOSIS — S40.022A ARM CONTUSION, LEFT, INITIAL ENCOUNTER: Primary | ICD-10-CM
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
## 2021-05-24 PROCEDURE — 99283 EMERGENCY DEPT VISIT LOW MDM: CPT | Mod: 25

## 2022-01-29 ENCOUNTER — HOSPITAL ENCOUNTER (EMERGENCY)
Facility: HOSPITAL | Age: 57
Discharge: HOME | End: 2022-01-30
Attending: EMERGENCY MEDICINE
Payer: MEDICAID

## 2022-01-29 DIAGNOSIS — R19.7 NAUSEA VOMITING AND DIARRHEA: Primary | ICD-10-CM

## 2022-01-29 DIAGNOSIS — R11.2 NAUSEA VOMITING AND DIARRHEA: Primary | ICD-10-CM

## 2022-01-29 DIAGNOSIS — R06.09 DOE (DYSPNEA ON EXERTION): ICD-10-CM

## 2022-01-29 DIAGNOSIS — R10.13 EPIGASTRIC PAIN: ICD-10-CM

## 2022-01-29 LAB
BASOPHILS # BLD AUTO: 0.03 K/UL (ref 0–0.2)
BASOPHILS NFR BLD: 0.2 % (ref 0–1.9)
BNP SERPL-MCNC: 19 PG/ML (ref 0–99)
CTP QC/QA: YES
DIFFERENTIAL METHOD: ABNORMAL
EOSINOPHIL # BLD AUTO: 0.1 K/UL (ref 0–0.5)
EOSINOPHIL NFR BLD: 0.6 % (ref 0–8)
ERYTHROCYTE [DISTWIDTH] IN BLOOD BY AUTOMATED COUNT: 15.9 % (ref 11.5–14.5)
HCT VFR BLD AUTO: 35.3 % (ref 37–48.5)
HGB BLD-MCNC: 11.9 G/DL (ref 12–16)
IMM GRANULOCYTES # BLD AUTO: 0.07 K/UL (ref 0–0.04)
IMM GRANULOCYTES NFR BLD AUTO: 0.5 % (ref 0–0.5)
LYMPHOCYTES # BLD AUTO: 1 K/UL (ref 1–4.8)
LYMPHOCYTES NFR BLD: 6.9 % (ref 18–48)
MCH RBC QN AUTO: 27.4 PG (ref 27–31)
MCHC RBC AUTO-ENTMCNC: 33.7 G/DL (ref 32–36)
MCV RBC AUTO: 81 FL (ref 82–98)
MONOCYTES # BLD AUTO: 0.4 K/UL (ref 0.3–1)
MONOCYTES NFR BLD: 2.8 % (ref 4–15)
NEUTROPHILS # BLD AUTO: 12.2 K/UL (ref 1.8–7.7)
NEUTROPHILS NFR BLD: 89 % (ref 38–73)
NRBC BLD-RTO: 0 /100 WBC
PLATELET # BLD AUTO: 219 K/UL (ref 150–450)
PMV BLD AUTO: ABNORMAL FL (ref 9.2–12.9)
RBC # BLD AUTO: 4.35 M/UL (ref 4–5.4)
SARS-COV-2 RDRP RESP QL NAA+PROBE: NEGATIVE
WBC # BLD AUTO: 13.7 K/UL (ref 3.9–12.7)

## 2022-01-29 PROCEDURE — 93010 EKG 12-LEAD: ICD-10-PCS | Mod: ,,, | Performed by: INTERNAL MEDICINE

## 2022-01-29 PROCEDURE — 25000003 PHARM REV CODE 250: Performed by: EMERGENCY MEDICINE

## 2022-01-29 PROCEDURE — 93005 ELECTROCARDIOGRAM TRACING: CPT

## 2022-01-29 PROCEDURE — 96375 TX/PRO/DX INJ NEW DRUG ADDON: CPT

## 2022-01-29 PROCEDURE — 83690 ASSAY OF LIPASE: CPT | Performed by: EMERGENCY MEDICINE

## 2022-01-29 PROCEDURE — 96365 THER/PROPH/DIAG IV INF INIT: CPT

## 2022-01-29 PROCEDURE — 93010 ELECTROCARDIOGRAM REPORT: CPT | Mod: ,,, | Performed by: INTERNAL MEDICINE

## 2022-01-29 PROCEDURE — C9113 INJ PANTOPRAZOLE SODIUM, VIA: HCPCS | Performed by: EMERGENCY MEDICINE

## 2022-01-29 PROCEDURE — 80053 COMPREHEN METABOLIC PANEL: CPT | Performed by: EMERGENCY MEDICINE

## 2022-01-29 PROCEDURE — U0002 COVID-19 LAB TEST NON-CDC: HCPCS | Performed by: EMERGENCY MEDICINE

## 2022-01-29 PROCEDURE — 96361 HYDRATE IV INFUSION ADD-ON: CPT

## 2022-01-29 PROCEDURE — 84484 ASSAY OF TROPONIN QUANT: CPT | Performed by: EMERGENCY MEDICINE

## 2022-01-29 PROCEDURE — 63600175 PHARM REV CODE 636 W HCPCS: Performed by: EMERGENCY MEDICINE

## 2022-01-29 PROCEDURE — 83880 ASSAY OF NATRIURETIC PEPTIDE: CPT | Mod: 91 | Performed by: EMERGENCY MEDICINE

## 2022-01-29 PROCEDURE — 85025 COMPLETE CBC W/AUTO DIFF WBC: CPT | Performed by: EMERGENCY MEDICINE

## 2022-01-29 PROCEDURE — 85027 COMPLETE CBC AUTOMATED: CPT | Mod: 59 | Performed by: EMERGENCY MEDICINE

## 2022-01-29 PROCEDURE — 99284 EMERGENCY DEPT VISIT MOD MDM: CPT | Mod: 25

## 2022-01-29 RX ORDER — ONDANSETRON 2 MG/ML
4 INJECTION INTRAMUSCULAR; INTRAVENOUS
Status: DISCONTINUED | OUTPATIENT
Start: 2022-01-29 | End: 2022-01-29

## 2022-01-29 RX ORDER — PANTOPRAZOLE SODIUM 40 MG/10ML
40 INJECTION, POWDER, LYOPHILIZED, FOR SOLUTION INTRAVENOUS
Status: COMPLETED | OUTPATIENT
Start: 2022-01-29 | End: 2022-01-29

## 2022-01-29 RX ADMIN — PROMETHAZINE HYDROCHLORIDE 12.5 MG: 25 INJECTION INTRAMUSCULAR; INTRAVENOUS at 11:01

## 2022-01-29 RX ADMIN — SODIUM CHLORIDE, SODIUM LACTATE, POTASSIUM CHLORIDE, AND CALCIUM CHLORIDE 1000 ML: .6; .31; .03; .02 INJECTION, SOLUTION INTRAVENOUS at 08:01

## 2022-01-29 RX ADMIN — PANTOPRAZOLE SODIUM 40 MG: 40 INJECTION, POWDER, FOR SOLUTION INTRAVENOUS at 09:01

## 2022-01-30 VITALS
HEART RATE: 80 BPM | OXYGEN SATURATION: 95 % | TEMPERATURE: 99 F | SYSTOLIC BLOOD PRESSURE: 113 MMHG | DIASTOLIC BLOOD PRESSURE: 56 MMHG | RESPIRATION RATE: 21 BRPM

## 2022-01-30 LAB
ALBUMIN SERPL BCP-MCNC: 3.7 G/DL (ref 3.5–5.2)
ALP SERPL-CCNC: 120 U/L (ref 55–135)
ALT SERPL W/O P-5'-P-CCNC: 42 U/L (ref 10–44)
ANION GAP SERPL CALC-SCNC: 10 MMOL/L (ref 8–16)
AST SERPL-CCNC: 23 U/L (ref 10–40)
BILIRUB SERPL-MCNC: 0.9 MG/DL (ref 0.1–1)
BNP SERPL-MCNC: 28 PG/ML (ref 0–99)
BUN SERPL-MCNC: 15 MG/DL (ref 6–20)
CALCIUM SERPL-MCNC: 9.5 MG/DL (ref 8.7–10.5)
CHLORIDE SERPL-SCNC: 105 MMOL/L (ref 95–110)
CO2 SERPL-SCNC: 24 MMOL/L (ref 23–29)
CREAT SERPL-MCNC: 0.6 MG/DL (ref 0.5–1.4)
ERYTHROCYTE [DISTWIDTH] IN BLOOD BY AUTOMATED COUNT: 15.9 % (ref 11.5–14.5)
EST. GFR  (AFRICAN AMERICAN): >60 ML/MIN/1.73 M^2
EST. GFR  (NON AFRICAN AMERICAN): >60 ML/MIN/1.73 M^2
GLUCOSE SERPL-MCNC: 124 MG/DL (ref 70–110)
HCT VFR BLD AUTO: 40.6 % (ref 37–48.5)
HGB BLD-MCNC: 13.2 G/DL (ref 12–16)
LIPASE SERPL-CCNC: 10 U/L (ref 4–60)
MCH RBC QN AUTO: 26.1 PG (ref 27–31)
MCHC RBC AUTO-ENTMCNC: 32.5 G/DL (ref 32–36)
MCV RBC AUTO: 80 FL (ref 82–98)
PLATELET # BLD AUTO: 387 K/UL (ref 150–450)
PLATELET BLD QL SMEAR: NORMAL
PMV BLD AUTO: 10.3 FL (ref 9.2–12.9)
POTASSIUM SERPL-SCNC: 4.1 MMOL/L (ref 3.5–5.1)
PROT SERPL-MCNC: 7.9 G/DL (ref 6–8.4)
RBC # BLD AUTO: 5.05 M/UL (ref 4–5.4)
SODIUM SERPL-SCNC: 139 MMOL/L (ref 136–145)
TROPONIN I SERPL DL<=0.01 NG/ML-MCNC: <0.006 NG/ML (ref 0–0.03)
WBC # BLD AUTO: 14.5 K/UL (ref 3.9–12.7)

## 2022-01-30 PROCEDURE — 36415 COLL VENOUS BLD VENIPUNCTURE: CPT | Performed by: EMERGENCY MEDICINE

## 2022-01-30 RX ORDER — PROMETHAZINE HYDROCHLORIDE 25 MG/1
25 TABLET ORAL EVERY 6 HOURS PRN
Qty: 15 TABLET | Refills: 0 | OUTPATIENT
Start: 2022-01-30 | End: 2023-01-30

## 2022-01-30 RX ORDER — PANTOPRAZOLE SODIUM 40 MG/1
40 TABLET, DELAYED RELEASE ORAL DAILY
Qty: 30 TABLET | Refills: 0 | Status: SHIPPED | OUTPATIENT
Start: 2022-01-30 | End: 2022-03-01

## 2022-01-30 NOTE — ED PROVIDER NOTES
Encounter Date: 2022       History     Chief Complaint   Patient presents with    Vomiting     Pt. Presents to Ed via [facility] due to having fatigue, nausea, and vomiting since about 3am today. Pt also states in the last few hours she has started to having mild difficuly breathing upon exertion       55 y/o F with PMH of NSTEMI, Obesity, Headache, HTN, Depression here with c/o N/V/D over the past day. This is moderate, constant. The patient has been having vomiting almost continuously, as well as loose green stools. She denies any bloody or black vomiting or stool. There is associated epigastric pain that started after vomiting. Patient took over the counter antiemetics (not sure of  Name) without relief. Also complaining of SOB on exertion in the last few hours. She denies any chest pain, leg pain, constipation, dysuria, fever, chills, cough, congestion, fever, chills, numbness, weakness.         Review of patient's allergies indicates:   Allergen Reactions    Pcn [penicillins] Swelling    Codeine     Latex, natural rubber     Tylox [oxycodone-acetaminophen]      Past Medical History:   Diagnosis Date    Anxiety     Depression     Depression with anxiety     Family history of heart disease 2016    Hypertension     Irregular heart beat     Migraine headache     Morbid obesity with BMI of 40.0-44.9, adult 2017    NSTEMI (non-ST elevated myocardial infarction) 2017     Past Surgical History:   Procedure Laterality Date    APPENDECTOMY       SECTION      HYSTERECTOMY       Family History   Problem Relation Age of Onset    Hypertension Unknown     Heart disease Father 70    Hypertension Father     Heart attack Mother 40        triple bypass    Diabetes Mother     Hypertension Mother     Stroke Mother     Heart attack Brother 40        CABG x 2     Social History     Tobacco Use    Smoking status: Never Smoker    Smokeless tobacco: Never Used   Substance Use Topics     Alcohol use: No     Alcohol/week: 0.0 standard drinks     Comment: denies    Drug use: No     Comment: denies     Review of Systems   Constitutional: Negative for diaphoresis and fever.   HENT: Negative for congestion, dental problem and sore throat.    Eyes: Negative for pain and visual disturbance.   Respiratory: Positive for shortness of breath. Negative for cough.    Cardiovascular: Negative for chest pain and palpitations.   Gastrointestinal: Positive for abdominal pain, diarrhea, nausea and vomiting.   Genitourinary: Negative for dysuria and flank pain.   Musculoskeletal: Negative for back pain and neck pain.   Skin: Negative for rash and wound.   Neurological: Negative for weakness, numbness and headaches.   Psychiatric/Behavioral: Negative for agitation and confusion.       Physical Exam     Initial Vitals [01/29/22 1917]   BP Pulse Resp Temp SpO2   (!) 138/103 93 18 98.3 °F (36.8 °C) 98 %      MAP       --         Physical Exam    Constitutional:   Obese. Sitting up in bed with vomit bag up to mouth.    HENT:   Head: Normocephalic and atraumatic.   Mouth/Throat: Oropharynx is clear and moist. No oropharyngeal exudate.   Poor dentition.    Eyes: EOM are normal. Pupils are equal, round, and reactive to light.   Neck: Neck supple.   Normal range of motion.  Cardiovascular: Normal rate and regular rhythm.   Pulmonary/Chest: Breath sounds normal. No respiratory distress.   Abdominal: She exhibits no distension. There is abdominal tenderness.   Epigastric   Musculoskeletal:      Cervical back: Normal range of motion and neck supple.     Neurological: She is alert and oriented to person, place, and time.   Skin: Skin is warm and dry.   Psychiatric: She has a normal mood and affect.         ED Course   Procedures  Labs Reviewed   CBC W/ AUTO DIFFERENTIAL - Abnormal; Notable for the following components:       Result Value    WBC 13.70 (*)     Hemoglobin 11.9 (*)     Hematocrit 35.3 (*)     MCV 81 (*)     RDW 15.9  (*)     Gran # (ANC) 12.2 (*)     Immature Grans (Abs) 0.07 (*)     Gran % 89.0 (*)     Lymph % 6.9 (*)     Mono % 2.8 (*)     All other components within normal limits   COMPREHENSIVE METABOLIC PANEL - Abnormal; Notable for the following components:    Glucose 124 (*)     All other components within normal limits   CBC WITHOUT DIFFERENTIAL - Abnormal; Notable for the following components:    WBC 14.50 (*)     MCV 80 (*)     MCH 26.1 (*)     RDW 15.9 (*)     All other components within normal limits   SARS-COV-2 RDRP GENE - Normal    Narrative:     This test utilizes isothermal nucleic acid amplification   technology to detect the SARS-CoV-2 RdRp nucleic acid segment.   The analytical sensitivity (limit of detection) is 125 genome   equivalents/mL.   A POSITIVE result implies infection with the SARS-CoV-2 virus;   the patient is presumed to be contagious.     A NEGATIVE result means that SARS-CoV-2 nucleic acids are not   present above the limit of detection. A NEGATIVE result should be   treated as presumptive. It does not rule out the possibility of   COVID-19 and should not be the sole basis for treatment decisions.   If COVID-19 is strongly suspected based on clinical and exposure   history, re-testing using an alternate molecular assay should be   considered.   This test is only for use under the Food and Drug   Administration s Emergency Use Authorization (EUA).   Commercial kits are provided by [employer].   Performance characteristics of the EUA have been independently   verified by [facility] Department of   Pathology and Laboratory Medicine.   _________________________________________________________________   The authorized Fact Sheet for Healthcare Providers and the authorized Fact   Sheet for Patients of the ID NOW COVID-19 are available on the FDA   website:     https://www.fda.gov/media/001684/download  https://www.fda.gov/media/892120/download       B-TYPE NATRIURETIC PEPTIDE    CBC W/ AUTO DIFFERENTIAL   COMPREHENSIVE METABOLIC PANEL   LIPASE   TROPONIN I   B-TYPE NATRIURETIC PEPTIDE   LIPASE   PLATELET REVIEW     EKG Readings: (Independently Interpreted)   89 BPM. NSR. Normal axis. Normal IA, QRS. QTc is prolonged. No STEMI. No significant changes from previous.        Imaging Results          X-Ray Chest AP Portable (Final result)  Result time 01/29/22 20:05:45    Final result by Darrell Cole MD (01/29/22 20:05:45)                 Impression:      No acute abnormality.      Electronically signed by: Darrell Cole  Date:    01/29/2022  Time:    20:05             Narrative:    EXAMINATION:  XR CHEST AP PORTABLE    CLINICAL HISTORY:  dyspnea;    TECHNIQUE:  Single frontal view of the chest was performed.    COMPARISON:  Multiple priors.    FINDINGS:  The lungs are clear, with normal appearance of pulmonary vasculature and no pleural effusion or pneumothorax.    The cardiac silhouette is normal in size. The hilar and mediastinal contours are unremarkable.    Bones are intact.                                 Medications   lactated ringers bolus 1,000 mL (0 mLs Intravenous Stopped 1/29/22 2130)   pantoprazole injection 40 mg (40 mg Intravenous Given 1/29/22 2146)   promethazine (PHENERGAN) 12.5 mg in dextrose 5 % 50 mL IVPB (0 mg Intravenous Stopped 1/30/22 0020)                 ED Course as of 01/30/22 0715   Sun Jan 30, 2022   0051 12:52 AM Reassessment: I reassessed the pt.  The pt is resting comfortably and is NAD.  Pt states their sx have improved. Discussed test results, shared treatment plan, specific conditions for return, and the need for f/u.  Answered their questions at this time.  Pt understands and agrees to the plan.  The pt has remained hemodynamically stable through ED course and is stable for discharge.    [BA]      ED Course User Index  [BA] Jacob Nagy MD             Clinical Impression:   Final diagnoses:  [R06.00] REHMAN (dyspnea on exertion)  [R11.2, R19.7] Nausea  vomiting and diarrhea (Primary)  [R10.13] Epigastric pain          ED Disposition Condition    Discharge Stable        ED Prescriptions     Medication Sig Dispense Start Date End Date Auth. Provider    pantoprazole (PROTONIX) 40 MG tablet Take 1 tablet (40 mg total) by mouth once daily. 30 tablet 1/30/2022 3/1/2022 Jacob Nagy MD    promethazine (PHENERGAN) 25 MG tablet Take 1 tablet (25 mg total) by mouth every 6 (six) hours as needed for Nausea. 15 tablet 1/30/2022  Jacob Nagy MD        Follow-up Information     Follow up With Specialties Details Why Contact Info    Your Primary Care Provider  Schedule an appointment as soon as possible for a visit in 2 days For re-evaluation and further treatment     [facility] - Emergency Dept. Emergency Medicine Go today If symptoms worsen, For re-evaluation and further treatment, As needed [id number] [facility] [id number] [phone]           Jacob Nagy MD  01/30/22 0715

## 2022-11-01 ENCOUNTER — HOSPITAL ENCOUNTER (EMERGENCY)
Facility: HOSPITAL | Age: 57
Discharge: HOME | End: 2022-11-01
Attending: EMERGENCY MEDICINE
Payer: MEDICAID

## 2022-11-01 VITALS
OXYGEN SATURATION: 97 % | DIASTOLIC BLOOD PRESSURE: 92 MMHG | HEART RATE: 86 BPM | SYSTOLIC BLOOD PRESSURE: 190 MMHG | TEMPERATURE: 99 F | RESPIRATION RATE: 20 BRPM

## 2022-11-01 DIAGNOSIS — R07.9 CHEST PAIN: ICD-10-CM

## 2022-11-01 DIAGNOSIS — I10 PRIMARY HYPERTENSION: ICD-10-CM

## 2022-11-01 DIAGNOSIS — J40 BRONCHITIS: Primary | ICD-10-CM

## 2022-11-01 LAB
ALBUMIN SERPL BCP-MCNC: 3.7 G/DL (ref 3.5–5.2)
ALP SERPL-CCNC: 120 U/L (ref 55–135)
ALT SERPL W/O P-5'-P-CCNC: 58 U/L (ref 10–44)
ANION GAP SERPL CALC-SCNC: 9 MMOL/L (ref 8–16)
AST SERPL-CCNC: 42 U/L (ref 10–40)
BASOPHILS # BLD AUTO: 0.03 K/UL (ref 0–0.2)
BASOPHILS NFR BLD: 0.3 % (ref 0–1.9)
BILIRUB SERPL-MCNC: 0.4 MG/DL (ref 0.1–1)
BNP SERPL-MCNC: 32 PG/ML (ref 0–99)
BUN SERPL-MCNC: 11 MG/DL (ref 6–20)
CALCIUM SERPL-MCNC: 9.8 MG/DL (ref 8.7–10.5)
CHLORIDE SERPL-SCNC: 104 MMOL/L (ref 95–110)
CO2 SERPL-SCNC: 26 MMOL/L (ref 23–29)
CREAT SERPL-MCNC: 0.6 MG/DL (ref 0.5–1.4)
CTP QC/QA: YES
CTP QC/QA: YES
DIFFERENTIAL METHOD: ABNORMAL
EOSINOPHIL # BLD AUTO: 0.2 K/UL (ref 0–0.5)
EOSINOPHIL NFR BLD: 1.8 % (ref 0–8)
ERYTHROCYTE [DISTWIDTH] IN BLOOD BY AUTOMATED COUNT: 14.9 % (ref 11.5–14.5)
EST. GFR  (NO RACE VARIABLE): >60 ML/MIN/1.73 M^2
GLUCOSE SERPL-MCNC: 88 MG/DL (ref 70–110)
HCT VFR BLD AUTO: 35.3 % (ref 37–48.5)
HGB BLD-MCNC: 11.6 G/DL (ref 12–16)
IMM GRANULOCYTES # BLD AUTO: 0.1 K/UL (ref 0–0.04)
IMM GRANULOCYTES NFR BLD AUTO: 0.9 % (ref 0–0.5)
LYMPHOCYTES # BLD AUTO: 1.6 K/UL (ref 1–4.8)
LYMPHOCYTES NFR BLD: 14.2 % (ref 18–48)
MCH RBC QN AUTO: 28.6 PG (ref 27–31)
MCHC RBC AUTO-ENTMCNC: 32.9 G/DL (ref 32–36)
MCV RBC AUTO: 87 FL (ref 82–98)
MONOCYTES # BLD AUTO: 0.7 K/UL (ref 0.3–1)
MONOCYTES NFR BLD: 6.1 % (ref 4–15)
NEUTROPHILS # BLD AUTO: 8.4 K/UL (ref 1.8–7.7)
NEUTROPHILS NFR BLD: 76.7 % (ref 38–73)
NRBC BLD-RTO: 0 /100 WBC
PLATELET # BLD AUTO: 313 K/UL (ref 150–450)
PMV BLD AUTO: 9.5 FL (ref 9.2–12.9)
POC MOLECULAR INFLUENZA A AGN: NEGATIVE
POC MOLECULAR INFLUENZA B AGN: NEGATIVE
POTASSIUM SERPL-SCNC: 3.9 MMOL/L (ref 3.5–5.1)
PROT SERPL-MCNC: 8.3 G/DL (ref 6–8.4)
RBC # BLD AUTO: 4.05 M/UL (ref 4–5.4)
SARS-COV-2 RDRP RESP QL NAA+PROBE: NEGATIVE
SODIUM SERPL-SCNC: 139 MMOL/L (ref 136–145)
TROPONIN I SERPL DL<=0.01 NG/ML-MCNC: 0.01 NG/ML (ref 0–0.03)
WBC # BLD AUTO: 10.95 K/UL (ref 3.9–12.7)

## 2022-11-01 PROCEDURE — 87635 SARS-COV-2 COVID-19 AMP PRB: CPT | Performed by: EMERGENCY MEDICINE

## 2022-11-01 PROCEDURE — 93010 ELECTROCARDIOGRAM REPORT: CPT | Mod: ,,, | Performed by: INTERNAL MEDICINE

## 2022-11-01 PROCEDURE — 93005 ELECTROCARDIOGRAM TRACING: CPT

## 2022-11-01 PROCEDURE — 25000242 PHARM REV CODE 250 ALT 637 W/ HCPCS: Performed by: EMERGENCY MEDICINE

## 2022-11-01 PROCEDURE — 80053 COMPREHEN METABOLIC PANEL: CPT | Performed by: EMERGENCY MEDICINE

## 2022-11-01 PROCEDURE — 99285 EMERGENCY DEPT VISIT HI MDM: CPT | Mod: 25

## 2022-11-01 PROCEDURE — 85025 COMPLETE CBC W/AUTO DIFF WBC: CPT | Performed by: EMERGENCY MEDICINE

## 2022-11-01 PROCEDURE — 96374 THER/PROPH/DIAG INJ IV PUSH: CPT

## 2022-11-01 PROCEDURE — 25000003 PHARM REV CODE 250: Performed by: EMERGENCY MEDICINE

## 2022-11-01 PROCEDURE — 94640 AIRWAY INHALATION TREATMENT: CPT | Mod: XB

## 2022-11-01 PROCEDURE — 87502 INFLUENZA DNA AMP PROBE: CPT

## 2022-11-01 PROCEDURE — 63600175 PHARM REV CODE 636 W HCPCS: Performed by: EMERGENCY MEDICINE

## 2022-11-01 PROCEDURE — 83880 ASSAY OF NATRIURETIC PEPTIDE: CPT | Performed by: EMERGENCY MEDICINE

## 2022-11-01 PROCEDURE — 93010 EKG 12-LEAD: ICD-10-PCS | Mod: ,,, | Performed by: INTERNAL MEDICINE

## 2022-11-01 PROCEDURE — 84484 ASSAY OF TROPONIN QUANT: CPT | Performed by: EMERGENCY MEDICINE

## 2022-11-01 RX ORDER — FLUTICASONE PROPIONATE 50 MCG
1 SPRAY, SUSPENSION (ML) NASAL 2 TIMES DAILY PRN
Qty: 15 G | Refills: 0 | Status: SHIPPED | OUTPATIENT
Start: 2022-11-01

## 2022-11-01 RX ORDER — OXYMETAZOLINE HCL 0.05 %
2 SPRAY, NON-AEROSOL (ML) NASAL
Status: COMPLETED | OUTPATIENT
Start: 2022-11-01 | End: 2022-11-01

## 2022-11-01 RX ORDER — METHYLPREDNISOLONE SOD SUCC 125 MG
125 VIAL (EA) INJECTION
Status: COMPLETED | OUTPATIENT
Start: 2022-11-01 | End: 2022-11-01

## 2022-11-01 RX ORDER — AMLODIPINE BESYLATE 10 MG/1
10 TABLET ORAL DAILY
Qty: 30 TABLET | Refills: 0 | Status: SHIPPED | OUTPATIENT
Start: 2022-11-01 | End: 2023-11-01

## 2022-11-01 RX ORDER — PREDNISONE 10 MG/1
10 TABLET ORAL DAILY
Qty: 21 TABLET | Refills: 0 | Status: SHIPPED | OUTPATIENT
Start: 2022-11-01

## 2022-11-01 RX ORDER — IPRATROPIUM BROMIDE AND ALBUTEROL SULFATE 2.5; .5 MG/3ML; MG/3ML
3 SOLUTION RESPIRATORY (INHALATION)
Status: COMPLETED | OUTPATIENT
Start: 2022-11-01 | End: 2022-11-01

## 2022-11-01 RX ORDER — ALBUTEROL SULFATE 90 UG/1
1-2 AEROSOL, METERED RESPIRATORY (INHALATION) EVERY 6 HOURS PRN
Qty: 18 G | Refills: 0 | OUTPATIENT
Start: 2022-11-01 | End: 2022-11-10

## 2022-11-01 RX ORDER — CLONIDINE HYDROCHLORIDE 0.2 MG/1
0.2 TABLET ORAL
Status: COMPLETED | OUTPATIENT
Start: 2022-11-01 | End: 2022-11-01

## 2022-11-01 RX ADMIN — CLONIDINE HYDROCHLORIDE 0.2 MG: 0.2 TABLET ORAL at 06:11

## 2022-11-01 RX ADMIN — OXYMETAZOLINE HYDROCHLORIDE 2 SPRAY: 5 SPRAY NASAL at 05:11

## 2022-11-01 RX ADMIN — IPRATROPIUM BROMIDE AND ALBUTEROL SULFATE 3 ML: 2.5; .5 SOLUTION RESPIRATORY (INHALATION) at 05:11

## 2022-11-01 RX ADMIN — METHYLPREDNISOLONE SODIUM SUCCINATE 125 MG: 125 INJECTION, POWDER, FOR SOLUTION INTRAMUSCULAR; INTRAVENOUS at 05:11

## 2022-11-01 NOTE — DISCHARGE INSTRUCTIONS
Increase your Norvasc (amlodipine) to 10 mg daily.  Right now you are taking 5 mg but increase it to 10 mg daily for better blood pressure control    Follow-up with your doctor in 1-2 days for recheck.

## 2022-11-01 NOTE — ED PROVIDER NOTES
SCRIBE #1 NOTE: I, Nena Harris, am scribing for, and in the presence of, Harris Joseph Do, MD. I have scribed the entire note.       History     Chief Complaint   Patient presents with    Chest Pain     Patient states she has been having a cough, chest pain, and shortness of breath ongoing 3 weeks.      Review of patient's allergies indicates:   Allergen Reactions    Pcn [penicillins] Swelling    Codeine     Latex, natural rubber     Tylox [oxycodone-acetaminophen]          History of Present Illness     HPI    2022, 5:15 PM  History obtained from the patient      History of Present Illness: Judy Ruelas is a 57 y.o. female patient with a PMHx of anxiety, HTN, and NSTEMI who presents to the Emergency Department for evaluation of CP and SOB which onset gradually 3 weeks ago. Pt is not a smoker, but lives with a smoker. Associated sxs include a productive cough. Symptoms are constant and moderate in severity. No mitigating or exacerbating factors reported. Patient denies any n/v/d, fever, chills, and all other sxs at this time. No further complaints or concerns at this time.       Arrival mode: Ambulance Service    PCP: [facility]        Past Medical History:  Past Medical History:   Diagnosis Date    Anxiety     Depression     Depression with anxiety     Family history of heart disease 2016    Hypertension     Irregular heart beat     Migraine headache     Morbid obesity with BMI of 40.0-44.9, adult 2017    NSTEMI (non-ST elevated myocardial infarction) 2017       Past Surgical History:  Past Surgical History:   Procedure Laterality Date    APPENDECTOMY       SECTION      HYSTERECTOMY           Family History:  Family History   Problem Relation Age of Onset    Hypertension Unknown     Heart disease Father 70    Hypertension Father     Heart attack Mother 40        triple bypass    Diabetes Mother     Hypertension Mother     Stroke Mother     Heart attack  Brother 40        CABG x 2       Social History:  Social History     Tobacco Use    Smoking status: Never    Smokeless tobacco: Never   Substance and Sexual Activity    Alcohol use: No     Alcohol/week: 0.0 standard drinks     Comment: denies    Drug use: No     Comment: denies    Sexual activity: Not Currently        Review of Systems     Review of Systems   Constitutional:  Negative for chills and fever.   HENT:  Negative for sore throat.    Respiratory:  Positive for cough (productive) and shortness of breath.    Cardiovascular:  Positive for chest pain.   Gastrointestinal:  Negative for diarrhea, nausea and vomiting.   Genitourinary:  Negative for dysuria.   Musculoskeletal:  Negative for back pain.   Skin:  Negative for rash.   Neurological:  Negative for weakness.   Hematological:  Does not bruise/bleed easily.   All other systems reviewed and are negative.     Physical Exam     Initial Vitals   BP Pulse Resp Temp SpO2   11/01/22 1614 11/01/22 1614 11/01/22 1614 11/01/22 1618 11/01/22 1614   (!) 190/106 93 16 99.3 °F (37.4 °C) 97 %      MAP       --                 Physical Exam  Nursing Notes and Vital Signs Reviewed.  Constitutional: Patient is in moderate distress.   Head: Atraumatic. Normocephalic.  Eyes: PERRL. EOM intact. Conjunctivae are not pale. No scleral icterus.  ENT: Mucous membranes are moist. Oropharynx is clear and symmetric.    Neck: Supple. Full ROM.  Cardiovascular: Regular rate. Regular rhythm. No murmurs, rubs, or gallops. Distal pulses are 2+ and symmetric.  Pulmonary/Chest: Course breath sounds. Diffuse wheezing.  Abdominal: Soft and non-distended.  There is no tenderness.  No rebound, guarding, or rigidity.   Genitourinary: No CVA tenderness  Musculoskeletal: Moves all extremities. No obvious deformities. No edema.  Skin: Warm and dry.  Neurological:  Alert, awake, and appropriate.  Normal speech.  No acute focal neurological deficits are appreciated.  Psychiatric: Anxious. Good eye  contact. Appropriate in content.     ED Course   Procedures  ED Vital Signs:  Vitals:    11/01/22 1614 11/01/22 1618 11/01/22 1715 11/01/22 1719   BP: (!) 190/106      Pulse: 93  90 92   Resp: 16  20 20   Temp:  99.3 °F (37.4 °C)     TempSrc: Oral Oral     SpO2: 97%  97% 100%    11/01/22 1724   BP:    Pulse: 98   Resp: 20   Temp:    TempSrc:    SpO2: 100%       Abnormal Lab Results:  Labs Reviewed   CBC W/ AUTO DIFFERENTIAL - Abnormal; Notable for the following components:       Result Value    Hemoglobin 11.6 (*)     Hematocrit 35.3 (*)     RDW 14.9 (*)     Immature Granulocytes 0.9 (*)     Gran # (ANC) 8.4 (*)     Immature Grans (Abs) 0.10 (*)     Gran % 76.7 (*)     Lymph % 14.2 (*)     All other components within normal limits   COMPREHENSIVE METABOLIC PANEL - Abnormal; Notable for the following components:    AST 42 (*)     ALT 58 (*)     All other components within normal limits   TROPONIN I   B-TYPE NATRIURETIC PEPTIDE   SARS-COV-2 RDRP GENE    Narrative:     This test utilizes isothermal nucleic acid amplification   technology to detect the SARS-CoV-2 RdRp nucleic acid segment.   The analytical sensitivity (limit of detection) is 125 genome   equivalents/mL.   A POSITIVE result implies infection with the SARS-CoV-2 virus;   the patient is presumed to be contagious.     A NEGATIVE result means that SARS-CoV-2 nucleic acids are not   present above the limit of detection. A NEGATIVE result should be   treated as presumptive. It does not rule out the possibility of   COVID-19 and should not be the sole basis for treatment decisions.   If COVID-19 is strongly suspected based on clinical and exposure   history, re-testing using an alternate molecular assay should be   considered.   This test is only for use under the Food and Drug   Administration s Emergency Use Authorization (EUA).   Commercial kits are provided by [employer].   Performance characteristics of the EUA have been independently   verified by  [facility] Department of   Pathology and Laboratory Medicine.   _________________________________________________________________   The authorized Fact Sheet for Healthcare Providers and the authorized Fact   Sheet for Patients of the ID NOW COVID-19 are available on the FDA   website:     https://www.fda.gov/media/341005/download  https://www.fda.gov/media/449589/download          POCT INFLUENZA A/B MOLECULAR        All Lab Results:  Results for orders placed or performed during the hospital encounter of 11/01/22   CBC auto differential   Result Value Ref Range    WBC 10.95 3.90 - 12.70 K/uL    RBC 4.05 4.00 - 5.40 M/uL    Hemoglobin 11.6 (L) 12.0 - 16.0 g/dL    Hematocrit 35.3 (L) 37.0 - 48.5 %    MCV 87 82 - 98 fL    MCH 28.6 27.0 - 31.0 pg    MCHC 32.9 32.0 - 36.0 g/dL    RDW 14.9 (H) 11.5 - 14.5 %    Platelets 313 150 - 450 K/uL    MPV 9.5 9.2 - 12.9 fL    Immature Granulocytes 0.9 (H) 0.0 - 0.5 %    Gran # (ANC) 8.4 (H) 1.8 - 7.7 K/uL    Immature Grans (Abs) 0.10 (H) 0.00 - 0.04 K/uL    Lymph # 1.6 1.0 - 4.8 K/uL    Mono # 0.7 0.3 - 1.0 K/uL    Eos # 0.2 0.0 - 0.5 K/uL    Baso # 0.03 0.00 - 0.20 K/uL    nRBC 0 0 /100 WBC    Gran % 76.7 (H) 38.0 - 73.0 %    Lymph % 14.2 (L) 18.0 - 48.0 %    Mono % 6.1 4.0 - 15.0 %    Eosinophil % 1.8 0.0 - 8.0 %    Basophil % 0.3 0.0 - 1.9 %    Differential Method Automated    Comprehensive metabolic panel   Result Value Ref Range    Sodium 139 136 - 145 mmol/L    Potassium 3.9 3.5 - 5.1 mmol/L    Chloride 104 95 - 110 mmol/L    CO2 26 23 - 29 mmol/L    Glucose 88 70 - 110 mg/dL    BUN 11 6 - 20 mg/dL    Creatinine 0.6 0.5 - 1.4 mg/dL    Calcium 9.8 8.7 - 10.5 mg/dL    Total Protein 8.3 6.0 - 8.4 g/dL    Albumin 3.7 3.5 - 5.2 g/dL    Total Bilirubin 0.4 0.1 - 1.0 mg/dL    Alkaline Phosphatase 120 55 - 135 U/L    AST 42 (H) 10 - 40 U/L    ALT 58 (H) 10 - 44 U/L    Anion Gap 9 8 - 16 mmol/L    eGFR >60 >60 mL/min/1.73 m^2   Troponin I #1   Result Value Ref Range     Troponin I 0.011 0.000 - 0.026 ng/mL   BNP   Result Value Ref Range    BNP 32 0 - 99 pg/mL   POCT COVID-19 Rapid Screening   Result Value Ref Range    POC Rapid COVID Negative Negative     Acceptable Yes    POCT Influenza A/B Molecular   Result Value Ref Range    POC Molecular Influenza A Ag Negative Negative, Not Reported    POC Molecular Influenza B Ag Negative Negative, Not Reported     Acceptable Yes          Imaging Results:  Imaging Results              X-Ray Chest AP Portable (Final result)  Result time 11/01/22 17:12:55      Final result by Ina Jimenez MD (11/01/22 17:12:55)                   Impression:      No acute abnormality.  No definite change.  Stable findings      Electronically signed by: Tony Buckley  Date:    11/01/2022  Time:    17:12               Narrative:    EXAMINATION:  XR CHEST AP PORTABLE    CLINICAL HISTORY:  Chest Pain;    TECHNIQUE:  Single frontal view of the chest was performed.    COMPARISON:  Prior    FINDINGS:  Central pulmonary vascular engorgement.  Similar findings to prior exam.The lungs are clear, with normal appearance of pulmonary vasculature and no pleural effusion or pneumothorax.    The cardiac silhouette is normal in size. The hilar and mediastinal contours are unremarkable.    Bones are intact.                                       The EKG was ordered, reviewed, and independently interpreted by the ED provider.  Interpretation time: 17:31  Rate: 88 BPM  Rhythm: normal sinus rhythm  Interpretation: Right bundle branch block. No STEMI.             The Emergency Provider reviewed the vital signs and test results, which are outlined above.     ED Discussion     5:40 PM: Re-evaluated pt. Patient is in no acute distress and is no longer wheezing. D/w pt all pertinent results. D/w pt any concerns expressed at this time. Answered all questions. Pt expresses understanding at this time.    6:33 PM: Reassessed pt at this time. Discussed with pt  all pertinent ED information and results. Discussed pt dx and plan of tx. Gave pt all f/u and return to the ED instructions. All questions and concerns were addressed at this time. Pt expresses understanding of information and instructions, and is comfortable with plan to discharge. Pt is stable for discharge.    I discussed with patient and/or family/caretaker that evaluation in the ED does not suggest any emergent or life threatening medical conditions requiring immediate intervention beyond what was provided in the ED, and I believe patient is safe for discharge.  Regardless, an unremarkable evaluation in the ED does not preclude the development or presence of a serious of life threatening condition. As such, patient was instructed to return immediately for any worsening or change in current symptoms.      ED Course as of 11/01/22 1852 Tue Nov 01, 2022   1840 Patient is stable at this time she is breathing a lot better.  She can breathe through her nostrils and did not even get the Afrin spray.  COVID negative flu negative and x-ray did not show any type of pneumonia.  Plan to discharge her home steroids and albuterol.  Her blood pressure is elevated 190/90.  She reports that she is on 4 blood pressure medication and takes some daily.  She reports despite that her blood pressure runs about 160-180.  Encouraged her to follow-up with the primary care doctor for better blood pressure control [TD]      ED Course User Index  [TD] Harris Joseph Do, MD     Medical Decision Making:   Clinical Tests:   Lab Tests: Ordered and Reviewed  Radiological Study: Ordered and Reviewed  Medical Tests: Ordered and Reviewed         ED Medication(s):  Medications   oxymetazoline 0.05 % nasal spray 2 spray (has no administration in time range)   cloNIDine tablet 0.2 mg (has no administration in time range)   albuterol-ipratropium 2.5 mg-0.5 mg/3 mL nebulizer solution 3 mL (3 mLs Nebulization Given 11/1/22 3979)   methylPREDNISolone  sodium succinate injection 125 mg (125 mg Intravenous Given 11/1/22 1700)       New Prescriptions    ALBUTEROL (PROVENTIL/VENTOLIN HFA) 90 MCG/ACTUATION INHALER    Inhale 1-2 puffs into the lungs every 6 (six) hours as needed for Wheezing. Rescue    AMLODIPINE (NORVASC) 10 MG TABLET    Take 1 tablet (10 mg total) by mouth once daily.    FLUTICASONE PROPIONATE (FLONASE) 50 MCG/ACTUATION NASAL SPRAY    1 spray (50 mcg total) by Each Nostril route 2 (two) times daily as needed for Rhinitis.    PREDNISONE (DELTASONE) 10 MG TABLET    Take 1 tablet (10 mg total) by mouth once daily. Take 4 tabs x 3 days, then take 2 tabs x 3 days, then take 1 tab x 3 days.        Follow-up Information       Please follow up.    Contact information:  Follow-up with your primary care doctor in 1-2 days for recheck.                               Scribe Attestation:   Scribe #1: I performed the above scribed service and the documentation accurately describes the services I performed. I attest to the accuracy of the note.     Attending:   Physician Attestation Statement for Scribe #1: I, Harris Joseph Do, MD, personally performed the services described in this documentation, as scribed by Nena Harris, in my presence, and it is both accurate and complete.           Clinical Impression       ICD-10-CM ICD-9-CM   1. Bronchitis  J40 490   2. Chest pain  R07.9 786.50   3. Primary hypertension  I10 401.9       Disposition:   Disposition: Discharged  Condition: Stable       Harris Joseph Do, MD  11/01/22 0854

## 2022-11-09 ENCOUNTER — HOSPITAL ENCOUNTER (EMERGENCY)
Facility: HOSPITAL | Age: 57
Discharge: HOME | End: 2022-11-10
Attending: EMERGENCY MEDICINE
Payer: MEDICAID

## 2022-11-09 DIAGNOSIS — R06.02 SOB (SHORTNESS OF BREATH): Primary | ICD-10-CM

## 2022-11-09 DIAGNOSIS — R07.9 CHEST PAIN: ICD-10-CM

## 2022-11-09 LAB
ALBUMIN SERPL BCP-MCNC: 3.6 G/DL (ref 3.5–5.2)
ALP SERPL-CCNC: 117 U/L (ref 55–135)
ALT SERPL W/O P-5'-P-CCNC: 49 U/L (ref 10–44)
ANION GAP SERPL CALC-SCNC: 12 MMOL/L (ref 8–16)
AST SERPL-CCNC: 23 U/L (ref 10–40)
BASOPHILS # BLD AUTO: 0.03 K/UL (ref 0–0.2)
BASOPHILS NFR BLD: 0.2 % (ref 0–1.9)
BILIRUB SERPL-MCNC: 0.6 MG/DL (ref 0.1–1)
BNP SERPL-MCNC: 16 PG/ML (ref 0–99)
BUN SERPL-MCNC: 11 MG/DL (ref 6–20)
CALCIUM SERPL-MCNC: 9.3 MG/DL (ref 8.7–10.5)
CHLORIDE SERPL-SCNC: 104 MMOL/L (ref 95–110)
CO2 SERPL-SCNC: 22 MMOL/L (ref 23–29)
CREAT SERPL-MCNC: 0.6 MG/DL (ref 0.5–1.4)
D DIMER PPP IA.FEU-MCNC: 1.02 MG/L FEU
DIFFERENTIAL METHOD: ABNORMAL
EOSINOPHIL # BLD AUTO: 0.2 K/UL (ref 0–0.5)
EOSINOPHIL NFR BLD: 1.9 % (ref 0–8)
ERYTHROCYTE [DISTWIDTH] IN BLOOD BY AUTOMATED COUNT: 15 % (ref 11.5–14.5)
EST. GFR  (NO RACE VARIABLE): >60 ML/MIN/1.73 M^2
GLUCOSE SERPL-MCNC: 98 MG/DL (ref 70–110)
HCT VFR BLD AUTO: 38.4 % (ref 37–48.5)
HGB BLD-MCNC: 12.6 G/DL (ref 12–16)
IMM GRANULOCYTES # BLD AUTO: 0.07 K/UL (ref 0–0.04)
IMM GRANULOCYTES NFR BLD AUTO: 0.6 % (ref 0–0.5)
INFLUENZA A, MOLECULAR: NEGATIVE
INFLUENZA B, MOLECULAR: NEGATIVE
LYMPHOCYTES # BLD AUTO: 1.7 K/UL (ref 1–4.8)
LYMPHOCYTES NFR BLD: 13.5 % (ref 18–48)
MCH RBC QN AUTO: 28.3 PG (ref 27–31)
MCHC RBC AUTO-ENTMCNC: 32.8 G/DL (ref 32–36)
MCV RBC AUTO: 86 FL (ref 82–98)
MONOCYTES # BLD AUTO: 0.6 K/UL (ref 0.3–1)
MONOCYTES NFR BLD: 5.2 % (ref 4–15)
NEUTROPHILS # BLD AUTO: 9.6 K/UL (ref 1.8–7.7)
NEUTROPHILS NFR BLD: 78.6 % (ref 38–73)
NRBC BLD-RTO: 0 /100 WBC
PLATELET # BLD AUTO: 346 K/UL (ref 150–450)
PMV BLD AUTO: 9.5 FL (ref 9.2–12.9)
POTASSIUM SERPL-SCNC: 3.3 MMOL/L (ref 3.5–5.1)
PROT SERPL-MCNC: 8 G/DL (ref 6–8.4)
RBC # BLD AUTO: 4.45 M/UL (ref 4–5.4)
SODIUM SERPL-SCNC: 138 MMOL/L (ref 136–145)
SPECIMEN SOURCE: NORMAL
TROPONIN I SERPL DL<=0.01 NG/ML-MCNC: <0.006 NG/ML (ref 0–0.03)
WBC # BLD AUTO: 12.2 K/UL (ref 3.9–12.7)

## 2022-11-09 PROCEDURE — 25500020 PHARM REV CODE 255: Performed by: EMERGENCY MEDICINE

## 2022-11-09 PROCEDURE — 84484 ASSAY OF TROPONIN QUANT: CPT | Performed by: EMERGENCY MEDICINE

## 2022-11-09 PROCEDURE — 85025 COMPLETE CBC W/AUTO DIFF WBC: CPT | Performed by: EMERGENCY MEDICINE

## 2022-11-09 PROCEDURE — 99285 EMERGENCY DEPT VISIT HI MDM: CPT | Mod: 25

## 2022-11-09 PROCEDURE — 93010 ELECTROCARDIOGRAM REPORT: CPT | Mod: ,,, | Performed by: INTERNAL MEDICINE

## 2022-11-09 PROCEDURE — 93010 EKG 12-LEAD: ICD-10-PCS | Mod: ,,, | Performed by: INTERNAL MEDICINE

## 2022-11-09 PROCEDURE — 93005 ELECTROCARDIOGRAM TRACING: CPT

## 2022-11-09 PROCEDURE — 85379 FIBRIN DEGRADATION QUANT: CPT | Performed by: EMERGENCY MEDICINE

## 2022-11-09 PROCEDURE — 25000242 PHARM REV CODE 250 ALT 637 W/ HCPCS: Performed by: EMERGENCY MEDICINE

## 2022-11-09 PROCEDURE — 83880 ASSAY OF NATRIURETIC PEPTIDE: CPT | Performed by: EMERGENCY MEDICINE

## 2022-11-09 PROCEDURE — 96374 THER/PROPH/DIAG INJ IV PUSH: CPT | Mod: 59

## 2022-11-09 PROCEDURE — 80053 COMPREHEN METABOLIC PANEL: CPT | Performed by: EMERGENCY MEDICINE

## 2022-11-09 PROCEDURE — 87502 INFLUENZA DNA AMP PROBE: CPT | Performed by: EMERGENCY MEDICINE

## 2022-11-09 PROCEDURE — 87502 INFLUENZA DNA AMP PROBE: CPT

## 2022-11-09 RX ORDER — METHYLPREDNISOLONE SOD SUCC 125 MG
125 VIAL (EA) INJECTION
Status: COMPLETED | OUTPATIENT
Start: 2022-11-10 | End: 2022-11-10

## 2022-11-09 RX ORDER — IPRATROPIUM BROMIDE AND ALBUTEROL SULFATE 2.5; .5 MG/3ML; MG/3ML
3 SOLUTION RESPIRATORY (INHALATION)
Status: COMPLETED | OUTPATIENT
Start: 2022-11-09 | End: 2022-11-09

## 2022-11-09 RX ADMIN — IPRATROPIUM BROMIDE AND ALBUTEROL SULFATE 3 ML: 2.5; .5 SOLUTION RESPIRATORY (INHALATION) at 09:11

## 2022-11-09 RX ADMIN — IOHEXOL 100 ML: 350 INJECTION, SOLUTION INTRAVENOUS at 10:11

## 2022-11-10 VITALS
OXYGEN SATURATION: 96 % | WEIGHT: 272.19 LBS | DIASTOLIC BLOOD PRESSURE: 79 MMHG | BODY MASS INDEX: 42.72 KG/M2 | SYSTOLIC BLOOD PRESSURE: 149 MMHG | HEIGHT: 67 IN | TEMPERATURE: 99 F | HEART RATE: 96 BPM | RESPIRATION RATE: 20 BRPM

## 2022-11-10 LAB — TROPONIN I SERPL DL<=0.01 NG/ML-MCNC: <0.006 NG/ML (ref 0–0.03)

## 2022-11-10 PROCEDURE — 63600175 PHARM REV CODE 636 W HCPCS: Performed by: EMERGENCY MEDICINE

## 2022-11-10 PROCEDURE — 84484 ASSAY OF TROPONIN QUANT: CPT | Performed by: EMERGENCY MEDICINE

## 2022-11-10 RX ORDER — ALBUTEROL SULFATE 90 UG/1
1-2 AEROSOL, METERED RESPIRATORY (INHALATION) EVERY 6 HOURS PRN
Qty: 8 G | Refills: 0 | Status: SHIPPED | OUTPATIENT
Start: 2022-11-10 | End: 2023-11-10

## 2022-11-10 RX ORDER — PREDNISONE 10 MG/1
40 TABLET ORAL DAILY
Qty: 20 TABLET | Refills: 0 | Status: SHIPPED | OUTPATIENT
Start: 2022-11-10 | End: 2022-11-15

## 2022-11-10 RX ADMIN — METHYLPREDNISOLONE SODIUM SUCCINATE 125 MG: 125 INJECTION, POWDER, FOR SOLUTION INTRAMUSCULAR; INTRAVENOUS at 12:11

## 2022-11-10 NOTE — ED PROVIDER NOTES
SCRIBE #1 NOTE: I, Shannon Eden, am scribing for, and in the presence of, Jacob Nagy MD. I have scribed the entire note.       History     Chief Complaint   Patient presents with    Shortness of Breath     SOB and anxiety started today, intermittent CP over the last few days      Review of patient's allergies indicates:   Allergen Reactions    Pcn [penicillins] Swelling    Codeine     Latex, natural rubber     Tylox [oxycodone-acetaminophen]          History of Present Illness     HPI    2022, 8:49 PM  History obtained from the patient      History of Present Illness: Judy Ruelas is a 57 y.o. female patient with a family hx of heart disease who presents to the Emergency Department for evaluation of SOB x 3 weeks and intermittent CP x3 days. Pt states she does not smoke but her son who lives with her does. Symptoms are constant and moderate in severity. No mitigating or exacerbating factors reported. Associated sxs include cough. Patient denies any fever, chills, N/V, palpitations, and all other sxs at this time. No prior tx. No further complaints or concerns at this time.       Arrival mode: Ambulance Service    PCP: [facility]        Past Medical History:  Past Medical History:   Diagnosis Date    Anxiety     Depression     Depression with anxiety     Family history of heart disease 2016    Hypertension     Irregular heart beat     Migraine headache     Morbid obesity with BMI of 40.0-44.9, adult 2017    NSTEMI (non-ST elevated myocardial infarction) 2017       Past Surgical History:  Past Surgical History:   Procedure Laterality Date    APPENDECTOMY       SECTION      HYSTERECTOMY           Family History:  Family History   Problem Relation Age of Onset    Hypertension Unknown     Heart disease Father 70    Hypertension Father     Heart attack Mother 40        triple bypass    Diabetes Mother     Hypertension Mother     Stroke Mother     Heart  attack Brother 40        CABG x 2       Social History:  Social History     Tobacco Use    Smoking status: Never    Smokeless tobacco: Never   Substance and Sexual Activity    Alcohol use: No     Alcohol/week: 0.0 standard drinks     Comment: denies    Drug use: No     Comment: denies    Sexual activity: Not Currently        Review of Systems     Review of Systems   Constitutional:  Negative for chills and fever.   HENT:  Negative for congestion and sore throat.    Eyes:  Negative for pain and visual disturbance.   Respiratory:  Positive for cough and shortness of breath.    Cardiovascular:  Positive for chest pain. Negative for palpitations.   Gastrointestinal:  Negative for nausea and vomiting.   Genitourinary:  Negative for dysuria and hematuria.   Musculoskeletal:  Negative for back pain and neck pain.   Skin:  Negative for rash and wound.   Neurological:  Negative for weakness and numbness.   Hematological:  Does not bruise/bleed easily.      Physical Exam     Initial Vitals [11/09/22 2018]   BP Pulse Resp Temp SpO2   (!) 190/90 97 (!) 24 98.9 °F (37.2 °C) 97 %      MAP       --          Physical Exam  Nursing Notes and Vital Signs Reviewed.  Constitutional: Patient is in no acute distress. Well-developed and well-nourished.  Head: Atraumatic. Normocephalic.  Eyes: EOM intact. Conjunctivae are not pale. No scleral icterus.  ENT: Mucous membranes are moist. Oropharynx is clear and symmetric.    Neck: Supple. Full ROM.   Cardiovascular: Regular rate. Regular rhythm. No murmurs, rubs, or gallops. Distal pulses are 2+ and symmetric.  Pulmonary/Chest: Tachypneic. Decreased breath sounds bilaterally. No wheezing or rales.  Abdominal: Soft and non-distended.  There is no tenderness.  No rebound, guarding, or rigidity.   Musculoskeletal: Moves all extremities. No obvious deformities. trace edema.   Skin: Warm and dry.  Neurological:  Alert, awake, and appropriate.  Normal speech.  No acute focal neurological deficits  "are appreciated.  Psychiatric: Normal affect. Good eye contact. Appropriate in content.     ED Course   Procedures  ED Vital Signs:  Vitals:    11/09/22 2018 11/09/22 2115 11/09/22 2125 11/09/22 2130   BP: (!) 190/90      Pulse: 97 89  92   Resp: (!) 24 (!) 30  (!) 25   Temp: 98.9 °F (37.2 °C)      TempSrc: Oral      SpO2: 97%      Weight:   123.5 kg (272 lb 3.2 oz)    Height:   5' 7" (1.702 m)     11/09/22 2133 11/09/22 2147 11/09/22 2203 11/09/22 2228   BP: (!) 155/69  (!) 154/68    Pulse: 91 88 92 99   Resp: (!) 25 (!) 24 (!) 27 (!) 24   Temp:       TempSrc:       SpO2: 95%  99% 96%   Weight:       Height:           Abnormal Lab Results:  Labs Reviewed   COMPREHENSIVE METABOLIC PANEL - Abnormal; Notable for the following components:       Result Value    Potassium 3.3 (*)     CO2 22 (*)     ALT 49 (*)     All other components within normal limits   CBC W/ AUTO DIFFERENTIAL - Abnormal; Notable for the following components:    RDW 15.0 (*)     Immature Granulocytes 0.6 (*)     Gran # (ANC) 9.6 (*)     Immature Grans (Abs) 0.07 (*)     Gran % 78.6 (*)     Lymph % 13.5 (*)     All other components within normal limits   D DIMER, QUANTITATIVE - Abnormal; Notable for the following components:    D-Dimer 1.02 (*)     All other components within normal limits   INFLUENZA A & B BY MOLECULAR   B-TYPE NATRIURETIC PEPTIDE   TROPONIN I   TROPONIN I        All Lab Results:  Results for orders placed or performed during the hospital encounter of 11/09/22   Influenza A & B by Molecular    Specimen: Nasopharyngeal Swab   Result Value Ref Range    Influenza A, Molecular Negative Negative    Influenza B, Molecular Negative Negative    Flu A & B Source Nasal swab    Comprehensive metabolic panel   Result Value Ref Range    Sodium 138 136 - 145 mmol/L    Potassium 3.3 (L) 3.5 - 5.1 mmol/L    Chloride 104 95 - 110 mmol/L    CO2 22 (L) 23 - 29 mmol/L    Glucose 98 70 - 110 mg/dL    BUN 11 6 - 20 mg/dL    Creatinine 0.6 0.5 - 1.4 mg/dL "    Calcium 9.3 8.7 - 10.5 mg/dL    Total Protein 8.0 6.0 - 8.4 g/dL    Albumin 3.6 3.5 - 5.2 g/dL    Total Bilirubin 0.6 0.1 - 1.0 mg/dL    Alkaline Phosphatase 117 55 - 135 U/L    AST 23 10 - 40 U/L    ALT 49 (H) 10 - 44 U/L    Anion Gap 12 8 - 16 mmol/L    eGFR >60 >60 mL/min/1.73 m^2   CBC auto differential   Result Value Ref Range    WBC 12.20 3.90 - 12.70 K/uL    RBC 4.45 4.00 - 5.40 M/uL    Hemoglobin 12.6 12.0 - 16.0 g/dL    Hematocrit 38.4 37.0 - 48.5 %    MCV 86 82 - 98 fL    MCH 28.3 27.0 - 31.0 pg    MCHC 32.8 32.0 - 36.0 g/dL    RDW 15.0 (H) 11.5 - 14.5 %    Platelets 346 150 - 450 K/uL    MPV 9.5 9.2 - 12.9 fL    Immature Granulocytes 0.6 (H) 0.0 - 0.5 %    Gran # (ANC) 9.6 (H) 1.8 - 7.7 K/uL    Immature Grans (Abs) 0.07 (H) 0.00 - 0.04 K/uL    Lymph # 1.7 1.0 - 4.8 K/uL    Mono # 0.6 0.3 - 1.0 K/uL    Eos # 0.2 0.0 - 0.5 K/uL    Baso # 0.03 0.00 - 0.20 K/uL    nRBC 0 0 /100 WBC    Gran % 78.6 (H) 38.0 - 73.0 %    Lymph % 13.5 (L) 18.0 - 48.0 %    Mono % 5.2 4.0 - 15.0 %    Eosinophil % 1.9 0.0 - 8.0 %    Basophil % 0.2 0.0 - 1.9 %    Differential Method Automated    Brain natriuretic peptide   Result Value Ref Range    BNP 16 0 - 99 pg/mL   Troponin I   Result Value Ref Range    Troponin I <0.006 0.000 - 0.026 ng/mL   D dimer, quantitative   Result Value Ref Range    D-Dimer 1.02 (H) <0.50 mg/L FEU         Imaging Results:  Imaging Results              CTA Chest Non-Coronary (PE Studies) (Final result)  Result time 11/09/22 22:56:33      Final result by Ina Jimenez MD (11/09/22 22:56:33)                   Impression:      No pulmonary embolism.  No dissection.  No pneumonia.    Mild motion artifacts.  Mild subsegmental atelectasis.  Mild atherosclerotic changes.    All CT scans   are performed using dose optimization techniques including the following: automated exposure control; adjustment of the mA and/or kV; use of iterative reconstruction technique.  Dose modulation was employed for ALARA by  means of: Automated exposure control; adjustment of the mA and/or kV according to patient size (this includes techniques or standardized protocols for targeted exams where dose is matched to indication/reason for exam; i.e. extremities or head); and/or use of iterative reconstructive technique.      Electronically signed by: Tony Buckley  Date:    11/09/2022  Time:    22:56               Narrative:    EXAMINATION:  CTA CHEST NON CORONARY (PE STUDIES)    CLINICAL HISTORY:  Pulmonary embolism (PE) suspected, positive D-dimer;    TECHNIQUE:  Low dose axial images, sagittal and coronal reformations were obtained from the thoracic inlet to the lung bases following the IV administration of 100 mL of Omnipaque 350.  Contrast timing was optimized to evaluate the pulmonary arteries.  MIP images were performed.    COMPARISON:  None    FINDINGS:  No evidence for pulmonary embolism.  No evidence for aortic dissection or aneurysm.  Cardiovascular structures have a normal non gated appearance.  Age-related atherosclerotic changes noted.  Lungs are essentially clear.  No acute osseous injury mild motion artifacts.  Mild subsegmental atelectasis.  Mild hiatal hernia.  Fatty infiltration of the liver.                                       X-Ray Chest AP Portable (Final result)  Result time 11/09/22 21:07:02      Final result by Ina Jimenez MD (11/09/22 21:07:02)                   Impression:      No acute abnormality.      Electronically signed by: Tony Buckley  Date:    11/09/2022  Time:    21:07               Narrative:    EXAMINATION:  XR CHEST AP PORTABLE    CLINICAL HISTORY:  SOB;    TECHNIQUE:  Single frontal view of the chest was performed.    COMPARISON:  None    FINDINGS:  The lungs are clear, with normal appearance of pulmonary vasculature and no pleural effusion or pneumothorax.    The cardiac silhouette is normal in size. The hilar and mediastinal contours are unremarkable.    Bones are intact.                                        The EKG was ordered, reviewed, and independently interpreted by the ED provider.  Interpretation time: 20:28  Rate: 90 BPM  Rhythm: normal sinus rhythm  Interpretation: Incomplete right bundle branch block. Borderline ECG. No STEMI.           The Emergency Provider reviewed the vital signs and test results, which are outlined above.     ED Discussion       12:09 AM: Reassessed pt at this time.  Discussed with pt all pertinent ED information and results. Discussed pt dx and plan of tx. Gave pt all f/u and return to the ED instructions. All questions and concerns were addressed at this time. Pt expresses understanding of information and instructions, and is comfortable with plan to discharge. Pt is stable for discharge.    I discussed with patient and/or family/caretaker that evaluation in the ED does not suggest any emergent or life threatening medical conditions requiring immediate intervention beyond what was provided in the ED, and I believe patient is safe for discharge.  Regardless, an unremarkable evaluation in the ED does not preclude the development or presence of a serious of life threatening condition. As such, patient was instructed to return immediately for any worsening or change in current symptoms.         Medical Decision Making:   Clinical Tests:   Lab Tests: Ordered and Reviewed  Radiological Study: Ordered and Reviewed  Medical Tests: Ordered and Reviewed         ED Medication(s):  Medications   albuterol-ipratropium 2.5 mg-0.5 mg/3 mL nebulizer solution 3 mL (3 mLs Nebulization Given 11/9/22 2991)   iohexoL (OMNIPAQUE 350) injection 100 mL (100 mLs Intravenous Given 11/9/22 2250)   methylPREDNISolone sodium succinate injection 125 mg (125 mg Intravenous Given 11/10/22 0010)       New Prescriptions    No medications on file               Scribe Attestation:   Scribe #1: I performed the above scribed service and the documentation accurately describes the services I performed. I  attest to the accuracy of the note.     Attending:   Physician Attestation Statement for Scribe #1: I, Jaocb Nagy MD, personally performed the services described in this documentation, as scribed by Shannon Eden, in my presence, and it is both accurate and complete.           Clinical Impression       ICD-10-CM ICD-9-CM   1. SOB (shortness of breath)  R06.02 786.05   2. Chest pain  R07.9 786.50       Disposition:   Disposition: Discharged  Condition: Stable       Jacob Nagy MD  11/10/22 0358

## 2022-12-26 ENCOUNTER — HOSPITAL ENCOUNTER (EMERGENCY)
Facility: HOSPITAL | Age: 57
Discharge: HOME | End: 2022-12-26
Attending: EMERGENCY MEDICINE
Payer: MEDICAID

## 2022-12-26 VITALS
OXYGEN SATURATION: 96 % | TEMPERATURE: 98 F | SYSTOLIC BLOOD PRESSURE: 165 MMHG | HEART RATE: 84 BPM | RESPIRATION RATE: 20 BRPM | DIASTOLIC BLOOD PRESSURE: 79 MMHG

## 2022-12-26 DIAGNOSIS — M94.0 COSTOCHONDRITIS: Primary | ICD-10-CM

## 2022-12-26 DIAGNOSIS — R07.9 CHEST PAIN: ICD-10-CM

## 2022-12-26 LAB
ALBUMIN SERPL BCP-MCNC: 3.7 G/DL (ref 3.5–5.2)
ALP SERPL-CCNC: 114 U/L (ref 55–135)
ALT SERPL W/O P-5'-P-CCNC: 52 U/L (ref 10–44)
ANION GAP SERPL CALC-SCNC: 10 MMOL/L (ref 8–16)
AST SERPL-CCNC: 28 U/L (ref 10–40)
BASOPHILS # BLD AUTO: 0.04 K/UL (ref 0–0.2)
BASOPHILS NFR BLD: 0.5 % (ref 0–1.9)
BILIRUB SERPL-MCNC: 0.4 MG/DL (ref 0.1–1)
BNP SERPL-MCNC: 20 PG/ML (ref 0–99)
BUN SERPL-MCNC: 18 MG/DL (ref 6–20)
CALCIUM SERPL-MCNC: 9.5 MG/DL (ref 8.7–10.5)
CHLORIDE SERPL-SCNC: 103 MMOL/L (ref 95–110)
CO2 SERPL-SCNC: 25 MMOL/L (ref 23–29)
CREAT SERPL-MCNC: 0.8 MG/DL (ref 0.5–1.4)
DIFFERENTIAL METHOD: ABNORMAL
EOSINOPHIL # BLD AUTO: 0.1 K/UL (ref 0–0.5)
EOSINOPHIL NFR BLD: 1.7 % (ref 0–8)
ERYTHROCYTE [DISTWIDTH] IN BLOOD BY AUTOMATED COUNT: 14.6 % (ref 11.5–14.5)
EST. GFR  (NO RACE VARIABLE): >60 ML/MIN/1.73 M^2
GLUCOSE SERPL-MCNC: 110 MG/DL (ref 70–110)
HCT VFR BLD AUTO: 35.9 % (ref 37–48.5)
HGB BLD-MCNC: 11.7 G/DL (ref 12–16)
IMM GRANULOCYTES # BLD AUTO: 0.05 K/UL (ref 0–0.04)
IMM GRANULOCYTES NFR BLD AUTO: 0.6 % (ref 0–0.5)
LYMPHOCYTES # BLD AUTO: 1.2 K/UL (ref 1–4.8)
LYMPHOCYTES NFR BLD: 14.2 % (ref 18–48)
MCH RBC QN AUTO: 28.2 PG (ref 27–31)
MCHC RBC AUTO-ENTMCNC: 32.6 G/DL (ref 32–36)
MCV RBC AUTO: 87 FL (ref 82–98)
MONOCYTES # BLD AUTO: 0.5 K/UL (ref 0.3–1)
MONOCYTES NFR BLD: 6 % (ref 4–15)
NEUTROPHILS # BLD AUTO: 6.3 K/UL (ref 1.8–7.7)
NEUTROPHILS NFR BLD: 77 % (ref 38–73)
NRBC BLD-RTO: 0 /100 WBC
PLATELET # BLD AUTO: 265 K/UL (ref 150–450)
PMV BLD AUTO: 10.1 FL (ref 9.2–12.9)
POTASSIUM SERPL-SCNC: 4.4 MMOL/L (ref 3.5–5.1)
PROT SERPL-MCNC: 7.9 G/DL (ref 6–8.4)
RBC # BLD AUTO: 4.15 M/UL (ref 4–5.4)
SODIUM SERPL-SCNC: 138 MMOL/L (ref 136–145)
TROPONIN I SERPL DL<=0.01 NG/ML-MCNC: 0.01 NG/ML (ref 0–0.03)
TROPONIN I SERPL DL<=0.01 NG/ML-MCNC: <0.006 NG/ML (ref 0–0.03)
WBC # BLD AUTO: 8.16 K/UL (ref 3.9–12.7)

## 2022-12-26 PROCEDURE — 85025 COMPLETE CBC W/AUTO DIFF WBC: CPT | Performed by: EMERGENCY MEDICINE

## 2022-12-26 PROCEDURE — 99285 EMERGENCY DEPT VISIT HI MDM: CPT | Mod: 25

## 2022-12-26 PROCEDURE — 25000003 PHARM REV CODE 250: Performed by: EMERGENCY MEDICINE

## 2022-12-26 PROCEDURE — 83880 ASSAY OF NATRIURETIC PEPTIDE: CPT | Performed by: EMERGENCY MEDICINE

## 2022-12-26 PROCEDURE — 80053 COMPREHEN METABOLIC PANEL: CPT | Performed by: EMERGENCY MEDICINE

## 2022-12-26 PROCEDURE — 93005 ELECTROCARDIOGRAM TRACING: CPT

## 2022-12-26 PROCEDURE — 93010 EKG 12-LEAD: ICD-10-PCS | Mod: ,,, | Performed by: STUDENT IN AN ORGANIZED HEALTH CARE EDUCATION/TRAINING PROGRAM

## 2022-12-26 PROCEDURE — 93010 ELECTROCARDIOGRAM REPORT: CPT | Mod: ,,, | Performed by: STUDENT IN AN ORGANIZED HEALTH CARE EDUCATION/TRAINING PROGRAM

## 2022-12-26 PROCEDURE — 84484 ASSAY OF TROPONIN QUANT: CPT | Performed by: EMERGENCY MEDICINE

## 2022-12-26 RX ORDER — NAPROXEN 500 MG/1
500 TABLET ORAL 2 TIMES DAILY WITH MEALS
Qty: 20 TABLET | Refills: 0 | OUTPATIENT
Start: 2022-12-26 | End: 2023-05-08

## 2022-12-26 RX ADMIN — NITROGLYCERIN 1 INCH: 20 OINTMENT TOPICAL at 07:12

## 2022-12-26 NOTE — ED NOTES
Pt reports her CP is 6/10. Respirations e/u, hypertensive on monitor. MD notified. Will continue to monitor.

## 2022-12-26 NOTE — ED NOTES
Pt AAOx4, resting in bed, denies SOB and states CP is better but still present, side rails up x 2, call bell within reach. NAD at this time. Will continue to monitor.

## 2022-12-26 NOTE — ED PROVIDER NOTES
SCRIBE #1 NOTE: I, Wilman Perez, am scribing for, and in the presence of, Maco Rocha Jr., MD. I have scribed the entire note.      History      Chief Complaint   Patient presents with    Chest Pain     Chest pain x 3 days       Review of patient's allergies indicates:   Allergen Reactions    Pcn [penicillins] Swelling    Codeine     Latex, natural rubber     Tylox [oxycodone-acetaminophen]         HPI   HPI    2022, 6:52 AM   History obtained from the patient      History of Present Illness: Judy Ruelas is a 57 y.o. female patient who presents to the Emergency Department for substernal chest pain, onset 3 days PTA. Symptoms are constant and moderate in severity. No mitigating or exacerbating factors reported. No associated sxs reported. Patient denies any fever, chills, n/v/d, SOB, weakness, numbness, dizziness, headache, and all other sxs at this time. Pt states that she is on Plavix, but that she has been out of all of her home medications for the past 2 weeks. Pt was given NTG and ASA en route to the ED. No further complaints or concerns at this time.     Arrival mode: EMS    PCP: [facility]       Past Medical History:  Past Medical History:   Diagnosis Date    Anxiety     Depression     Depression with anxiety     Family history of heart disease 2016    Hypertension     Irregular heart beat     Migraine headache     Morbid obesity with BMI of 40.0-44.9, adult 2017    NSTEMI (non-ST elevated myocardial infarction) 2017       Past Surgical History:  Past Surgical History:   Procedure Laterality Date    APPENDECTOMY       SECTION      HYSTERECTOMY           Family History:  Family History   Problem Relation Age of Onset    Hypertension Unknown     Heart disease Father 70    Hypertension Father     Heart attack Mother 40        triple bypass    Diabetes Mother     Hypertension Mother     Stroke Mother     Heart attack Brother 40        CABG x 2        Social History:  Social History     Tobacco Use    Smoking status: Never    Smokeless tobacco: Never   Substance and Sexual Activity    Alcohol use: No     Alcohol/week: 0.0 standard drinks     Comment: denies    Drug use: No     Comment: denies    Sexual activity: Not Currently       ROS   Review of Systems   Constitutional:  Negative for chills and fever.   HENT:  Negative for sore throat.    Respiratory:  Negative for shortness of breath.    Cardiovascular:  Positive for chest pain (substernal).   Gastrointestinal:  Negative for diarrhea, nausea and vomiting.   Genitourinary:  Negative for dysuria.   Musculoskeletal:  Negative for back pain.   Skin:  Negative for rash.   Neurological:  Negative for dizziness, weakness, light-headedness, numbness and headaches.   Hematological:  Does not bruise/bleed easily.   All other systems reviewed and are negative.    Physical Exam      Initial Vitals   BP Pulse Resp Temp SpO2   12/26/22 0653 12/26/22 0653 12/26/22 0653 12/26/22 0700 12/26/22 0653   (!) 170/100 90 18 98 °F (36.7 °C) 98 %      MAP       --                 Physical Exam  Nursing Notes and Vital Signs Reviewed.  Constitutional: Patient is in no acute distress. Well-developed and well-nourished.  Head: Atraumatic. Normocephalic.  Eyes: PERRL. EOM intact. Conjunctivae are not pale. No scleral icterus.  ENT: Mucous membranes are moist. Oropharynx is clear and symmetric.    Neck: Supple. Full ROM.   Cardiovascular: Regular rate. Regular rhythm. No murmurs, rubs, or gallops. Distal pulses are 2+ and symmetric.  Pulmonary/Chest: Substernal chest wall tenderness to palpation, which reproduces the pt's chief complaint. No respiratory distress. Clear to auscultation bilaterally. No wheezing or rales.  Abdominal: Soft and non-distended.  There is no tenderness.  No rebound, guarding, or rigidity.   Musculoskeletal: Moves all extremities. No obvious deformities. No edema.  Skin: Warm and dry.  Neurological:  Alert,  awake, and appropriate.  Normal speech.  No acute focal neurological deficits are appreciated.  Psychiatric: Normal affect. Good eye contact. Appropriate in content.    ED Course    Procedures  ED Vital Signs:  Vitals:    12/26/22 0653 12/26/22 0700 12/26/22 0710 12/26/22 0800   BP: (!) 170/100   (!) 182/89   Pulse: 90  78 77   Resp: 18   20   Temp:  98 °F (36.7 °C)     TempSrc:  Oral     SpO2: 98%   97%    12/26/22 0900 12/26/22 1040 12/26/22 1100 12/26/22 1200   BP: (!) 163/84 (!) 152/82 (!) 173/79 (!) 165/79   Pulse: 81 80 78 84   Resp: 20 20 18 20   Temp:    98.3 °F (36.8 °C)   TempSrc:    Oral   SpO2: 97% 95% 95% 96%       Abnormal Lab Results:  Labs Reviewed   CBC W/ AUTO DIFFERENTIAL - Abnormal; Notable for the following components:       Result Value    Hemoglobin 11.7 (*)     Hematocrit 35.9 (*)     RDW 14.6 (*)     Immature Granulocytes 0.6 (*)     Immature Grans (Abs) 0.05 (*)     Gran % 77.0 (*)     Lymph % 14.2 (*)     All other components within normal limits   COMPREHENSIVE METABOLIC PANEL - Abnormal; Notable for the following components:    ALT 52 (*)     All other components within normal limits   TROPONIN I   B-TYPE NATRIURETIC PEPTIDE   TROPONIN I        All Lab Results:  Results for orders placed or performed during the hospital encounter of 12/26/22   CBC auto differential   Result Value Ref Range    WBC 8.16 3.90 - 12.70 K/uL    RBC 4.15 4.00 - 5.40 M/uL    Hemoglobin 11.7 (L) 12.0 - 16.0 g/dL    Hematocrit 35.9 (L) 37.0 - 48.5 %    MCV 87 82 - 98 fL    MCH 28.2 27.0 - 31.0 pg    MCHC 32.6 32.0 - 36.0 g/dL    RDW 14.6 (H) 11.5 - 14.5 %    Platelets 265 150 - 450 K/uL    MPV 10.1 9.2 - 12.9 fL    Immature Granulocytes 0.6 (H) 0.0 - 0.5 %    Gran # (ANC) 6.3 1.8 - 7.7 K/uL    Immature Grans (Abs) 0.05 (H) 0.00 - 0.04 K/uL    Lymph # 1.2 1.0 - 4.8 K/uL    Mono # 0.5 0.3 - 1.0 K/uL    Eos # 0.1 0.0 - 0.5 K/uL    Baso # 0.04 0.00 - 0.20 K/uL    nRBC 0 0 /100 WBC    Gran % 77.0 (H) 38.0 - 73.0 %     Lymph % 14.2 (L) 18.0 - 48.0 %    Mono % 6.0 4.0 - 15.0 %    Eosinophil % 1.7 0.0 - 8.0 %    Basophil % 0.5 0.0 - 1.9 %    Differential Method Automated    Comprehensive metabolic panel   Result Value Ref Range    Sodium 138 136 - 145 mmol/L    Potassium 4.4 3.5 - 5.1 mmol/L    Chloride 103 95 - 110 mmol/L    CO2 25 23 - 29 mmol/L    Glucose 110 70 - 110 mg/dL    BUN 18 6 - 20 mg/dL    Creatinine 0.8 0.5 - 1.4 mg/dL    Calcium 9.5 8.7 - 10.5 mg/dL    Total Protein 7.9 6.0 - 8.4 g/dL    Albumin 3.7 3.5 - 5.2 g/dL    Total Bilirubin 0.4 0.1 - 1.0 mg/dL    Alkaline Phosphatase 114 55 - 135 U/L    AST 28 10 - 40 U/L    ALT 52 (H) 10 - 44 U/L    Anion Gap 10 8 - 16 mmol/L    eGFR >60 >60 mL/min/1.73 m^2   Troponin I #1   Result Value Ref Range    Troponin I <0.006 0.000 - 0.026 ng/mL   BNP   Result Value Ref Range    BNP 20 0 - 99 pg/mL   Troponin I #2   Result Value Ref Range    Troponin I 0.006 0.000 - 0.026 ng/mL     Imaging Results:  Imaging Results              X-Ray Chest AP Portable (Final result)  Result time 12/26/22 07:49:21      Final result by Placido Reddy MD (12/26/22 07:49:21)                   Impression:      Negative chest x-ray.      Electronically signed by: Placido Reddy MD  Date:    12/26/2022  Time:    07:49               Narrative:    EXAMINATION:  XR CHEST AP PORTABLE    CLINICAL HISTORY:  Chest Pain;    FINDINGS:  Comparison study 11/09/2022.  Normal size heart.  Lungs are clear.                                       The EKG was ordered, reviewed, and independently interpreted by the ED provider.  Interpretation time: 07:49  Rate: 76 BPM  Rhythm: Sinus rhythm with premature atrial complexes  Interpretation: RBBB. No STEMI.           The Emergency Provider reviewed the vital signs and test results, which are outlined above.    ED Discussion     10:57 AM: Re-evaluated pt. Pt is resting comfortably and is in no acute distress.  Pt states that her chest pain is improving at this time, currently  awaiting repeat troponin. D/w pt all pertinent results. D/w pt any concerns expressed at this time. Answered all questions. Pt expresses understanding at this time.    11:43 AM: Reassessed pt at this time.  Discussed with pt all pertinent ED information and results. Discussed pt dx and plan of tx. Gave pt all f/u and return to the ED instructions. All questions and concerns were addressed at this time. Pt expresses understanding of information and instructions, and is comfortable with plan to discharge. Pt is stable for discharge.    I discussed with patient and/or family/caretaker that evaluation in the ED does not suggest any emergent or life threatening medical conditions requiring immediate intervention beyond what was provided in the ED, and I believe patient is safe for discharge.  Regardless, an unremarkable evaluation in the ED does not preclude the development or presence of a serious of life threatening condition. As such, patient was instructed to return immediately for any worsening or change in current symptoms.    Regarding CHEST PAIN, I advised the patient that chest pain can be caused by a range of conditions, from not serious to life-threatening such as: heart attack or a blood clot in your lungs, angina indicating poor blood flow to the heart; infection, inflammation, or a fracture in the bones or cartilage in chest wall; a digestive problem, such as acid reflux or a stomach ulcer; or even an anxiety attack.  Instructed patient to follow up with primary healthcare provider for reevaluation and possible diagnostic studies to find the actual cause of the chest pain. Patient was instructed to call 911 or go to the nearest emergency department if they develop any of the following signs of a heart attack: squeezing, pressure, or pain in the chest that lasts longer than five minutes or returns; discomfort or pain in the back, neck, jaw, stomach, or arm; trouble breathing; nausea or vomiting;  lightheadedness;  or a sudden cold sweat, especially with chest pain or trouble breathing.  Also return to the emergency department the chest discomfort gets worse (even with medicine); cough or vomit blood; have bowel movements that are black or bloody; cannot stop vomiting; or develop difficulty swallowing.             ED Medication(s):  Medications   nitroGLYCERIN 2% TD oint ointment 1 inch (1 inch Topical (Top) Given 12/26/22 0759)        Follow-up Information       [facility]. Schedule an appointment as soon as possible for a visit in 1 week.    Contact information:  [address] [facility] [id number] [phone]               [facility] Internal Med 2nd Fl. Schedule an appointment as soon as possible for a visit in 1 week.    Specialty: Internal Medicine  Contact information:  [address] [facility] [id number] [phone]  Additional information:  Please park on the [address] side and use the Clinic entrance. Check in on the 2nd floor, to the right.             [facility] Cardiology 3rd Fl. Schedule an appointment as soon as possible for a visit in 1 week.    Specialty: Cardiology  Contact information:  [address] [facility] [id number] [phone]  Additional information:  Please park on the [address] side and use the Clinic entrance. Check in on the 3rd floor, to the right.             [facility] - Emergency Dept..    Specialty: Emergency Medicine  Why: As needed, If symptoms worsen  Contact information:  [address]  [facility] [id number] [phone]                         New Prescriptions    NAPROXEN (NAPROSYN) 500 MG TABLET    Take 1 tablet (500 mg total) by mouth 2 (two) times daily with meals.         Medical Decision Making    Medical Decision Making:   Clinical Tests:   Lab Tests: Ordered and Reviewed  Radiological Study: Ordered and Reviewed  Medical Tests: Ordered and Reviewed         Scribe  Attestation:   Scribe #1: I performed the above scribed service and the documentation accurately describes the services I performed. I attest to the accuracy of the note.    Attending:   Physician Attestation Statement for Scribe #1: I, Maco Rocha Jr., MD, personally performed the services described in this documentation, as scribed by Wilman Perez, in my presence, and it is both accurate and complete.          Clinical Impression       ICD-10-CM ICD-9-CM   1. Costochondritis  M94.0 733.6   2. Chest pain  R07.9 786.50       Disposition:   Disposition: Discharged  Condition: Stable       Maco Rocha Jr., MD  12/26/22 7883

## 2023-01-30 ENCOUNTER — HOSPITAL ENCOUNTER (EMERGENCY)
Facility: HOSPITAL | Age: 58
Discharge: HOME | End: 2023-01-30
Attending: EMERGENCY MEDICINE
Payer: MEDICAID

## 2023-01-30 VITALS
BODY MASS INDEX: 42.76 KG/M2 | SYSTOLIC BLOOD PRESSURE: 183 MMHG | TEMPERATURE: 98 F | HEART RATE: 74 BPM | RESPIRATION RATE: 16 BRPM | WEIGHT: 273 LBS | OXYGEN SATURATION: 98 % | DIASTOLIC BLOOD PRESSURE: 85 MMHG

## 2023-01-30 DIAGNOSIS — N30.01 ACUTE CYSTITIS WITH HEMATURIA: ICD-10-CM

## 2023-01-30 DIAGNOSIS — R11.2 NAUSEA AND VOMITING, UNSPECIFIED VOMITING TYPE: Primary | ICD-10-CM

## 2023-01-30 LAB
ALBUMIN SERPL BCP-MCNC: 3.9 G/DL (ref 3.5–5.2)
ALP SERPL-CCNC: 108 U/L (ref 55–135)
ALT SERPL W/O P-5'-P-CCNC: 61 U/L (ref 10–44)
ANION GAP SERPL CALC-SCNC: 14 MMOL/L (ref 8–16)
AST SERPL-CCNC: 45 U/L (ref 10–40)
BACTERIA #/AREA URNS HPF: ABNORMAL /HPF
BASOPHILS # BLD AUTO: 0.02 K/UL (ref 0–0.2)
BASOPHILS NFR BLD: 0.2 % (ref 0–1.9)
BILIRUB SERPL-MCNC: 0.6 MG/DL (ref 0.1–1)
BILIRUB UR QL STRIP: NEGATIVE
BUN SERPL-MCNC: 17 MG/DL (ref 6–20)
CALCIUM SERPL-MCNC: 9.5 MG/DL (ref 8.7–10.5)
CHLORIDE SERPL-SCNC: 104 MMOL/L (ref 95–110)
CLARITY UR: ABNORMAL
CO2 SERPL-SCNC: 21 MMOL/L (ref 23–29)
COLOR UR: YELLOW
CREAT SERPL-MCNC: 0.7 MG/DL (ref 0.5–1.4)
DIFFERENTIAL METHOD: ABNORMAL
EOSINOPHIL # BLD AUTO: 0 K/UL (ref 0–0.5)
EOSINOPHIL NFR BLD: 0.2 % (ref 0–8)
ERYTHROCYTE [DISTWIDTH] IN BLOOD BY AUTOMATED COUNT: 14.6 % (ref 11.5–14.5)
EST. GFR  (NO RACE VARIABLE): >60 ML/MIN/1.73 M^2
GLUCOSE SERPL-MCNC: 117 MG/DL (ref 70–110)
GLUCOSE UR QL STRIP: NEGATIVE
HCT VFR BLD AUTO: 39.9 % (ref 37–48.5)
HGB BLD-MCNC: 13.1 G/DL (ref 12–16)
HGB UR QL STRIP: NEGATIVE
HYALINE CASTS #/AREA URNS LPF: 0 /LPF
IMM GRANULOCYTES # BLD AUTO: 0.05 K/UL (ref 0–0.04)
IMM GRANULOCYTES NFR BLD AUTO: 0.5 % (ref 0–0.5)
INFLUENZA A, MOLECULAR: NEGATIVE
INFLUENZA B, MOLECULAR: NEGATIVE
KETONES UR QL STRIP: ABNORMAL
LEUKOCYTE ESTERASE UR QL STRIP: ABNORMAL
LIPASE SERPL-CCNC: 11 U/L (ref 4–60)
LYMPHOCYTES # BLD AUTO: 1.2 K/UL (ref 1–4.8)
LYMPHOCYTES NFR BLD: 11.5 % (ref 18–48)
MCH RBC QN AUTO: 27.6 PG (ref 27–31)
MCHC RBC AUTO-ENTMCNC: 32.8 G/DL (ref 32–36)
MCV RBC AUTO: 84 FL (ref 82–98)
MICROSCOPIC COMMENT: ABNORMAL
MONOCYTES # BLD AUTO: 0.7 K/UL (ref 0.3–1)
MONOCYTES NFR BLD: 6.4 % (ref 4–15)
NEUTROPHILS # BLD AUTO: 8.5 K/UL (ref 1.8–7.7)
NEUTROPHILS NFR BLD: 81.2 % (ref 38–73)
NITRITE UR QL STRIP: POSITIVE
NRBC BLD-RTO: 0 /100 WBC
PH UR STRIP: 6 [PH] (ref 5–8)
PLATELET # BLD AUTO: 293 K/UL (ref 150–450)
PMV BLD AUTO: 9.9 FL (ref 9.2–12.9)
POTASSIUM SERPL-SCNC: 3.4 MMOL/L (ref 3.5–5.1)
PROT SERPL-MCNC: 8 G/DL (ref 6–8.4)
PROT UR QL STRIP: ABNORMAL
RBC # BLD AUTO: 4.75 M/UL (ref 4–5.4)
RBC #/AREA URNS HPF: 3 /HPF (ref 0–4)
SARS-COV-2 RDRP RESP QL NAA+PROBE: NEGATIVE
SODIUM SERPL-SCNC: 139 MMOL/L (ref 136–145)
SP GR UR STRIP: 1.02 (ref 1–1.03)
SPECIMEN SOURCE: NORMAL
SQUAMOUS #/AREA URNS HPF: 4 /HPF
URN SPEC COLLECT METH UR: ABNORMAL
UROBILINOGEN UR STRIP-ACNC: NEGATIVE EU/DL
WBC # BLD AUTO: 10.46 K/UL (ref 3.9–12.7)
WBC #/AREA URNS HPF: >100 /HPF (ref 0–5)
WBC CLUMPS URNS QL MICRO: ABNORMAL

## 2023-01-30 PROCEDURE — 83690 ASSAY OF LIPASE: CPT | Performed by: EMERGENCY MEDICINE

## 2023-01-30 PROCEDURE — 87088 URINE BACTERIA CULTURE: CPT | Performed by: EMERGENCY MEDICINE

## 2023-01-30 PROCEDURE — 80053 COMPREHEN METABOLIC PANEL: CPT | Performed by: EMERGENCY MEDICINE

## 2023-01-30 PROCEDURE — 96374 THER/PROPH/DIAG INJ IV PUSH: CPT

## 2023-01-30 PROCEDURE — 87502 INFLUENZA DNA AMP PROBE: CPT | Performed by: EMERGENCY MEDICINE

## 2023-01-30 PROCEDURE — 99284 EMERGENCY DEPT VISIT MOD MDM: CPT | Mod: 25

## 2023-01-30 PROCEDURE — 85025 COMPLETE CBC W/AUTO DIFF WBC: CPT | Performed by: EMERGENCY MEDICINE

## 2023-01-30 PROCEDURE — 81000 URINALYSIS NONAUTO W/SCOPE: CPT | Performed by: EMERGENCY MEDICINE

## 2023-01-30 PROCEDURE — 87077 CULTURE AEROBIC IDENTIFY: CPT | Performed by: EMERGENCY MEDICINE

## 2023-01-30 PROCEDURE — 87186 SC STD MICRODIL/AGAR DIL: CPT | Performed by: EMERGENCY MEDICINE

## 2023-01-30 PROCEDURE — 63600175 PHARM REV CODE 636 W HCPCS: Performed by: EMERGENCY MEDICINE

## 2023-01-30 PROCEDURE — U0002 COVID-19 LAB TEST NON-CDC: HCPCS | Performed by: EMERGENCY MEDICINE

## 2023-01-30 PROCEDURE — 87086 URINE CULTURE/COLONY COUNT: CPT | Performed by: EMERGENCY MEDICINE

## 2023-01-30 PROCEDURE — 25000003 PHARM REV CODE 250: Performed by: EMERGENCY MEDICINE

## 2023-01-30 PROCEDURE — 96361 HYDRATE IV INFUSION ADD-ON: CPT

## 2023-01-30 RX ORDER — PROMETHAZINE HYDROCHLORIDE 25 MG/1
25 TABLET ORAL EVERY 6 HOURS PRN
Qty: 15 TABLET | Refills: 0 | Status: SHIPPED | OUTPATIENT
Start: 2023-01-30

## 2023-01-30 RX ORDER — CLONIDINE HYDROCHLORIDE 0.2 MG/1
0.2 TABLET ORAL
Status: COMPLETED | OUTPATIENT
Start: 2023-01-30 | End: 2023-01-30

## 2023-01-30 RX ORDER — CIPROFLOXACIN 500 MG/1
500 TABLET ORAL 2 TIMES DAILY
Qty: 14 TABLET | Refills: 0 | Status: SHIPPED | OUTPATIENT
Start: 2023-01-30 | End: 2023-02-06

## 2023-01-30 RX ORDER — ONDANSETRON 2 MG/ML
4 INJECTION INTRAMUSCULAR; INTRAVENOUS
Status: COMPLETED | OUTPATIENT
Start: 2023-01-30 | End: 2023-01-30

## 2023-01-30 RX ADMIN — ONDANSETRON 4 MG: 2 INJECTION INTRAMUSCULAR; INTRAVENOUS at 01:01

## 2023-01-30 RX ADMIN — SODIUM CHLORIDE 1000 ML: 9 INJECTION, SOLUTION INTRAVENOUS at 01:01

## 2023-01-30 RX ADMIN — CLONIDINE HYDROCHLORIDE 0.2 MG: 0.2 TABLET ORAL at 04:01

## 2023-01-30 NOTE — ED NOTES
Pt was educated not to leave the hospital with an IV in place. The pt did understand this communication

## 2023-01-30 NOTE — ED NOTES
Bed: [facility] 01  Expected date:   Expected time:   Means of arrival:   Comments:

## 2023-01-30 NOTE — ED PROVIDER NOTES
SCRIBE #1 NOTE: I, Karlee Alvarez, am scribing for, and in the presence of, Pepe Alonzo MD. I have scribed the entire note.       History     Chief Complaint   Patient presents with    Emesis     N/V/D x4 days     Review of patient's allergies indicates:   Allergen Reactions    Pcn [penicillins] Swelling    Codeine     Latex, natural rubber     Tylox [oxycodone-acetaminophen]          History of Present Illness     HPI    2023, 1:08 PM  History obtained from the patient      History of Present Illness: Judy Ruelas is a 57 y.o. female patient with a PMHx of HTN, anxiety, depression, s/p appendectomy, s/p hysterectomy, who presents to the Emergency Department for evaluation of emesis which onset 4 days PTA. Symptoms are episodic and moderate in severity. No mitigating or exacerbating factors reported. Associated sxs include nausea and diarrhea. Patient denies any fever, chills, CP, SOB, lightheadedness, numbness, HA, and all other sxs at this time. No prior Tx reported. No further complaints or concerns at this time.       Arrival mode: EMS    PCP: [facility]        Past Medical History:  Past Medical History:   Diagnosis Date    Anxiety     Depression     Depression with anxiety     Family history of heart disease 2016    Hypertension     Irregular heart beat     Migraine headache     Morbid obesity with BMI of 40.0-44.9, adult 2017    NSTEMI (non-ST elevated myocardial infarction) 2017       Past Surgical History:  Past Surgical History:   Procedure Laterality Date    APPENDECTOMY       SECTION      HYSTERECTOMY           Family History:  Family History   Problem Relation Age of Onset    Hypertension Unknown     Heart disease Father 70    Hypertension Father     Heart attack Mother 40        triple bypass    Diabetes Mother     Hypertension Mother     Stroke Mother     Heart attack Brother 40        CABG x 2       Social History:  Social History      Tobacco Use    Smoking status: Never    Smokeless tobacco: Never   Substance and Sexual Activity    Alcohol use: No     Alcohol/week: 0.0 standard drinks     Comment: denies    Drug use: No     Comment: denies    Sexual activity: Not Currently        Review of Systems     Review of Systems   Constitutional:  Negative for chills and fever.   HENT:  Negative for sore throat.    Respiratory:  Negative for shortness of breath.    Cardiovascular:  Negative for chest pain.   Gastrointestinal:  Positive for diarrhea, nausea and vomiting.   Genitourinary:  Negative for dysuria.   Musculoskeletal:  Negative for back pain.   Skin:  Negative for rash.   Neurological:  Negative for weakness, light-headedness, numbness and headaches.   Hematological:  Does not bruise/bleed easily.   All other systems reviewed and are negative.     Physical Exam     Initial Vitals [01/30/23 1247]   BP Pulse Resp Temp SpO2   (!) 174/90 73 18 98.4 °F (36.9 °C) 98 %      MAP       --          Physical Exam  Nursing Notes and Vital Signs Reviewed.  Constitutional: Patient is in no acute distress. Well-developed and well-nourished.  Head: Atraumatic. Normocephalic.  Eyes: PERRL. EOM intact. Conjunctivae are not pale. No scleral icterus.  ENT: Mucous membranes are moist.   Neck: Supple. Full ROM.   Cardiovascular: Regular rate. Regular rhythm. No murmurs, rubs, or gallops. Distal pulses are 2+ and symmetric.  Pulmonary/Chest: No respiratory distress. Clear to auscultation bilaterally. No wheezing or rales.  Abdominal: Soft and non-distended.  There is no tenderness.  No rebound, guarding, or rigidity.   Musculoskeletal: Moves all extremities. No obvious deformities. No edema.   Skin: Warm and dry.  Neurological:  Alert, awake, and appropriate.  Normal speech.  No acute focal neurological deficits are appreciated.  Psychiatric: Normal affect. Good eye contact. Appropriate in content.     ED Course   Procedures  ED Vital Signs:  Vitals:    01/30/23  1247   BP: (!) 174/90   Pulse: 73   Resp: 18   Temp: 98.4 °F (36.9 °C)   TempSrc: Oral   SpO2: 98%   Weight: 123.8 kg (273 lb)       Abnormal Lab Results:  Labs Reviewed   CBC W/ AUTO DIFFERENTIAL - Abnormal; Notable for the following components:       Result Value    RDW 14.6 (*)     Gran # (ANC) 8.5 (*)     Immature Grans (Abs) 0.05 (*)     Gran % 81.2 (*)     Lymph % 11.5 (*)     All other components within normal limits   COMPREHENSIVE METABOLIC PANEL - Abnormal; Notable for the following components:    Potassium 3.4 (*)     CO2 21 (*)     Glucose 117 (*)     AST 45 (*)     ALT 61 (*)     All other components within normal limits   URINALYSIS, REFLEX TO URINE CULTURE - Abnormal; Notable for the following components:    Appearance, UA Hazy (*)     Protein, UA 1+ (*)     Ketones, UA Trace (*)     Nitrite, UA Positive (*)     Leukocytes, UA 3+ (*)     All other components within normal limits    Narrative:     Specimen Source->Urine   URINALYSIS MICROSCOPIC - Abnormal; Notable for the following components:    WBC, UA >100 (*)     WBC Clumps, UA Moderate (*)     Bacteria Many (*)     All other components within normal limits    Narrative:     Specimen Source->Urine   INFLUENZA A & B BY MOLECULAR   CULTURE, URINE   LIPASE   SARS-COV-2 RNA AMPLIFICATION, QUAL        All Lab Results:  Results for orders placed or performed during the hospital encounter of 01/30/23   Influenza A & B by Molecular    Specimen: Nasopharyngeal Swab   Result Value Ref Range    Influenza A, Molecular Negative Negative    Influenza B, Molecular Negative Negative    Flu A & B Source Nasal swab    CBC Auto Differential   Result Value Ref Range    WBC 10.46 3.90 - 12.70 K/uL    RBC 4.75 4.00 - 5.40 M/uL    Hemoglobin 13.1 12.0 - 16.0 g/dL    Hematocrit 39.9 37.0 - 48.5 %    MCV 84 82 - 98 fL    MCH 27.6 27.0 - 31.0 pg    MCHC 32.8 32.0 - 36.0 g/dL    RDW 14.6 (H) 11.5 - 14.5 %    Platelets 293 150 - 450 K/uL    MPV 9.9 9.2 - 12.9 fL    Immature  Granulocytes 0.5 0.0 - 0.5 %    Gran # (ANC) 8.5 (H) 1.8 - 7.7 K/uL    Immature Grans (Abs) 0.05 (H) 0.00 - 0.04 K/uL    Lymph # 1.2 1.0 - 4.8 K/uL    Mono # 0.7 0.3 - 1.0 K/uL    Eos # 0.0 0.0 - 0.5 K/uL    Baso # 0.02 0.00 - 0.20 K/uL    nRBC 0 0 /100 WBC    Gran % 81.2 (H) 38.0 - 73.0 %    Lymph % 11.5 (L) 18.0 - 48.0 %    Mono % 6.4 4.0 - 15.0 %    Eosinophil % 0.2 0.0 - 8.0 %    Basophil % 0.2 0.0 - 1.9 %    Differential Method Automated    Comprehensive Metabolic Panel   Result Value Ref Range    Sodium 139 136 - 145 mmol/L    Potassium 3.4 (L) 3.5 - 5.1 mmol/L    Chloride 104 95 - 110 mmol/L    CO2 21 (L) 23 - 29 mmol/L    Glucose 117 (H) 70 - 110 mg/dL    BUN 17 6 - 20 mg/dL    Creatinine 0.7 0.5 - 1.4 mg/dL    Calcium 9.5 8.7 - 10.5 mg/dL    Total Protein 8.0 6.0 - 8.4 g/dL    Albumin 3.9 3.5 - 5.2 g/dL    Total Bilirubin 0.6 0.1 - 1.0 mg/dL    Alkaline Phosphatase 108 55 - 135 U/L    AST 45 (H) 10 - 40 U/L    ALT 61 (H) 10 - 44 U/L    Anion Gap 14 8 - 16 mmol/L    eGFR >60 >60 mL/min/1.73 m^2   Urinalysis, Reflex to Urine Culture Urine, Clean Catch    Specimen: Urine   Result Value Ref Range    Specimen UA Urine, Clean Catch     Color, UA Yellow Yellow, Straw, Pema    Appearance, UA Hazy (A) Clear    pH, UA 6.0 5.0 - 8.0    Specific Gravity, UA 1.025 1.005 - 1.030    Protein, UA 1+ (A) Negative    Glucose, UA Negative Negative    Ketones, UA Trace (A) Negative    Bilirubin (UA) Negative Negative    Occult Blood UA Negative Negative    Nitrite, UA Positive (A) Negative    Urobilinogen, UA Negative <2.0 EU/dL    Leukocytes, UA 3+ (A) Negative   Lipase   Result Value Ref Range    Lipase 11 4 - 60 U/L   COVID-19 Rapid Screening   Result Value Ref Range    SARS-CoV-2 RNA, Amplification, Qual Negative Negative   Urinalysis Microscopic   Result Value Ref Range    RBC, UA 3 0 - 4 /hpf    WBC, UA >100 (H) 0 - 5 /hpf    WBC Clumps, UA Moderate (A) None-Rare    Bacteria Many (A) None-Occ /hpf    Squam Epithel, UA 4  /hpf    Hyaline Casts, UA 0 0-1/lpf /lpf    Microscopic Comment SEE COMMENT             The Emergency Provider reviewed the vital signs and test results, which are outlined above.     ED Discussion       3:21 PM: Reassessed pt at this time. Discussed with pt all pertinent ED information and results. Discussed pt dx and plan of tx. Gave pt all f/u and return to the ED instructions. All questions and concerns were addressed at this time. Pt expresses understanding of information and instructions, and is comfortable with plan to discharge. Pt is stable for discharge.    I discussed with patient and/or family/caretaker that evaluation in the ED does not suggest any emergent or life threatening medical conditions requiring immediate intervention beyond what was provided in the ED, and I believe patient is safe for discharge.  Regardless, an unremarkable evaluation in the ED does not preclude the development or presence of a serious of life threatening condition. As such, patient was instructed to return immediately for any worsening or change in current symptoms.       Medical Decision Making:   Initial Assessment:   Nausea, vomiting, diarrhea and weakness  Differential Diagnosis:   N/V/D/ covid flu, uti, viral syndrome  Clinical Tests:   Lab Tests: Ordered and Reviewed  ED Management:  Blood work wnl.  U/A consistent with UTI.  Will rx for abx and nausea medications.           ED Medication(s):  Medications   sodium chloride 0.9% bolus 1,000 mL 1,000 mL (0 mLs Intravenous Stopped 1/30/23 1431)   ondansetron injection 4 mg (4 mg Intravenous Given 1/30/23 1334)       New Prescriptions    CIPROFLOXACIN HCL (CIPRO) 500 MG TABLET    Take 1 tablet (500 mg total) by mouth 2 (two) times daily. for 7 days    PROMETHAZINE (PHENERGAN) 25 MG TABLET    Take 1 tablet (25 mg total) by mouth every 6 (six) hours as needed for Nausea.        Follow-up Information       [facility] In 2 days.    Contact information:  [address]  [facility] [address]  [phone]                                 Scribe Attestation:   Scribe #1: I performed the above scribed service and the documentation accurately describes the services I performed. I attest to the accuracy of the note.     Attending:   Physician Attestation Statement for Scribe #1: I, Pepe Alonzo MD, personally performed the services described in this documentation, as scribed by Karlee Alvarez, in my presence, and it is both accurate and complete.           Clinical Impression       ICD-10-CM ICD-9-CM   1. Nausea and vomiting, unspecified vomiting type  R11.2 787.01   2. Acute cystitis with hematuria  N30.01 595.0       Disposition:   Disposition: Discharged  Condition: Stable       Pepe Alonzo MD  01/30/23 8711

## 2023-02-02 LAB — BACTERIA UR CULT: ABNORMAL

## 2023-02-05 ENCOUNTER — TELEPHONE (OUTPATIENT)
Dept: EMERGENCY MEDICINE | Facility: HOSPITAL | Age: 58
End: 2023-02-05
Payer: MEDICAID

## 2023-02-05 NOTE — LETTER
February 5, 2023    Judy Ruelas  [address] [facility] [address] [facility] [address] [facility] [address]             [facility] - Emergency Dept.  [address] [facility] [address]  Phone: [phone]  Fax: [phone]   Sent Via Certified Mail        Dear Judy Ruelas      Several attempts have been made to contact you without success. Please  Contact the Emergency Department regarding your recent labs.    Please contact the  at [phone] as soon as possible.        Sincerely,        Nguyen Hansen LPN  Care Coordinator, [facility]

## 2023-05-08 ENCOUNTER — HOSPITAL ENCOUNTER (EMERGENCY)
Facility: HOSPITAL | Age: 58
Discharge: HOME | End: 2023-05-08
Attending: EMERGENCY MEDICINE
Payer: MEDICAID

## 2023-05-08 VITALS
WEIGHT: 260.81 LBS | TEMPERATURE: 98 F | DIASTOLIC BLOOD PRESSURE: 74 MMHG | SYSTOLIC BLOOD PRESSURE: 139 MMHG | BODY MASS INDEX: 40.85 KG/M2 | HEART RATE: 63 BPM | OXYGEN SATURATION: 95 % | RESPIRATION RATE: 20 BRPM

## 2023-05-08 DIAGNOSIS — R07.9 CHEST PAIN, UNSPECIFIED TYPE: ICD-10-CM

## 2023-05-08 DIAGNOSIS — R07.89 CHEST WALL PAIN: Primary | ICD-10-CM

## 2023-05-08 DIAGNOSIS — R07.9 CHEST PAIN: ICD-10-CM

## 2023-05-08 LAB
ALBUMIN SERPL BCP-MCNC: 3.3 G/DL (ref 3.5–5.2)
ALP SERPL-CCNC: 121 U/L (ref 55–135)
ALT SERPL W/O P-5'-P-CCNC: 39 U/L (ref 10–44)
ANION GAP SERPL CALC-SCNC: 9 MMOL/L (ref 8–16)
AST SERPL-CCNC: 31 U/L (ref 10–40)
BASOPHILS # BLD AUTO: 0.04 K/UL (ref 0–0.2)
BASOPHILS NFR BLD: 0.5 % (ref 0–1.9)
BILIRUB SERPL-MCNC: 0.4 MG/DL (ref 0.1–1)
BNP SERPL-MCNC: 52 PG/ML (ref 0–99)
BUN SERPL-MCNC: 10 MG/DL (ref 6–20)
CALCIUM SERPL-MCNC: 8.6 MG/DL (ref 8.7–10.5)
CHLORIDE SERPL-SCNC: 108 MMOL/L (ref 95–110)
CO2 SERPL-SCNC: 22 MMOL/L (ref 23–29)
CREAT SERPL-MCNC: 0.6 MG/DL (ref 0.5–1.4)
DIFFERENTIAL METHOD: ABNORMAL
EOSINOPHIL # BLD AUTO: 0.2 K/UL (ref 0–0.5)
EOSINOPHIL NFR BLD: 2.4 % (ref 0–8)
ERYTHROCYTE [DISTWIDTH] IN BLOOD BY AUTOMATED COUNT: 14.9 % (ref 11.5–14.5)
EST. GFR  (NO RACE VARIABLE): >60 ML/MIN/1.73 M^2
GLUCOSE SERPL-MCNC: 97 MG/DL (ref 70–110)
HCT VFR BLD AUTO: 32.6 % (ref 37–48.5)
HGB BLD-MCNC: 10.5 G/DL (ref 12–16)
IMM GRANULOCYTES # BLD AUTO: 0.04 K/UL (ref 0–0.04)
IMM GRANULOCYTES NFR BLD AUTO: 0.5 % (ref 0–0.5)
LYMPHOCYTES # BLD AUTO: 1.3 K/UL (ref 1–4.8)
LYMPHOCYTES NFR BLD: 15.7 % (ref 18–48)
MCH RBC QN AUTO: 27.6 PG (ref 27–31)
MCHC RBC AUTO-ENTMCNC: 32.2 G/DL (ref 32–36)
MCV RBC AUTO: 86 FL (ref 82–98)
MONOCYTES # BLD AUTO: 0.6 K/UL (ref 0.3–1)
MONOCYTES NFR BLD: 8 % (ref 4–15)
NEUTROPHILS # BLD AUTO: 5.8 K/UL (ref 1.8–7.7)
NEUTROPHILS NFR BLD: 72.9 % (ref 38–73)
NRBC BLD-RTO: 0 /100 WBC
PLATELET # BLD AUTO: 234 K/UL (ref 150–450)
PMV BLD AUTO: 10.1 FL (ref 9.2–12.9)
POTASSIUM SERPL-SCNC: 3.3 MMOL/L (ref 3.5–5.1)
PROT SERPL-MCNC: 7 G/DL (ref 6–8.4)
RBC # BLD AUTO: 3.81 M/UL (ref 4–5.4)
SODIUM SERPL-SCNC: 139 MMOL/L (ref 136–145)
TROPONIN I SERPL DL<=0.01 NG/ML-MCNC: <0.006 NG/ML (ref 0–0.03)
WBC # BLD AUTO: 8.01 K/UL (ref 3.9–12.7)

## 2023-05-08 PROCEDURE — 96374 THER/PROPH/DIAG INJ IV PUSH: CPT

## 2023-05-08 PROCEDURE — 96375 TX/PRO/DX INJ NEW DRUG ADDON: CPT

## 2023-05-08 PROCEDURE — 63600175 PHARM REV CODE 636 W HCPCS: Performed by: EMERGENCY MEDICINE

## 2023-05-08 PROCEDURE — 85025 COMPLETE CBC W/AUTO DIFF WBC: CPT | Performed by: EMERGENCY MEDICINE

## 2023-05-08 PROCEDURE — 99285 EMERGENCY DEPT VISIT HI MDM: CPT | Mod: 25

## 2023-05-08 PROCEDURE — 93005 ELECTROCARDIOGRAM TRACING: CPT

## 2023-05-08 PROCEDURE — 83880 ASSAY OF NATRIURETIC PEPTIDE: CPT | Performed by: EMERGENCY MEDICINE

## 2023-05-08 PROCEDURE — 84484 ASSAY OF TROPONIN QUANT: CPT | Performed by: EMERGENCY MEDICINE

## 2023-05-08 PROCEDURE — 93010 EKG 12-LEAD: ICD-10-PCS | Mod: ,,, | Performed by: INTERNAL MEDICINE

## 2023-05-08 PROCEDURE — 80053 COMPREHEN METABOLIC PANEL: CPT | Performed by: EMERGENCY MEDICINE

## 2023-05-08 PROCEDURE — 93010 ELECTROCARDIOGRAM REPORT: CPT | Mod: ,,, | Performed by: INTERNAL MEDICINE

## 2023-05-08 RX ORDER — ONDANSETRON 2 MG/ML
4 INJECTION INTRAMUSCULAR; INTRAVENOUS
Status: COMPLETED | OUTPATIENT
Start: 2023-05-08 | End: 2023-05-08

## 2023-05-08 RX ORDER — LISINOPRIL 40 MG/1
40 TABLET ORAL DAILY
COMMUNITY

## 2023-05-08 RX ORDER — SPIRONOLACTONE 50 MG/1
50 TABLET, FILM COATED ORAL DAILY
COMMUNITY

## 2023-05-08 RX ORDER — CARVEDILOL 25 MG/1
25 TABLET ORAL 2 TIMES DAILY WITH MEALS
COMMUNITY

## 2023-05-08 RX ORDER — NAPROXEN 500 MG/1
500 TABLET ORAL 2 TIMES DAILY WITH MEALS
Qty: 60 TABLET | Refills: 0 | Status: SHIPPED | OUTPATIENT
Start: 2023-05-08

## 2023-05-08 RX ORDER — HYDRALAZINE HYDROCHLORIDE 25 MG/1
25 TABLET, FILM COATED ORAL 3 TIMES DAILY
COMMUNITY

## 2023-05-08 RX ORDER — MORPHINE SULFATE 4 MG/ML
4 INJECTION, SOLUTION INTRAMUSCULAR; INTRAVENOUS
Status: COMPLETED | OUTPATIENT
Start: 2023-05-08 | End: 2023-05-08

## 2023-05-08 RX ORDER — NYSTATIN 100000 U/G
CREAM TOPICAL 2 TIMES DAILY
COMMUNITY

## 2023-05-08 RX ORDER — MECLIZINE HYDROCHLORIDE 50 MG/1
50 TABLET ORAL 2 TIMES DAILY
COMMUNITY

## 2023-05-08 RX ORDER — ERGOCALCIFEROL 1.25 MG/1
50000 CAPSULE ORAL
COMMUNITY

## 2023-05-08 RX ADMIN — ONDANSETRON 4 MG: 2 INJECTION INTRAMUSCULAR; INTRAVENOUS at 04:05

## 2023-05-08 RX ADMIN — MORPHINE SULFATE 4 MG: 4 INJECTION INTRAVENOUS at 04:05

## 2023-05-08 NOTE — ED NOTES
Pt resting in ED stretcher comfortably, skin warm and dry, RR even and unlabored. YVROSES. NADN.

## 2023-05-08 NOTE — ED PROVIDER NOTES
SCRIBE #1 NOTE: I, Karlee Alvarez, am scribing for, and in the presence of, Harris Joseph Do, MD. I have scribed the entire note.       History     Chief Complaint   Patient presents with    Chest Pain     Acute onset of CP today. Radiates to left shoulder.Pt received 100 mcg fentanyl, 2 sprays nitro. 325 ASA.      Review of patient's allergies indicates:   Allergen Reactions    Pcn [penicillins] Swelling    Codeine     Latex, natural rubber     Tylox [oxycodone-acetaminophen]          History of Present Illness     HPI    2023, 3:36 PM  History obtained from the patient      History of Present Illness: Judy Ruelas is a 58 y.o. female patient with a PMHx of HTN, NSTEMI, anterograde amnesia due to CVA, who presents to the Emergency Department for evaluation of CP which gradually 2 weeks PTA. Patient states the pain radiates to her L shoulder. Symptoms are intermittent and moderate in severity. Symptoms worsen with touched and with exertion. Associated sxs include dizziness, SOB, diarrhea, L shoulder pain. Patient denies any fever, chills, N/V, numbness, rash, cough, abdominal pain, HA, and all other sxs at this time. Prior Tx includes 100 mcg fentanyl, 2 sprays of nitro, and 325 ASA en route. No further complaints or concerns at this time.       Arrival mode: EMS    PCP: [facility]        Past Medical History:  Past Medical History:   Diagnosis Date    Anxiety     Depression     Depression with anxiety     Family history of heart disease 2016    Hypertension     Irregular heart beat     Migraine headache     Morbid obesity with BMI of 40.0-44.9, adult 2017    NSTEMI (non-ST elevated myocardial infarction) 2017       Past Surgical History:  Past Surgical History:   Procedure Laterality Date    APPENDECTOMY       SECTION      HYSTERECTOMY           Family History:  Family History   Problem Relation Age of Onset    Hypertension Unknown     Heart disease Father  70    Hypertension Father     Heart attack Mother 40        triple bypass    Diabetes Mother     Hypertension Mother     Stroke Mother     Heart attack Brother 40        CABG x 2       Social History:  Social History     Tobacco Use    Smoking status: Never    Smokeless tobacco: Never   Substance and Sexual Activity    Alcohol use: No     Alcohol/week: 0.0 standard drinks     Comment: denies    Drug use: No     Comment: denies    Sexual activity: Not Currently        Review of Systems     Review of Systems   Constitutional:  Negative for chills and fever.   HENT:  Negative for sore throat.    Respiratory:  Positive for shortness of breath.    Cardiovascular:  Positive for chest pain.   Gastrointestinal:  Positive for diarrhea. Negative for abdominal pain, nausea and vomiting.   Genitourinary:  Negative for dysuria.   Musculoskeletal:  Positive for arthralgias (L shoulder). Negative for back pain.   Skin:  Negative for rash.   Neurological:  Positive for dizziness. Negative for weakness, numbness and headaches.   Hematological:  Does not bruise/bleed easily.   All other systems reviewed and are negative.     Physical Exam     Initial Vitals   BP Pulse Resp Temp SpO2   05/08/23 1517 05/08/23 1517 05/08/23 1517 05/08/23 1551 05/08/23 1517   (!) 145/88 94 (!) 21 97.9 °F (36.6 °C) (!) 94 %      MAP       --                 Physical Exam  Nursing Notes and Vital Signs Reviewed.  Constitutional: Patient is in no acute distress. Well-developed and well-nourished.  Head: Atraumatic. Normocephalic.  Eyes: PERRL. EOM intact. Conjunctivae are not pale. No scleral icterus.  ENT: Mucous membranes are moist. Oropharynx is clear and symmetric.    Neck: Supple. Full ROM.   Cardiovascular: Regular rate. Regular rhythm. No murmurs, rubs, or gallops. Distal pulses are 2+ and symmetric.  Pulmonary/Chest: Mild respiratory distress. Clear to auscultation bilaterally. No wheezing or rales. Substernal chest wall pain.  Abdominal: Soft and  non-distended.  There is no tenderness.  No rebound, guarding, or rigidity.   Musculoskeletal: Moves all extremities. No obvious deformities. No edema.   Skin: Warm and dry. No rash or signs of infection.  Neurological:  Alert, awake, and appropriate.  Normal speech.  No acute focal neurological deficits are appreciated.  Psychiatric: Normal affect. Good eye contact. Appropriate in content.     ED Course   Procedures  ED Vital Signs:  Vitals:    05/08/23 1517 05/08/23 1531 05/08/23 1539 05/08/23 1551   BP: (!) 145/88      Pulse: 94  74    Resp: (!) 21      Temp:    97.9 °F (36.6 °C)   TempSrc:    Oral   SpO2: (!) 94%      Weight:  118.3 kg (260 lb 12.9 oz)      05/08/23 1632 05/08/23 1644 05/08/23 1703 05/08/23 1903   BP: (!) 150/78  134/71 139/74   Pulse: 69  64 63   Resp: (!) 21 20 18 20   Temp:       TempSrc:       SpO2: 95%  95% 95%   Weight:           Abnormal Lab Results:  Labs Reviewed   CBC W/ AUTO DIFFERENTIAL - Abnormal; Notable for the following components:       Result Value    RBC 3.81 (*)     Hemoglobin 10.5 (*)     Hematocrit 32.6 (*)     RDW 14.9 (*)     Lymph % 15.7 (*)     All other components within normal limits   COMPREHENSIVE METABOLIC PANEL - Abnormal; Notable for the following components:    Potassium 3.3 (*)     CO2 22 (*)     Calcium 8.6 (*)     Albumin 3.3 (*)     All other components within normal limits   TROPONIN I   B-TYPE NATRIURETIC PEPTIDE        All Lab Results:  Results for orders placed or performed during the hospital encounter of 05/08/23   CBC auto differential   Result Value Ref Range    WBC 8.01 3.90 - 12.70 K/uL    RBC 3.81 (L) 4.00 - 5.40 M/uL    Hemoglobin 10.5 (L) 12.0 - 16.0 g/dL    Hematocrit 32.6 (L) 37.0 - 48.5 %    MCV 86 82 - 98 fL    MCH 27.6 27.0 - 31.0 pg    MCHC 32.2 32.0 - 36.0 g/dL    RDW 14.9 (H) 11.5 - 14.5 %    Platelets 234 150 - 450 K/uL    MPV 10.1 9.2 - 12.9 fL    Immature Granulocytes 0.5 0.0 - 0.5 %    Gran # (ANC) 5.8 1.8 - 7.7 K/uL    Immature  Grans (Abs) 0.04 0.00 - 0.04 K/uL    Lymph # 1.3 1.0 - 4.8 K/uL    Mono # 0.6 0.3 - 1.0 K/uL    Eos # 0.2 0.0 - 0.5 K/uL    Baso # 0.04 0.00 - 0.20 K/uL    nRBC 0 0 /100 WBC    Gran % 72.9 38.0 - 73.0 %    Lymph % 15.7 (L) 18.0 - 48.0 %    Mono % 8.0 4.0 - 15.0 %    Eosinophil % 2.4 0.0 - 8.0 %    Basophil % 0.5 0.0 - 1.9 %    Differential Method Automated    Comprehensive metabolic panel   Result Value Ref Range    Sodium 139 136 - 145 mmol/L    Potassium 3.3 (L) 3.5 - 5.1 mmol/L    Chloride 108 95 - 110 mmol/L    CO2 22 (L) 23 - 29 mmol/L    Glucose 97 70 - 110 mg/dL    BUN 10 6 - 20 mg/dL    Creatinine 0.6 0.5 - 1.4 mg/dL    Calcium 8.6 (L) 8.7 - 10.5 mg/dL    Total Protein 7.0 6.0 - 8.4 g/dL    Albumin 3.3 (L) 3.5 - 5.2 g/dL    Total Bilirubin 0.4 0.1 - 1.0 mg/dL    Alkaline Phosphatase 121 55 - 135 U/L    AST 31 10 - 40 U/L    ALT 39 10 - 44 U/L    Anion Gap 9 8 - 16 mmol/L    eGFR >60 >60 mL/min/1.73 m^2   Troponin I #1   Result Value Ref Range    Troponin I <0.006 0.000 - 0.026 ng/mL   BNP   Result Value Ref Range    BNP 52 0 - 99 pg/mL       Imaging Results:  Imaging Results              X-Ray Chest AP Portable (Final result)  Result time 05/08/23 16:33:10      Final result by Wayne Reed MD (05/08/23 16:33:10)                   Impression:      1.  Negative for acute process involving the chest.    2.  Stable findings as noted above.      Electronically signed by: Wayne Reed MD  Date:    05/08/2023  Time:    16:33               Narrative:    EXAMINATION:  XR CHEST AP PORTABLE    CLINICAL HISTORY:  Chest Pain;    COMPARISON:  Studies dating back to June 17, 2017.    FINDINGS:  EKG leads overlie the chest which is lordotic in position.  Stable mild basilar reticular interstitial changes.  The lungs are free of new pulmonary opacities.  The cardiac silhouette size is normal. The trachea is midline and the mediastinal width is normal. Negative for focal infiltrate, effusion or pneumothorax. Pulmonary  vasculature is normal. Negative for osseous abnormalities. Stable ectatic and tortuous aorta as well as degenerative changes of the spine and both shoulder girdles.  Eventration is of the mm hemidiaphragms again seen.                                       The EKG was ordered, reviewed, and independently interpreted by the ED provider.  Interpretation time: 3:24  Rate: 82 BPM  Rhythm: normal sinus rhythm  Interpretation: RBBB. No STEMI.           The Emergency Provider reviewed the vital signs and test results, which are outlined above.     ED Discussion       5:38 PM: Troponin is negative. Referred patient to cardiology for OP workup.  Patient has exquisite tenderness to the chest wall consistent with costochondritis    5:57 PM: Reassessed pt at this time. Discussed with pt all pertinent ED information and results. Discussed pt dx and plan of tx. Gave pt all f/u and return to the ED instructions. All questions and concerns were addressed at this time. Pt expresses understanding of information and instructions, and is comfortable with plan to discharge. Pt is stable for discharge.    I discussed with patient and/or family/caretaker that evaluation in the ED does not suggest any emergent or life threatening medical conditions requiring immediate intervention beyond what was provided in the ED, and I believe patient is safe for discharge.  Regardless, an unremarkable evaluation in the ED does not preclude the development or presence of a serious of life threatening condition. As such, patient was instructed to return immediately for any worsening or change in current symptoms.         Medical Decision Making:   Initial Assessment:   Patient with chest pain to palpation and movement  Differential Diagnosis:   Differential diagnoses:  Heart attack, angina, pneumonia, asthma, infection, aortic dissection, pneumothorax, pericarditis , pleural effusion, GERD, pancreatitis, gallstone pain, cholelithiasis or cholecystitis,  esophageal rupture, bowel perforation, gastritis, nonspecific chest pain, musculoskeletal chest pain, costochondritis    Clinical Tests:   Lab Tests: Ordered and Reviewed  Radiological Study: Ordered and Reviewed  Medical Tests: Ordered and Reviewed  ED Management:  Labs within normal limits x-ray with no acute findings EKG with no ST elevation         ED Medication(s):  Medications   ondansetron injection 4 mg (4 mg Intravenous Given 5/8/23 1645)   morphine injection 4 mg (4 mg Intravenous Given 5/8/23 1644)       Discharge Medication List as of 5/8/2023  6:57 PM           Follow-up Information       Please follow up.    Contact information:  Follow-up with your doctor in 1-2 days for recheck                               Scribe Attestation:   Scribe #1: I performed the above scribed service and the documentation accurately describes the services I performed. I attest to the accuracy of the note.     Attending:   Physician Attestation Statement for Scribe #1: I, Harris Joseph Do, MD, personally performed the services described in this documentation, as scribed by Karlee Alvarez, in my presence, and it is both accurate and complete.           Clinical Impression       ICD-10-CM ICD-9-CM   1. Chest wall pain  R07.89 786.52   2. Chest pain  R07.9 786.50   3. Chest pain, unspecified type  R07.9 786.50       Disposition:   Disposition: Discharged  Condition: Stable       Harris Joseph Do, MD  05/08/23 2016

## 2023-07-01 ENCOUNTER — HOSPITAL ENCOUNTER (EMERGENCY)
Facility: HOSPITAL | Age: 58
Discharge: HOME | End: 2023-07-02
Attending: EMERGENCY MEDICINE
Payer: MEDICAID

## 2023-07-01 DIAGNOSIS — R20.2 PARESTHESIAS: Primary | ICD-10-CM

## 2023-07-01 DIAGNOSIS — R53.1 WEAKNESS: ICD-10-CM

## 2023-07-01 LAB
ALBUMIN SERPL BCP-MCNC: 4.2 G/DL (ref 3.5–5.2)
ALP SERPL-CCNC: 144 U/L (ref 55–135)
ALT SERPL W/O P-5'-P-CCNC: 29 U/L (ref 10–44)
ANION GAP SERPL CALC-SCNC: 14 MMOL/L (ref 8–16)
AST SERPL-CCNC: 26 U/L (ref 10–40)
BASOPHILS # BLD AUTO: 0.04 K/UL (ref 0–0.2)
BASOPHILS NFR BLD: 0.4 % (ref 0–1.9)
BILIRUB SERPL-MCNC: 0.5 MG/DL (ref 0.1–1)
BNP SERPL-MCNC: 10 PG/ML (ref 0–99)
BUN SERPL-MCNC: 16 MG/DL (ref 6–20)
CALCIUM SERPL-MCNC: 10.2 MG/DL (ref 8.7–10.5)
CHLORIDE SERPL-SCNC: 105 MMOL/L (ref 95–110)
CO2 SERPL-SCNC: 23 MMOL/L (ref 23–29)
CREAT SERPL-MCNC: 0.7 MG/DL (ref 0.5–1.4)
DIFFERENTIAL METHOD: ABNORMAL
EOSINOPHIL # BLD AUTO: 0.3 K/UL (ref 0–0.5)
EOSINOPHIL NFR BLD: 2.5 % (ref 0–8)
ERYTHROCYTE [DISTWIDTH] IN BLOOD BY AUTOMATED COUNT: 14.8 % (ref 11.5–14.5)
EST. GFR  (NO RACE VARIABLE): >60 ML/MIN/1.73 M^2
GLUCOSE SERPL-MCNC: 101 MG/DL (ref 70–110)
HCT VFR BLD AUTO: 35.7 % (ref 37–48.5)
HGB BLD-MCNC: 11.8 G/DL (ref 12–16)
IMM GRANULOCYTES # BLD AUTO: 0.03 K/UL (ref 0–0.04)
IMM GRANULOCYTES NFR BLD AUTO: 0.3 % (ref 0–0.5)
INR PPP: 1.1 (ref 0.8–1.2)
LYMPHOCYTES # BLD AUTO: 1.5 K/UL (ref 1–4.8)
LYMPHOCYTES NFR BLD: 14.7 % (ref 18–48)
MCH RBC QN AUTO: 28 PG (ref 27–31)
MCHC RBC AUTO-ENTMCNC: 33.1 G/DL (ref 32–36)
MCV RBC AUTO: 85 FL (ref 82–98)
MONOCYTES # BLD AUTO: 0.6 K/UL (ref 0.3–1)
MONOCYTES NFR BLD: 6.2 % (ref 4–15)
NEUTROPHILS # BLD AUTO: 7.7 K/UL (ref 1.8–7.7)
NEUTROPHILS NFR BLD: 75.9 % (ref 38–73)
NRBC BLD-RTO: 0 /100 WBC
PLATELET # BLD AUTO: 325 K/UL (ref 150–450)
PMV BLD AUTO: 9.3 FL (ref 9.2–12.9)
POTASSIUM SERPL-SCNC: 4 MMOL/L (ref 3.5–5.1)
PROT SERPL-MCNC: 8.6 G/DL (ref 6–8.4)
PROTHROMBIN TIME: 11.1 SEC (ref 9–12.5)
RBC # BLD AUTO: 4.21 M/UL (ref 4–5.4)
SODIUM SERPL-SCNC: 142 MMOL/L (ref 136–145)
TROPONIN I SERPL DL<=0.01 NG/ML-MCNC: 0.01 NG/ML (ref 0–0.03)
WBC # BLD AUTO: 10.18 K/UL (ref 3.9–12.7)

## 2023-07-01 PROCEDURE — 85610 PROTHROMBIN TIME: CPT | Performed by: EMERGENCY MEDICINE

## 2023-07-01 PROCEDURE — 93005 ELECTROCARDIOGRAM TRACING: CPT

## 2023-07-01 PROCEDURE — 99285 EMERGENCY DEPT VISIT HI MDM: CPT | Mod: 25

## 2023-07-01 PROCEDURE — 80053 COMPREHEN METABOLIC PANEL: CPT | Performed by: EMERGENCY MEDICINE

## 2023-07-01 PROCEDURE — 85025 COMPLETE CBC W/AUTO DIFF WBC: CPT | Performed by: EMERGENCY MEDICINE

## 2023-07-01 PROCEDURE — 93010 ELECTROCARDIOGRAM REPORT: CPT | Mod: ,,, | Performed by: INTERNAL MEDICINE

## 2023-07-01 PROCEDURE — 93010 EKG 12-LEAD: ICD-10-PCS | Mod: ,,, | Performed by: INTERNAL MEDICINE

## 2023-07-01 PROCEDURE — 84484 ASSAY OF TROPONIN QUANT: CPT | Performed by: EMERGENCY MEDICINE

## 2023-07-01 PROCEDURE — 83880 ASSAY OF NATRIURETIC PEPTIDE: CPT | Performed by: EMERGENCY MEDICINE

## 2023-07-02 VITALS
RESPIRATION RATE: 15 BRPM | SYSTOLIC BLOOD PRESSURE: 137 MMHG | DIASTOLIC BLOOD PRESSURE: 67 MMHG | HEART RATE: 88 BPM | BODY MASS INDEX: 38.32 KG/M2 | TEMPERATURE: 99 F | OXYGEN SATURATION: 97 % | WEIGHT: 230 LBS | HEIGHT: 65 IN

## 2023-07-02 NOTE — ED PROVIDER NOTES
SCRIBE #1 NOTE: I, Carlito Hernandez, am scribing for, and in the presence of, Didier Quintero MD. I have scribed the entire note.       History     Chief Complaint   Patient presents with    Numbness     Pt having L sided numbness and R hand numbness x 1 week, no other complaints     HPI  2023, 9:42 PM  History obtained from the patient    HPI:  Judy Ruelas is a 58 y.o. female with a PMH of HTN, depression, NSTEMI, depression, and stroke who presents to the [facility] emergency department for evaluation of left arm numbness which onset a week and a half ago. Left arm weakness began approximately a month ago. Pt did not complain about right hand numbness per triage sxs. Associated symptoms include numbness to lips and left leg swelling. No exacerbating factors. No prior treatment. Pt takes prescribed medications as normal. No other complaints or concerns.     Arrival mode: Ambulance Service      PCP: [facility]    Review of patient's allergies indicates:   Allergen Reactions    Pcn [penicillins] Swelling    Codeine     Latex, natural rubber     Tylox [oxycodone-acetaminophen]       Past Medical History:   Diagnosis Date    Anxiety     Depression     Depression with anxiety     Family history of heart disease 2016    Hypertension     Irregular heart beat     Migraine headache     Morbid obesity with BMI of 40.0-44.9, adult 2017    NSTEMI (non-ST elevated myocardial infarction) 2017     Past Surgical History:   Procedure Laterality Date    APPENDECTOMY       SECTION      HYSTERECTOMY         Family History   Problem Relation Age of Onset    Hypertension Unknown     Heart disease Father 70    Hypertension Father     Heart attack Mother 40        triple bypass    Diabetes Mother     Hypertension Mother     Stroke Mother     Heart attack Brother 40        CABG x 2     Social History     Tobacco Use    Smoking status: Never    Smokeless tobacco: Never  "  Substance and Sexual Activity    Alcohol use: No     Alcohol/week: 0.0 standard drinks     Comment: denies    Drug use: No     Comment: denies    Sexual activity: Not Currently      Review of Systems     Review of Systems   Constitutional: Negative.    HENT: Negative.     Eyes: Negative.    Respiratory: Negative.     Cardiovascular:  Positive for leg swelling (left leg).   Gastrointestinal: Negative.    Endocrine: Negative.    Genitourinary: Negative.    Musculoskeletal: Negative.    Skin: Negative.    Allergic/Immunologic: Negative.    Neurological:  Positive for weakness (left arm) and numbness (lips and left arm).   Hematological: Negative.    Psychiatric/Behavioral: Negative.          Physical Exam     Initial Vitals [07/01/23 1849]   BP Pulse Resp Temp SpO2   (!) 180/79 96 18 99.1 °F (37.3 °C) 98 %      MAP       --          Physical Exam  Nursing notes and vital signs reviewed.  Constitutional: Patient is in no distress.   Head: Normocephalic. Atraumatic. Tenderness to left maxillary and left mandibular aspects of the face. Pt has facial asymmetry that is mild.  Eyes: Conjunctivae are not pale. No scleral icterus.   ENT: Mucous membranes moist.   Neck: Supple.   Cardiovascular: Regular rate. Regular rhythm.   Pulmonary: No respiratory distress.   Abdominal: Non-distended.   Musculoskeletal: Moves all extremities. No obvious deformities. No edema.   Skin: Warm and dry.   Neurological:  Alert, awake, and appropriate. Normal speech. No acute lateralizing neurologic deficits appreciated. Decreased sensations to left hand.  Psychiatric: Normal affect.       ED Course   Procedures  Vitals:    07/01/23 1849 07/02/23 0030 07/02/23 0154   BP: (!) 180/79 136/65 137/67   Pulse: 96 81 88   Resp: 18  15   Temp: 99.1 °F (37.3 °C)  98.6 °F (37 °C)   TempSrc: Oral  Oral   SpO2: 98% 97% 97%   Weight: 104.3 kg (230 lb)     Height: 5' 5" (1.651 m)       Lab Results Interpreted as Abnormal:  Labs Reviewed   CBC W/ AUTO " DIFFERENTIAL - Abnormal; Notable for the following components:       Result Value    Hemoglobin 11.8 (*)     Hematocrit 35.7 (*)     RDW 14.8 (*)     Gran % 75.9 (*)     Lymph % 14.7 (*)     All other components within normal limits   COMPREHENSIVE METABOLIC PANEL - Abnormal; Notable for the following components:    Total Protein 8.6 (*)     Alkaline Phosphatase 144 (*)     All other components within normal limits   B-TYPE NATRIURETIC PEPTIDE   TROPONIN I   PROTIME-INR      All Lab Results:  Results for orders placed or performed during the hospital encounter of 07/01/23   CBC auto differential   Result Value Ref Range    WBC 10.18 3.90 - 12.70 K/uL    RBC 4.21 4.00 - 5.40 M/uL    Hemoglobin 11.8 (L) 12.0 - 16.0 g/dL    Hematocrit 35.7 (L) 37.0 - 48.5 %    MCV 85 82 - 98 fL    MCH 28.0 27.0 - 31.0 pg    MCHC 33.1 32.0 - 36.0 g/dL    RDW 14.8 (H) 11.5 - 14.5 %    Platelets 325 150 - 450 K/uL    MPV 9.3 9.2 - 12.9 fL    Immature Granulocytes 0.3 0.0 - 0.5 %    Gran # (ANC) 7.7 1.8 - 7.7 K/uL    Immature Grans (Abs) 0.03 0.00 - 0.04 K/uL    Lymph # 1.5 1.0 - 4.8 K/uL    Mono # 0.6 0.3 - 1.0 K/uL    Eos # 0.3 0.0 - 0.5 K/uL    Baso # 0.04 0.00 - 0.20 K/uL    nRBC 0 0 /100 WBC    Gran % 75.9 (H) 38.0 - 73.0 %    Lymph % 14.7 (L) 18.0 - 48.0 %    Mono % 6.2 4.0 - 15.0 %    Eosinophil % 2.5 0.0 - 8.0 %    Basophil % 0.4 0.0 - 1.9 %    Differential Method Automated    Comprehensive metabolic panel   Result Value Ref Range    Sodium 142 136 - 145 mmol/L    Potassium 4.0 3.5 - 5.1 mmol/L    Chloride 105 95 - 110 mmol/L    CO2 23 23 - 29 mmol/L    Glucose 101 70 - 110 mg/dL    BUN 16 6 - 20 mg/dL    Creatinine 0.7 0.5 - 1.4 mg/dL    Calcium 10.2 8.7 - 10.5 mg/dL    Total Protein 8.6 (H) 6.0 - 8.4 g/dL    Albumin 4.2 3.5 - 5.2 g/dL    Total Bilirubin 0.5 0.1 - 1.0 mg/dL    Alkaline Phosphatase 144 (H) 55 - 135 U/L    AST 26 10 - 40 U/L    ALT 29 10 - 44 U/L    eGFR >60 >60 mL/min/1.73 m^2    Anion Gap 14 8 - 16 mmol/L   Brain  natriuretic peptide   Result Value Ref Range    BNP 10 0 - 99 pg/mL   Troponin I   Result Value Ref Range    Troponin I 0.011 0.000 - 0.026 ng/mL   Protime-INR   Result Value Ref Range    Prothrombin Time 11.1 9.0 - 12.5 sec    INR 1.1 0.8 - 1.2     Imaging Results              MRI Brain Without Contrast (Final result)  Result time 07/01/23 23:16:48      Final result by Darrell Cole MD (07/01/23 23:16:48)                   Impression:      No acute abnormality identified.  Correlation and further evaluation as warranted.      Electronically signed by: Darrell Cole  Date:    07/01/2023  Time:    23:16               Narrative:    EXAMINATION:  MRI BRAIN WITHOUT CONTRAST    CLINICAL HISTORY:  Stroke, follow up;chronic L side weakness, but new L side numbness;.    TECHNIQUE:  Multiplanar multisequence MR imaging of the brain was performed without contrast.    COMPARISON:  Multiple priors.    FINDINGS:  Intracranial compartment:    Ventricles and sulci are normal in size for age without evidence of hydrocephalus. No extra-axial blood or fluid collections.    Mild to moderate microvascular ischemic change.  No mass lesion, acute hemorrhage, edema or acute infarct.    Normal vascular flow voids are preserved.    Skull/extracranial contents (limited evaluation): Bone marrow signal intensity is normal.                                     ED Physician's independent review of the above imaging: agree with radiologist, no acute findings     The EKG was ordered, reviewed, and independently interpreted by the ED Physician:  Interpretation time: 19:05  Rate: 89 bpm  Rhythm: normal sinus rhythm  Interpretation: Right bundle branch block. No STEMI.      The emergency physician reviewed the vital signs and test results, which are outlined above.     ED Discussion         Patient's evaluation in the ED does not suggest any emergent or life-threatening medical conditions requiring immediate intervention beyond what was provided  in the ED, and I believe patient is safe for discharge.  Regardless, an unremarkable evaluation in the ED does not preclude the development or presence of a serious or life-threatening condition. As such, patient was given return instructions for any change or worsening of symptoms.     Medical Decision Making:   Clinical Tests:   Lab Tests: Ordered and Reviewed  Radiological Study: Ordered and Reviewed  Medical Tests: Ordered and Reviewed       ED Medication(s) Administered:  Medications - No data to display  Prescription Management: I performed a review of the patient's current Rx medication list as input by nursing staff.  Discharge Medication List as of 7/2/2023  1:24 AM        CONTINUE these medications which have NOT CHANGED    Details   albuterol (PROVENTIL/VENTOLIN HFA) 90 mcg/actuation inhaler Inhale 1-2 puffs into the lungs every 6 (six) hours as needed for Wheezing. Rescue, Starting Thu 11/10/2022, Until Fri 11/10/2023 at 2359, Normal      amLODIPine (NORVASC) 10 MG tablet Take 1 tablet (10 mg total) by mouth once daily., Starting Tue 11/1/2022, Until Wed 11/1/2023, Normal      aspirin 81 MG Chew Take 1 tablet (81 mg total) by mouth once daily., Starting Thu 12/10/2015, OTC      atenolol (TENORMIN) 50 MG tablet Take 50 mg by mouth once daily., Until Discontinued, Historical Med      atorvastatin (LIPITOR) 80 MG tablet Take 1 tablet (80 mg total) by mouth once daily., Starting Wed 12/9/2020, Until Mon 5/8/2023, Normal      busPIRone (BUSPAR) 7.5 MG tablet Take 7.5 mg by mouth 2 (two) times daily. , Historical Med      butalbital-acetaminophen-caffeine -40 mg (FIORICET, ESGIC) -40 mg per tablet Take 1 tablet by mouth every 6 (six) hours as needed for Pain or Headaches., Starting 6/18/2015, Until Discontinued, Print      carvediloL (COREG) 25 MG tablet Take 25 mg by mouth 2 (two) times daily with meals., Historical Med      clopidogrel (PLAVIX) 75 mg tablet Take 1 tablet (75 mg total) by mouth  once daily., Starting Thu 6/29/2017, Until Mon 5/8/2023, Normal      colchicine 0.6 mg tablet Take 1 tablet (0.6 mg total) by mouth once daily., Starting Mon 7/17/2017, Until Tue 7/17/2018, Print      ergocalciferol (ERGOCALCIFEROL) 50,000 unit Cap Take 50,000 Units by mouth every 7 days., Historical Med      ferrous sulfate 324 mg (65 mg iron) TbEC Take 325 mg by mouth once daily., Until Discontinued, Historical Med      fluoxetine (PROZAC) 20 MG capsule Take 20 mg by mouth once daily., Until Discontinued, Historical Med      fluticasone propionate (FLONASE) 50 mcg/actuation nasal spray 1 spray (50 mcg total) by Each Nostril route 2 (two) times daily as needed for Rhinitis., Starting Tue 11/1/2022, Normal      hydrALAZINE (APRESOLINE) 25 MG tablet Take 25 mg by mouth 3 (three) times daily., Historical Med      hydrocodone-acetaminophen 7.5-325mg (NORCO) 7.5-325 mg per tablet Take 1 tablet by mouth every 6 (six) hours as needed for Pain., Starting 8/5/2016, Until Discontinued, Print      hydrOXYzine (VISTARIL) 50 MG Cap Take 50 mg by mouth 4 (four) times daily., Until Discontinued, Historical Med      isosorbide mononitrate (IMDUR) 30 MG 24 hr tablet Take 1 tablet (30 mg total) by mouth once daily., Starting Thu 6/29/2017, Until Fri 6/29/2018, Normal      lisinopriL (PRINIVIL,ZESTRIL) 40 MG tablet Take 40 mg by mouth once daily., Historical Med      losartan (COZAAR) 50 MG tablet Take 1 tablet (50 mg total) by mouth once daily., Starting Thu 6/29/2017, Until Fri 6/29/2018, Normal      meclizine (ANTIVERT) 50 MG tablet Take 50 mg by mouth 2 (two) times daily., Historical Med      naproxen (NAPROSYN) 500 MG tablet Take 1 tablet (500 mg total) by mouth 2 (two) times daily with meals., Starting Mon 5/8/2023, Normal      nitroGLYCERIN (NITROSTAT) 0.4 MG SL tablet Place 1 tablet (0.4 mg total) under the tongue every 5 (five) minutes as needed for Chest pain., Starting Mon 6/19/2017, Until Tue 6/19/2018, Print       nystatin (MYCOSTATIN) cream Apply topically 2 (two) times daily., Historical Med      omeprazole (PRILOSEC) 40 MG capsule Take 1 capsule (40 mg total) by mouth once daily., Starting Thu 1/28/2016, No Print      ondansetron (ZOFRAN) 4 MG tablet Take 8 mg by mouth 2 (two) times daily., Historical Med      pantoprazole (PROTONIX) 40 MG tablet Take 1 tablet (40 mg total) by mouth once daily., Starting Sun 1/30/2022, Until Tue 3/1/2022, Normal      predniSONE (DELTASONE) 10 MG tablet Take 1 tablet (10 mg total) by mouth once daily. Take 4 tabs x 3 days, then take 2 tabs x 3 days, then take 1 tab x 3 days., Starting Tue 11/1/2022, Normal      promethazine (PHENERGAN) 25 MG tablet Take 1 tablet (25 mg total) by mouth every 6 (six) hours as needed for Nausea., Starting Mon 1/30/2023, Normal      spironolactone (ALDACTONE) 50 MG tablet Take 50 mg by mouth once daily., Historical Med      trazodone (DESYREL) 50 MG tablet Take 100 mg by mouth every evening., Historical Med      venlafaxine (EFFEXOR-XR) 75 MG 24 hr capsule Take 75 mg by mouth once daily., Until Discontinued, Historical Med                 Follow-up Information       with your pcp. Schedule an appointment as soon as possible for a visit in 1 day.               [facility] - Emergency Dept..    Specialty: Emergency Medicine  Why: As needed, If symptoms worsen  Contact information:  [address] [facility] [address] [phone]                           Scribe Attestation:   Scribe #1: I performed the above scribed service and the documentation accurately describes the services I performed. I attest to the accuracy of the note.     Attending:   Physician Attestation Statement for Scribe #1: I, Didier Quintero MD, personally performed the services described in this documentation, as scribed by Carlito Hernandez, in my presence, and it is both accurate and complete. As with other dictation methods such as dictation software, small errors  or inconsistencies may be overlooked due to the goal of spending more face-to-face time with patients.      Clinical Impression       ICD-10-CM ICD-9-CM   1. Paresthesias  R20.2 782.0   2. Weakness  R53.1 780.79      ED Disposition Condition    Discharge Stable              Didier Quintero MD  07/03/23 0592

## 2024-05-27 ENCOUNTER — HOSPITAL ENCOUNTER (EMERGENCY)
Facility: HOSPITAL | Age: 59
Discharge: HOME | End: 2024-05-28
Attending: EMERGENCY MEDICINE
Payer: MEDICAID

## 2024-05-27 DIAGNOSIS — R07.9 CHEST PAIN: ICD-10-CM

## 2024-05-27 DIAGNOSIS — R53.1 LEFT-SIDED WEAKNESS: ICD-10-CM

## 2024-05-27 DIAGNOSIS — R20.2 PARESTHESIAS: Primary | ICD-10-CM

## 2024-05-27 LAB
ALBUMIN SERPL BCP-MCNC: 3.8 G/DL (ref 3.5–5.2)
ALP SERPL-CCNC: 82 U/L (ref 55–135)
ALT SERPL W/O P-5'-P-CCNC: 12 U/L (ref 10–44)
ANION GAP SERPL CALC-SCNC: 10 MMOL/L (ref 8–16)
AST SERPL-CCNC: 14 U/L (ref 10–40)
BASOPHILS # BLD AUTO: 0.03 K/UL (ref 0–0.2)
BASOPHILS NFR BLD: 0.4 % (ref 0–1.9)
BILIRUB SERPL-MCNC: 0.8 MG/DL (ref 0.1–1)
BNP SERPL-MCNC: 26 PG/ML (ref 0–99)
BUN SERPL-MCNC: 12 MG/DL (ref 6–20)
CALCIUM SERPL-MCNC: 9.8 MG/DL (ref 8.7–10.5)
CHLORIDE SERPL-SCNC: 108 MMOL/L (ref 95–110)
CO2 SERPL-SCNC: 23 MMOL/L (ref 23–29)
CREAT SERPL-MCNC: 0.6 MG/DL (ref 0.5–1.4)
DIFFERENTIAL METHOD BLD: ABNORMAL
EOSINOPHIL # BLD AUTO: 0.3 K/UL (ref 0–0.5)
EOSINOPHIL NFR BLD: 2.9 % (ref 0–8)
ERYTHROCYTE [DISTWIDTH] IN BLOOD BY AUTOMATED COUNT: 14.4 % (ref 11.5–14.5)
EST. GFR  (NO RACE VARIABLE): >60 ML/MIN/1.73 M^2
GLUCOSE SERPL-MCNC: 97 MG/DL (ref 70–110)
HCT VFR BLD AUTO: 38.4 % (ref 37–48.5)
HCV AB SERPL QL IA: NEGATIVE
HEP C VIRUS HOLD SPECIMEN: NORMAL
HGB BLD-MCNC: 12.5 G/DL (ref 12–16)
HIV 1+2 AB+HIV1 P24 AG SERPL QL IA: NEGATIVE
IMM GRANULOCYTES # BLD AUTO: 0.03 K/UL (ref 0–0.04)
IMM GRANULOCYTES NFR BLD AUTO: 0.4 % (ref 0–0.5)
LYMPHOCYTES # BLD AUTO: 1.2 K/UL (ref 1–4.8)
LYMPHOCYTES NFR BLD: 14.1 % (ref 18–48)
MAGNESIUM SERPL-MCNC: 1.6 MG/DL (ref 1.6–2.6)
MCH RBC QN AUTO: 28.5 PG (ref 27–31)
MCHC RBC AUTO-ENTMCNC: 32.6 G/DL (ref 32–36)
MCV RBC AUTO: 88 FL (ref 82–98)
MONOCYTES # BLD AUTO: 0.5 K/UL (ref 0.3–1)
MONOCYTES NFR BLD: 6.4 % (ref 4–15)
NEUTROPHILS # BLD AUTO: 6.5 K/UL (ref 1.8–7.7)
NEUTROPHILS NFR BLD: 75.8 % (ref 38–73)
NRBC BLD-RTO: 0 /100 WBC
PLATELET # BLD AUTO: 351 K/UL (ref 150–450)
PLATELET BLD QL SMEAR: ABNORMAL
PMV BLD AUTO: 10.1 FL (ref 9.2–12.9)
POTASSIUM SERPL-SCNC: 3.5 MMOL/L (ref 3.5–5.1)
PROT SERPL-MCNC: 7.9 G/DL (ref 6–8.4)
RBC # BLD AUTO: 4.39 M/UL (ref 4–5.4)
SODIUM SERPL-SCNC: 141 MMOL/L (ref 136–145)
TROPONIN I SERPL DL<=0.01 NG/ML-MCNC: <0.006 NG/ML (ref 0–0.03)
TROPONIN I SERPL DL<=0.01 NG/ML-MCNC: <0.006 NG/ML (ref 0–0.03)
TSH SERPL DL<=0.005 MIU/L-ACNC: 0.59 UIU/ML (ref 0.4–4)
WBC # BLD AUTO: 8.5 K/UL (ref 3.9–12.7)

## 2024-05-27 PROCEDURE — 87389 HIV-1 AG W/HIV-1&-2 AB AG IA: CPT | Performed by: EMERGENCY MEDICINE

## 2024-05-27 PROCEDURE — 84484 ASSAY OF TROPONIN QUANT: CPT | Performed by: EMERGENCY MEDICINE

## 2024-05-27 PROCEDURE — 36415 COLL VENOUS BLD VENIPUNCTURE: CPT | Performed by: NURSE PRACTITIONER

## 2024-05-27 PROCEDURE — 83735 ASSAY OF MAGNESIUM: CPT | Performed by: EMERGENCY MEDICINE

## 2024-05-27 PROCEDURE — 99285 EMERGENCY DEPT VISIT HI MDM: CPT | Mod: 25

## 2024-05-27 PROCEDURE — 80053 COMPREHEN METABOLIC PANEL: CPT | Performed by: EMERGENCY MEDICINE

## 2024-05-27 PROCEDURE — 85025 COMPLETE CBC W/AUTO DIFF WBC: CPT | Performed by: NURSE PRACTITIONER

## 2024-05-27 PROCEDURE — 93010 ELECTROCARDIOGRAM REPORT: CPT | Mod: ,,, | Performed by: STUDENT IN AN ORGANIZED HEALTH CARE EDUCATION/TRAINING PROGRAM

## 2024-05-27 PROCEDURE — 83880 ASSAY OF NATRIURETIC PEPTIDE: CPT | Performed by: NURSE PRACTITIONER

## 2024-05-27 PROCEDURE — 86803 HEPATITIS C AB TEST: CPT | Performed by: EMERGENCY MEDICINE

## 2024-05-27 PROCEDURE — 93005 ELECTROCARDIOGRAM TRACING: CPT

## 2024-05-27 PROCEDURE — 25500020 PHARM REV CODE 255: Performed by: EMERGENCY MEDICINE

## 2024-05-27 PROCEDURE — 84443 ASSAY THYROID STIM HORMONE: CPT | Performed by: NURSE PRACTITIONER

## 2024-05-27 RX ADMIN — IOHEXOL 100 ML: 350 INJECTION, SOLUTION INTRAVENOUS at 10:05

## 2024-05-27 NOTE — FIRST PROVIDER EVALUATION
" Emergency Department TeleTriage Encounter Note      CHIEF COMPLAINT    Chief Complaint   Patient presents with    Tingling     Pt brought in by EMS for tingling to the whole left side of her body X3 days.        VITAL SIGNS   Initial Vitals [05/27/24 1552]   BP Pulse Resp Temp SpO2   123/83 91 20 97.4 °F (36.3 °C) 97 %      MAP       --            ALLERGIES    Review of patient's allergies indicates:   Allergen Reactions    Pcn [penicillins] Swelling    Codeine     Latex, natural rubber     Tylox [oxycodone-acetaminophen]        PROVIDER TRIAGE NOTE  This is a teletriage evaluation of a 59 y.o. female presenting to the ED complaining of "tingling" to left side of body constantly for three days. Denies weakness and pain. Reports that her face feels swollen.     Alert, no distress.    Initial orders will be placed and care will be transferred to an alternate provider when patient is roomed for a full evaluation. Any additional orders and the final disposition will be determined by that provider.         ORDERS  Labs Reviewed   HIV 1 / 2 ANTIBODY   HEPATITIS C ANTIBODY   HEP C VIRUS HOLD SPECIMEN   CBC W/ AUTO DIFFERENTIAL   COMPREHENSIVE METABOLIC PANEL       ED Orders (720h ago, onward)      Start Ordered     Status Ordering Provider    05/27/24 1842 05/27/24 1842  CBC auto differential  STAT         Ordered MONROE CROSS N.    05/27/24 1842 05/27/24 1842  Comprehensive metabolic panel  STAT         Ordered MONROE CROSS N.    05/27/24 1842 05/27/24 1842  Insert Saline lock IV  Once         Ordered MONROE CROSS N.    05/27/24 1553 05/27/24 1553  HIV 1/2 Ag/Ab (4th Gen)  STAT         Ordered SABAS JIMENEZ P.    05/27/24 1553 05/27/24 1553  Hepatitis C Antibody  STAT        Placed in "And" Linked Group    Ordered SABAS JIMENEZ P.    05/27/24 1553 05/27/24 1553  HCV Virus Hold Specimen  STAT        Placed in "And" Linked Group    Ordered SABAS JIMENEZ P.              Virtual Visit " Note: The provider triage portion of this emergency department evaluation and documentation was performed via [employer], a HIPAA-compliant telemedicine application, in concert with a tele-presenter in the room. A face to face patient evaluation with one of my colleagues will occur once the patient is placed in an emergency department room.      DISCLAIMER: This note was prepared with [employer] voice recognition transcription software. Garbled syntax, mangled pronouns, and other bizarre constructions may be attributed to that software system.

## 2024-05-28 VITALS
OXYGEN SATURATION: 98 % | TEMPERATURE: 98 F | RESPIRATION RATE: 20 BRPM | HEART RATE: 98 BPM | SYSTOLIC BLOOD PRESSURE: 121 MMHG | DIASTOLIC BLOOD PRESSURE: 80 MMHG

## 2024-05-28 LAB
BILIRUB UR QL STRIP: NEGATIVE
CLARITY UR: CLEAR
COLOR UR: YELLOW
GLUCOSE UR QL STRIP: NEGATIVE
HGB UR QL STRIP: NEGATIVE
KETONES UR QL STRIP: NEGATIVE
LEUKOCYTE ESTERASE UR QL STRIP: NEGATIVE
NITRITE UR QL STRIP: NEGATIVE
OHS QRS DURATION: 130 MS
OHS QTC CALCULATION: 497 MS
PH UR STRIP: 8 [PH] (ref 5–8)
PROT UR QL STRIP: ABNORMAL
SP GR UR STRIP: >1.03 (ref 1–1.03)
URN SPEC COLLECT METH UR: ABNORMAL
UROBILINOGEN UR STRIP-ACNC: NEGATIVE EU/DL

## 2024-05-28 PROCEDURE — 81003 URINALYSIS AUTO W/O SCOPE: CPT | Performed by: EMERGENCY MEDICINE

## 2024-05-28 NOTE — ED PROVIDER NOTES
SCRIBE #1 NOTE: I, Carmel Mann, am scribing for, and in the presence of, Lavinia Hussein DO. I have scribed the entire note.       History     Chief Complaint   Patient presents with    Tingling     Pt brought in by EMS for tingling to the whole left side of her body X3 days.      Review of patient's allergies indicates:   Allergen Reactions    Pcn [penicillins] Swelling    Codeine     Latex, natural rubber     Tylox [oxycodone-acetaminophen]          History of Present Illness     HPI    2024, 7:40 PM  History obtained from the patient      History of Present Illness: Judy Ruelas is a 59 y.o. female patient with a PMHx of previous CVA with residual left-sided weakness and paresthesias and HTN who presents to the Emergency Department for evaluation of tingling to the whole left side of body which onset 3 days PTA. Symptoms are constant and moderate in severity. No mitigating or exacerbating factors reported. Patient denies any visual changes, cp, sob, ha, slurred speech,fever, and all other sxs at this time. No further complaints or concerns at this time.       Arrival mode: AASI    PCP: [place], [facility]        Past Medical History:  Past Medical History:   Diagnosis Date    Anxiety     Depression     Depression with anxiety     Family history of heart disease 2016    Hypertension     Irregular heart beat     Migraine headache     Morbid obesity with BMI of 40.0-44.9, adult 2017    NSTEMI (non-ST elevated myocardial infarction) 2017       Past Surgical History:  Past Surgical History:   Procedure Laterality Date    APPENDECTOMY       SECTION      HYSTERECTOMY           Family History:  Family History   Problem Relation Name Age of Onset    Hypertension Unknown      Heart disease Father  70    Hypertension Father      Heart attack Mother  40        triple bypass    Diabetes Mother      Hypertension Mother      Stroke Mother      Heart attack Brother  40         CABG x 2       Social History:  Social History     Tobacco Use    Smoking status: Never    Smokeless tobacco: Never   Substance and Sexual Activity    Alcohol use: No     Alcohol/week: 0.0 standard drinks of alcohol     Comment: denies    Drug use: No     Comment: denies    Sexual activity: Not Currently        Review of Systems     Review of Systems   Eyes:  Negative for visual disturbance.   Respiratory:  Positive for shortness of breath.    Cardiovascular:  Positive for chest pain.   Gastrointestinal:  Positive for abdominal pain.   Musculoskeletal:         (+) Tingling L side    Neurological:  Positive for weakness and numbness. Negative for dizziness, facial asymmetry, speech difficulty, light-headedness and headaches.        Physical Exam     Initial Vitals [05/27/24 1552]   BP Pulse Resp Temp SpO2   123/83 91 20 97.4 °F (36.3 °C) 97 %      MAP       --          Physical Exam  Nursing Notes and Vital Signs Reviewed.  Constitutional: Patient is in no apparent distress. Well-developed and well-nourished. Poor hygiene.  Head: Atraumatic. Normocephalic.  Eyes: PERRL. EOM intact. Conjunctivae are not pale. No scleral icterus.  ENT: Mucous membranes are moist. Oropharynx is clear and symmetric.    Neck: Supple. Full ROM.  Cardiovascular: Regular rate. Regular rhythm. No murmurs, rubs, or gallops. Distal pulses are 2+ and symmetric.  Pulmonary/Chest: No respiratory distress. Clear to auscultation bilaterally. No wheezing or rales.  Abdominal: Soft and non-distended.  There is no tenderness.  No rebound, guarding, or rigidity.   Musculoskeletal: Moves all extremities. No obvious deformities. No edema. No calf tenderness. Decrease sensation on L side of face, LUE, and LLE. Weakness of LLE.  Skin: Warm and dry.  Neurological:  Alert, awake, and appropriate.  Normal speech.  No acute focal neurological deficits are appreciated.  Psychiatric: Normal affect. Good eye contact. Appropriate in content.     ED Course    Procedures  ED Vital Signs:  Vitals:    05/27/24 1552 05/27/24 2035 05/27/24 2300 05/28/24 0148   BP: 123/83 126/72 115/74 121/80   Pulse: 91 87 88 98   Resp: 20 20 20 20   Temp: 97.4 °F (36.3 °C) 98 °F (36.7 °C) 98 °F (36.7 °C) 98 °F (36.7 °C)   TempSrc: Oral Oral     SpO2: 97% 97% 100% 98%       Abnormal Lab Results:  Labs Reviewed   CBC W/ AUTO DIFFERENTIAL - Abnormal; Notable for the following components:       Result Value    Gran % 75.8 (*)     Lymph % 14.1 (*)     All other components within normal limits   URINALYSIS, REFLEX TO URINE CULTURE - Abnormal; Notable for the following components:    Specific Gravity, UA >1.030 (*)     Protein, UA Trace (*)     All other components within normal limits    Narrative:     Specimen Source->Urine   HIV 1 / 2 ANTIBODY    Narrative:     Release to patient->Immediate   HEPATITIS C ANTIBODY    Narrative:     Release to patient->Immediate   HEP C VIRUS HOLD SPECIMEN    Narrative:     Release to patient->Immediate   B-TYPE NATRIURETIC PEPTIDE   MAGNESIUM   TROPONIN I   TSH   B-TYPE NATRIURETIC PEPTIDE   TSH   COMPREHENSIVE METABOLIC PANEL   TROPONIN I   MAGNESIUM   TROPONIN I        All Lab Results:  Results for orders placed or performed during the hospital encounter of 05/27/24   HIV 1/2 Ag/Ab (4th Gen)   Result Value Ref Range    HIV 1/2 Ag/Ab Negative Negative   Hepatitis C Antibody   Result Value Ref Range    Hepatitis C Ab Negative Negative   HCV Virus Hold Specimen   Result Value Ref Range    HEP C Virus Hold Specimen Hold for HCV sendout    CBC auto differential   Result Value Ref Range    WBC 8.50 3.90 - 12.70 K/uL    RBC 4.39 4.00 - 5.40 M/uL    Hemoglobin 12.5 12.0 - 16.0 g/dL    Hematocrit 38.4 37.0 - 48.5 %    MCV 88 82 - 98 fL    MCH 28.5 27.0 - 31.0 pg    MCHC 32.6 32.0 - 36.0 g/dL    RDW 14.4 11.5 - 14.5 %    Platelets 351 150 - 450 K/uL    MPV 10.1 9.2 - 12.9 fL    Immature Granulocytes 0.4 0.0 - 0.5 %    Gran # (ANC) 6.5 1.8 - 7.7 K/uL    Immature Grans  (Abs) 0.03 0.00 - 0.04 K/uL    Lymph # 1.2 1.0 - 4.8 K/uL    Mono # 0.5 0.3 - 1.0 K/uL    Eos # 0.3 0.0 - 0.5 K/uL    Baso # 0.03 0.00 - 0.20 K/uL    nRBC 0 0 /100 WBC    Gran % 75.8 (H) 38.0 - 73.0 %    Lymph % 14.1 (L) 18.0 - 48.0 %    Mono % 6.4 4.0 - 15.0 %    Eosinophil % 2.9 0.0 - 8.0 %    Basophil % 0.4 0.0 - 1.9 %    Platelet Estimate Appears normal     Differential Method Automated    BNP   Result Value Ref Range    BNP 26 0 - 99 pg/mL   TSH   Result Value Ref Range    TSH 0.587 0.400 - 4.000 uIU/mL   Comprehensive metabolic panel   Result Value Ref Range    Sodium 141 136 - 145 mmol/L    Potassium 3.5 3.5 - 5.1 mmol/L    Chloride 108 95 - 110 mmol/L    CO2 23 23 - 29 mmol/L    Glucose 97 70 - 110 mg/dL    BUN 12 6 - 20 mg/dL    Creatinine 0.6 0.5 - 1.4 mg/dL    Calcium 9.8 8.7 - 10.5 mg/dL    Total Protein 7.9 6.0 - 8.4 g/dL    Albumin 3.8 3.5 - 5.2 g/dL    Total Bilirubin 0.8 0.1 - 1.0 mg/dL    Alkaline Phosphatase 82 55 - 135 U/L    AST 14 10 - 40 U/L    ALT 12 10 - 44 U/L    eGFR >60 >60 mL/min/1.73 m^2    Anion Gap 10 8 - 16 mmol/L   Troponin I   Result Value Ref Range    Troponin I <0.006 0.000 - 0.026 ng/mL   Magnesium   Result Value Ref Range    Magnesium 1.6 1.6 - 2.6 mg/dL   Troponin I #2   Result Value Ref Range    Troponin I <0.006 0.000 - 0.026 ng/mL   Urinalysis, Reflex to Urine Culture Urine, Catheterized    Specimen: Urine   Result Value Ref Range    Specimen UA Urine, Catheterized     Color, UA Yellow Yellow, Straw, Pema    Appearance, UA Clear Clear    pH, UA 8.0 5.0 - 8.0    Specific Gravity, UA >1.030 (A) 1.005 - 1.030    Protein, UA Trace (A) Negative    Glucose, UA Negative Negative    Ketones, UA Negative Negative    Bilirubin (UA) Negative Negative    Occult Blood UA Negative Negative    Nitrite, UA Negative Negative    Urobilinogen, UA Negative <2.0 EU/dL    Leukocytes, UA Negative Negative   EKG 12-lead   Result Value Ref Range    QRS Duration 130 ms    [facility] QTC Calculation 497 ms          Imaging Results:  Imaging Results              CTA Chest Abdomen Pelvis (Final result)  Result time 05/28/24 00:06:11      Final result by Jovany Quiroz MD (05/28/24 00:06:11)                   Impression:      No aortic aneurysm or dissection.  No large vessel occlusion.    Punctate focus of gas within the bladder may relate to recent instrumentation or infection with gas-forming organism.      Electronically signed by: Jovany Quiroz  Date:    05/28/2024  Time:    00:06               Narrative:    EXAMINATION:  CTA CHEST ABDOMEN PELVIS    CLINICAL HISTORY:  Aortic aneurysm, known or suspected;    TECHNIQUE:  Low dose axial images, sagittal and coronal reformations were obtained from the thoracic inlet to the pubic symphysis following the IV administration of 100 mL of Omnipaque 350.  Oral contrast was not administered.    COMPARISON:  None.    FINDINGS:  Base of Neck: No significant abnormality.    Thoracic soft tissues: Unremarkable.    Aorta: Moderate coronary artery atherosclerosis.  No aortic aneurysm or dissection.    Heart: Normal size. No effusion.    Pulmonary vasculature: Normal caliber.    Belle/Mediastinum: No pathologic carmelita enlargement.    Airways: Patent.    Lungs/Pleura: No focal consolidation or pleural effusion.    Esophagus: Unremarkable.    Liver: Fatty liver.    Gallbladder: Cholecystectomy.    Bile Ducts: No dilatation.    Pancreas: No mass. No peripancreatic fat stranding.    Spleen: Normal.    Adrenals: Normal.    Kidneys/Ureters: Normal enhancement.  No mass or  hydroureteronephrosis.    Bladder: Punctate focus of gas within the bladder may relate to recent instrumentation or infection with gas-forming organism.    Reproductive organs: Hysterectomy.    GI Tract/Mesentery: Hiatal hernia.  No evidence of bowel obstruction or inflammation.    Peritoneal Space: No ascites or free air.    Retroperitoneum: No significant adenopathy.    Abdominal wall: Normal.    Vasculature:  No large vessel occlusion or aneurysm.    Bones: No acute fracture. No suspicious lytic or sclerotic lesions.                                       MRI Brain Without Contrast (Final result)  Result time 05/27/24 21:40:52      Final result by Shanna Reyes MD (05/27/24 21:40:52)                   Impression:      No acute ischemia      Electronically signed by: Shanna Reyes  Date:    05/27/2024  Time:    21:40               Narrative:    EXAMINATION:  MRI BRAIN WITHOUT CONTRAST    CLINICAL HISTORY:  Transient ischemic attack (TIA);    TECHNIQUE:  Multiplanar multisequence MR imaging of the brain was performed without contrast.    COMPARISON:  CT correlation    FINDINGS:  No acute ischemia    No mass effect or midline shift    No convincing hydrocephalus    Scattered T2 FLAIR hyperintense foci and lacunar infarcts.    Left sphenoid sinusitis                                       X-Ray Chest AP Portable (Final result)  Result time 05/27/24 20:42:52      Final result by Shanna Reyes MD (05/27/24 20:42:52)                   Impression:      stable chest exam      Electronically signed by: Shanna Reyes  Date:    05/27/2024  Time:    20:42               Narrative:    EXAMINATION:  XR CHEST AP PORTABLE    CLINICAL HISTORY:  Chest Pain;    TECHNIQUE:  Single frontal portable view of the chest was performed.    COMPARISON:  05/08/2023    FINDINGS:  Lungs well aerated unchanged.  No pleural effusion or convincing pneumothorax                                       CT Head Without Contrast (Final result)  Result time 05/27/24 20:46:13      Final result by Shanna Reyes MD (05/27/24 20:46:13)                   Impression:      No acute abnormality.  Aspect score 10      Electronically signed by: Shanna Reyes  Date:    05/27/2024  Time:    20:46               Narrative:    EXAMINATION:  CT HEAD WITHOUT CONTRAST    CLINICAL HISTORY:  Neuro deficit, acute, stroke suspected;    TECHNIQUE:  Low  dose axial CT images obtained throughout the head without intravenous contrast. Sagittal and coronal reconstructions were performed.    COMPARISON:  July 1, 2023    FINDINGS:  Intracranial compartment:    Ventricles and sulci are normal in size for age without evidence of hydrocephalus. No extra-axial blood or fluid collections.    The brain parenchyma appears normal. No parenchymal mass, hemorrhage, edema or major vascular distribution infarct.    Chronic left maxillary sinusitis                                       The EKG was ordered, reviewed, and independently interpreted by the ED provider.  Interpretation time: 19:52  Rate: 76 BPM  Rhythm: normal sinus rhythm  Interpretation: Right bundle branch block. No STEMI.             The Emergency Provider reviewed the vital signs and test results, which are outlined above.     ED Discussion       1:32 AM: Reassessed pt at this time. Discussed with pt all pertinent ED information and results. Discussed pt dx and plan of tx. Gave pt all f/u and return to the ED instructions. All questions and concerns were addressed at this time. Pt expresses understanding of information and instructions, and is comfortable with plan to discharge. Pt is stable for discharge.    I discussed with patient and/or family/caretaker that evaluation in the ED does not suggest any emergent or life threatening medical conditions requiring immediate intervention beyond what was provided in the ED, and I believe patient is safe for discharge.  Regardless, an unremarkable evaluation in the ED does not preclude the development or presence of a serious of life threatening condition. As such, patient was instructed to return immediately for any worsening or change in current symptoms.      ED Course as of 05/29/24 1003   e May 28, 2024   0225 59-year-old female with a history of CVA with residual symptoms and hypertension presents with left-sided paresthesias and weakness of the left side.  NIH  stroke scale 2.  Onset of symptoms 3 days ago.  Outside of the thrombolytic window.  Outside of the thrombectomy window.  Associated symptoms include shortness of breath and chest pain.  Noncontrast head CT is negative for intracranial hemorrhage.  MRI brain shows no acute ischemic changes.  CTA chest abdomen and pelvis is negative for aortic dissection.  Chest x-ray negative for pneumonia, pneumothorax, or CHF.  Additionally BNP is normal.  EKG and troponin x2 are negative for ACS.  CMP shows normal renal function, LFTs, and electrolytes.  Magnesium is normal.  CBC shows no significant leukocytosis, anemia, or platelet abnormality.  TSH within normal limits.  UA shows no signs of infection. [NF]      ED Course User Index  [NF] Lavinia Hussein, DO     Medical Decision Making  Amount and/or Complexity of Data Reviewed  Labs: ordered. Decision-making details documented in ED Course.  Radiology: ordered and independent interpretation performed. Decision-making details documented in ED Course.  ECG/medicine tests: ordered and independent interpretation performed. Decision-making details documented in ED Course.    Risk  Prescription drug management.       Additional MDM:   Differential Diagnosis:   Includes but is not limited to complex migraine, CVA, intracranial hemorrhage, aortic dissection, UTI, electrolyte derangement, ACS, pneumothorax, pneumonia, CHF, thyroid abnormality, PE.        NIH Stroke Scale:   Interval = initial 15 minute assessment from arrival  Level of consciousness = 0 - alert  LOC questions = 0 - answers both correctly  LOC commands = 0 - performs both correctly  Best gaze = 0 - normal  Visual = 0 - no visual loss  Facial palsy = 0 - normal  Motor left arm =  0 - no drift  Motor right arm =  0 - no drift  Motor left leg = 0 - no drift  Motor right leg =  1 - drift  Limb ataxia = 0 - absent  Sensory = 1 - mild to moderate loss  Best language = 0 - no aphasia  Dysarthria = 0 - normal  articulation  Extinction and inattention = 0 - no neglect  NIH Stroke Scale Total = 2              ED Medication(s):  Medications   iohexoL (OMNIPAQUE 350) injection 100 mL (100 mLs Intravenous Given 5/27/24 5234)       Discharge Medication List as of 5/28/2024  1:32 AM           Follow-up Information       [facility] In 2 days.    Contact information:  [address] [address] [address] [phone]               [facility] - Emergency Dept..    Specialty: Emergency Medicine  Why: As needed, If symptoms worsen  Contact information:  [address]  [facility] [address] [phone]                               Scribe Attestation:   Scribe #1: I performed the above scribed service and the documentation accurately describes the services I performed. I attest to the accuracy of the note.     Attending:   Physician Attestation Statement for Scribe #1: I, Lavinia Hussein DO, personally performed the services described in this documentation, as scribed by Carmel Mann, in my presence, and it is both accurate and complete.           Clinical Impression       ICD-10-CM ICD-9-CM   1. Paresthesias  R20.2 782.0   2. Chest pain  R07.9 786.50   3. Left-sided weakness  R53.1 728.87       Disposition:   Disposition: Discharged  Condition: Stable         Lavinia Hussein DO  05/29/24 1004

## 2024-06-11 ENCOUNTER — HOSPITAL ENCOUNTER (EMERGENCY)
Facility: HOSPITAL | Age: 59
Discharge: HOME | End: 2024-06-11
Attending: FAMILY MEDICINE
Payer: MEDICAID

## 2024-06-11 VITALS
OXYGEN SATURATION: 95 % | RESPIRATION RATE: 18 BRPM | HEART RATE: 72 BPM | SYSTOLIC BLOOD PRESSURE: 141 MMHG | DIASTOLIC BLOOD PRESSURE: 89 MMHG | TEMPERATURE: 98 F

## 2024-06-11 DIAGNOSIS — R06.02 SOB (SHORTNESS OF BREATH): Primary | ICD-10-CM

## 2024-06-11 LAB
ALBUMIN SERPL BCP-MCNC: 3.9 G/DL (ref 3.5–5.2)
ALP SERPL-CCNC: 62 U/L (ref 55–135)
ALT SERPL W/O P-5'-P-CCNC: 12 U/L (ref 10–44)
ANION GAP SERPL CALC-SCNC: 11 MMOL/L (ref 8–16)
AST SERPL-CCNC: 14 U/L (ref 10–40)
BASOPHILS # BLD AUTO: 0.03 K/UL (ref 0–0.2)
BASOPHILS NFR BLD: 0.5 % (ref 0–1.9)
BILIRUB SERPL-MCNC: 0.8 MG/DL (ref 0.1–1)
BNP SERPL-MCNC: 12 PG/ML (ref 0–99)
BUN SERPL-MCNC: 21 MG/DL (ref 6–20)
CALCIUM SERPL-MCNC: 9.5 MG/DL (ref 8.7–10.5)
CHLORIDE SERPL-SCNC: 110 MMOL/L (ref 95–110)
CO2 SERPL-SCNC: 20 MMOL/L (ref 23–29)
CREAT SERPL-MCNC: 0.7 MG/DL (ref 0.5–1.4)
DIFFERENTIAL METHOD BLD: ABNORMAL
EOSINOPHIL # BLD AUTO: 0.2 K/UL (ref 0–0.5)
EOSINOPHIL NFR BLD: 2.8 % (ref 0–8)
ERYTHROCYTE [DISTWIDTH] IN BLOOD BY AUTOMATED COUNT: 14.4 % (ref 11.5–14.5)
EST. GFR  (NO RACE VARIABLE): >60 ML/MIN/1.73 M^2
GLUCOSE SERPL-MCNC: 88 MG/DL (ref 70–110)
HCT VFR BLD AUTO: 35.9 % (ref 37–48.5)
HGB BLD-MCNC: 11.7 G/DL (ref 12–16)
IMM GRANULOCYTES # BLD AUTO: 0.02 K/UL (ref 0–0.04)
IMM GRANULOCYTES NFR BLD AUTO: 0.3 % (ref 0–0.5)
LYMPHOCYTES # BLD AUTO: 1.1 K/UL (ref 1–4.8)
LYMPHOCYTES NFR BLD: 16.7 % (ref 18–48)
MCH RBC QN AUTO: 28.7 PG (ref 27–31)
MCHC RBC AUTO-ENTMCNC: 32.6 G/DL (ref 32–36)
MCV RBC AUTO: 88 FL (ref 82–98)
MONOCYTES # BLD AUTO: 0.5 K/UL (ref 0.3–1)
MONOCYTES NFR BLD: 7.4 % (ref 4–15)
NEUTROPHILS # BLD AUTO: 4.7 K/UL (ref 1.8–7.7)
NEUTROPHILS NFR BLD: 72.3 % (ref 38–73)
NRBC BLD-RTO: 0 /100 WBC
OHS QRS DURATION: 128 MS
OHS QTC CALCULATION: 467 MS
PLATELET # BLD AUTO: 372 K/UL (ref 150–450)
PMV BLD AUTO: 9.8 FL (ref 9.2–12.9)
POTASSIUM SERPL-SCNC: 3.8 MMOL/L (ref 3.5–5.1)
PROT SERPL-MCNC: 7.8 G/DL (ref 6–8.4)
RBC # BLD AUTO: 4.08 M/UL (ref 4–5.4)
SODIUM SERPL-SCNC: 141 MMOL/L (ref 136–145)
TROPONIN I SERPL DL<=0.01 NG/ML-MCNC: <0.006 NG/ML (ref 0–0.03)
WBC # BLD AUTO: 6.47 K/UL (ref 3.9–12.7)

## 2024-06-11 PROCEDURE — 84484 ASSAY OF TROPONIN QUANT: CPT | Performed by: FAMILY MEDICINE

## 2024-06-11 PROCEDURE — 93005 ELECTROCARDIOGRAM TRACING: CPT

## 2024-06-11 PROCEDURE — 99285 EMERGENCY DEPT VISIT HI MDM: CPT | Mod: 25

## 2024-06-11 PROCEDURE — 83880 ASSAY OF NATRIURETIC PEPTIDE: CPT | Performed by: FAMILY MEDICINE

## 2024-06-11 PROCEDURE — 85025 COMPLETE CBC W/AUTO DIFF WBC: CPT | Performed by: FAMILY MEDICINE

## 2024-06-11 PROCEDURE — 93010 ELECTROCARDIOGRAM REPORT: CPT | Mod: ,,, | Performed by: INTERNAL MEDICINE

## 2024-06-11 PROCEDURE — 80053 COMPREHEN METABOLIC PANEL: CPT | Performed by: FAMILY MEDICINE

## 2024-06-11 NOTE — ED PROVIDER NOTES
SCRIBE #1 NOTE: I, Andrew Choi, am scribing for, and in the presence of, Eli Adams MD. I have scribed the entire note.       History     Chief Complaint   Patient presents with    Shortness of Breath     [facility] reports pt. Having a cough and shortness of breath for several days.      Review of patient's allergies indicates:   Allergen Reactions    Pcn [penicillins] Swelling    Codeine     Latex, natural rubber     Tylox [oxycodone-acetaminophen]          History of Present Illness     HPI    2024, 4:26 PM  History obtained from the patient      History of Present Illness: Judy Ruelas is a 59 y.o. female patient who presents to the Emergency Department for evaluation of SOB which onset a few days ago. Symptoms are constant and moderate in severity. No mitigating or exacerbating factors reported. Pt reports associated cough. No further complaints or concerns at this time.       Arrival mode: EMS    PCP: [facility]        Past Medical History:  Past Medical History:   Diagnosis Date    Anxiety     Depression     Depression with anxiety     Family history of heart disease 2016    Hypertension     Irregular heart beat     Migraine headache     Morbid obesity with BMI of 40.0-44.9, adult 2017    NSTEMI (non-ST elevated myocardial infarction) 2017       Past Surgical History:  Past Surgical History:   Procedure Laterality Date    APPENDECTOMY       SECTION      HYSTERECTOMY           Family History:  Family History   Problem Relation Name Age of Onset    Hypertension Unknown      Heart disease Father  70    Hypertension Father      Heart attack Mother  40        triple bypass    Diabetes Mother      Hypertension Mother      Stroke Mother      Heart attack Brother  40        CABG x 2       Social History:  Social History     Tobacco Use    Smoking status: Never    Smokeless tobacco: Never   Substance and Sexual Activity    Alcohol use: No      Alcohol/week: 0.0 standard drinks of alcohol     Comment: denies    Drug use: No     Comment: denies    Sexual activity: Not Currently        Review of Systems     Review of Systems   Constitutional:  Negative for fever.   HENT:  Negative for sore throat.    Respiratory:  Positive for cough and shortness of breath.    Cardiovascular:  Negative for chest pain.   Gastrointestinal:  Negative for nausea.   Genitourinary:  Negative for dysuria.   Musculoskeletal:  Negative for back pain.   Skin:  Negative for rash.   Neurological:  Negative for weakness.   Hematological:  Does not bruise/bleed easily.   All other systems reviewed and are negative.       Physical Exam     Initial Vitals [06/11/24 1530]   BP Pulse Resp Temp SpO2   (!) 141/89 72 18 97.8 °F (36.6 °C) 95 %      MAP       --          Physical Exam  Nursing Notes and Vital Signs Reviewed.  Constitutional: Patient is in no apparent distress. Well-developed and well-nourished.  Head: Atraumatic. Normocephalic.  Eyes: PERRL. EOM intact. Conjunctivae are not pale. No scleral icterus.  ENT: Mucous membranes are moist.   Neck: Supple. Full ROM.   Cardiovascular: Regular rate. Regular rhythm. No murmurs, rubs, or gallops. Distal pulses are 2+ and symmetric.  Pulmonary/Chest: No respiratory distress. Clear to auscultation bilaterally. No wheezing or rales.  Abdominal: Soft and non-distended.  There is no tenderness.  No rebound, guarding, or rigidity.   Genitourinary: No CVA tenderness  Musculoskeletal: Moves all extremities. No obvious deformities. No edema.  Skin: Warm and dry.  Neurological:  Alert, awake, and appropriate.  Normal speech.  No acute focal neurological deficits are appreciated.  Psychiatric: Normal affect. Good eye contact. Appropriate in content.     ED Course   Procedures  ED Vital Signs:  Vitals:    06/11/24 1530   BP: (!) 141/89   Pulse: 72   Resp: 18   Temp: 97.8 °F (36.6 °C)   TempSrc: Oral   SpO2: 95%       Abnormal Lab Results:  Labs Reviewed    CBC W/ AUTO DIFFERENTIAL - Abnormal; Notable for the following components:       Result Value    Hemoglobin 11.7 (*)     Hematocrit 35.9 (*)     Lymph % 16.7 (*)     All other components within normal limits   COMPREHENSIVE METABOLIC PANEL - Abnormal; Notable for the following components:    CO2 20 (*)     BUN 21 (*)     All other components within normal limits   B-TYPE NATRIURETIC PEPTIDE   TROPONIN I        All Lab Results:  Results for orders placed or performed during the hospital encounter of 06/11/24   CBC auto differential   Result Value Ref Range    WBC 6.47 3.90 - 12.70 K/uL    RBC 4.08 4.00 - 5.40 M/uL    Hemoglobin 11.7 (L) 12.0 - 16.0 g/dL    Hematocrit 35.9 (L) 37.0 - 48.5 %    MCV 88 82 - 98 fL    MCH 28.7 27.0 - 31.0 pg    MCHC 32.6 32.0 - 36.0 g/dL    RDW 14.4 11.5 - 14.5 %    Platelets 372 150 - 450 K/uL    MPV 9.8 9.2 - 12.9 fL    Immature Granulocytes 0.3 0.0 - 0.5 %    Gran # (ANC) 4.7 1.8 - 7.7 K/uL    Immature Grans (Abs) 0.02 0.00 - 0.04 K/uL    Lymph # 1.1 1.0 - 4.8 K/uL    Mono # 0.5 0.3 - 1.0 K/uL    Eos # 0.2 0.0 - 0.5 K/uL    Baso # 0.03 0.00 - 0.20 K/uL    nRBC 0 0 /100 WBC    Gran % 72.3 38.0 - 73.0 %    Lymph % 16.7 (L) 18.0 - 48.0 %    Mono % 7.4 4.0 - 15.0 %    Eosinophil % 2.8 0.0 - 8.0 %    Basophil % 0.5 0.0 - 1.9 %    Differential Method Automated    Comprehensive metabolic panel   Result Value Ref Range    Sodium 141 136 - 145 mmol/L    Potassium 3.8 3.5 - 5.1 mmol/L    Chloride 110 95 - 110 mmol/L    CO2 20 (L) 23 - 29 mmol/L    Glucose 88 70 - 110 mg/dL    BUN 21 (H) 6 - 20 mg/dL    Creatinine 0.7 0.5 - 1.4 mg/dL    Calcium 9.5 8.7 - 10.5 mg/dL    Total Protein 7.8 6.0 - 8.4 g/dL    Albumin 3.9 3.5 - 5.2 g/dL    Total Bilirubin 0.8 0.1 - 1.0 mg/dL    Alkaline Phosphatase 62 55 - 135 U/L    AST 14 10 - 40 U/L    ALT 12 10 - 44 U/L    eGFR >60 >60 mL/min/1.73 m^2    Anion Gap 11 8 - 16 mmol/L   B-Type natriuretic peptide (BNP)   Result Value Ref Range    BNP 12 0 - 99 pg/mL    Troponin I   Result Value Ref Range    Troponin I <0.006 0.000 - 0.026 ng/mL   EKG 12-lead   Result Value Ref Range    QRS Duration 128 ms    OHS QTC Calculation 467 ms         Imaging Results:  Imaging Results              X-Ray Chest AP Portable (Final result)  Result time 06/11/24 16:24:06      Final result by Shanna Reyes MD (06/11/24 16:24:06)                   Impression:      Underinflation      Electronically signed by: Shanna Reyes  Date:    06/11/2024  Time:    16:24               Narrative:    EXAMINATION:  XR CHEST AP PORTABLE    CLINICAL HISTORY:  Chest Pain;    TECHNIQUE:  Single frontal portable view of the chest was performed.    COMPARISON:  05/27/2024    FINDINGS:  Lungs underinflated with no new sizable consolidation or pleural effusion                                       The EKG was ordered, reviewed, and independently interpreted by the ED provider.  Interpretation time: 4:17 PM  Rate: 69 BPM  Rhythm: normal sinus rhythm  Interpretation: RB BB. No STEMI.           The Emergency Provider reviewed the vital signs and test results, which are outlined above.     ED Discussion     5:49 PM: Reassessed pt at this time. Discussed with pt all pertinent ED information and results. Discussed pt dx and plan of tx. Gave pt all f/u and return to the ED instructions. All questions and concerns were addressed at this time. Pt expresses understanding of information and instructions, and is comfortable with plan to discharge. Pt is stable for discharge.    I discussed with patient and/or family/caretaker that evaluation in the ED does not suggest any emergent or life threatening medical conditions requiring immediate intervention beyond what was provided in the ED, and I believe patient is safe for discharge.  Regardless, an unremarkable evaluation in the ED does not preclude the development or presence of a serious of life threatening condition. As such, patient was instructed to return  immediately for any worsening or change in current symptoms.         Medical Decision Making  Amount and/or Complexity of Data Reviewed  Labs: ordered. Decision-making details documented in ED Course.  Radiology: ordered. Decision-making details documented in ED Course.  ECG/medicine tests: ordered and independent interpretation performed. Decision-making details documented in ED Course.                ED Medication(s):  Medications - No data to display    Discharge Medication List as of 6/11/2024  5:47 PM           Follow-up Information       Schedule an appointment as soon as possible for a visit  with Center[facility].    Why: As needed  Contact information:  [address] [address] [address]  [phone]                                 Scribe Attestation:   Scribe #1: I performed the above scribed service and the documentation accurately describes the services I performed. I attest to the accuracy of the note.     Attending:   Physician Attestation Statement for Scribe #1: I, Eli Adams MD, personally performed the services described in this documentation, as scribed by Andrew Choi, in my presence, and it is both accurate and complete.           Clinical Impression       ICD-10-CM ICD-9-CM   1. SOB (shortness of breath)  R06.02 786.05       Disposition:   Disposition: Discharged  Condition: Stable         Eli Adams MD  06/14/24 2543

## 2024-09-14 ENCOUNTER — HOSPITAL ENCOUNTER (EMERGENCY)
Facility: HOSPITAL | Age: 59
Discharge: HOME | End: 2024-09-14
Attending: EMERGENCY MEDICINE
Payer: MEDICARE

## 2024-09-14 VITALS
SYSTOLIC BLOOD PRESSURE: 140 MMHG | TEMPERATURE: 98 F | BODY MASS INDEX: 44.38 KG/M2 | WEIGHT: 259.94 LBS | HEIGHT: 64 IN | HEART RATE: 80 BPM | OXYGEN SATURATION: 100 % | RESPIRATION RATE: 18 BRPM | DIASTOLIC BLOOD PRESSURE: 84 MMHG

## 2024-09-14 DIAGNOSIS — B37.89 CANDIDIASIS OF BREAST: ICD-10-CM

## 2024-09-14 DIAGNOSIS — R06.02 SOB (SHORTNESS OF BREATH): Primary | ICD-10-CM

## 2024-09-14 DIAGNOSIS — T63.421A FIRE ANT BITE, ACCIDENTAL OR UNINTENTIONAL, INITIAL ENCOUNTER: ICD-10-CM

## 2024-09-14 LAB
ALBUMIN SERPL BCP-MCNC: 4 G/DL (ref 3.5–5.2)
ALP SERPL-CCNC: 57 U/L (ref 55–135)
ALT SERPL W/O P-5'-P-CCNC: 11 U/L (ref 10–44)
ANION GAP SERPL CALC-SCNC: 12 MMOL/L (ref 8–16)
AST SERPL-CCNC: 14 U/L (ref 10–40)
BASOPHILS # BLD AUTO: 0.03 K/UL (ref 0–0.2)
BASOPHILS NFR BLD: 0.4 % (ref 0–1.9)
BILIRUB SERPL-MCNC: 0.5 MG/DL (ref 0.1–1)
BNP SERPL-MCNC: 26 PG/ML (ref 0–99)
BUN SERPL-MCNC: 22 MG/DL (ref 6–20)
CALCIUM SERPL-MCNC: 9.8 MG/DL (ref 8.7–10.5)
CHLORIDE SERPL-SCNC: 105 MMOL/L (ref 95–110)
CO2 SERPL-SCNC: 22 MMOL/L (ref 23–29)
CREAT SERPL-MCNC: 0.7 MG/DL (ref 0.5–1.4)
DIFFERENTIAL METHOD BLD: ABNORMAL
EOSINOPHIL # BLD AUTO: 0.2 K/UL (ref 0–0.5)
EOSINOPHIL NFR BLD: 2.6 % (ref 0–8)
ERYTHROCYTE [DISTWIDTH] IN BLOOD BY AUTOMATED COUNT: 13.6 % (ref 11.5–14.5)
EST. GFR  (NO RACE VARIABLE): >60 ML/MIN/1.73 M^2
GLUCOSE SERPL-MCNC: 102 MG/DL (ref 70–110)
HCT VFR BLD AUTO: 36.7 % (ref 37–48.5)
HGB BLD-MCNC: 11.9 G/DL (ref 12–16)
IMM GRANULOCYTES # BLD AUTO: 0.03 K/UL (ref 0–0.04)
IMM GRANULOCYTES NFR BLD AUTO: 0.4 % (ref 0–0.5)
INFLUENZA A, MOLECULAR: NEGATIVE
INFLUENZA B, MOLECULAR: NEGATIVE
LYMPHOCYTES # BLD AUTO: 1.1 K/UL (ref 1–4.8)
LYMPHOCYTES NFR BLD: 16.1 % (ref 18–48)
MCH RBC QN AUTO: 29 PG (ref 27–31)
MCHC RBC AUTO-ENTMCNC: 32.4 G/DL (ref 32–36)
MCV RBC AUTO: 89 FL (ref 82–98)
MONOCYTES # BLD AUTO: 0.5 K/UL (ref 0.3–1)
MONOCYTES NFR BLD: 7.2 % (ref 4–15)
NEUTROPHILS # BLD AUTO: 5.1 K/UL (ref 1.8–7.7)
NEUTROPHILS NFR BLD: 73.3 % (ref 38–73)
NRBC BLD-RTO: 0 /100 WBC
PLATELET # BLD AUTO: 349 K/UL (ref 150–450)
PMV BLD AUTO: 9.7 FL (ref 9.2–12.9)
POTASSIUM SERPL-SCNC: 4 MMOL/L (ref 3.5–5.1)
PROT SERPL-MCNC: 8 G/DL (ref 6–8.4)
RBC # BLD AUTO: 4.11 M/UL (ref 4–5.4)
SARS-COV-2 RDRP RESP QL NAA+PROBE: NEGATIVE
SODIUM SERPL-SCNC: 139 MMOL/L (ref 136–145)
SPECIMEN SOURCE: NORMAL
TROPONIN I SERPL DL<=0.01 NG/ML-MCNC: <0.006 NG/ML (ref 0–0.03)
WBC # BLD AUTO: 6.9 K/UL (ref 3.9–12.7)

## 2024-09-14 PROCEDURE — 87502 INFLUENZA DNA AMP PROBE: CPT | Performed by: EMERGENCY MEDICINE

## 2024-09-14 PROCEDURE — 83880 ASSAY OF NATRIURETIC PEPTIDE: CPT | Performed by: EMERGENCY MEDICINE

## 2024-09-14 PROCEDURE — U0002 COVID-19 LAB TEST NON-CDC: HCPCS | Performed by: EMERGENCY MEDICINE

## 2024-09-14 PROCEDURE — 80053 COMPREHEN METABOLIC PANEL: CPT | Performed by: EMERGENCY MEDICINE

## 2024-09-14 PROCEDURE — 93005 ELECTROCARDIOGRAM TRACING: CPT

## 2024-09-14 PROCEDURE — 85025 COMPLETE CBC W/AUTO DIFF WBC: CPT | Performed by: EMERGENCY MEDICINE

## 2024-09-14 PROCEDURE — 93010 ELECTROCARDIOGRAM REPORT: CPT | Mod: ,,, | Performed by: INTERNAL MEDICINE

## 2024-09-14 PROCEDURE — 99285 EMERGENCY DEPT VISIT HI MDM: CPT | Mod: 25

## 2024-09-14 PROCEDURE — 84484 ASSAY OF TROPONIN QUANT: CPT | Performed by: EMERGENCY MEDICINE

## 2024-09-14 RX ORDER — NYSTATIN 100000 [USP'U]/G
POWDER TOPICAL 2 TIMES DAILY
Qty: 60 G | Refills: 1 | Status: SHIPPED | OUTPATIENT
Start: 2024-09-14

## 2024-09-15 LAB
OHS QRS DURATION: 144 MS
OHS QTC CALCULATION: 514 MS

## 2024-09-15 NOTE — ED PROVIDER NOTES
SCRIBE #1 NOTE: I, Eli Martinez, am scribing for, and in the presence of, Stella Lam MD. I have scribed the entire note.       History     Chief Complaint   Patient presents with    Shortness of Breath     PT reports SOB x 2 days, intermitted rash to her chest and left lower leg swelling. No reported pain.      Review of patient's allergies indicates:   Allergen Reactions    Pcn [penicillins] Swelling    Codeine     Latex, natural rubber     Tylox [oxycodone-acetaminophen]          History of Present Illness     HPI    9/14/2024, 9:23 PM  History obtained from the patient      History of Present Illness: Judy Ruelas is a 59 y.o. female patient with a PMHx of HTN, migraine, irregular heart beat, depression, anxiety, NSTEMI, and morbid obesity who presents to the Emergency Department for evaluation of SOB which onset 2-3 days ago. Pt's CP is exacerbated by laying down, and mitigated by sitting up. Pt states that she has been sleeping sitting up. She denies any history of bronchitis, COPD, or asthma. She does not use O2 at home. She also c/o L-leg swelling, but states that she has multiple ant bites on her L-leg. Along with SOB and L-leg swelling, pt c/o productive cough which onset 3 days ago, and a rash under her BL breasts. Symptoms are constant and moderate in severity. Associated sxs include L-leg swelling, productive cough, and rash under BL breasts. Patient denies any fever, CP, congestion, sore throat, and all other sxs at this time. No prior Tx reported. No further complaints or concerns at this time.       Arrival mode: Ambulance    PCP: [facility]        Past Medical History:  Past Medical History:   Diagnosis Date    Anxiety     Depression     Depression with anxiety     Family history of heart disease 1/27/2016    Hypertension     Irregular heart beat     Migraine headache     Morbid obesity with BMI of 40.0-44.9, adult 6/30/2017    NSTEMI (non-ST elevated  myocardial infarction) 2017       Past Surgical History:  Past Surgical History:   Procedure Laterality Date    APPENDECTOMY       SECTION      HYSTERECTOMY           Family History:  Family History   Problem Relation Name Age of Onset    Hypertension Unknown      Heart disease Father  70    Hypertension Father      Heart attack Mother  40        triple bypass    Diabetes Mother      Hypertension Mother      Stroke Mother      Heart attack Brother  40        CABG x 2       Social History:  Social History     Tobacco Use    Smoking status: Never    Smokeless tobacco: Never   Substance and Sexual Activity    Alcohol use: No     Alcohol/week: 0.0 standard drinks of alcohol     Comment: denies    Drug use: No     Comment: denies    Sexual activity: Not Currently        Review of Systems     Review of Systems   Constitutional:  Negative for fever.   HENT:  Negative for congestion and sore throat.    Respiratory:  Positive for cough (productive) and shortness of breath.    Cardiovascular:  Positive for leg swelling (L-leg). Negative for chest pain.   Gastrointestinal:  Negative for nausea.   Genitourinary:  Negative for dysuria.   Musculoskeletal:  Negative for back pain.   Skin:  Positive for rash (under BL breasts).   Neurological:  Negative for weakness.   Hematological:  Does not bruise/bleed easily.      Physical Exam     Initial Vitals [244]   BP Pulse Resp Temp SpO2   (!) 129/98 72 (!) 26 97.9 °F (36.6 °C) 97 %      MAP       --          Physical Exam  Nursing Notes and Vital Signs Reviewed.  Constitutional: Patient is in no acute distress. Well-developed and well-nourished.  Head: Atraumatic. Normocephalic.  Eyes: PERRL. EOM intact. Conjunctivae are not pale. No scleral icterus.  ENT: Mucous membranes are moist. Oropharynx is clear and symmetric.    Neck: Supple. Full ROM. No lymphadenopathy.  Cardiovascular: Regular rate. Regular rhythm. No murmurs, rubs, or gallops. Distal pulses are 2+  "and symmetric.  Pulmonary/Chest: No respiratory distress. Clear to auscultation bilaterally. No wheezing or rales.  Abdominal: Soft and non-distended.  There is no tenderness.  No rebound, guarding, or rigidity. Good bowel sounds.  Genitourinary: No CVA tenderness  Musculoskeletal: Moves all extremities. No obvious deformities. No edema. No calf tenderness.  Skin: Intertriginous and erythematous skinfold rash under BL breasts.  Neurological:  Alert, awake, and appropriate.  Normal speech.  No acute focal neurological deficits are appreciated.  Psychiatric: Normal affect. Good eye contact. Appropriate in content.     ED Course   Procedures  ED Vital Signs:  Vitals:    09/14/24 2114 09/14/24 2135 09/14/24 2219 09/14/24 2221   BP: (!) 129/98 135/74  135/74   Pulse: 72 70 70 70   Resp: (!) 26 20  20   Temp: 97.9 °F (36.6 °C)      SpO2: 97% 100%  100%   Weight: 117.9 kg (260 lb)   117.9 kg (259 lb 14.8 oz)   Height: 5' 4" (1.626 m)   5' 4" (1.626 m)    09/14/24 2305 09/14/24 2325   BP: (!) 143/84 (!) 140/84   Pulse: 79 80   Resp: 18 18   Temp:     SpO2: 97% 100%   Weight:     Height:         Abnormal Lab Results:  Labs Reviewed   CBC W/ AUTO DIFFERENTIAL - Abnormal       Result Value    WBC 6.90      RBC 4.11      Hemoglobin 11.9 (*)     Hematocrit 36.7 (*)     MCV 89      MCH 29.0      MCHC 32.4      RDW 13.6      Platelets 349      MPV 9.7      Immature Granulocytes 0.4      Gran # (ANC) 5.1      Immature Grans (Abs) 0.03      Lymph # 1.1      Mono # 0.5      Eos # 0.2      Baso # 0.03      nRBC 0      Gran % 73.3 (*)     Lymph % 16.1 (*)     Mono % 7.2      Eosinophil % 2.6      Basophil % 0.4      Differential Method Automated     COMPREHENSIVE METABOLIC PANEL - Abnormal    Sodium 139      Potassium 4.0      Chloride 105      CO2 22 (*)     Glucose 102      BUN 22 (*)     Creatinine 0.7      Calcium 9.8      Total Protein 8.0      Albumin 4.0      Total Bilirubin 0.5      Alkaline Phosphatase 57      AST 14      ALT " 11      eGFR >60      Anion Gap 12     INFLUENZA A & B BY MOLECULAR    Influenza A, Molecular Negative      Influenza B, Molecular Negative      Flu A & B Source Nasal swab     B-TYPE NATRIURETIC PEPTIDE    BNP 26     TROPONIN I    Troponin I <0.006     SARS-COV-2 RNA AMPLIFICATION, QUAL    SARS-CoV-2 RNA, Amplification, Qual Negative          All Lab Results:  Results for orders placed or performed during the hospital encounter of 09/14/24   Influenza A & B by Molecular    Specimen: Nasopharyngeal Swab   Result Value Ref Range    Influenza A, Molecular Negative Negative    Influenza B, Molecular Negative Negative    Flu A & B Source Nasal swab    CBC auto differential   Result Value Ref Range    WBC 6.90 3.90 - 12.70 K/uL    RBC 4.11 4.00 - 5.40 M/uL    Hemoglobin 11.9 (L) 12.0 - 16.0 g/dL    Hematocrit 36.7 (L) 37.0 - 48.5 %    MCV 89 82 - 98 fL    MCH 29.0 27.0 - 31.0 pg    MCHC 32.4 32.0 - 36.0 g/dL    RDW 13.6 11.5 - 14.5 %    Platelets 349 150 - 450 K/uL    MPV 9.7 9.2 - 12.9 fL    Immature Granulocytes 0.4 0.0 - 0.5 %    Gran # (ANC) 5.1 1.8 - 7.7 K/uL    Immature Grans (Abs) 0.03 0.00 - 0.04 K/uL    Lymph # 1.1 1.0 - 4.8 K/uL    Mono # 0.5 0.3 - 1.0 K/uL    Eos # 0.2 0.0 - 0.5 K/uL    Baso # 0.03 0.00 - 0.20 K/uL    nRBC 0 0 /100 WBC    Gran % 73.3 (H) 38.0 - 73.0 %    Lymph % 16.1 (L) 18.0 - 48.0 %    Mono % 7.2 4.0 - 15.0 %    Eosinophil % 2.6 0.0 - 8.0 %    Basophil % 0.4 0.0 - 1.9 %    Differential Method Automated    Comprehensive metabolic panel   Result Value Ref Range    Sodium 139 136 - 145 mmol/L    Potassium 4.0 3.5 - 5.1 mmol/L    Chloride 105 95 - 110 mmol/L    CO2 22 (L) 23 - 29 mmol/L    Glucose 102 70 - 110 mg/dL    BUN 22 (H) 6 - 20 mg/dL    Creatinine 0.7 0.5 - 1.4 mg/dL    Calcium 9.8 8.7 - 10.5 mg/dL    Total Protein 8.0 6.0 - 8.4 g/dL    Albumin 4.0 3.5 - 5.2 g/dL    Total Bilirubin 0.5 0.1 - 1.0 mg/dL    Alkaline Phosphatase 57 55 - 135 U/L    AST 14 10 - 40 U/L    ALT 11 10 - 44 U/L     eGFR >60 >60 mL/min/1.73 m^2    Anion Gap 12 8 - 16 mmol/L   Brain natriuretic peptide   Result Value Ref Range    BNP 26 0 - 99 pg/mL   Troponin I   Result Value Ref Range    Troponin I <0.006 0.000 - 0.026 ng/mL   COVID-19 Rapid Screening   Result Value Ref Range    SARS-CoV-2 RNA, Amplification, Qual Negative Negative        Imaging Results:  Imaging Results              X-Ray Chest AP Portable (Final result)  Result time 09/14/24 22:02:36      Final result by Tony Buckley MD (09/14/24 22:02:36)                   Impression:      No acute abnormality.      Electronically signed by: Tony Buckley  Date:    09/14/2024  Time:    22:02               Narrative:    EXAMINATION:  XR CHEST AP PORTABLE    CLINICAL HISTORY:  Shortness of breath    TECHNIQUE:  Single frontal view of the chest was performed.    COMPARISON:  None    FINDINGS:  The lungs are clear, with normal appearance of pulmonary vasculature and no pleural effusion or pneumothorax.    The cardiac silhouette is normal in size. The hilar and mediastinal contours are unremarkable.    Bones are intact.                                       The EKG was ordered, reviewed, and independently interpreted by the ED provider.  Interpretation time: 22:15  Rate: 72 BPM  Rhythm: normal sinus rhythm  Interpretation: RBBB. No STEMI.           The Emergency Provider reviewed the vital signs and test results, which are outlined above.     ED Discussion       10:43 PM: Reassessed pt at this time. Discussed with pt all pertinent ED information and results. Discussed pt dx and plan of tx. Gave pt all f/u and return to the ED instructions. All questions and concerns were addressed at this time. Pt expresses understanding of information and instructions, and is comfortable with plan to discharge. Pt is stable for discharge.    I discussed with patient and/or family/caretaker that evaluation in the ED does not suggest any emergent or life threatening medical conditions  requiring immediate intervention beyond what was provided in the ED, and I believe patient is safe for discharge.  Regardless, an unremarkable evaluation in the ED does not preclude the development or presence of a serious of life threatening condition. As such, patient was instructed to return immediately for any worsening or change in current symptoms.        Medical Decision Making  Amount and/or Complexity of Data Reviewed  Labs: ordered. Decision-making details documented in ED Course.  Radiology: ordered. Decision-making details documented in ED Course.  ECG/medicine tests: ordered and independent interpretation performed. Decision-making details documented in ED Course.    Risk  Prescription drug management.                ED Medication(s):  Medications - No data to display    Discharge Medication List as of 9/14/2024 10:45 PM        START taking these medications    Details   nystatin (MYCOSTATIN) powder Apply topically 2 (two) times daily., Starting Sat 9/14/2024, Print              Follow-up Information       Center, [facility] In 2 days.    Contact information:  [id number] [place] [address] [phone]               [facility] - Emergency Dept..    Specialty: Emergency Medicine  Why: As needed, If symptoms worsen  Contact information:  [address] [place] [address] [phone]                               Scribe Attestation:   Scribe #1: I performed the above scribed service and the documentation accurately describes the services I performed. I attest to the accuracy of the note.     Attending:   Physician Attestation Statement for Scribe #1: I, Stella Lam MD, personally performed the services described in this documentation, as scribed by Eli Martinez, in my presence, and it is both accurate and complete.       Scribe Attestation:   Scribe #1: I performed the above scribed service and the documentation accurately describes  the services I performed. I attest to the accuracy of the note.         Clinical Impression       ICD-10-CM ICD-9-CM   1. SOB (shortness of breath)  R06.02 786.05   2. Candidiasis of breast  B37.89 112.89   3. Fire ant bite, accidental or unintentional, initial encounter  T63.421A 989.5     E905.5       Disposition:   Disposition: Discharged  Condition: Stable        Stella Lam MD  09/15/24 0555

## 2024-12-01 ENCOUNTER — HOSPITAL ENCOUNTER (OUTPATIENT)
Facility: HOSPITAL | Age: 59
Discharge: HOME HEALTH | End: 2024-12-02
Attending: EMERGENCY MEDICINE | Admitting: HOSPITALIST
Payer: MEDICARE

## 2024-12-01 DIAGNOSIS — R29.818 ACUTE FOCAL NEUROLOGICAL DEFICIT: ICD-10-CM

## 2024-12-01 DIAGNOSIS — I63.9 CEREBROVASCULAR ACCIDENT (CVA), UNSPECIFIED MECHANISM: Primary | ICD-10-CM

## 2024-12-01 PROBLEM — E78.5 HLD (HYPERLIPIDEMIA): Chronic | Status: ACTIVE | Noted: 2024-12-01

## 2024-12-01 PROBLEM — K21.9 GERD (GASTROESOPHAGEAL REFLUX DISEASE): Status: ACTIVE | Noted: 2024-12-01

## 2024-12-01 PROBLEM — N39.0 UTI (URINARY TRACT INFECTION): Status: ACTIVE | Noted: 2024-12-01

## 2024-12-01 PROBLEM — E78.5 HLD (HYPERLIPIDEMIA): Status: ACTIVE | Noted: 2024-12-01

## 2024-12-01 PROBLEM — G47.33 OBSTRUCTIVE SLEEP APNEA SYNDROME: Chronic | Status: ACTIVE | Noted: 2020-12-05

## 2024-12-01 PROBLEM — K21.9 GERD (GASTROESOPHAGEAL REFLUX DISEASE): Chronic | Status: ACTIVE | Noted: 2024-12-01

## 2024-12-01 PROBLEM — E66.01 MORBID OBESITY WITH BMI OF 40.0-44.9, ADULT: Chronic | Status: ACTIVE | Noted: 2017-06-30

## 2024-12-01 PROBLEM — R20.0 RIGHT SIDED NUMBNESS: Status: ACTIVE | Noted: 2024-12-01

## 2024-12-01 PROBLEM — I25.10 CORONARY ARTERY DISEASE INVOLVING NATIVE CORONARY ARTERY OF NATIVE HEART WITHOUT ANGINA PECTORIS: Chronic | Status: ACTIVE | Noted: 2017-06-29

## 2024-12-01 LAB
ALBUMIN SERPL BCP-MCNC: 4 G/DL (ref 3.5–5.2)
ALP SERPL-CCNC: 71 U/L (ref 40–150)
ALT SERPL W/O P-5'-P-CCNC: 16 U/L (ref 10–44)
ANION GAP SERPL CALC-SCNC: 11 MMOL/L (ref 8–16)
AST SERPL-CCNC: 16 U/L (ref 10–40)
BACTERIA #/AREA URNS HPF: ABNORMAL /HPF
BASOPHILS # BLD AUTO: 0.04 K/UL (ref 0–0.2)
BASOPHILS NFR BLD: 0.5 % (ref 0–1.9)
BILIRUB SERPL-MCNC: 0.5 MG/DL (ref 0.1–1)
BILIRUB UR QL STRIP: NEGATIVE
BUN SERPL-MCNC: 22 MG/DL (ref 6–20)
CALCIUM SERPL-MCNC: 9.8 MG/DL (ref 8.7–10.5)
CHLORIDE SERPL-SCNC: 106 MMOL/L (ref 95–110)
CLARITY UR: CLEAR
CO2 SERPL-SCNC: 22 MMOL/L (ref 23–29)
COLOR UR: YELLOW
CREAT SERPL-MCNC: 0.8 MG/DL (ref 0.5–1.4)
DIFFERENTIAL METHOD BLD: ABNORMAL
EOSINOPHIL # BLD AUTO: 0.3 K/UL (ref 0–0.5)
EOSINOPHIL NFR BLD: 2.9 % (ref 0–8)
ERYTHROCYTE [DISTWIDTH] IN BLOOD BY AUTOMATED COUNT: 14 % (ref 11.5–14.5)
EST. GFR  (NO RACE VARIABLE): >60 ML/MIN/1.73 M^2
GLUCOSE SERPL-MCNC: 87 MG/DL (ref 70–110)
GLUCOSE SERPL-MCNC: 97 MG/DL (ref 70–110)
GLUCOSE UR QL STRIP: NEGATIVE
HCT VFR BLD AUTO: 36.8 % (ref 37–48.5)
HGB BLD-MCNC: 12.1 G/DL (ref 12–16)
HGB UR QL STRIP: NEGATIVE
IMM GRANULOCYTES # BLD AUTO: 0.04 K/UL (ref 0–0.04)
IMM GRANULOCYTES NFR BLD AUTO: 0.5 % (ref 0–0.5)
INR PPP: 0.9 (ref 0.8–1.2)
KETONES UR QL STRIP: NEGATIVE
LEUKOCYTE ESTERASE UR QL STRIP: ABNORMAL
LYMPHOCYTES # BLD AUTO: 1.1 K/UL (ref 1–4.8)
LYMPHOCYTES NFR BLD: 13.1 % (ref 18–48)
MCH RBC QN AUTO: 29 PG (ref 27–31)
MCHC RBC AUTO-ENTMCNC: 32.9 G/DL (ref 32–36)
MCV RBC AUTO: 88 FL (ref 82–98)
MICROSCOPIC COMMENT: ABNORMAL
MONOCYTES # BLD AUTO: 0.7 K/UL (ref 0.3–1)
MONOCYTES NFR BLD: 7.7 % (ref 4–15)
NEUTROPHILS # BLD AUTO: 6.5 K/UL (ref 1.8–7.7)
NEUTROPHILS NFR BLD: 75.3 % (ref 38–73)
NITRITE UR QL STRIP: POSITIVE
NRBC BLD-RTO: 0 /100 WBC
PH UR STRIP: 7 [PH] (ref 5–8)
PLATELET # BLD AUTO: 362 K/UL (ref 150–450)
PMV BLD AUTO: 9.8 FL (ref 9.2–12.9)
POCT GLUCOSE: 108 MG/DL (ref 70–110)
POTASSIUM SERPL-SCNC: 4.7 MMOL/L (ref 3.5–5.1)
PROT SERPL-MCNC: 8.3 G/DL (ref 6–8.4)
PROT UR QL STRIP: NEGATIVE
PROTHROMBIN TIME: 11 SEC (ref 9–12.5)
RBC # BLD AUTO: 4.17 M/UL (ref 4–5.4)
RBC #/AREA URNS HPF: 1 /HPF (ref 0–4)
SODIUM SERPL-SCNC: 139 MMOL/L (ref 136–145)
SP GR UR STRIP: >1.03 (ref 1–1.03)
SQUAMOUS #/AREA URNS HPF: 2 /HPF
TSH SERPL DL<=0.005 MIU/L-ACNC: 2.19 UIU/ML (ref 0.4–4)
URN SPEC COLLECT METH UR: ABNORMAL
UROBILINOGEN UR STRIP-ACNC: NEGATIVE EU/DL
WBC # BLD AUTO: 8.67 K/UL (ref 3.9–12.7)
WBC #/AREA URNS HPF: 17 /HPF (ref 0–5)

## 2024-12-01 PROCEDURE — G0378 HOSPITAL OBSERVATION PER HR: HCPCS

## 2024-12-01 PROCEDURE — 87186 SC STD MICRODIL/AGAR DIL: CPT | Performed by: NURSE PRACTITIONER

## 2024-12-01 PROCEDURE — 25000003 PHARM REV CODE 250: Performed by: EMERGENCY MEDICINE

## 2024-12-01 PROCEDURE — 99285 EMERGENCY DEPT VISIT HI MDM: CPT | Mod: 25

## 2024-12-01 PROCEDURE — 93005 ELECTROCARDIOGRAM TRACING: CPT

## 2024-12-01 PROCEDURE — 82962 GLUCOSE BLOOD TEST: CPT

## 2024-12-01 PROCEDURE — 25000003 PHARM REV CODE 250: Performed by: HOSPITALIST

## 2024-12-01 PROCEDURE — 80053 COMPREHEN METABOLIC PANEL: CPT

## 2024-12-01 PROCEDURE — 63600175 PHARM REV CODE 636 W HCPCS: Performed by: NURSE PRACTITIONER

## 2024-12-01 PROCEDURE — 85610 PROTHROMBIN TIME: CPT

## 2024-12-01 PROCEDURE — 80061 LIPID PANEL: CPT | Performed by: NURSE PRACTITIONER

## 2024-12-01 PROCEDURE — G0426 INPT/ED TELECONSULT50: HCPCS | Mod: 95,G0,, | Performed by: PSYCHIATRY & NEUROLOGY

## 2024-12-01 PROCEDURE — 85025 COMPLETE CBC W/AUTO DIFF WBC: CPT

## 2024-12-01 PROCEDURE — 83036 HEMOGLOBIN GLYCOSYLATED A1C: CPT | Performed by: NURSE PRACTITIONER

## 2024-12-01 PROCEDURE — 25500020 PHARM REV CODE 255: Performed by: EMERGENCY MEDICINE

## 2024-12-01 PROCEDURE — 96372 THER/PROPH/DIAG INJ SC/IM: CPT | Performed by: NURSE PRACTITIONER

## 2024-12-01 PROCEDURE — 84443 ASSAY THYROID STIM HORMONE: CPT

## 2024-12-01 PROCEDURE — 93010 ELECTROCARDIOGRAM REPORT: CPT | Mod: ,,, | Performed by: INTERNAL MEDICINE

## 2024-12-01 PROCEDURE — 81000 URINALYSIS NONAUTO W/SCOPE: CPT | Performed by: NURSE PRACTITIONER

## 2024-12-01 PROCEDURE — 87086 URINE CULTURE/COLONY COUNT: CPT | Performed by: NURSE PRACTITIONER

## 2024-12-01 PROCEDURE — 87088 URINE BACTERIA CULTURE: CPT | Performed by: NURSE PRACTITIONER

## 2024-12-01 RX ORDER — ONDANSETRON HYDROCHLORIDE 2 MG/ML
4 INJECTION, SOLUTION INTRAVENOUS EVERY 8 HOURS PRN
Status: DISCONTINUED | OUTPATIENT
Start: 2024-12-01 | End: 2024-12-02 | Stop reason: HOSPADM

## 2024-12-01 RX ORDER — CLOPIDOGREL BISULFATE 75 MG/1
75 TABLET ORAL DAILY
Status: DISCONTINUED | OUTPATIENT
Start: 2024-12-02 | End: 2024-12-02 | Stop reason: HOSPADM

## 2024-12-01 RX ORDER — ASPIRIN 325 MG
325 TABLET ORAL
Status: COMPLETED | OUTPATIENT
Start: 2024-12-01 | End: 2024-12-01

## 2024-12-01 RX ORDER — MIDODRINE HYDROCHLORIDE 5 MG/1
10 TABLET ORAL 3 TIMES DAILY PRN
Status: ON HOLD | COMMUNITY
Start: 2024-08-22 | End: 2024-12-02 | Stop reason: HOSPADM

## 2024-12-01 RX ORDER — FENOFIBRATE 145 MG/1
145 TABLET, FILM COATED ORAL NIGHTLY
Status: ON HOLD | COMMUNITY
End: 2024-12-02 | Stop reason: CLARIF

## 2024-12-01 RX ORDER — ATORVASTATIN CALCIUM 40 MG/1
80 TABLET, FILM COATED ORAL DAILY
Status: DISCONTINUED | OUTPATIENT
Start: 2024-12-02 | End: 2024-12-02 | Stop reason: HOSPADM

## 2024-12-01 RX ORDER — LABETALOL HYDROCHLORIDE 5 MG/ML
10 INJECTION, SOLUTION INTRAVENOUS
Status: DISCONTINUED | OUTPATIENT
Start: 2024-12-01 | End: 2024-12-02 | Stop reason: HOSPADM

## 2024-12-01 RX ORDER — BISACODYL 10 MG/1
10 SUPPOSITORY RECTAL DAILY PRN
Status: DISCONTINUED | OUTPATIENT
Start: 2024-12-01 | End: 2024-12-02 | Stop reason: HOSPADM

## 2024-12-01 RX ORDER — RIFAXIMIN 550 MG/1
550 TABLET ORAL 2 TIMES DAILY
COMMUNITY
Start: 2024-08-21

## 2024-12-01 RX ORDER — CLINDAMYCIN HYDROCHLORIDE 150 MG/1
150 CAPSULE ORAL EVERY 8 HOURS
COMMUNITY
Start: 2024-10-02 | End: 2024-12-01 | Stop reason: ALTCHOICE

## 2024-12-01 RX ORDER — METFORMIN HYDROCHLORIDE 500 MG/1
500 TABLET, EXTENDED RELEASE ORAL EVERY MORNING
COMMUNITY
Start: 2024-07-22

## 2024-12-01 RX ORDER — SODIUM CHLORIDE 0.9 % (FLUSH) 0.9 %
10 SYRINGE (ML) INJECTION
Status: DISCONTINUED | OUTPATIENT
Start: 2024-12-01 | End: 2024-12-02 | Stop reason: HOSPADM

## 2024-12-01 RX ORDER — POTASSIUM CHLORIDE 750 MG/1
10 TABLET, EXTENDED RELEASE ORAL
COMMUNITY
Start: 2024-08-17

## 2024-12-01 RX ORDER — FESOTERODINE FUMARATE 4 MG/1
TABLET, FILM COATED, EXTENDED RELEASE ORAL
COMMUNITY
Start: 2024-04-23

## 2024-12-01 RX ORDER — LEVOCETIRIZINE DIHYDROCHLORIDE 5 MG/1
5 TABLET, FILM COATED ORAL
COMMUNITY
Start: 2024-11-08

## 2024-12-01 RX ORDER — LEVOTHYROXINE SODIUM 25 UG/1
25 TABLET ORAL EVERY MORNING
COMMUNITY
Start: 2024-10-31

## 2024-12-01 RX ORDER — NAPROXEN SODIUM 220 MG/1
81 TABLET, FILM COATED ORAL DAILY
Status: DISCONTINUED | OUTPATIENT
Start: 2024-12-02 | End: 2024-12-02 | Stop reason: HOSPADM

## 2024-12-01 RX ORDER — FUROSEMIDE 20 MG/1
20 TABLET ORAL
COMMUNITY
Start: 2024-08-17

## 2024-12-01 RX ORDER — FERROUS FUMARATE 324(106)MG
1 TABLET ORAL
Status: ON HOLD | COMMUNITY
End: 2024-12-02 | Stop reason: HOSPADM

## 2024-12-01 RX ORDER — CARIPRAZINE 1.5 MG/1
1.5 CAPSULE, GELATIN COATED ORAL
COMMUNITY

## 2024-12-01 RX ORDER — LACTULOSE 10 G/15ML
SOLUTION ORAL; RECTAL
COMMUNITY
Start: 2024-11-11

## 2024-12-01 RX ORDER — CLOPIDOGREL BISULFATE 75 MG/1
300 TABLET ORAL ONCE
Status: COMPLETED | OUTPATIENT
Start: 2024-12-01 | End: 2024-12-01

## 2024-12-01 RX ORDER — ENOXAPARIN SODIUM 100 MG/ML
40 INJECTION SUBCUTANEOUS 2 TIMES DAILY
Status: DISCONTINUED | OUTPATIENT
Start: 2024-12-01 | End: 2024-12-02 | Stop reason: HOSPADM

## 2024-12-01 RX ADMIN — ENOXAPARIN SODIUM 40 MG: 40 INJECTION SUBCUTANEOUS at 08:12

## 2024-12-01 RX ADMIN — IOHEXOL 100 ML: 350 INJECTION, SOLUTION INTRAVENOUS at 06:12

## 2024-12-01 RX ADMIN — ASPIRIN 325 MG ORAL TABLET 325 MG: 325 PILL ORAL at 07:12

## 2024-12-01 RX ADMIN — CLOPIDOGREL BISULFATE 300 MG: 75 TABLET ORAL at 11:12

## 2024-12-02 VITALS
TEMPERATURE: 98 F | OXYGEN SATURATION: 93 % | RESPIRATION RATE: 18 BRPM | HEIGHT: 64 IN | WEIGHT: 246.19 LBS | BODY MASS INDEX: 42.03 KG/M2 | SYSTOLIC BLOOD PRESSURE: 125 MMHG | HEART RATE: 81 BPM | DIASTOLIC BLOOD PRESSURE: 68 MMHG

## 2024-12-02 LAB
ALBUMIN SERPL BCP-MCNC: 3.5 G/DL (ref 3.5–5.2)
ALP SERPL-CCNC: 67 U/L (ref 40–150)
ALT SERPL W/O P-5'-P-CCNC: 11 U/L (ref 10–44)
ANION GAP SERPL CALC-SCNC: 10 MMOL/L (ref 8–16)
AST SERPL-CCNC: 14 U/L (ref 10–40)
BASOPHILS # BLD AUTO: 0.02 K/UL (ref 0–0.2)
BASOPHILS NFR BLD: 0.4 % (ref 0–1.9)
BILIRUB SERPL-MCNC: 0.6 MG/DL (ref 0.1–1)
BUN SERPL-MCNC: 17 MG/DL (ref 6–20)
CALCIUM SERPL-MCNC: 8.9 MG/DL (ref 8.7–10.5)
CHLORIDE SERPL-SCNC: 106 MMOL/L (ref 95–110)
CHOLEST SERPL-MCNC: 106 MG/DL (ref 120–199)
CHOLEST/HDLC SERPL: 2.5 {RATIO} (ref 2–5)
CO2 SERPL-SCNC: 24 MMOL/L (ref 23–29)
CREAT SERPL-MCNC: 0.7 MG/DL (ref 0.5–1.4)
DIFFERENTIAL METHOD BLD: ABNORMAL
EOSINOPHIL # BLD AUTO: 0.2 K/UL (ref 0–0.5)
EOSINOPHIL NFR BLD: 3.5 % (ref 0–8)
ERYTHROCYTE [DISTWIDTH] IN BLOOD BY AUTOMATED COUNT: 14 % (ref 11.5–14.5)
EST. GFR  (NO RACE VARIABLE): >60 ML/MIN/1.73 M^2
ESTIMATED AVG GLUCOSE: 97 MG/DL (ref 68–131)
GLUCOSE SERPL-MCNC: 99 MG/DL (ref 70–110)
HBA1C MFR BLD: 5 % (ref 4–5.6)
HCT VFR BLD AUTO: 33 % (ref 37–48.5)
HDLC SERPL-MCNC: 43 MG/DL (ref 40–75)
HDLC SERPL: 40.6 % (ref 20–50)
HGB BLD-MCNC: 10.6 G/DL (ref 12–16)
IMM GRANULOCYTES # BLD AUTO: 0.02 K/UL (ref 0–0.04)
IMM GRANULOCYTES NFR BLD AUTO: 0.4 % (ref 0–0.5)
LDLC SERPL CALC-MCNC: 48 MG/DL (ref 63–159)
LYMPHOCYTES # BLD AUTO: 1.1 K/UL (ref 1–4.8)
LYMPHOCYTES NFR BLD: 22.1 % (ref 18–48)
MAGNESIUM SERPL-MCNC: 2 MG/DL (ref 1.6–2.6)
MCH RBC QN AUTO: 28.6 PG (ref 27–31)
MCHC RBC AUTO-ENTMCNC: 32.1 G/DL (ref 32–36)
MCV RBC AUTO: 89 FL (ref 82–98)
MONOCYTES # BLD AUTO: 0.4 K/UL (ref 0.3–1)
MONOCYTES NFR BLD: 8.3 % (ref 4–15)
NEUTROPHILS # BLD AUTO: 3.2 K/UL (ref 1.8–7.7)
NEUTROPHILS NFR BLD: 65.3 % (ref 38–73)
NONHDLC SERPL-MCNC: 63 MG/DL
NRBC BLD-RTO: 0 /100 WBC
PHOSPHATE SERPL-MCNC: 3.8 MG/DL (ref 2.7–4.5)
PLATELET # BLD AUTO: 302 K/UL (ref 150–450)
PMV BLD AUTO: 10 FL (ref 9.2–12.9)
POTASSIUM SERPL-SCNC: 3.6 MMOL/L (ref 3.5–5.1)
PROT SERPL-MCNC: 7.1 G/DL (ref 6–8.4)
RBC # BLD AUTO: 3.71 M/UL (ref 4–5.4)
SODIUM SERPL-SCNC: 140 MMOL/L (ref 136–145)
TRIGL SERPL-MCNC: 75 MG/DL (ref 30–150)
TROPONIN I SERPL DL<=0.01 NG/ML-MCNC: 0.01 NG/ML (ref 0–0.03)
WBC # BLD AUTO: 4.84 K/UL (ref 3.9–12.7)

## 2024-12-02 PROCEDURE — 36415 COLL VENOUS BLD VENIPUNCTURE: CPT | Performed by: NURSE PRACTITIONER

## 2024-12-02 PROCEDURE — 96372 THER/PROPH/DIAG INJ SC/IM: CPT | Performed by: NURSE PRACTITIONER

## 2024-12-02 PROCEDURE — 97166 OT EVAL MOD COMPLEX 45 MIN: CPT

## 2024-12-02 PROCEDURE — 63600175 PHARM REV CODE 636 W HCPCS: Performed by: NURSE PRACTITIONER

## 2024-12-02 PROCEDURE — 84100 ASSAY OF PHOSPHORUS: CPT | Performed by: NURSE PRACTITIONER

## 2024-12-02 PROCEDURE — 85025 COMPLETE CBC W/AUTO DIFF WBC: CPT | Performed by: NURSE PRACTITIONER

## 2024-12-02 PROCEDURE — 80053 COMPREHEN METABOLIC PANEL: CPT | Performed by: NURSE PRACTITIONER

## 2024-12-02 PROCEDURE — 97530 THERAPEUTIC ACTIVITIES: CPT

## 2024-12-02 PROCEDURE — 84484 ASSAY OF TROPONIN QUANT: CPT | Performed by: NURSE PRACTITIONER

## 2024-12-02 PROCEDURE — 25000003 PHARM REV CODE 250: Performed by: NURSE PRACTITIONER

## 2024-12-02 PROCEDURE — 97162 PT EVAL MOD COMPLEX 30 MIN: CPT

## 2024-12-02 PROCEDURE — G0378 HOSPITAL OBSERVATION PER HR: HCPCS

## 2024-12-02 PROCEDURE — 83735 ASSAY OF MAGNESIUM: CPT | Performed by: NURSE PRACTITIONER

## 2024-12-02 PROCEDURE — 25000003 PHARM REV CODE 250: Performed by: HOSPITALIST

## 2024-12-02 RX ORDER — FLUCONAZOLE 150 MG/1
150 TABLET ORAL ONCE
Qty: 1 TABLET | Refills: 0 | Status: SHIPPED | OUTPATIENT
Start: 2024-12-05 | End: 2024-12-05

## 2024-12-02 RX ORDER — CEFTRIAXONE 1 G/1
1 INJECTION, POWDER, FOR SOLUTION INTRAMUSCULAR; INTRAVENOUS
Status: DISCONTINUED | OUTPATIENT
Start: 2024-12-02 | End: 2024-12-02 | Stop reason: HOSPADM

## 2024-12-02 RX ORDER — MICONAZOLE NITRATE 2 G/100G
POWDER TOPICAL 2 TIMES DAILY
Qty: 85 G | Refills: 1 | Status: SHIPPED | OUTPATIENT
Start: 2024-12-02

## 2024-12-02 RX ORDER — FLUCONAZOLE 100 MG/1
200 TABLET ORAL ONCE
Status: DISCONTINUED | OUTPATIENT
Start: 2024-12-02 | End: 2024-12-02

## 2024-12-02 RX ORDER — CEFDINIR 300 MG/1
300 CAPSULE ORAL 2 TIMES DAILY
Qty: 14 CAPSULE | Refills: 0 | Status: SHIPPED | OUTPATIENT
Start: 2024-12-02 | End: 2024-12-09

## 2024-12-02 RX ORDER — FLUCONAZOLE 150 MG/1
150 TABLET ORAL ONCE
Status: COMPLETED | OUTPATIENT
Start: 2024-12-02 | End: 2024-12-02

## 2024-12-02 RX ORDER — MICONAZOLE NITRATE 2 G/100G
POWDER TOPICAL 2 TIMES DAILY
Status: DISCONTINUED | OUTPATIENT
Start: 2024-12-02 | End: 2024-12-02 | Stop reason: HOSPADM

## 2024-12-02 RX ORDER — CLOPIDOGREL BISULFATE 75 MG/1
75 TABLET ORAL DAILY
Qty: 21 TABLET | Refills: 0 | Status: SHIPPED | OUTPATIENT
Start: 2024-12-02 | End: 2025-12-02

## 2024-12-02 RX ADMIN — ASPIRIN 81 MG CHEWABLE TABLET 81 MG: 81 TABLET CHEWABLE at 10:12

## 2024-12-02 RX ADMIN — ATORVASTATIN CALCIUM 80 MG: 40 TABLET, FILM COATED ORAL at 10:12

## 2024-12-02 RX ADMIN — ENOXAPARIN SODIUM 40 MG: 40 INJECTION SUBCUTANEOUS at 10:12

## 2024-12-02 RX ADMIN — FLUCONAZOLE 150 MG: 150 TABLET ORAL at 10:12

## 2024-12-02 RX ADMIN — CEFTRIAXONE 1 G: 1 INJECTION, POWDER, FOR SOLUTION INTRAMUSCULAR; INTRAVENOUS at 10:12

## 2024-12-02 RX ADMIN — CLOPIDOGREL BISULFATE 75 MG: 75 TABLET ORAL at 10:12

## 2024-12-02 RX ADMIN — MICONAZOLE NITRATE ANTIFUNGAL POWDER: 2 POWDER TOPICAL at 10:12

## 2024-12-02 NOTE — PROGRESS NOTES
Pharmacist Renal Dose Adjustment Note    Judy Ruelas is a 59 y.o. female being treated with the medication enoxaparin.    Patient Data:    Vital Signs (Most Recent):  Temp: 98.1 °F (36.7 °C) (12/01/24 1713)  Pulse: 74 (12/01/24 1903)  Resp: (!) 23 (12/01/24 1903)  BP: (!) 156/87 (12/01/24 1903)  SpO2: 98 % (12/01/24 1839) Vital Signs (72h Range):  Temp:  [98.1 °F (36.7 °C)]   Pulse:  [74-80]   Resp:  [15-23]   BP: (143-157)/(70-87)   SpO2:  [96 %-98 %]      Recent Labs   Lab 12/01/24 1818   CREATININE 0.8     Serum creatinine: 0.8 mg/dL 12/01/24 1818  Estimated creatinine clearance: 91.5 mL/min    Enoxaparin 40 mg subcutaneous every 24 hours will be changed to enoxaparin 40 mg subcutaneous every 12 hours for DVT prevention with BMI > 40 kg/m2.    Pharmacist's Name: Oni Vicente  Pharmacist's Extension: [phone]

## 2024-12-02 NOTE — PT/OT/SLP EVAL
"Occupational Therapy Evaluation and Treatment    Name: Judy Ruelas  MRN: [id number]  Admitting Diagnosis: Right sided numbness  Recent Surgery: * No surgery found *      Recommendations:     Discharge Recommendations: Low Intensity Therapy (24/7 SPV and A)  Level of Assistance Recommended: 24 hours light assistance and 24 hours supervision  Discharge Equipment Recommendations:  (recommending use of RW at d/c)  Barriers to discharge: None    Assessment:     Judy Ruelas is a 59 y.o. female with a medical diagnosis of Right sided numbness. She presents with performance deficits affecting function including weakness, impaired endurance, impaired self care skills, impaired functional mobility, impaired balance, impaired skin, impaired cardiopulmonary response to activity.    Rehab Prognosis: Fair; patient would benefit from acute OT services to address these deficits and reach maximum level of function.    Plan:     Patient to be seen 2 x/week to address the above listed problems via self-care/home management, therapeutic activities, therapeutic exercises  Plan of Care Expires: 12/16/24  Plan of Care Reviewed with: patient    Subjective     Chief Complaint: Reported "I don't know what's going on with my body."  Patient Comments/Goals: none reported  Pain/Comfort:  Pain Rating 1:  (declined pain but reports cramping in B LE)  Pain Addressed 1:  (activity pacing)    Social History:  Living Environment: Patient lives with their son in a mobile home with  3 steps to enter with rail.  Prior Level of Function: Prior to admission, patient was modified independent with household distance ambulation via SPC vs no AD. Completing ADLs mod I.  Roles and Routines: Patient was not driving and not working prior to admission.  Equipment Used at Home: cane, straight  DME owned (not currently used): rolling walker  Assistance Upon Discharge: family    Objective:     Communicated with nurseDivina, prior to session. Patient found " supine with peripheral IV, telemetry, bed alarm, PureWick upon OT entry to room.    General Precautions: Standard, fall   Orthopedic Precautions: N/A   Braces: N/A    Respiratory Status: Room air    Occupational Performance    Gait belt applied - Yes    Bed Mobility:   Supine to sit from left side of bed with stand by assistance    Functional Mobility/Transfers:  Sit <> Stand Transfer with contact guard assistance with rolling walker  Bed <> Chair Transfer using Step Transfer technique with contact guard assistance with rolling walker  Functional Mobility: Patient completed x20ft functional mobility with RW and CGA to increase dynamic standing balance and activity tolerance needed for ADL completion.  Provided with v/c for technique with transfers to increase safety and independence with completion  Max encouragement throughout as patient highly fearful of falling    Activities of Daily Living:  Upper Body Dressing: set up assistance  Toileting: moderate assistance    Cognitive/Visual Perceptual:  Cognitive/Psychosocial Skills:    -     Oriented to: Person, Place, Time, Situation  -     Follows Commands/attention: Easily distracted and Follows one-step commands  -     Mood/Affect/Coping skills/emotional control: fearful/anxious    Physical Exam:  Balance:    -     Sitting: supervision  -     Standing: contact guard assistance  Upper Extremity Range of Motion:     -       Right Upper Extremity: WFL  -       Left Upper Extremity: WFL  Upper Extremity Strength:    -       Right Upper Extremity: Deficits: grossly 4/5  -       Left Upper Extremity: Deficits: grossly 4/5   Strength:    -       Right Upper Extremity: Deficits: fair  -       Left Upper Extremity: Deficits: fair  Dry skin noted    AMPAC 6 Click ADL:  AMPAC Total Score: 19    Treatment & Education:  Therapist provided facilitation and instruction of proper body mechanics, energy conservation, and fall prevention strategies during tasks listed  above  Patient educated on role of OT, POC, and goals for therapy  Patient educated on importance of OOB activities with staff member assistance and sitting OOB majority of the day  Encouraged completion of B UE AROM therex throughout the day to increase functional strength and activity tolerance needed for ADL completion.    Patient left up in chair with all lines intact, call button in reach, RN notified, and chair alarm on.    GOALS:   Multidisciplinary Problems       Occupational Therapy Goals          Problem: Occupational Therapy    Goal Priority Disciplines Outcome Interventions   Occupational Therapy Goal     OT, PT/OT     Description: Goals to be met by: 24     Patient will increase functional independence with ADLs by performing:    Toileting from toilet with Contact Guard Assistance for hygiene and clothing management.   Toilet transfer to toilet with Contact Guard Assistance.  Increased functional strength in B UE grossly by 1/2 MM grade.                         History:     Past Medical History:   Diagnosis Date    Anxiety     Depression     Depression with anxiety     Family history of heart disease 2016    Hypertension     Irregular heart beat     Migraine headache     Morbid obesity with BMI of 40.0-44.9, adult 2017    NSTEMI (non-ST elevated myocardial infarction) 2017         Past Surgical History:   Procedure Laterality Date    APPENDECTOMY       SECTION      HYSTERECTOMY         Time Tracking:     OT Date of Treatment: 24  OT Start Time: 715  OT Stop Time: 740  OT Total Time (min): 25 min    Billable Minutes: Evaluation 15 and Therapeutic Activity 10    Dana Joshi OT  2024

## 2024-12-02 NOTE — ASSESSMENT & PLAN NOTE
Antithrombotics for secondary stroke prevention: Antiplatelets: Aspirin: 81 mg daily  Aspirin: 325 mg daily  Clopidogrel: 300 mg loading dose x 1, now  Clopidogrel: 75 mg daily    Statins for secondary stroke prevention and hyperlipidemia, if present:   Statins: Atorvastatin- 80 mg daily    Aggressive risk factor modification: HTN, Diet, Exercise, Obesity     Rehab efforts: The patient has been evaluated by a stroke team provider and the therapy needs have been fully considered based off the presenting complaints and exam findings. The following therapy evaluations are needed: PT evaluate and treat, OT evaluate and treat, SLP evaluate and treat    Diagnostics ordered/pending: CT scan of head without contrast to asses brain parenchyma, CTA Head to assess vasculature , CTA Neck/Arch to assess vasculature, HgbA1C to assess blood glucose levels, Lipid Profile to assess cholesterol levels, MRI head without contrast to assess brain parenchyma, TTE to assess cardiac function/status , TSH to assess thyroid function    VTE prophylaxis: Enoxaparin 40 mg SQ every 24 hours    BP parameters: Infarct: No intervention, SBP <220

## 2024-12-02 NOTE — SUBJECTIVE & OBJECTIVE
HPI:  59 y.o. female with h/o CAD, memory loss, depression, DEJUAN, HTN presenting with R sided numbness.  She states she woke up with the symptoms at 6 am.  Also notes HA and nausea.     Images personally reviewed and interpreted:  Study: Head CT and CTA Head & Neck  Study Interpretation: no acute findings     Assessment and plan:  Patient presenting with R hemibody numbness, concerning for thalamic stroke.  Recommend admission for MRI brain and further workup.      - MRI brain w/o contrast to evaluate for presence of and characterization of infarct  - If passes bedside swallow: Load with aspirin 325 mg and clopidogrel 300 mg, followed by ASA 81 and clopidogrel 75 mg daily.  - If unable to swallow: given rectal  and consider NGT placement.  - High intensity statin  - Transothoracic echocardiogram  - Lipid profile and A1c for evaluation of modifiable risk factors  - PT/OT/SLP eval and Rx  - Permissive HTN < 220/120 mmHg x 48-72h pending results of vessel imaging        Lytics recommendation: Thrombolytic therapy not recommended due to Outside of treatment window     Thrombectomy recommendation: No; No large vessel occlusion identified on imaging   Placement recommendation: admit to inpatient

## 2024-12-02 NOTE — PT/OT/SLP EVAL
Physical Therapy Evaluation and Treatment    Patient Name:  Judy Ruelas   MRN:  [id number]    Recommendations:     Discharge Recommendations: Low Intensity Therapy (WITH 24 HOUR CARE FOR SAFETY)   Discharge Equipment Recommendations:  (RECOMMENDING USE OF RW AT HOME)   Barriers to discharge: None    Assessment:     Judy Ruelas is a 59 y.o. female admitted with a medical diagnosis of Right sided numbness.  She presents with the following impairments/functional limitations: weakness, impaired endurance, impaired functional mobility, gait instability, impaired balance, impaired sensation, decreased safety awareness, decreased coordination.    Rehab Prognosis: Fair; patient would benefit from acute skilled PT services to address these deficits and reach maximum level of function.    Recent Surgery: * No surgery found *     Plan:     During this hospitalization, patient to be seen 3 x/week to address the identified rehab impairments via therapeutic activities, therapeutic exercises, gait training and progress toward the following goals:    Plan of Care Expires:  12/16/24    Subjective     Chief Complaint: C/O CRAMPING AND TIGHTNESS TO BLE  Patient/Family Comments/goals: NONE STATED  Pain/Comfort:  Pain Rating 1: 0/10 (REPORTS CRAMPING TO BLE'S)  Location - Side 1: Bilateral  Location - Orientation 1: generalized  Location 1: leg  Pain Addressed 1: Reposition    Patients cultural, spiritual, Tenriism conflicts given the current situation:      Living Environment:  PT HAS A SON THAT LIVES WITH HER-SHE REPORTS HE IS UNWILLING TO ASSIST HER AT HOME, LIVES IN MOBILE HOME WITH 3 STEPS TO ENTER WITH RAIL, AMB MOSTLY INDEP BUT USES SPC AS NEEDED LIMITED COMMUNITY DISTANCES, DOES NOT DRIVE, SON DOES NOT DRIVE, REPORTS HER SISTER IN LAW DRIVES FOR HER, INDEP WITH ADL'S  Prior to admission, patients level of function was INDEP/ANASTASIA.  Equipment used at home: cane, straight.  DME owned (not currently used): rolling walker.   "Upon discharge, patient will have assistance from ?.    Objective:     Communicated with NURSE ACUNA prior to session.  Patient found supine with telemetry, peripheral IV, bed alarm, PureWick  upon PT entry to room.    General Precautions: Standard, fall  Orthopedic Precautions:N/A   Braces: N/A  Respiratory Status: Room air    Exams:  Cognitive Exam:  Patient is oriented to Person, Place, Time, and Situation  Postural Exam:  Patient presented with the following abnormalities:    -       Rounded shoulders  Sensation: REPORTS NUMBNESS/TIGHTNESS TO BLE  RLE ROM: WFL  RLE Strength: GROSSLY 4-/5  LLE ROM: WFL  LLE Strength: GROSSLY 4-/5    Functional Mobility:  Bed Mobility:     Rolling Left:  stand by assistance  Scooting: stand by assistance  Supine to Sit: stand by assistance  Transfers:     Sit to Stand:  contact guard assistance with rolling walker  Bed to Chair: contact guard assistance with  rolling walker  using  Step Transfer  Gait: PT AMB 25' WITH RW AND CGA, C/O BLE WEAKNESS, NO LOB OR SOB ON ROOM AIR  Balance: GOOD SITTING BALANCE, FAIR DYNAMIC BALANCE DURING GAIT  PT EDUCATED IN AND PERFORMED SEATED BLE THEREX X 10 REPS AROM WITH REST: HIP FLEX/EXT, LAQ, AP'S    AM-PAC 6 CLICK MOBILITY  Total Score:18     Treatment & Education:  PT EDUCATION:  - ROLE OF P.T. AND POC IN ACUTE CARE HOSPITAL SETTING  - RW USE AND SAFETY DURING TF'S AND GAIT  - ENCOURAGED TO INCREASE TIME OOB IN CHAIR TO TOLERANCE   - TO CONTINUE THERAPUETIC EXERCISES THROUGHOUT THE DAY TO INCREASE ACTIVITY TOLERANCE AND DECREASE RISK FOR PNEUMONIA AND BLOOD CLOTS: HIP FLEX/EXT, HIP ABD/ADD, QUAD SET, HEEL SLIDE, AP  - RISK FOR FALLS DUE TO GENERALIZED WEAKNESS, EDUCATED ON "CALL DON'T FALL", ENCOURAGED TO CALL FOR ASSISTANCE WITH ALL NEEDS SUCH AS BED<>CHAIR TRANSFERS OR TRIPS TO BATHROOM, PT AGREEABLE TO SAFETY PRECAUTIONS    Patient left up in chair with all lines intact, call button in reach, chair alarm on, and NURSE notified.    GOALS: "   Multidisciplinary Problems       Physical Therapy Goals          Problem: Physical Therapy    Goal Priority Disciplines Outcome Interventions   Physical Therapy Goal     PT, PT/OT     Description: LTG'S TO BE MET IN 14 DAYS (-)  PT WILL BE ANASTASIA FOR BED MOBILITY  PT WILL BE ANASTASIA FOR BED<>CHAIR TF'S  PT WILL  FEET WITH RW AND ANASTASIA  PT WILL INC AMPAC SCORE BY 2 POINTS TO PROGRESS GROSS FUNC MOBILITY                         History:     Past Medical History:   Diagnosis Date    Anxiety     Depression     Depression with anxiety     Family history of heart disease 2016    Hypertension     Irregular heart beat     Migraine headache     Morbid obesity with BMI of 40.0-44.9, adult 2017    NSTEMI (non-ST elevated myocardial infarction) 2017       Past Surgical History:   Procedure Laterality Date    APPENDECTOMY       SECTION      HYSTERECTOMY         Time Tracking:     PT Received On: 24  PT Start Time: 710     PT Stop Time: 740  PT Total Time (min): 30 min     Billable Minutes: Evaluation 15 and Therapeutic Activity 15    2024

## 2024-12-02 NOTE — SUBJECTIVE & OBJECTIVE
Past Medical History:   Diagnosis Date    Anxiety     Depression     Depression with anxiety     Family history of heart disease 2016    Hypertension     Irregular heart beat     Migraine headache     Morbid obesity with BMI of 40.0-44.9, adult 2017    NSTEMI (non-ST elevated myocardial infarction) 2017       Past Surgical History:   Procedure Laterality Date    APPENDECTOMY       SECTION      HYSTERECTOMY         Review of patient's allergies indicates:   Allergen Reactions    Pcn [penicillins] Swelling    Acetaminophen Itching    Codeine     Latex, natural rubber     Tylox [oxycodone-acetaminophen]        No current facility-administered medications on file prior to encounter.     Current Outpatient Medications on File Prior to Encounter   Medication Sig    fesoterodine (TOVIAZ) 4 mg Tb24 TAKE 1 BY MOUTH ONCE DAILY FOR URINATION    furosemide (LASIX) 20 MG tablet Take 20 mg by mouth.    lactulose (CHRONULAC) 10 gram/15 mL solution SMARTSI Milliliter(s) By Mouth 3 Times Daily    levocetirizine (XYZAL) 5 MG tablet Take 5 mg by mouth.    levothyroxine (SYNTHROID) 200 MCG tablet Take 200 mcg by mouth every morning.    metFORMIN (GLUCOPHAGE-XR) 500 MG ER 24hr tablet Take 500 mg by mouth every morning.    midodrine (PROAMATINE) 5 MG Tab Take 10 mg by mouth 3 (three) times daily.    potassium chloride SA (K-DUR,KLOR-CON M) 10 MEQ tablet Take 10 mEq by mouth.    XIFAXAN 550 mg Tab Take 550 mg by mouth 2 (two) times daily.    [DISCONTINUED] clindamycin (CLEOCIN) 150 MG capsule Take 150 mg by mouth every 8 (eight) hours.    albuterol (PROVENTIL/VENTOLIN HFA) 90 mcg/actuation inhaler Inhale 1-2 puffs into the lungs every 6 (six) hours as needed for Wheezing. Rescue    amLODIPine (NORVASC) 10 MG tablet Take 1 tablet (10 mg total) by mouth once daily.    aspirin 81 MG Chew Take 1 tablet (81 mg total) by mouth once daily.    atenolol (TENORMIN) 50 MG tablet Take 50 mg by mouth once daily.     atorvastatin (LIPITOR) 80 MG tablet Take 1 tablet (80 mg total) by mouth once daily.    busPIRone (BUSPAR) 7.5 MG tablet Take 7.5 mg by mouth 2 (two) times daily.     butalbital-acetaminophen-caffeine -40 mg (FIORICET, ESGIC) -40 mg per tablet Take 1 tablet by mouth every 6 (six) hours as needed for Pain or Headaches.    carvediloL (COREG) 25 MG tablet Take 25 mg by mouth 2 (two) times daily with meals.    clopidogrel (PLAVIX) 75 mg tablet Take 1 tablet (75 mg total) by mouth once daily.    colchicine 0.6 mg tablet Take 1 tablet (0.6 mg total) by mouth once daily.    ergocalciferol (ERGOCALCIFEROL) 50,000 unit Cap Take 50,000 Units by mouth every 7 days.    fenofibrate (TRICOR) 145 MG tablet Take 145 mg by mouth every evening.    FERRETTS 325 mg (106 mg iron) Tab Take 1 tablet by mouth.    ferrous sulfate 324 mg (65 mg iron) TbEC Take 325 mg by mouth once daily.    fluoxetine (PROZAC) 20 MG capsule Take 20 mg by mouth once daily.    fluticasone propionate (FLONASE) 50 mcg/actuation nasal spray 1 spray (50 mcg total) by Each Nostril route 2 (two) times daily as needed for Rhinitis.    hydrALAZINE (APRESOLINE) 25 MG tablet Take 25 mg by mouth 3 (three) times daily.    hydrocodone-acetaminophen 7.5-325mg (NORCO) 7.5-325 mg per tablet Take 1 tablet by mouth every 6 (six) hours as needed for Pain.    hydrOXYzine (VISTARIL) 50 MG Cap Take 50 mg by mouth 4 (four) times daily.    isosorbide mononitrate (IMDUR) 30 MG 24 hr tablet Take 1 tablet (30 mg total) by mouth once daily.    lisinopriL (PRINIVIL,ZESTRIL) 40 MG tablet Take 40 mg by mouth once daily.    losartan (COZAAR) 50 MG tablet Take 1 tablet (50 mg total) by mouth once daily.    meclizine (ANTIVERT) 50 MG tablet Take 50 mg by mouth 2 (two) times daily.    naproxen (NAPROSYN) 500 MG tablet Take 1 tablet (500 mg total) by mouth 2 (two) times daily with meals.    nitroGLYCERIN (NITROSTAT) 0.4 MG SL tablet Place 1 tablet (0.4 mg total) under the tongue  every 5 (five) minutes as needed for Chest pain.    nystatin (MYCOSTATIN) powder Apply topically 2 (two) times daily.    omeprazole (PRILOSEC) 40 MG capsule Take 1 capsule (40 mg total) by mouth once daily.    ondansetron (ZOFRAN) 4 MG tablet Take 8 mg by mouth 2 (two) times daily.    pantoprazole (PROTONIX) 40 MG tablet Take 1 tablet (40 mg total) by mouth once daily.    predniSONE (DELTASONE) 10 MG tablet Take 1 tablet (10 mg total) by mouth once daily. Take 4 tabs x 3 days, then take 2 tabs x 3 days, then take 1 tab x 3 days.    promethazine (PHENERGAN) 25 MG tablet Take 1 tablet (25 mg total) by mouth every 6 (six) hours as needed for Nausea.    spironolactone (ALDACTONE) 50 MG tablet Take 50 mg by mouth once daily.    trazodone (DESYREL) 50 MG tablet Take 100 mg by mouth every evening.    venlafaxine (EFFEXOR-XR) 75 MG 24 hr capsule Take 75 mg by mouth once daily.    VRAYLAR 1.5 mg Cap Take 1.5 mg by mouth.     Family History       Problem Relation (Age of Onset)    Diabetes Mother    Heart attack Mother (40), Brother (40)    Heart disease Father (70)    Hypertension Father, Mother    Stroke Mother          Tobacco Use    Smoking status: Never    Smokeless tobacco: Never   Substance and Sexual Activity    Alcohol use: No     Alcohol/week: 0.0 standard drinks of alcohol     Comment: denies    Drug use: No     Comment: denies    Sexual activity: Not Currently     Review of Systems   All other systems reviewed and are negative.    Objective:     Vital Signs (Most Recent):  Temp: 98.1 °F (36.7 °C) (12/01/24 1713)  Pulse: 72 (12/01/24 2144)  Resp: 18 (12/01/24 2003)  BP: (!) 158/85 (12/01/24 2003)  SpO2: 96 % (12/01/24 2003) Vital Signs (24h Range):  Temp:  [98.1 °F (36.7 °C)] 98.1 °F (36.7 °C)  Pulse:  [72-80] 72  Resp:  [15-23] 18  SpO2:  [96 %-98 %] 96 %  BP: (143-158)/(70-87) 158/85     Weight: 111.7 kg (246 lb 3.2 oz) (need weight)  Body mass index is 42.26 kg/m².     Physical Exam  Vitals reviewed.    Constitutional:       General: She is not in acute distress.     Appearance: Normal appearance. She is obese. She is not ill-appearing, toxic-appearing or diaphoretic.   HENT:      Head: Normocephalic and atraumatic.      Right Ear: External ear normal.      Left Ear: External ear normal.      Nose: Nose normal. No congestion or rhinorrhea.      Mouth/Throat:      Mouth: Mucous membranes are moist.      Pharynx: Oropharynx is clear. No oropharyngeal exudate or posterior oropharyngeal erythema.   Eyes:      General: No scleral icterus.     Extraocular Movements: Extraocular movements intact.      Conjunctiva/sclera: Conjunctivae normal.      Pupils: Pupils are equal, round, and reactive to light.   Neck:      Vascular: No carotid bruit.   Cardiovascular:      Rate and Rhythm: Normal rate and regular rhythm.      Pulses: Normal pulses.      Heart sounds: Normal heart sounds. No murmur heard.     No friction rub. No gallop.   Pulmonary:      Effort: Pulmonary effort is normal. No respiratory distress.      Breath sounds: Normal breath sounds. No stridor. No wheezing, rhonchi or rales.   Chest:      Chest wall: No tenderness.   Abdominal:      General: Abdomen is flat. Bowel sounds are normal. There is no distension.      Palpations: Abdomen is soft. There is no mass.      Tenderness: There is no abdominal tenderness. There is no right CVA tenderness, left CVA tenderness, guarding or rebound.      Hernia: No hernia is present.   Musculoskeletal:         General: No swelling, tenderness, deformity or signs of injury. Normal range of motion.      Cervical back: Normal range of motion and neck supple. No rigidity or tenderness.      Right lower leg: No edema.      Left lower leg: No edema.   Lymphadenopathy:      Cervical: No cervical adenopathy.   Skin:     General: Skin is warm and dry.      Capillary Refill: Capillary refill takes less than 2 seconds.      Coloration: Skin is not jaundiced or pale.      Findings: No  bruising, erythema, lesion or rash.   Neurological:      Mental Status: She is alert and oriented to person, place, and time.      Cranial Nerves: No cranial nerve deficit.      Sensory: Sensory deficit present.      Motor: No weakness.      Coordination: Coordination normal.      Comments: Currently A&O x3.  Negative for lid lag, facial drooping, tongue deviation, or pronator drift.  Patient with reported right-sided paresthesia.  Sensation to light touch grossly intact throughout left side.  Patient yielding 5/5 strength throughout.   Psychiatric:         Mood and Affect: Mood normal.         Behavior: Behavior normal.         Thought Content: Thought content normal.         Judgment: Judgment normal.              CRANIAL NERVES     CN III, IV, VI   Pupils are equal, round, and reactive to light.       Significant Labs: All pertinent labs within the past 24 hours have been reviewed.    Significant Imaging: I have reviewed all pertinent imaging results/findings within the past 24 hours.    LABS:  Recent Results (from the past 24 hours)   POCT glucose    Collection Time: 12/01/24  5:13 PM   Result Value Ref Range    POCT Glucose 108 70 - 110 mg/dL   CBC W/ AUTO DIFFERENTIAL    Collection Time: 12/01/24  6:18 PM   Result Value Ref Range    WBC 8.67 3.90 - 12.70 K/uL    RBC 4.17 4.00 - 5.40 M/uL    Hemoglobin 12.1 12.0 - 16.0 g/dL    Hematocrit 36.8 (L) 37.0 - 48.5 %    MCV 88 82 - 98 fL    MCH 29.0 27.0 - 31.0 pg    MCHC 32.9 32.0 - 36.0 g/dL    RDW 14.0 11.5 - 14.5 %    Platelets 362 150 - 450 K/uL    MPV 9.8 9.2 - 12.9 fL    Immature Granulocytes 0.5 0.0 - 0.5 %    Gran # (ANC) 6.5 1.8 - 7.7 K/uL    Immature Grans (Abs) 0.04 0.00 - 0.04 K/uL    Lymph # 1.1 1.0 - 4.8 K/uL    Mono # 0.7 0.3 - 1.0 K/uL    Eos # 0.3 0.0 - 0.5 K/uL    Baso # 0.04 0.00 - 0.20 K/uL    nRBC 0 0 /100 WBC    Gran % 75.3 (H) 38.0 - 73.0 %    Lymph % 13.1 (L) 18.0 - 48.0 %    Mono % 7.7 4.0 - 15.0 %    Eosinophil % 2.9 0.0 - 8.0 %    Basophil  % 0.5 0.0 - 1.9 %    Differential Method Automated    Comprehensive metabolic panel    Collection Time: 12/01/24  6:18 PM   Result Value Ref Range    Sodium 139 136 - 145 mmol/L    Potassium 4.7 3.5 - 5.1 mmol/L    Chloride 106 95 - 110 mmol/L    CO2 22 (L) 23 - 29 mmol/L    Glucose 97 70 - 110 mg/dL    BUN 22 (H) 6 - 20 mg/dL    Creatinine 0.8 0.5 - 1.4 mg/dL    Calcium 9.8 8.7 - 10.5 mg/dL    Total Protein 8.3 6.0 - 8.4 g/dL    Albumin 4.0 3.5 - 5.2 g/dL    Total Bilirubin 0.5 0.1 - 1.0 mg/dL    Alkaline Phosphatase 71 40 - 150 U/L    AST 16 10 - 40 U/L    ALT 16 10 - 44 U/L    eGFR >60 >60 mL/min/1.73 m^2    Anion Gap 11 8 - 16 mmol/L   Protime-INR    Collection Time: 12/01/24  6:18 PM   Result Value Ref Range    Prothrombin Time 11.0 9.0 - 12.5 sec    INR 0.9 0.8 - 1.2   TSH    Collection Time: 12/01/24  6:18 PM   Result Value Ref Range    TSH 2.195 0.400 - 4.000 uIU/mL   Urinalysis, Reflex to Urine Culture Urine, Clean Catch    Collection Time: 12/01/24  8:57 PM    Specimen: Urine   Result Value Ref Range    Specimen UA Urine, Clean Catch     Color, UA Yellow Yellow, Straw, Pema    Appearance, UA Clear Clear    pH, UA 7.0 5.0 - 8.0    Specific Gravity, UA >1.030 (A) 1.005 - 1.030    Protein, UA Negative Negative    Glucose, UA Negative Negative    Ketones, UA Negative Negative    Bilirubin (UA) Negative Negative    Occult Blood UA Negative Negative    Nitrite, UA Positive (A) Negative    Urobilinogen, UA Negative <2.0 EU/dL    Leukocytes, UA 1+ (A) Negative   Urinalysis Microscopic    Collection Time: 12/01/24  8:57 PM   Result Value Ref Range    RBC, UA 1 0 - 4 /hpf    WBC, UA 17 (H) 0 - 5 /hpf    Bacteria Many (A) None-Occ /hpf    Squam Epithel, UA 2 /hpf    Microscopic Comment SEE COMMENT        RADIOLOGY  MRI Brain Without Contrast    Result Date: 12/1/2024  EXAMINATION: MRI BRAIN WITHOUT CONTRAST CLINICAL HISTORY: Transient ischemic attack (TIA);. TECHNIQUE: Multiplanar multisequence MR imaging of the  brain was performed without contrast. COMPARISON: None FINDINGS: Intracranial compartment: Ventricles and sulci are normal in size for age without evidence of hydrocephalus. No extra-axial blood or fluid collections. Mild T2 FLAIR hyperintensities likely microvascular ischemic changes.  These are similar to the prior.  No mass lesion, acute hemorrhage, edema or acute infarct. Normal vascular flow voids are preserved. Skull/extracranial contents (limited evaluation): Bone marrow signal intensity is normal.     No definite acute abnormality identified.  Correlation and further evaluation as warranted. Electronically signed by: Darrell Cole Date:    12/01/2024 Time:    21:03    CTA Head and Neck (xpd)    Result Date: 12/1/2024  EXAMINATION: CTA HEAD AND NECK (XPD) CLINICAL HISTORY: Stroke/TIA, determine embolic source; TECHNIQUE: Non contrast low dose axial images were obtained through the head. CT angiogram was performed from the level of the carolyn to the top of the head following the IV administration of 100mL of Omnipaque 350.   Sagittal and coronal reconstructions and maximum intensity projection reconstructions were performed. Arterial stenosis percentages are based on NASCET measurement criteria. COMPARISON: Multiple FINDINGS: Intracranial Compartment: Ventricles and sulci are normal in size for age without evidence of hydrocephalus. No extra-axial blood or fluid collections. Mild microvascular ischemic change.  Remote right thalamic lacunar infarct.  No parenchymal mass, hemorrhage, edema, or major vascular distribution infarct. Skull/Extracranial Contents (limited evaluation): No fracture. Non-Vascular Structures of the Neck/Thoracic Inlet (limited evaluation): Cervical spine degenerative change.  Prominent but not pathologically enlarged number and size of cervical lymph nodes.  Paranasal sinus disease asymmetric towards the left.  Thyroid grossly within normal limits. Aorta: Normal 3 vessel arch.  Extracranial carotid circulation: No hemodynamically significant stenosis, aneurysmal dilatation, or dissection.  Less than 20% stenosis bilaterally at the carotid bifurcations.  Motion does degrade the evaluation of the internal carotid arteries to a minor extent. Extracranial vertebral circulation: No hemodynamically significant stenosis, aneurysmal dilatation, or dissection. Intracranial Arteries: ICA terminus are intact bilaterally.  Right MCA appears intact.  No aneurysm, severe stenosis, or occlusion.  Left MCA appears intact.  No aneurysm, severe stenosis, or occlusion.  Bilateral AIXA appear intact.  No aneurysm, severe stenosis, or occlusion. Intracranial vertebrobasilar circulation appears intact.  No aneurysm, severe stenosis, or occlusion. Bilateral PCA appear intact.  No aneurysm, severe stenosis, or occlusion. Venous structures (limited evaluation): Normal.     No acute abnormality. No high-grade stenosis or major vessel occlusion. Details as above. All CT scans at this facility are performed  using dose modulation techniques as appropriate to performed exam including the following:  automated exposure control; adjustment of mA and/or kV according to the patients size (this includes techniques or standardized protocols for targeted exams where dose is matched to indication/reason for exam: i.e. extremities or head);  iterative reconstruction technique. Electronically signed by: Darrell Cole Date:    12/01/2024 Time:    19:25      EKG    MICROBIOLOGY    MDM

## 2024-12-02 NOTE — HPI
Judy Ruelas is a 59 y.o. female with a PMH  has a past medical history of Anxiety, Depression, Depression with anxiety, Family history of heart disease (1/27/2016), Hypertension, Irregular heart beat, Migraine headache, Morbid obesity with BMI of 40.0-44.9, adult (6/30/2017), and NSTEMI (non-ST elevated myocardial infarction) (6/18/2017). who presented to the ED for further evaluation of acute onset right-sided numbness which began upon waking earlier this morning around 6:00 a.m..  Patient has significant history of CVAs with left-sided residual weakness and paresthesia according to her reports ambulating via aid of cane/walker as needed.  She reported no known alleviating or aggravating factors noted with a associated symptoms also including headache, left-sided facial drooping, slurred speech, and numbness throughout her mouth in addition to the right-sided paresthesia. Prior to onset of symptoms, patient reported being in her usual state of health with no other concerns or complaints with all other review of systems negative except as noted above.  She reports compliance with home medications as well as dietary/fluid restrictions.  Initial workup in the ED revealed patient to be afebrile without leukocytosis, UA positive for nitrites, 1+ LE, and many bacteria with remaining CBC and CMP near baseline.  Patient was evaluated by tele vascular Neurology and recommended patient undergo continued stroke workup but reported patient was not a candidate for thrombolytics therapy given last known normal exceeded window.  CT head, CTA head and neck, and MRI brain positive for previous CVAs but negative for acute findings.  Patient initiated on Rocephin for treatment of UTI and admitted to Hospital Medicine under observation or continued medical management.      PCP: [place], [facility]

## 2024-12-02 NOTE — PLAN OF CARE
P.T. EVAL COMPLETE.  PT CURRENTLY REQUIRES [facility]BA FOR BED MOBILITY, [facility] FOR TF'S AND GAIT WITH RW.  P.T. RECOMMENDS LOW INTENSITY THERAPY UPON DISCHARGE

## 2024-12-02 NOTE — PLAN OF CARE
[facility] - Telemetry (Hospital)  Discharge Final Note    [facility]    Expected Discharge Date: 12/2/2024    Final Discharge Note (most recent)       Final Note - 12/02/24 1214          Final Note    Assessment Type Final Discharge Note     Anticipated Discharge Disposition Home-Health Care [facility]     Hospital Resources/Appts/Education Provided Post-Acute resouces added to AVS;Appointments scheduled and added to AVS        Post-Acute Status    Discharge Delays None known at this time                   Pt cleared for DC home today.  [facility] accepting; AVS updated.  Pt to arrange follow up with PCP/[facility].     Important Message from Medicare             Contact Info       [facility]   Relationship: PCP - General    [address] [place] [id number]   Phone: [phone]       Next Steps: Follow up in 3 day(s)    Instructions: f/u with PCP in 3 days to review urine culture and for paresthesias

## 2024-12-02 NOTE — TELEMEDICINE CONSULT
[facility]  Vascular Neurology  Comprehensive Stroke Center  TeleVascular Neurology Acute Consultation Note        Consult Information  Consults    Consulting Provider: TOO CARMONA JR   Current Providers  No providers found    Patient Location:  [facility] EMERGENCY DEPARTMENT Emergency Department    Spoke hospital nurse at bedside with patient assisting consultant.  Patient information was obtained from patient.       Stroke Documentation  Acute Stroke Times   Last Known Normal Date: 11/30/24  Last Known Normal Time: 2130  Stroke Team Called Date: 12/01/24  Stroke Team Called Time: 1815  Stroke Team Arrival Date: 12/01/24  Stroke Team Arrival Time: 1825  CT Interpretation Time: 1825  Thrombolytic Therapy Recommended: No  Thrombectomy Recommended: No    NIH Scale:  1a. Level of Consciousness: 0-->Alert, keenly responsive  1b. LOC Questions: 0-->Answers both questions correctly  1c. LOC Commands: 0-->Performs both tasks correctly  2. Best Gaze: 0-->Normal  3. Visual: 0-->No visual loss  4. Facial Palsy: 0-->Normal symmetrical movements  5a. Motor Arm, Left: 0-->No drift, limb holds 90 (or 45) degrees for full 10 secs  5b. Motor Arm, Right: 0-->No drift, limb holds 90 (or 45) degrees for full 10 secs  6a. Motor Leg, Left: 0-->No drift, leg holds 30 degree position for full 5 secs  6b. Motor Leg, Right: 0-->No drift, leg holds 30 degree position for full 5 secs  7. Limb Ataxia: 0-->Absent  8. Sensory: 2-->Severe to total sensory loss, patient is not aware of being touched in the face, arm, and leg  9. Best Language: 0-->No aphasia, normal  10. Dysarthria: 0-->Normal  11. Extinction and Inattention (formerly Neglect): 0-->No abnormality  Total (NIH Stroke Scale): 2      Modified Niles:    Terrence Coma Scale:     ABCD2 Score:    [id number] Score:    HAS -BLED Score:    ICH Score:    Hunt & Gomes Classification:      Blood pressure (!) 149/73, pulse 77, temperature 98.1 °F (36.7 °C), temperature  "source Oral, resp. rate 15, height 5' 4" (1.626 m), weight 111.7 kg (246 lb 3.2 oz), SpO2 98%.      In my opinion, this was a: Tier 2; VAN Stroke Assessment: Negative     Medical Decision Making  HPI:  59 y.o. female with h/o CAD, memory loss, depression, DEJUAN, HTN presenting with R sided numbness.  She states she woke up with the symptoms at 6 am.  Also notes HA and nausea.     Images personally reviewed and interpreted:  Study: Head CT and CTA Head & Neck  Study Interpretation: no acute findings     Assessment and plan:  Patient presenting with R hemibody numbness, concerning for thalamic stroke.  Recommend admission for MRI brain and further workup.      - MRI brain w/o contrast to evaluate for presence of and characterization of infarct  - If passes bedside swallow: Load with aspirin 325 mg and clopidogrel 300 mg, followed by ASA 81 and clopidogrel 75 mg daily.  - If unable to swallow: given rectal  and consider NGT placement.  - High intensity statin  - Transothoracic echocardiogram  - Lipid profile and A1c for evaluation of modifiable risk factors  - PT/OT/SLP eval and Rx  - Permissive HTN < 220/120 mmHg x 48-72h pending results of vessel imaging        Lytics recommendation: Thrombolytic therapy not recommended due to Outside of treatment window     Thrombectomy recommendation: No; No large vessel occlusion identified on imaging   Placement recommendation: admit to inpatient               ROS  Physical Exam  Neurological:      Mental Status: She is oriented to person, place, and time.      Motor: No weakness.      Coordination: Coordination normal.      Comments: Absent sensation of LT R face/arm/leg       Past Medical History:   Diagnosis Date    Anxiety     Depression     Depression with anxiety     Family history of heart disease 1/27/2016    Hypertension     Irregular heart beat     Migraine headache     Morbid obesity with BMI of 40.0-44.9, adult 6/30/2017    NSTEMI (non-ST elevated myocardial " infarction) 2017     Past Surgical History:   Procedure Laterality Date    APPENDECTOMY       SECTION      HYSTERECTOMY       Family History   Problem Relation Name Age of Onset    Hypertension Unknown      Heart disease Father  70    Hypertension Father      Heart attack Mother  40        triple bypass    Diabetes Mother      Hypertension Mother      Stroke Mother      Heart attack Brother  40        CABG x 2       Diagnoses  Problem Noted   Right Sided Numbness 2024   Anterograde Amnesia 2020       Padmaja Hodge MD      Emergent/Acute neurological consultation requested by spoke provider due to critical concerns for possible cerebrovascular event that could result in permanent loss of neurologic/bodily function, severe disability or death of this patient.  Immediate/timely evaluation by a highly prepared expert is paramount for optimal outcomes  High risk for neurological deterioration if not properly diagnosed  High risk for neurological deterioration if not treated promplty/as soon as possible  Complex diagnostic evaluation may be required (advanced imaging)  High risk treatment options (thrombolytics and/or thrombectomy)    Patient care was coordinated with spoke provider, including but not limted to    Discussing likely diagnosis/etiology of symptoms  Making recommendations for further diagnostic studies  Making recommendations for intravenous thrombolytics or other advanced therapies  Making recommendations for disposition (admission/transfer for higher level of care)      Neurology consultation requested by spoke provider. Audiovisual encounter with the patient performed using a secure connection.  Results and impressions from the visit are documented on this note and were communicated to the consulting provider/team via direct communication. The note has been shared for addition to the patients electronic medical record.

## 2024-12-02 NOTE — PLAN OF CARE
Problem: Adult Inpatient Plan of Care  Goal: Plan of Care Review  Outcome: Progressing  Goal: Patient-Specific Goal (Individualized)  Outcome: Progressing  Goal: Absence of Hospital-Acquired Illness or Injury  Outcome: Progressing  Goal: Optimal Comfort and Wellbeing  Outcome: Progressing  Goal: Readiness for Transition of Care  Outcome: Progressing     Problem: Bariatric Environmental Safety  Goal: Safety Maintained with Care  Outcome: Progressing     Problem: Infection  Goal: Absence of Infection Signs and Symptoms  Outcome: Progressing     Problem: Stroke, Ischemic (Includes Transient Ischemic Attack)  Goal: Optimal Coping  Outcome: Progressing  Goal: Effective Bowel Elimination  Outcome: Progressing  Goal: Optimal Cerebral Tissue Perfusion  Outcome: Progressing  Goal: Optimal Cognitive Function  Outcome: Progressing  Goal: Improved Communication Skills  Outcome: Progressing  Goal: Optimal Functional Ability  Outcome: Progressing  Goal: Optimal Nutrition Intake  Outcome: Progressing  Goal: Effective Oxygenation and Ventilation  Outcome: Progressing  Goal: Improved Sensorimotor Function  Outcome: Progressing  Goal: Safe and Effective Swallow  Outcome: Progressing  Goal: Effective Urinary Elimination  Outcome: Progressing     Problem: Wound  Goal: Optimal Coping  Outcome: Progressing  Goal: Optimal Functional Ability  Outcome: Progressing  Goal: Absence of Infection Signs and Symptoms  Outcome: Progressing  Goal: Improved Oral Intake  Outcome: Progressing  Goal: Optimal Pain Control and Function  Outcome: Progressing  Goal: Skin Health and Integrity  Outcome: Progressing  Goal: Optimal Wound Healing  Outcome: Progressing      Physical Therapy Evaluation   [facility]  Outpatient Physical Therapy  [place] [address] [address]  Phone: [phone] Fax: [phone]    Physician Name: Kamilla Mg MD               Patient Name: Spenser Chaney  MRN: [id number]  YOB: 1986  PT Date of Service: 05/15/23    Visit Type: Initial Evaluation -  Daily Treatment Note  Visit: 1  Referring Provider: Kamilla Mg MD  Medical Diagnosis (from order): Diagnosis Information    Diagnosis  724.3 (ICD-9-CM) - M54.40 (ICD-10-CM) - Low back pain with sciatica       Treatment Diagnosis: lumbar - increased pain/symptoms, impaired posture, impaired range of motion, impaired mobility and impaired activity tolerance.  Chart reviewed at time of initial evaluation (relevant co-morbidities, allergies, tests and medications listed):   No current outpatient medications on file.  No current facility-administered medications for this visit.  No past medical history on file.      SUBJECTIVE                                                                                                               5/15/2023 eval: Patient is a 37 year old male presenting to physical therapy w/ diagnosis of low back pain with sciatica. Notes that his pain comes up when he starts walking, general movement, or standing for any prolonged period of time. Currently works full time on computer. Denies any noticeable numbness. Notes that his weight goes up, his pain goes up. Some minor pain currently. Rest position improves pain. No prior treatment for his pain.    Function:   Limitations / Exacerbation Factors:   - Patient reports pain, difficulty and increased time with function reported below.  - , house/yard work, grocery shopping, bending/squatting/lifting, kneeling, standing and walking, stairs, walking quickly as required to cross a street/exit a building rapidly, community distances  Prior Level of Function: declining  function, therefore referred to therapy,    Patient Goals: decreased pain and increased motion. Weight loss    Prior treatment  - no therapies  - Discharged from hospital, home health, or skilled nursing facility in last 30 days: no  Home Environment   - Patient lives with: significant other  - Type of home: multiple level home  - Assistance available: no assist  - Denies 2 or more falls or an unexplained fall with injury in the last year.  - Feel safe at home / work / school: yes      OBJECTIVE                                                                                                                     Range of Motion (ROM)   (degrees unless noted; active unless noted; norms in ( ); negative=lacking to 0, positive=beyond 0)  WNL: LLE, RLE  Lumbar:  Impairment Level:       - Flexion: minimal impairment    - Extension: minimal impairment    - Side Bend:        • Left: minimal impairment         • Right: minimal impairment     Strength  (out of 5 unless noted, standard test position unless noted)   5/5: LLE, RLE          Muscle Length Tests  - 90/90 Hamstring at knee:  - Left:  65° - Right: 65°     Sensation/Dermatome Testing:    L1 (back, over trochanter and groin):     - Light Touch: • Left: intact • Right: intact  L2 (back, front of thigh to knee):     - Light Touch: • Left: intact • Right: intact  L3 (back, upper buttock, anterior thigh, knee, medial lower leg):     - Light Touch: • Left: intact • Right: intact  L4 (medial buttock, lateral thigh, medial leg, dorsum of foot, great toe):     - Light Touch: • Left: intact • Right: intact  L5 (buttock, posterior and lateral thigh, lateral aspect of leg, dorsum of foot, medial half of sole, 1-3rd toes):     - Light Touch: • Left: intact • Right: intact   S1 (buttock, thigh, posterior leg):     - Light Touch: • Left: intact • Right: intact           Outcome/Assessments  Outcome Measures:   OSWESTRY Total Scored: 8  OSWESTRY Total Possible Score: 50  OSWESTRY Score  Calculated: 16 %  (0-20% = minimal disability; 20-40% = moderate disability; 40-60% = severe disability; 60-80% = crippled; % = bed bound) see flowsheet for additional documentation      Treatment     Therapeutic Exercise  Pt education in regards to results of exam, course of treatment, roll of PT, home exercise program, and importance of exercise.    Initiated home program:  - Beginner Bridge  - 2 x daily - 2 sets - 10 reps - 5 second hold  - Standing Shoulder Row Reactive Isometric  - 2 x daily - 2 sets - 10 reps  - Shoulder External Rotation Reactive Isometrics  - 2 x daily - 2 sets - 10 reps  - Supine Lower Trunk Rotation  - 2 x daily - 20 reps  - Elliptical  - 1 x daily, 5-10 minutes to start    Home Exercise Program  Access Code: [email or web address]  URL: [email or web address]  Date: 05/15/2023  Prepared by: Adriano Thomas    Exercises  - Beginner Bridge  - 2 x daily - 2 sets - 10 reps - 5 second hold  - Standing Shoulder Row Reactive Isometric  - 2 x daily - 2 sets - 10 reps  - Shoulder External Rotation Reactive Isometrics  - 2 x daily - 2 sets - 10 reps  - Supine Lower Trunk Rotation  - 2 x daily - 20 reps  - Elliptical  - 1 x daily      ASSESSMENT                                                                                                          37 year old patient has reported functional limitations listed above impacted by signs and symptoms consistent with treatment diagnosis below.  Treatment Diagnosis:   - Involved: lumbar.  - Symptoms/impairments: increased pain/symptoms, impaired posture, impaired range of motion, impaired mobility and impaired activity tolerance.    Due to the above listed impairments, pt has difficulty performing household tasks, ambulating any prolonged distance, lifting, bending, regular exercise routine for weight loss, and has an overall decreased quality of life. Given exercises to perform at home to address above listed impairments.    Prognosis: Patient  will benefit from skilled therapy.  Rehabilitative potential is: good.  Predicted patient presentation: Low (stable) - Patient comorbidities and complexities, as defined above, will have little effect on progress for prescribed plan of care.  Education:   - Present and ready to learn: patient  - Results of above outlined education: Verbalizes understanding and Demonstrates understanding    PLAN                                                                                                                         The following skilled interventions to be implemented to achieve goals listed below:  Neuromuscular Re-Education (03751)  Therapeutic Activity (82733)  Therapeutic Exercise (70816)  Manual Therapy (83968)  Gait Training (84096)    Frequency / Duration  1 times per week tapering as patient progresses for 6 weeks for an estimated total of 3 visits    Patient involved in and agreed to plan of care and goals.  Patient given attendance policy at time of initial evaluation.    Suggestions for next session as indicated: Progress per plan of care      Goals  Long Term Goals: to be met by end of plan of care  1. Pt to demonstrate understanding and be independent in an evolving home exercise program.  2. Pt to decrease Oswestry score to 6% or less to demonstrate increased quality of life.  3. Patient to report ability to stand for 30 minutes without needing to sit down to perform household tasks without pain or difficulty.  4. 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       Therapy procedure time and total treatment time can be found documented on the Time Entry flowsheet

## 2024-12-02 NOTE — ED PROVIDER NOTES
SCRIBE #1 NOTE: I, Natalie Larkin, am scribing for, and in the presence of, Magen Monroy Jr., MD. I have scribed the entire note.       History     Chief Complaint   Patient presents with    Numbness     R sided numbness persistent since 0600 today. Hx of previous CVA with residual left-sided weakness and paresthesias. No drift in triage, no slurred speech, no facial droop.  in triage      Review of patient's allergies indicates:   Allergen Reactions    Pcn [penicillins] Swelling    Acetaminophen Itching    Codeine     Latex, natural rubber     Tylox [oxycodone-acetaminophen]          History of Present Illness     HPI    2024, 6:29 PM  History obtained from the patient      History of Present Illness: Judy Ruelas is a 59 y.o. female patient with a PMHx of HTN, Depression, anxiety, NSTEMI, and CVA (with residual left-sided weakness and paresthesias) who presents to the Emergency Department for evaluation of unilateral  (R) numbness which onset gradually at 6:00 am today. Pt's last known well is 6:00 am. Symptoms are constant and moderate in severity. No mitigating or exacerbating factors reported. Associated sxs include headache, left sided facial droop, and slurred speech with right tongue deviation. Patient denies all other sxs at this time. CBG is 108 in triage. No prior Tx reported. No further complaints or concerns at this time.       Arrival mode: Personal vehicle    PCP: [facility]        Past Medical History:  Past Medical History:   Diagnosis Date    Anxiety     Depression     Depression with anxiety     Family history of heart disease 2016    Hypertension     Irregular heart beat     Migraine headache     Morbid obesity with BMI of 40.0-44.9, adult 2017    NSTEMI (non-ST elevated myocardial infarction) 2017       Past Surgical History:  Past Surgical History:   Procedure Laterality Date    APPENDECTOMY       SECTION      HYSTERECTOMY            Family History:  Family History   Problem Relation Name Age of Onset    Hypertension Unknown      Heart disease Father  70    Hypertension Father      Heart attack Mother  40        triple bypass    Diabetes Mother      Hypertension Mother      Stroke Mother      Heart attack Brother  40        CABG x 2       Social History:  Social History     Tobacco Use    Smoking status: Never    Smokeless tobacco: Never   Substance and Sexual Activity    Alcohol use: No     Alcohol/week: 0.0 standard drinks of alcohol     Comment: denies    Drug use: No     Comment: denies    Sexual activity: Not Currently        Review of Systems     Review of Systems   Neurological:  Positive for speech difficulty (slurred, right tongue deviation), numbness (unilateral (R)) and headaches.   All other systems reviewed and are negative.       Physical Exam     Initial Vitals [12/01/24 1713]   BP Pulse Resp Temp SpO2   (!) 143/76 80 17 98.1 °F (36.7 °C) 96 %      MAP       --          Physical Exam  Nursing Notes and Vital Signs Reviewed.  Constitutional: Patient is in no acute distress. Well-developed and well-nourished.  Head: Atraumatic. Normocephalic. Left sided facial droop.  Eyes:  EOM intact.  No scleral icterus.  ENT: Mucous membranes are moist.  Nares clear   Neck:  Full ROM. No JVD.  Cardiovascular: Regular rate. Regular rhythm No murmurs, rubs, or gallops. Distal pulses are 2+ and symmetric  Pulmonary/Chest: No respiratory distress. Clear to auscultation bilaterally. No wheezing or rales.  Equal chest wall rise bilaterally  Abdominal: Soft and non-distended.  There is no tenderness.  No rebound, guarding, or rigidity. Good bowel sounds.  Genitourinary: No CVA tenderness.  No suprapubic tenderness  Musculoskeletal: Moves all extremities. No obvious deformities.  5 x 5 strength in all extremities. Unilateral (R) numbness.   Skin: Warm and dry.  Neurological:  Alert, awake, and appropriate.  Slurred speech.  Patient was  "left-sided facial droop.  He was complaining of numbness to the right side of her body as well as subjective weakness however this is not appreciated on exam she has a negative pronator drift.    Psychiatric: Normal affect. Good eye contact. Appropriate in content.       ED Course   Critical Care    Date/Time: 12/1/2024 6:43 PM    Performed by: Magen Monroy Jr., MD  Authorized by: Magen Monroy Jr., MD  Direct patient critical care time: 17 minutes  Additional history critical care time: 8 minutes  Ordering / reviewing critical care time: 6 minutes  Documentation critical care time: 7 minutes  Consulting other physicians critical care time: 5 minutes  Total critical care time (exclusive of procedural time) : 43 minutes  Critical care time was exclusive of separately billable procedures and treating other patients and teaching time.  Critical care was necessary to treat or prevent imminent or life-threatening deterioration of the following conditions: CVA.  Critical care was time spent personally by me on the following activities: blood draw for specimens, discussions with consultants, development of treatment plan with patient or surrogate, discussions with primary provider, interpretation of cardiac output measurements, examination of patient, evaluation of patient's response to treatment, obtaining history from patient or surrogate, ordering and performing treatments and interventions, ordering and review of laboratory studies, ordering and review of radiographic studies, review of old charts, re-evaluation of patient's condition and pulse oximetry.        ED Vital Signs:  Vitals:    12/01/24 1713 12/01/24 1830 12/01/24 1839 12/01/24 1903   BP: (!) 143/76 (!) 157/70 (!) 149/73 (!) 156/87   Pulse: 80 79 77 74   Resp: 17 20 15 (!) 23   Temp: 98.1 °F (36.7 °C)      TempSrc: Oral      SpO2: 96% 96% 98%    Weight: 111.7 kg (246 lb 3.2 oz)      Height: 5' 4" (1.626 m)       12/01/24 2003   BP: (!) 158/85 "   Pulse: 73   Resp: 18   Temp:    TempSrc:    SpO2: 96%   Weight:    Height:        Abnormal Lab Results:  Labs Reviewed   CBC W/ AUTO DIFFERENTIAL - Abnormal       Result Value    WBC 8.67      RBC 4.17      Hemoglobin 12.1      Hematocrit 36.8 (*)     MCV 88      MCH 29.0      MCHC 32.9      RDW 14.0      Platelets 362      MPV 9.8      Immature Granulocytes 0.5      Gran # (ANC) 6.5      Immature Grans (Abs) 0.04      Lymph # 1.1      Mono # 0.7      Eos # 0.3      Baso # 0.04      nRBC 0      Gran % 75.3 (*)     Lymph % 13.1 (*)     Mono % 7.7      Eosinophil % 2.9      Basophil % 0.5      Differential Method Automated     COMPREHENSIVE METABOLIC PANEL - Abnormal    Sodium 139      Potassium 4.7      Chloride 106      CO2 22 (*)     Glucose 97      BUN 22 (*)     Creatinine 0.8      Calcium 9.8      Total Protein 8.3      Albumin 4.0      Total Bilirubin 0.5      Alkaline Phosphatase 71      AST 16      ALT 16      eGFR >60      Anion Gap 11     PROTIME-INR    Prothrombin Time 11.0      INR 0.9     TSH    TSH 2.195     URINALYSIS, REFLEX TO URINE CULTURE   LIPID PANEL   HEMOGLOBIN A1C   POCT GLUCOSE, HAND-HELD DEVICE   POCT GLUCOSE    POCT Glucose 108          All Lab Results:  Results for orders placed or performed during the hospital encounter of 12/01/24   POCT glucose    Collection Time: 12/01/24  5:13 PM   Result Value Ref Range    POCT Glucose 108 70 - 110 mg/dL   CBC W/ AUTO DIFFERENTIAL    Collection Time: 12/01/24  6:18 PM   Result Value Ref Range    WBC 8.67 3.90 - 12.70 K/uL    RBC 4.17 4.00 - 5.40 M/uL    Hemoglobin 12.1 12.0 - 16.0 g/dL    Hematocrit 36.8 (L) 37.0 - 48.5 %    MCV 88 82 - 98 fL    MCH 29.0 27.0 - 31.0 pg    MCHC 32.9 32.0 - 36.0 g/dL    RDW 14.0 11.5 - 14.5 %    Platelets 362 150 - 450 K/uL    MPV 9.8 9.2 - 12.9 fL    Immature Granulocytes 0.5 0.0 - 0.5 %    Gran # (ANC) 6.5 1.8 - 7.7 K/uL    Immature Grans (Abs) 0.04 0.00 - 0.04 K/uL    Lymph # 1.1 1.0 - 4.8 K/uL    Mono # 0.7 0.3 -  1.0 K/uL    Eos # 0.3 0.0 - 0.5 K/uL    Baso # 0.04 0.00 - 0.20 K/uL    nRBC 0 0 /100 WBC    Gran % 75.3 (H) 38.0 - 73.0 %    Lymph % 13.1 (L) 18.0 - 48.0 %    Mono % 7.7 4.0 - 15.0 %    Eosinophil % 2.9 0.0 - 8.0 %    Basophil % 0.5 0.0 - 1.9 %    Differential Method Automated    Comprehensive metabolic panel    Collection Time: 12/01/24  6:18 PM   Result Value Ref Range    Sodium 139 136 - 145 mmol/L    Potassium 4.7 3.5 - 5.1 mmol/L    Chloride 106 95 - 110 mmol/L    CO2 22 (L) 23 - 29 mmol/L    Glucose 97 70 - 110 mg/dL    BUN 22 (H) 6 - 20 mg/dL    Creatinine 0.8 0.5 - 1.4 mg/dL    Calcium 9.8 8.7 - 10.5 mg/dL    Total Protein 8.3 6.0 - 8.4 g/dL    Albumin 4.0 3.5 - 5.2 g/dL    Total Bilirubin 0.5 0.1 - 1.0 mg/dL    Alkaline Phosphatase 71 40 - 150 U/L    AST 16 10 - 40 U/L    ALT 16 10 - 44 U/L    eGFR >60 >60 mL/min/1.73 m^2    Anion Gap 11 8 - 16 mmol/L   Protime-INR    Collection Time: 12/01/24  6:18 PM   Result Value Ref Range    Prothrombin Time 11.0 9.0 - 12.5 sec    INR 0.9 0.8 - 1.2   TSH    Collection Time: 12/01/24  6:18 PM   Result Value Ref Range    TSH 2.195 0.400 - 4.000 uIU/mL         Imaging Results:  Imaging Results              MRI Brain Without Contrast (In process)                      CTA Head and Neck (xpd) (Final result)  Result time 12/01/24 19:25:47      Final result by Darrell Cole MD (12/01/24 19:25:47)                   Impression:      No acute abnormality. No high-grade stenosis or major vessel occlusion.    Details as above.    All CT scans at this facility are performed  using dose modulation techniques as appropriate to performed exam including the following:  automated exposure control; adjustment of mA and/or kV according to the patients size (this includes techniques or standardized protocols for targeted exams where dose is matched to indication/reason for exam: i.e. extremities or head);  iterative reconstruction technique.      Electronically signed by: Darrell  Douglas  Date:    12/01/2024  Time:    19:25               Narrative:    EXAMINATION:  CTA HEAD AND NECK (XPD)    CLINICAL HISTORY:  Stroke/TIA, determine embolic source;    TECHNIQUE:  Non contrast low dose axial images were obtained through the head. CT angiogram was performed from the level of the carolyn to the top of the head following the IV administration of 100mL of Omnipaque 350.   Sagittal and coronal reconstructions and maximum intensity projection reconstructions were performed. Arterial stenosis percentages are based on NASCET measurement criteria.    COMPARISON:  Multiple    FINDINGS:  Intracranial Compartment:    Ventricles and sulci are normal in size for age without evidence of hydrocephalus. No extra-axial blood or fluid collections.    Mild microvascular ischemic change.  Remote right thalamic lacunar infarct.  No parenchymal mass, hemorrhage, edema, or major vascular distribution infarct.    Skull/Extracranial Contents (limited evaluation): No fracture.    Non-Vascular Structures of the Neck/Thoracic Inlet (limited evaluation): Cervical spine degenerative change.  Prominent but not pathologically enlarged number and size of cervical lymph nodes.  Paranasal sinus disease asymmetric towards the left.  Thyroid grossly within normal limits.    Aorta: Normal 3 vessel arch.    Extracranial carotid circulation: No hemodynamically significant stenosis, aneurysmal dilatation, or dissection.  Less than 20% stenosis bilaterally at the carotid bifurcations.  Motion does degrade the evaluation of the internal carotid arteries to a minor extent.    Extracranial vertebral circulation: No hemodynamically significant stenosis, aneurysmal dilatation, or dissection.    Intracranial Arteries: ICA terminus are intact bilaterally.  Right MCA appears intact.  No aneurysm, severe stenosis, or occlusion.  Left MCA appears intact.  No aneurysm, severe stenosis, or occlusion.  Bilateral AIXA appear intact.  No aneurysm, severe  stenosis, or occlusion.    Intracranial vertebrobasilar circulation appears intact.  No aneurysm, severe stenosis, or occlusion.    Bilateral PCA appear intact.  No aneurysm, severe stenosis, or occlusion.    Venous structures (limited evaluation): Normal.                                       The EKG was ordered, reviewed, and independently interpreted by the ED provider.  Interpretation time: 17:15  Rate: 80 BPM  Rhythm: normal sinus rhythm  Interpretation: right bundle branch block. No STEMI.           The Emergency Provider reviewed the vital signs and test results, which are outlined above.     ED Discussion       7:06 PM: Discussed case with BUFFY Barajas ([facility]). Dr. Montoya agrees with current care and management of pt and accepts admission.   Admitting Service: [facility]  Admitting Physician: Dr. Montoya  Admit to: obs med/tele    7:07 PM: Re-evaluated pt. I have discussed test results, shared treatment plan, and the need for admission with patient and family at bedside. Pt and family express understanding at this time and agree with all information. All questions answered. Pt and family have no further questions or concerns at this time. Pt is ready for admit.         Medical Decision Making  Differential diagnosis: Stroke, TIA, focal neurological deficit, weakness, slurred speech, facial droop    Patient was evaluated with stroke code on arrival.  She was left facial droop with right-sided weakness and numbness on exam.  CTA was negative and evaluation by tele neuro did not recommend TNK.  They recommended aspirin full dose with the admission for further workup of stroke.  Her workup here was benign [facility] graciously accepted for admission    Amount and/or Complexity of Data Reviewed  External Data Reviewed: labs and notes.     Details: Frequent evaluations for shortness breath  Labs: ordered. Decision-making details documented in ED Course.     Details: TSH is normal.  CMP  is benign.  INR is normal.  It was in the white count normal H&H.  Glucose was 108  Radiology: ordered. Decision-making details documented in ED Course.     Details: CTA shows no acute findings  ECG/medicine tests: ordered and independent interpretation performed. Decision-making details documented in ED Course.     Details: No STEMI  Discussion of management or test interpretation with external provider(s): Tele neuro consulted.  They recommended beginning full-dose aspirin with the admission here for workup of stroke.  They do not recommend TNK.  Hospital Medicine subsequently consulted and graciously accepts for admission    Risk  OTC drugs.  Prescription drug management.  Drug therapy requiring intensive monitoring for toxicity.  Decision regarding hospitalization.  Risk Details: Consideration for TNK however this was not indicated    Critical Care  Total time providing critical care: 43 minutes                ED Medication(s):  Medications   sodium chloride 0.9% flush 10 mL (has no administration in time range)   labetaloL injection 10 mg (has no administration in time range)   bisacodyL suppository 10 mg (has no administration in time range)   enoxaparin injection 40 mg (40 mg Subcutaneous Given 12/1/24 2029)   ondansetron injection 4 mg (has no administration in time range)   iohexoL (OMNIPAQUE 350) injection 100 mL (100 mLs Intravenous Given 12/1/24 1835)   aspirin tablet 325 mg (325 mg Oral Given 12/1/24 1953)       New Prescriptions    No medications on file               Scribe Attestation:   Scribe #1: I performed the above scribed service and the documentation accurately describes the services I performed. I attest to the accuracy of the note.     Attending:   Physician Attestation Statement for Scribe #1: I, Magen Monroy Jr., MD, personally performed the services described in this documentation, as scribed by Natalie Larkin, in my presence, and it is both accurate and complete.           Clinical  Impression       ICD-10-CM ICD-9-CM   1. Cerebrovascular accident (CVA), unspecified mechanism  I63.9 434.91   2. Acute focal neurological deficit  R29.818 781.99       Disposition:   Disposition: Placed in Observation  Condition: Magen Schilling Jr., MD  12/01/24 2053

## 2024-12-02 NOTE — PLAN OF CARE
[facility] - Telemetry (Hospital)  Discharge Assessment    Primary Care Provider: Jenny Quintanilla [facility]     Discharge Assessment (most recent)       BRIEF DISCHARGE ASSESSMENT - 12/02/24 1128          Discharge Planning    Assessment Type Discharge Planning Brief Assessment     Resource/Environmental Concerns none     Support Systems Family members;Friends/neighbors;Children     Assistance Needed NA     Equipment Currently Used at Home cane, straight     Current Living Arrangements home     Patient/Family Anticipates Transition to home;home with family     Patient/Family Anticipated Services at Transition none     DME Needed Upon Discharge  none     Discharge Plan A Home with family;Home Health     Discharge Plan B Home with family;Home Health                   SW met with patient at bedside. Pt reports living with her son and daughter. Pt reports being able to care for herself and has help from her children and friend, Mello. Mello will likely be transportation home.  SW discussed home health recommendations. Pt agreeable; no preference for agency. [facility] accepting referral via Epic.  Pt's whiteboard updated with CM contact and anticipated discharge disposition. SW to remain available as needed.

## 2024-12-02 NOTE — H&P
[facility] Medicine  History & Physical    Patient Name: Judy Ruelas  MRN: [id number]  Patient Class: OP- Observation  Admission Date: 12/1/2024  Attending Physician: Alen Montoya MD   Primary Care Provider: [facility]         Patient information was obtained from patient, past medical records, and ER records.     Subjective:     Principal Problem:Right sided numbness    Chief Complaint:   Chief Complaint   Patient presents with    Numbness     R sided numbness persistent since 0600 today. Hx of previous CVA with residual left-sided weakness and paresthesias. No drift in triage, no slurred speech, no facial droop.  in triage         HPI: Judy Ruelas is a 59 y.o. female with a PMH  has a past medical history of Anxiety, Depression, Depression with anxiety, Family history of heart disease (1/27/2016), Hypertension, Irregular heart beat, Migraine headache, Morbid obesity with BMI of 40.0-44.9, adult (6/30/2017), and NSTEMI (non-ST elevated myocardial infarction) (6/18/2017). who presented to the ED for further evaluation of acute onset right-sided numbness which began upon waking earlier this morning around 6:00 a.m..  Patient has significant history of CVAs with left-sided residual weakness and paresthesia according to her reports ambulating via aid of cane/walker as needed.  She reported no known alleviating or aggravating factors noted with a associated symptoms also including headache, left-sided facial drooping, slurred speech, and numbness throughout her mouth in addition to the right-sided paresthesia. Prior to onset of symptoms, patient reported being in her usual state of health with no other concerns or complaints with all other review of systems negative except as noted above.  She reports compliance with home medications as well as dietary/fluid restrictions.  Initial workup in the ED revealed patient to be afebrile without  leukocytosis, UA positive for nitrites, 1+ LE, and many bacteria with remaining CBC and CMP near baseline.  Patient was evaluated by tele vascular Neurology and recommended patient undergo continued stroke workup but reported patient was not a candidate for thrombolytics therapy given last known normal exceeded window.  CT head, CTA head and neck, and MRI brain positive for previous CVAs but negative for acute findings.  Patient initiated on Rocephin for treatment of UTI and admitted to Hospital Medicine under observation or continued medical management.      PCP: [place]       Past Medical History:   Diagnosis Date    Anxiety     Depression     Depression with anxiety     Family history of heart disease 2016    Hypertension     Irregular heart beat     Migraine headache     Morbid obesity with BMI of 40.0-44.9, adult 2017    NSTEMI (non-ST elevated myocardial infarction) 2017       Past Surgical History:   Procedure Laterality Date    APPENDECTOMY       SECTION      HYSTERECTOMY         Review of patient's allergies indicates:   Allergen Reactions    Pcn [penicillins] Swelling    Acetaminophen Itching    Codeine     Latex, natural rubber     Tylox [oxycodone-acetaminophen]        No current facility-administered medications on file prior to encounter.     Current Outpatient Medications on File Prior to Encounter   Medication Sig    fesoterodine (TOVIAZ) 4 mg Tb24 TAKE 1 BY MOUTH ONCE DAILY FOR URINATION    furosemide (LASIX) 20 MG tablet Take 20 mg by mouth.    lactulose (CHRONULAC) 10 gram/15 mL solution SMARTSI Milliliter(s) By Mouth 3 Times Daily    levocetirizine (XYZAL) 5 MG tablet Take 5 mg by mouth.    levothyroxine (SYNTHROID) 200 MCG tablet Take 200 mcg by mouth every morning.    metFORMIN (GLUCOPHAGE-XR) 500 MG ER 24hr tablet Take 500 mg by mouth every morning.    midodrine (PROAMATINE) 5 MG Tab Take 10 mg by mouth 3 (three) times daily.     potassium chloride SA (K-DUR,KLOR-CON M) 10 MEQ tablet Take 10 mEq by mouth.    XIFAXAN 550 mg Tab Take 550 mg by mouth 2 (two) times daily.    [DISCONTINUED] clindamycin (CLEOCIN) 150 MG capsule Take 150 mg by mouth every 8 (eight) hours.    albuterol (PROVENTIL/VENTOLIN HFA) 90 mcg/actuation inhaler Inhale 1-2 puffs into the lungs every 6 (six) hours as needed for Wheezing. Rescue    amLODIPine (NORVASC) 10 MG tablet Take 1 tablet (10 mg total) by mouth once daily.    aspirin 81 MG Chew Take 1 tablet (81 mg total) by mouth once daily.    atenolol (TENORMIN) 50 MG tablet Take 50 mg by mouth once daily.    atorvastatin (LIPITOR) 80 MG tablet Take 1 tablet (80 mg total) by mouth once daily.    busPIRone (BUSPAR) 7.5 MG tablet Take 7.5 mg by mouth 2 (two) times daily.     butalbital-acetaminophen-caffeine -40 mg (FIORICET, ESGIC) -40 mg per tablet Take 1 tablet by mouth every 6 (six) hours as needed for Pain or Headaches.    carvediloL (COREG) 25 MG tablet Take 25 mg by mouth 2 (two) times daily with meals.    clopidogrel (PLAVIX) 75 mg tablet Take 1 tablet (75 mg total) by mouth once daily.    colchicine 0.6 mg tablet Take 1 tablet (0.6 mg total) by mouth once daily.    ergocalciferol (ERGOCALCIFEROL) 50,000 unit Cap Take 50,000 Units by mouth every 7 days.    fenofibrate (TRICOR) 145 MG tablet Take 145 mg by mouth every evening.    FERRETTS 325 mg (106 mg iron) Tab Take 1 tablet by mouth.    ferrous sulfate 324 mg (65 mg iron) TbEC Take 325 mg by mouth once daily.    fluoxetine (PROZAC) 20 MG capsule Take 20 mg by mouth once daily.    fluticasone propionate (FLONASE) 50 mcg/actuation nasal spray 1 spray (50 mcg total) by Each Nostril route 2 (two) times daily as needed for Rhinitis.    hydrALAZINE (APRESOLINE) 25 MG tablet Take 25 mg by mouth 3 (three) times daily.    hydrocodone-acetaminophen 7.5-325mg (NORCO) 7.5-325 mg per tablet Take 1 tablet by mouth every 6 (six) hours as needed for Pain.     hydrOXYzine (VISTARIL) 50 MG Cap Take 50 mg by mouth 4 (four) times daily.    isosorbide mononitrate (IMDUR) 30 MG 24 hr tablet Take 1 tablet (30 mg total) by mouth once daily.    lisinopriL (PRINIVIL,ZESTRIL) 40 MG tablet Take 40 mg by mouth once daily.    losartan (COZAAR) 50 MG tablet Take 1 tablet (50 mg total) by mouth once daily.    meclizine (ANTIVERT) 50 MG tablet Take 50 mg by mouth 2 (two) times daily.    naproxen (NAPROSYN) 500 MG tablet Take 1 tablet (500 mg total) by mouth 2 (two) times daily with meals.    nitroGLYCERIN (NITROSTAT) 0.4 MG SL tablet Place 1 tablet (0.4 mg total) under the tongue every 5 (five) minutes as needed for Chest pain.    nystatin (MYCOSTATIN) powder Apply topically 2 (two) times daily.    omeprazole (PRILOSEC) 40 MG capsule Take 1 capsule (40 mg total) by mouth once daily.    ondansetron (ZOFRAN) 4 MG tablet Take 8 mg by mouth 2 (two) times daily.    pantoprazole (PROTONIX) 40 MG tablet Take 1 tablet (40 mg total) by mouth once daily.    predniSONE (DELTASONE) 10 MG tablet Take 1 tablet (10 mg total) by mouth once daily. Take 4 tabs x 3 days, then take 2 tabs x 3 days, then take 1 tab x 3 days.    promethazine (PHENERGAN) 25 MG tablet Take 1 tablet (25 mg total) by mouth every 6 (six) hours as needed for Nausea.    spironolactone (ALDACTONE) 50 MG tablet Take 50 mg by mouth once daily.    trazodone (DESYREL) 50 MG tablet Take 100 mg by mouth every evening.    venlafaxine (EFFEXOR-XR) 75 MG 24 hr capsule Take 75 mg by mouth once daily.    VRAYLAR 1.5 mg Cap Take 1.5 mg by mouth.     Family History       Problem Relation (Age of Onset)    Diabetes Mother    Heart attack Mother (40), Brother (40)    Heart disease Father (70)    Hypertension Father, Mother    Stroke Mother          Tobacco Use    Smoking status: Never    Smokeless tobacco: Never   Substance and Sexual Activity    Alcohol use: No     Alcohol/week: 0.0 standard drinks of alcohol     Comment: denies    Drug use: No      Comment: denies    Sexual activity: Not Currently     Review of Systems   All other systems reviewed and are negative.    Objective:     Vital Signs (Most Recent):  Temp: 98.1 °F (36.7 °C) (12/01/24 1713)  Pulse: 72 (12/01/24 2144)  Resp: 18 (12/01/24 2003)  BP: (!) 158/85 (12/01/24 2003)  SpO2: 96 % (12/01/24 2003) Vital Signs (24h Range):  Temp:  [98.1 °F (36.7 °C)] 98.1 °F (36.7 °C)  Pulse:  [72-80] 72  Resp:  [15-23] 18  SpO2:  [96 %-98 %] 96 %  BP: (143-158)/(70-87) 158/85     Weight: 111.7 kg (246 lb 3.2 oz) (need weight)  Body mass index is 42.26 kg/m².     Physical Exam  Vitals reviewed.   Constitutional:       General: She is not in acute distress.     Appearance: Normal appearance. She is obese. She is not ill-appearing, toxic-appearing or diaphoretic.   HENT:      Head: Normocephalic and atraumatic.      Right Ear: External ear normal.      Left Ear: External ear normal.      Nose: Nose normal. No congestion or rhinorrhea.      Mouth/Throat:      Mouth: Mucous membranes are moist.      Pharynx: Oropharynx is clear. No oropharyngeal exudate or posterior oropharyngeal erythema.   Eyes:      General: No scleral icterus.     Extraocular Movements: Extraocular movements intact.      Conjunctiva/sclera: Conjunctivae normal.      Pupils: Pupils are equal, round, and reactive to light.   Neck:      Vascular: No carotid bruit.   Cardiovascular:      Rate and Rhythm: Normal rate and regular rhythm.      Pulses: Normal pulses.      Heart sounds: Normal heart sounds. No murmur heard.     No friction rub. No gallop.   Pulmonary:      Effort: Pulmonary effort is normal. No respiratory distress.      Breath sounds: Normal breath sounds. No stridor. No wheezing, rhonchi or rales.   Chest:      Chest wall: No tenderness.   Abdominal:      General: Abdomen is flat. Bowel sounds are normal. There is no distension.      Palpations: Abdomen is soft. There is no mass.      Tenderness: There is no abdominal tenderness. There  is no right CVA tenderness, left CVA tenderness, guarding or rebound.      Hernia: No hernia is present.   Musculoskeletal:         General: No swelling, tenderness, deformity or signs of injury. Normal range of motion.      Cervical back: Normal range of motion and neck supple. No rigidity or tenderness.      Right lower leg: No edema.      Left lower leg: No edema.   Lymphadenopathy:      Cervical: No cervical adenopathy.   Skin:     General: Skin is warm and dry.      Capillary Refill: Capillary refill takes less than 2 seconds.      Coloration: Skin is not jaundiced or pale.      Findings: No bruising, erythema, lesion or rash.   Neurological:      Mental Status: She is alert and oriented to person, place, and time.      Cranial Nerves: No cranial nerve deficit.      Sensory: Sensory deficit present.      Motor: No weakness.      Coordination: Coordination normal.      Comments: Currently A&O x3.  Negative for lid lag, facial drooping, tongue deviation, or pronator drift.  Patient with reported right-sided paresthesia.  Sensation to light touch grossly intact throughout left side.  Patient yielding 5/5 strength throughout.   Psychiatric:         Mood and Affect: Mood normal.         Behavior: Behavior normal.         Thought Content: Thought content normal.         Judgment: Judgment normal.              CRANIAL NERVES     CN III, IV, VI   Pupils are equal, round, and reactive to light.       Significant Labs: All pertinent labs within the past 24 hours have been reviewed.    Significant Imaging: I have reviewed all pertinent imaging results/findings within the past 24 hours.    LABS:  Recent Results (from the past 24 hours)   POCT glucose    Collection Time: 12/01/24  5:13 PM   Result Value Ref Range    POCT Glucose 108 70 - 110 mg/dL   CBC W/ AUTO DIFFERENTIAL    Collection Time: 12/01/24  6:18 PM   Result Value Ref Range    WBC 8.67 3.90 - 12.70 K/uL    RBC 4.17 4.00 - 5.40 M/uL    Hemoglobin 12.1 12.0 - 16.0  g/dL    Hematocrit 36.8 (L) 37.0 - 48.5 %    MCV 88 82 - 98 fL    MCH 29.0 27.0 - 31.0 pg    MCHC 32.9 32.0 - 36.0 g/dL    RDW 14.0 11.5 - 14.5 %    Platelets 362 150 - 450 K/uL    MPV 9.8 9.2 - 12.9 fL    Immature Granulocytes 0.5 0.0 - 0.5 %    Gran # (ANC) 6.5 1.8 - 7.7 K/uL    Immature Grans (Abs) 0.04 0.00 - 0.04 K/uL    Lymph # 1.1 1.0 - 4.8 K/uL    Mono # 0.7 0.3 - 1.0 K/uL    Eos # 0.3 0.0 - 0.5 K/uL    Baso # 0.04 0.00 - 0.20 K/uL    nRBC 0 0 /100 WBC    Gran % 75.3 (H) 38.0 - 73.0 %    Lymph % 13.1 (L) 18.0 - 48.0 %    Mono % 7.7 4.0 - 15.0 %    Eosinophil % 2.9 0.0 - 8.0 %    Basophil % 0.5 0.0 - 1.9 %    Differential Method Automated    Comprehensive metabolic panel    Collection Time: 12/01/24  6:18 PM   Result Value Ref Range    Sodium 139 136 - 145 mmol/L    Potassium 4.7 3.5 - 5.1 mmol/L    Chloride 106 95 - 110 mmol/L    CO2 22 (L) 23 - 29 mmol/L    Glucose 97 70 - 110 mg/dL    BUN 22 (H) 6 - 20 mg/dL    Creatinine 0.8 0.5 - 1.4 mg/dL    Calcium 9.8 8.7 - 10.5 mg/dL    Total Protein 8.3 6.0 - 8.4 g/dL    Albumin 4.0 3.5 - 5.2 g/dL    Total Bilirubin 0.5 0.1 - 1.0 mg/dL    Alkaline Phosphatase 71 40 - 150 U/L    AST 16 10 - 40 U/L    ALT 16 10 - 44 U/L    eGFR >60 >60 mL/min/1.73 m^2    Anion Gap 11 8 - 16 mmol/L   Protime-INR    Collection Time: 12/01/24  6:18 PM   Result Value Ref Range    Prothrombin Time 11.0 9.0 - 12.5 sec    INR 0.9 0.8 - 1.2   TSH    Collection Time: 12/01/24  6:18 PM   Result Value Ref Range    TSH 2.195 0.400 - 4.000 uIU/mL   Urinalysis, Reflex to Urine Culture Urine, Clean Catch    Collection Time: 12/01/24  8:57 PM    Specimen: Urine   Result Value Ref Range    Specimen UA Urine, Clean Catch     Color, UA Yellow Yellow, Straw, Pema    Appearance, UA Clear Clear    pH, UA 7.0 5.0 - 8.0    Specific Gravity, UA >1.030 (A) 1.005 - 1.030    Protein, UA Negative Negative    Glucose, UA Negative Negative    Ketones, UA Negative Negative    Bilirubin (UA) Negative Negative     Occult Blood UA Negative Negative    Nitrite, UA Positive (A) Negative    Urobilinogen, UA Negative <2.0 EU/dL    Leukocytes, UA 1+ (A) Negative   Urinalysis Microscopic    Collection Time: 12/01/24  8:57 PM   Result Value Ref Range    RBC, UA 1 0 - 4 /hpf    WBC, UA 17 (H) 0 - 5 /hpf    Bacteria Many (A) None-Occ /hpf    Squam Epithel, UA 2 /hpf    Microscopic Comment SEE COMMENT        RADIOLOGY  MRI Brain Without Contrast    Result Date: 12/1/2024  EXAMINATION: MRI BRAIN WITHOUT CONTRAST CLINICAL HISTORY: Transient ischemic attack (TIA);. TECHNIQUE: Multiplanar multisequence MR imaging of the brain was performed without contrast. COMPARISON: None FINDINGS: Intracranial compartment: Ventricles and sulci are normal in size for age without evidence of hydrocephalus. No extra-axial blood or fluid collections. Mild T2 FLAIR hyperintensities likely microvascular ischemic changes.  These are similar to the prior.  No mass lesion, acute hemorrhage, edema or acute infarct. Normal vascular flow voids are preserved. Skull/extracranial contents (limited evaluation): Bone marrow signal intensity is normal.     No definite acute abnormality identified.  Correlation and further evaluation as warranted. Electronically signed by: Darrell Cole Date:    12/01/2024 Time:    21:03    CTA Head and Neck (xpd)    Result Date: 12/1/2024  EXAMINATION: CTA HEAD AND NECK (XPD) CLINICAL HISTORY: Stroke/TIA, determine embolic source; TECHNIQUE: Non contrast low dose axial images were obtained through the head. CT angiogram was performed from the level of the carolyn to the top of the head following the IV administration of 100mL of Omnipaque 350.   Sagittal and coronal reconstructions and maximum intensity projection reconstructions were performed. Arterial stenosis percentages are based on NASCET measurement criteria. COMPARISON: Multiple FINDINGS: Intracranial Compartment: Ventricles and sulci are normal in size for age without evidence of  hydrocephalus. No extra-axial blood or fluid collections. Mild microvascular ischemic change.  Remote right thalamic lacunar infarct.  No parenchymal mass, hemorrhage, edema, or major vascular distribution infarct. Skull/Extracranial Contents (limited evaluation): No fracture. Non-Vascular Structures of the Neck/Thoracic Inlet (limited evaluation): Cervical spine degenerative change.  Prominent but not pathologically enlarged number and size of cervical lymph nodes.  Paranasal sinus disease asymmetric towards the left.  Thyroid grossly within normal limits. Aorta: Normal 3 vessel arch. Extracranial carotid circulation: No hemodynamically significant stenosis, aneurysmal dilatation, or dissection.  Less than 20% stenosis bilaterally at the carotid bifurcations.  Motion does degrade the evaluation of the internal carotid arteries to a minor extent. Extracranial vertebral circulation: No hemodynamically significant stenosis, aneurysmal dilatation, or dissection. Intracranial Arteries: ICA terminus are intact bilaterally.  Right MCA appears intact.  No aneurysm, severe stenosis, or occlusion.  Left MCA appears intact.  No aneurysm, severe stenosis, or occlusion.  Bilateral AIXA appear intact.  No aneurysm, severe stenosis, or occlusion. Intracranial vertebrobasilar circulation appears intact.  No aneurysm, severe stenosis, or occlusion. Bilateral PCA appear intact.  No aneurysm, severe stenosis, or occlusion. Venous structures (limited evaluation): Normal.     No acute abnormality. No high-grade stenosis or major vessel occlusion. Details as above. All CT scans at this facility are performed  using dose modulation techniques as appropriate to performed exam including the following:  automated exposure control; adjustment of mA and/or kV according to the patients size (this includes techniques or standardized protocols for targeted exams where dose is matched to indication/reason for exam: i.e. extremities or head);   iterative reconstruction technique. Electronically signed by: Darrell Cole Date:    12/01/2024 Time:    19:25      EKG    MICROBIOLOGY    MDM    Assessment/Plan:     * Right sided numbness    CVA (cerebral vascular accident)  Antithrombotics for secondary stroke prevention: Antiplatelets: Aspirin: 81 mg daily  Aspirin: 325 mg daily  Clopidogrel: 300 mg loading dose x 1, now  Clopidogrel: 75 mg daily    Statins for secondary stroke prevention and hyperlipidemia, if present:   Statins: Atorvastatin- 80 mg daily    Aggressive risk factor modification: HTN, Diet, Exercise, Obesity     Rehab efforts: The patient has been evaluated by a stroke team provider and the therapy needs have been fully considered based off the presenting complaints and exam findings. The following therapy evaluations are needed: PT evaluate and treat, OT evaluate and treat, SLP evaluate and treat    Diagnostics ordered/pending: CT scan of head without contrast to asses brain parenchyma, CTA Head to assess vasculature , CTA Neck/Arch to assess vasculature, HgbA1C to assess blood glucose levels, Lipid Profile to assess cholesterol levels, MRI head without contrast to assess brain parenchyma, TTE to assess cardiac function/status , TSH to assess thyroid function    VTE prophylaxis: Enoxaparin 40 mg SQ every 24 hours    BP parameters: Infarct: No intervention, SBP <220      UTI (urinary tract infection)  Patient found to have evidence of UTI on UA.  Patient remains afebrile without leukocytosis.  Patient initiated on Rocephin with cultures obtained and pending.  Plan:  -continue antibiotics  -f/u cultures      Essential hypertension  Chronic, controlled.  Latest blood pressure and vitals reviewed-   Temp:  [98.1 °F (36.7 °C)]   Pulse:  [72-80]   Resp:  [15-23]   BP: (143-158)/(70-87)   SpO2:  [96 %-98 %] .   Home meds for hypertension were reviewed and noted below.   Hypertension Medications               furosemide (LASIX) 20 MG tablet Take 20 mg  by mouth.    amLODIPine (NORVASC) 10 MG tablet Take 1 tablet (10 mg total) by mouth once daily.    atenolol (TENORMIN) 50 MG tablet Take 50 mg by mouth once daily.    carvediloL (COREG) 25 MG tablet Take 25 mg by mouth 2 (two) times daily with meals.    hydrALAZINE (APRESOLINE) 25 MG tablet Take 25 mg by mouth 3 (three) times daily.    isosorbide mononitrate (IMDUR) 30 MG 24 hr tablet Take 1 tablet (30 mg total) by mouth once daily.    lisinopriL (PRINIVIL,ZESTRIL) 40 MG tablet Take 40 mg by mouth once daily.    losartan (COZAAR) 50 MG tablet Take 1 tablet (50 mg total) by mouth once daily.    nitroGLYCERIN (NITROSTAT) 0.4 MG SL tablet Place 1 tablet (0.4 mg total) under the tongue every 5 (five) minutes as needed for Chest pain.    spironolactone (ALDACTONE) 50 MG tablet Take 50 mg by mouth once daily.     While in the hospital, will manage blood pressure as follows; Adjust home antihypertensive regimen as follows- holding home medications to allow for permissive hypertension.    Will utilize p.r.n. blood pressure medication only if patient's blood pressure greater than  180/110 and she develops symptoms such as worsening chest pain or shortness of breath.      Coronary artery disease involving native coronary artery of native heart without angina pectoris  Patient with known CAD s/p stent placement, which is controlled Will continue ASA, Plavix, and Statin and monitor for S/Sx of angina/ACS. Continue to monitor on telemetry.       HLD (hyperlipidemia)  Patient is chronically on statin.will continue for now. Last Lipid Panel:   Lab Results   Component Value Date    CHOL 105 05/08/2024    HDL 41 (L) 05/08/2024    LDLCALC 46 05/08/2024    TRIG 88 05/08/2024    CHOLHDL 14.3 (L) 12/04/2020   Plan:  -Continue home medication  -low fat/low calorie diet      Depression with anxiety  Chronic. Stable. Not in acute exacerbation and currently denies endorsing any suicidal/homicidal ideations.   Plan:  -Continue home  medications       GERD (gastroesophageal reflux disease)  Chronic. Stable. Currently asymptomatic. Home medications include PPI/Antacids as needed.  Plan:  -Continue PPI/Antacids as needed       Obstructive sleep apnea syndrome  Currently on CPAP outpatient.  Plan:  -continue CPAP q.h.s.       Morbid obesity with BMI of 40.0-44.9, adult  Body mass index is 42.26 kg/m². Morbid obesity complicates all aspects of disease management from diagnostic modalities to treatment. Weight loss encouraged and health benefits explained to patient.         VTE Risk Mitigation (From admission, onward)           Ordered     enoxaparin injection 40 mg  2 times daily         12/01/24 2005     IP VTE HIGH RISK PATIENT  Once         12/01/24 2005     Place sequential compression device  Until discontinued         12/01/24 2005                  //Core Measures   -DVT proph: SCDs, Lovenox   -Code status: Full    -Surrogate: none provided       Components of this note were documented using a voice recognition system and are subject to errors not corrected at the time the document was proof read. Please contact the author for any clarifications.       On 12/01/2024, patient should be placed in hospital observation services under my care.      Pharmacist Renal Dose Adjustment Note    Judy Ruelas is a 59 y.o. female being treated with the medication enoxaparin.    Patient Data:    Vital Signs (Most Recent):  Temp: 98.1 °F (36.7 °C) (12/01/24 1713)  Pulse: 74 (12/01/24 1903)  Resp: (!) 23 (12/01/24 1903)  BP: (!) 156/87 (12/01/24 1903)  SpO2: 98 % (12/01/24 1839) Vital Signs (72h Range):  Temp:  [98.1 °F (36.7 °C)]   Pulse:  [74-80]   Resp:  [15-23]   BP: (143-157)/(70-87)   SpO2:  [96 %-98 %]      Recent Labs   Lab 12/01/24 1818   CREATININE 0.8     Serum creatinine: 0.8 mg/dL 12/01/24 1818  Estimated creatinine clearance: 91.5 mL/min    Enoxaparin 40 mg subcutaneous every 24 hours will be changed to enoxaparin 40 mg subcutaneous every  12 hours for DVT prevention with BMI > 40 kg/m2.    Pharmacist's Name: Oni Vicente  Pharmacist's Extension: [phone]      Alen Montoya MD  Department of Hospital Medicine  [facility] - Telemetry ([facility])

## 2024-12-02 NOTE — ASSESSMENT & PLAN NOTE
Chronic, controlled.  Latest blood pressure and vitals reviewed-   Temp:  [98.1 °F (36.7 °C)]   Pulse:  [72-80]   Resp:  [15-23]   BP: (143-158)/(70-87)   SpO2:  [96 %-98 %] .   Home meds for hypertension were reviewed and noted below.   Hypertension Medications               furosemide (LASIX) 20 MG tablet Take 20 mg by mouth.    amLODIPine (NORVASC) 10 MG tablet Take 1 tablet (10 mg total) by mouth once daily.    atenolol (TENORMIN) 50 MG tablet Take 50 mg by mouth once daily.    carvediloL (COREG) 25 MG tablet Take 25 mg by mouth 2 (two) times daily with meals.    hydrALAZINE (APRESOLINE) 25 MG tablet Take 25 mg by mouth 3 (three) times daily.    isosorbide mononitrate (IMDUR) 30 MG 24 hr tablet Take 1 tablet (30 mg total) by mouth once daily.    lisinopriL (PRINIVIL,ZESTRIL) 40 MG tablet Take 40 mg by mouth once daily.    losartan (COZAAR) 50 MG tablet Take 1 tablet (50 mg total) by mouth once daily.    nitroGLYCERIN (NITROSTAT) 0.4 MG SL tablet Place 1 tablet (0.4 mg total) under the tongue every 5 (five) minutes as needed for Chest pain.    spironolactone (ALDACTONE) 50 MG tablet Take 50 mg by mouth once daily.     While in the hospital, will manage blood pressure as follows; Adjust home antihypertensive regimen as follows- holding home medications to allow for permissive hypertension.    Will utilize p.r.n. blood pressure medication only if patient's blood pressure greater than  180/110 and she develops symptoms such as worsening chest pain or shortness of breath.

## 2024-12-02 NOTE — ASSESSMENT & PLAN NOTE
Patient is chronically on statin.will continue for now. Last Lipid Panel:   Lab Results   Component Value Date    CHOL 105 05/08/2024    HDL 41 (L) 05/08/2024    LDLCALC 46 05/08/2024    TRIG 88 05/08/2024    CHOLHDL 14.3 (L) 12/04/2020   Plan:  -Continue home medication  -low fat/low calorie diet     83

## 2024-12-02 NOTE — PLAN OF CARE
OT eval completed. Sup>sit SBA, sit>stand CGA, functional mobility x20ft with RW and CGA, step>pivot to bedside chair with RW and CGA. Recommending low intensity intervention with 24/7 SPV and A.

## 2024-12-02 NOTE — FIRST PROVIDER EVALUATION
Medical screening examination initiated.  I have conducted a focused provider triage encounter, findings are as follows:    Brief history of present illness:  Previous history of a CVA, reports left-sided residual weakness.  Reports that she has been having right-sided numbness new onset since 6:00 a.m. this morning.    Vitals:    12/01/24 1713   BP: (!) 143/76   Pulse: 80   Resp: 17   Temp: 98.1 °F (36.7 °C)   TempSrc: Oral   SpO2: 96%   Weight: (S)   Comment: need weight       Pertinent physical exam:  Right-sided weakness    Brief workup plan:  Workup    Preliminary workup initiated; this workup will be continued and followed by the physician or advanced practice provider that is assigned to the patient when roomed.

## 2024-12-02 NOTE — CONSULTS
[facility] Tele[facility] ([facility])  Wound Care    Patient Name:  Judy Ruelas   MRN:  [id number]  Date: 12/2/2024  Diagnosis: Right sided numbness    History:     Past Medical History:   Diagnosis Date    Anxiety     Depression     Depression with anxiety     Family history of heart disease 1/27/2016    Hypertension     Irregular heart beat     Migraine headache     Morbid obesity with BMI of 40.0-44.9, adult 6/30/2017    NSTEMI (non-ST elevated myocardial infarction) 6/18/2017       Social History     Socioeconomic History    Marital status:    Tobacco Use    Smoking status: Never    Smokeless tobacco: Never   Substance and Sexual Activity    Alcohol use: No     Alcohol/week: 0.0 standard drinks of alcohol     Comment: denies    Drug use: No     Comment: denies    Sexual activity: Not Currently     Social Drivers of Health     Food Insecurity: Food Insecurity Present (5/8/2024)    Received from [facility]    Hunger Vital Sign     Worried About Running Out of Food in the Last Year: Sometimes true     Ran Out of Food in the Last Year: Patient declined   Transportation Needs: Unmet Transportation Needs (5/8/2024)    Received from [facility]    PRAPARE - Transportation     Lack of Transportation (Medical): Yes     Lack of Transportation (Non-Medical): Yes       Precautions:     Allergies as of 12/01/2024 - Reviewed 12/01/2024   Allergen Reaction Noted    Pcn [penicillins] Swelling 03/06/2015    Acetaminophen Itching 12/01/2024    Codeine  03/06/2015    Latex, natural rubber  03/06/2015    Tylox [oxycodone-acetaminophen]  03/06/2015       WOC Assessment Details/Treatment         Consulted to evaluate wounds to skin folds of B breasts.  Ms. Ruelas is a 58 yo female patient admitted 12/1/24 with R-sided numbness.  PMH includes:  Anxiety, CVA's w/ L sided residual  weakness, Depression, Depression with anxiety, Family history of heart disease (1/27/2016), Hypertension, Irregular heart beat, Migraine headache, Morbid obesity with BMI of 40.0-44.9, adult (6/30/2017), and NSTEMI (non-ST elevated myocardial infarction) (6/18/2017).   This morning, patient sitting up in chair @ bedside.  She is awake and alert; denies c/o pain.    ---Noted large pendulous breasts w/ intertrigo to skin folds of B breasts; multiple minute partial thickness wounds which are very red and clean.  Periwound intact w/ + erythema and satellite lesions noted.  Minimal serous exudate noted.  Recommend:  cleansing daily w/ bath cloth and patting dry.  Apply sheet of Inter-dry between skin folds.  Remove sheets daily for bath; pat dry thoroughly & reapply daily X 5 days.  Plan f/u in 1 week.         12/02/24 0850   WOCN Assessment   WOCN Total Time (mins) 30   Visit Date 12/02/24   Visit Time 0850   Consult Type New   WOCN Speciality Wound   Wound moisture;yeast   Intervention assessed;applied;chart review;coordination of care   Teaching on-going   Skin Interventions   Device Skin Pressure Protection absorbent pad utilized/changed   Pressure Reduction Devices pressure-redistributing mattress utilized   Pressure Reduction Techniques frequent weight shift encouraged   Skin Protection incontinence pads utilized   Positioning   Body Position other (see comments)  (sitting up in chair @ bedside)   Head of Bed (HOB) Positioning HOB elevated   Positioning/Transfer Devices pillows;in use        Wound 12/01/24 2200 Moisture associated dermatitis Right lower Breast   Date First Assessed/Time First Assessed: 12/01/24 2200   Primary Wound Type: Moisture associated dermatitis  Side: Right  Orientation: lower  Location: Breast   Wound Image    Dressing Appearance Open to air   Drainage Amount Scant   Drainage Characteristics/Odor Serous   Appearance Red;Moist   Tissue loss description Partial thickness   Red (%), Wound  Tissue Color 100 %   Periwound Area Intact;Redness;Satellite lesion   Care Cleansed with:  (bath cloth)   Dressing Applied  (Inter-dry)   Dressing Change Due 12/03/24        Wound 12/01/24 2200 Moisture associated dermatitis Left lower Breast   Date First Assessed/Time First Assessed: 12/01/24 2200   Primary Wound Type: Moisture associated dermatitis  Side: Left  Orientation: lower  Location: Breast   Wound Image    Dressing Appearance Open to air   Drainage Amount Scant   Drainage Characteristics/Odor Serous   Appearance Red;Moist   Tissue loss description Partial thickness   Red (%), Wound Tissue Color 100 %   Periwound Area Intact;Redness;Satellite lesion   Care Cleansed with:  (bath cloth)   Dressing Applied  (Inter-dry)   Dressing Change Due 12/03/24 12/02/2024

## 2024-12-02 NOTE — NURSING
Pt arrived from ED w/ NIH of 2, scored 4 on assessment when on unit, no change throughout shift. Pt expressed inability to move RLE but able to adjust self in bed and extremity/purposeful movement observed. No drift to extremities, but pt reports numbness and tingling still to RLE and RUE. Pt noted w/ subtle droop upon smiling to L side. No other neuro changes noted.

## 2024-12-02 NOTE — ASSESSMENT & PLAN NOTE
Patient found to have evidence of UTI on UA.  Patient remains afebrile without leukocytosis.  Patient initiated on Rocephin with cultures obtained and pending.  Plan:  -continue antibiotics  -f/u cultures

## 2024-12-03 LAB
OHS QRS DURATION: 126 MS
OHS QTC CALCULATION: 470 MS

## 2024-12-04 LAB
BACTERIA UR CULT: ABNORMAL
POCT GLUCOSE: 102 MG/DL (ref 70–110)

## 2024-12-04 NOTE — DISCHARGE SUMMARY
[facility] Medicine  Discharge Summary      Patient Name: Judy Ruelas  MRN: [id number]  Banner: [id number]  Patient Class: OP- Observation  Admission Date: 12/1/2024  Hospital Length of Stay: 0 days  Discharge Date and Time: 12/2/2024  1:59 PM  Attending Physician: Dr. Mijares  Discharging Provider: Eva Lopez NP  Primary Care Provider: [facility]    Primary Care Team: Networked reference to record PCT     HPI:   Judy Ruelas is a 59 y.o. female with a PMH  has a past medical history of Anxiety, Depression, Depression with anxiety, Family history of heart disease (1/27/2016), Hypertension, Irregular heart beat, Migraine headache, Morbid obesity with BMI of 40.0-44.9, adult (6/30/2017), and NSTEMI (non-ST elevated myocardial infarction) (6/18/2017). who presented to the ED for further evaluation of acute onset right-sided numbness which began upon waking earlier this morning around 6:00 a.m..  Patient has significant history of CVAs with left-sided residual weakness and paresthesia according to her reports ambulating via aid of cane/walker as needed.  She reported no known alleviating or aggravating factors noted with a associated symptoms also including headache, left-sided facial drooping, slurred speech, and numbness throughout her mouth in addition to the right-sided paresthesia. Prior to onset of symptoms, patient reported being in her usual state of health with no other concerns or complaints with all other review of systems negative except as noted above.  She reports compliance with home medications as well as dietary/fluid restrictions.  Initial workup in the ED revealed patient to be afebrile without leukocytosis, UA positive for nitrites, 1+ LE, and many bacteria with remaining CBC and CMP near baseline.  Patient was evaluated by tele vascular Neurology and recommended patient undergo continued stroke workup but reported patient was not a  candidate for thrombolytics therapy given last known normal exceeded window.  CT head, CTA head and neck, and MRI brain positive for previous CVAs but negative for acute findings.  Patient initiated on Rocephin for treatment of UTI and admitted to Hospital Medicine under observation or continued medical management.      PCP: Jenny Quintanilla [facility]       * No surgery found *      Hospital Course:   The patient was placed in observation or acute right sided numbness and UTI. Pt has history of CVAs with left-sided residual weakness and paresthesia according to her reports ambulating via aid of cane/walker as needed. CT head, CTA head and neck, and MRI brain negative for acute findings. Recent echo 5/2024 showed normal LVEF. Patient initiated on Rocephin for treatment of UTI. Urine culture is pending. PT/OT evaluated pt and recommended home health. Pt has a son at her home that provides 24 hour supervision. Pt was counseled to f/u with her PCP for urine culture results. Pt will be discharged on Cefdinir. She will be continued on home meds ASA, Plavix, Statin. Home health was arranged. The patient was seen and examined today and determined to be stable for discharge.       Goals of Care Treatment Preferences:  Code Status: Full Code         Consults:   Consults (From admission, onward)          Status Ordering Provider     Inpatient consult to Social Work  Once        Provider:  (Not yet assigned)    Completed THOR BAIRD consult to case management/social work  Once        Provider:  (Not yet assigned)    Completed BENNY ERICKSON            Final Active Diagnoses:    Diagnosis Date Noted POA    PRINCIPAL PROBLEM:  Right sided numbness [R20.0] 12/01/2024 Yes    UTI (urinary tract infection) [N39.0] 12/01/2024 Yes    CVA (cerebral vascular accident) [I63.9] 12/01/2024 Yes    GERD (gastroesophageal reflux disease) [K21.9] 12/01/2024 Yes     Chronic    HLD (hyperlipidemia) [E78.5] 12/01/2024  Yes     Chronic    Obstructive sleep apnea syndrome [G47.33] 12/05/2020 Yes     Chronic    Morbid obesity with BMI of 40.0-44.9, adult [E66.01, Z68.41] 06/30/2017 Not Applicable     Chronic    Coronary artery disease involving native coronary artery of native heart without angina pectoris [I25.10] 06/29/2017 Yes     Chronic    Depression with anxiety [F41.8] 06/18/2017 Yes     Chronic    Essential hypertension [I10] 01/27/2016 Yes     Chronic      Problems Resolved During this Admission:       Discharged Condition: stable    Disposition: Home-Health Care Sv    Follow Up:   Contact information for follow-up providers       [facility] [place] [facility] up in 3 day(s).    Why: f/u with PCP in 3 days to review urine culture and for paresthesias  Contact information:  [address]  [place] [id number] [phone]                       Contact information for after-discharge care       [facility]       RANDEEHENRY JUSTIN Zuniga    Services: Home Nursing, Home Rehabilitation  Contact information:  [id number]  [facility] [address] [phone]                                 Patient Instructions:      Diet Cardiac     Diet Cardiac     Activity as tolerated     Activity as tolerated       Significant Diagnostic Studies:   Imaging Results              MRI Brain Without Contrast (Final result)  Result time 12/01/24 21:03:00      Final result by Darrell Cole MD (12/01/24 21:03:00)                   Impression:      No definite acute abnormality identified.  Correlation and further evaluation as warranted.      Electronically signed by: Darrell Cole  Date:    12/01/2024  Time:    21:03               Narrative:    EXAMINATION:  MRI BRAIN WITHOUT CONTRAST    CLINICAL HISTORY:  Transient ischemic attack (TIA);.    TECHNIQUE:  Multiplanar multisequence MR imaging of the brain was performed without contrast.    COMPARISON:  None    FINDINGS:  Intracranial compartment:    Ventricles and sulci are normal  in size for age without evidence of hydrocephalus. No extra-axial blood or fluid collections.    Mild T2 FLAIR hyperintensities likely microvascular ischemic changes.  These are similar to the prior.  No mass lesion, acute hemorrhage, edema or acute infarct.    Normal vascular flow voids are preserved.    Skull/extracranial contents (limited evaluation): Bone marrow signal intensity is normal.                                       CTA Head and Neck (xpd) (Final result)  Result time 12/01/24 19:25:47      Final result by Darrell Cole MD (12/01/24 19:25:47)                   Impression:      No acute abnormality. No high-grade stenosis or major vessel occlusion.    Details as above.    All CT scans at this facility are performed  using dose modulation techniques as appropriate to performed exam including the following:  automated exposure control; adjustment of mA and/or kV according to the patients size (this includes techniques or standardized protocols for targeted exams where dose is matched to indication/reason for exam: i.e. extremities or head);  iterative reconstruction technique.      Electronically signed by: Darrell Cole  Date:    12/01/2024  Time:    19:25               Narrative:    EXAMINATION:  CTA HEAD AND NECK (XPD)    CLINICAL HISTORY:  Stroke/TIA, determine embolic source;    TECHNIQUE:  Non contrast low dose axial images were obtained through the head. CT angiogram was performed from the level of the carolyn to the top of the head following the IV administration of 100mL of Omnipaque 350.   Sagittal and coronal reconstructions and maximum intensity projection reconstructions were performed. Arterial stenosis percentages are based on NASCET measurement criteria.    COMPARISON:  Multiple    FINDINGS:  Intracranial Compartment:    Ventricles and sulci are normal in size for age without evidence of hydrocephalus. No extra-axial blood or fluid collections.    Mild microvascular ischemic change.   Remote right thalamic lacunar infarct.  No parenchymal mass, hemorrhage, edema, or major vascular distribution infarct.    Skull/Extracranial Contents (limited evaluation): No fracture.    Non-Vascular Structures of the Neck/Thoracic Inlet (limited evaluation): Cervical spine degenerative change.  Prominent but not pathologically enlarged number and size of cervical lymph nodes.  Paranasal sinus disease asymmetric towards the left.  Thyroid grossly within normal limits.    Aorta: Normal 3 vessel arch.    Extracranial carotid circulation: No hemodynamically significant stenosis, aneurysmal dilatation, or dissection.  Less than 20% stenosis bilaterally at the carotid bifurcations.  Motion does degrade the evaluation of the internal carotid arteries to a minor extent.    Extracranial vertebral circulation: No hemodynamically significant stenosis, aneurysmal dilatation, or dissection.    Intracranial Arteries: ICA terminus are intact bilaterally.  Right MCA appears intact.  No aneurysm, severe stenosis, or occlusion.  Left MCA appears intact.  No aneurysm, severe stenosis, or occlusion.  Bilateral AIXA appear intact.  No aneurysm, severe stenosis, or occlusion.    Intracranial vertebrobasilar circulation appears intact.  No aneurysm, severe stenosis, or occlusion.    Bilateral PCA appear intact.  No aneurysm, severe stenosis, or occlusion.    Venous structures (limited evaluation): Normal.                                       Pending Diagnostic Studies:       None           Medications:  Reconciled Home Medications:      Medication List        START taking these medications      cefdinir 300 MG capsule  Commonly known as: OMNICEF  Take 1 capsule (300 mg total) by mouth 2 (two) times daily. for 7 days     fluconazole 150 MG Tab  Commonly known as: DIFLUCAN  Take 1 tablet (150 mg total) by mouth once. for 1 dose  Start taking on: December 5, 2024     miconazole NITRATE 2 % 2 % top powder  Commonly known as: MICOTIN  Apply  topically 2 (two) times daily.            CONTINUE taking these medications      albuterol 90 mcg/actuation inhaler  Commonly known as: PROVENTIL/VENTOLIN HFA  Inhale 1-2 puffs into the lungs every 6 (six) hours as needed for Wheezing. Rescue     amLODIPine 10 MG tablet  Commonly known as: NORVASC  Take 1 tablet (10 mg total) by mouth once daily.     aspirin 81 MG Chew  Take 1 tablet (81 mg total) by mouth once daily.     atorvastatin 80 MG tablet  Commonly known as: LIPITOR  Take 1 tablet (80 mg total) by mouth once daily.     carvediloL 6.25 MG tablet  Commonly known as: COREG  Take 6.25 mg by mouth 2 (two) times daily with meals.     clopidogreL 75 mg tablet  Commonly known as: PLAVIX  Take 1 tablet (75 mg total) by mouth once daily.     colchicine 0.6 mg tablet  Commonly known as: COLCRYS  Take 1 tablet (0.6 mg total) by mouth once daily.     ergocalciferol 50,000 unit Cap  Commonly known as: ERGOCALCIFEROL  Take 50,000 Units by mouth every 7 days.     ferrous sulfate 324 mg (65 mg iron) Tbec  Take 325 mg by mouth once daily.     fesoterodine 4 mg Tb24  Commonly known as: TOVIAZ  TAKE 1 BY MOUTH ONCE DAILY FOR URINATION     FLUoxetine 20 MG capsule  Take 20 mg by mouth once daily.     fluticasone propionate 50 mcg/actuation nasal spray  Commonly known as: FLONASE  1 spray (50 mcg total) by Each Nostril route 2 (two) times daily as needed for Rhinitis.     furosemide 20 MG tablet  Commonly known as: LASIX  Take 20 mg by mouth.     isosorbide mononitrate 30 MG 24 hr tablet  Commonly known as: IMDUR  Take 1 tablet (30 mg total) by mouth once daily.     lactulose 10 gram/15 mL solution  Commonly known as: CHRONULAC  SMARTSI Milliliter(s) By Mouth 3 Times Daily     levocetirizine 5 MG tablet  Commonly known as: XYZAL  Take 5 mg by mouth.     levothyroxine 25 MCG tablet  Commonly known as: SYNTHROID  Take 25 mcg by mouth every morning.     meclizine 50 MG tablet  Commonly known as: ANTIVERT  Take 50 mg by mouth 2  (two) times daily.     metFORMIN 500 MG ER 24hr tablet  Commonly known as: GLUCOPHAGE-XR  Take 500 mg by mouth every morning.     nitroGLYCERIN 0.4 MG SL tablet  Commonly known as: NITROSTAT  Place 1 tablet (0.4 mg total) under the tongue every 5 (five) minutes as needed for Chest pain.     pantoprazole 40 MG tablet  Commonly known as: PROTONIX  Take 1 tablet (40 mg total) by mouth once daily.     potassium chloride SA 10 MEQ tablet  Commonly known as: K-DUR,KLOR-CON M  Take 10 mEq by mouth.     spironolactone 50 MG tablet  Commonly known as: ALDACTONE  Take 50 mg by mouth once daily.     traZODone 50 MG tablet  Commonly known as: DESYREL  Take 100 mg by mouth every evening.     VRAYLAR 1.5 mg Cap  Generic drug: cariprazine  Take 1.5 mg by mouth.     XIFAXAN 550 mg Tab  Generic drug: rifAXIMin  Take 550 mg by mouth 2 (two) times daily.            STOP taking these medications      FERRETTS 325 mg (106 mg iron) Tab  Generic drug: ferrous fumarate     hydrALAZINE 25 MG tablet  Commonly known as: APRESOLINE     losartan 50 MG tablet  Commonly known as: COZAAR     midodrine 5 MG Tab  Commonly known as: PROAMATINE              Indwelling Lines/Drains at time of discharge:   Lines/Drains/Airways       None                   Time spent on the discharge of patient: 45 minutes         Eva Lopez NP  Department of Hospital Medicine  [facility] - Telemetry ([facility])

## 2024-12-04 NOTE — HOSPITAL COURSE
The patient was placed in observation or acute right sided numbness and UTI. Pt has history of CVAs with left-sided residual weakness and paresthesia according to her reports ambulating via aid of cane/walker as needed. CT head, CTA head and neck, and MRI brain negative for acute findings. Recent echo 5/2024 showed normal LVEF. Patient initiated on Rocephin for treatment of UTI. Urine culture is pending. PT/OT evaluated pt and recommended home health. Pt has a son at her home that provides 24 hour supervision. Pt was counseled to f/u with her PCP for urine culture results. Pt will be discharged on Cefdinir. She will be continued on home meds ASA, Plavix, Statin. Home health was arranged. The patient was seen and examined today and determined to be stable for discharge.

## 2024-12-10 ENCOUNTER — HOSPITAL ENCOUNTER (EMERGENCY)
Facility: HOSPITAL | Age: 59
Discharge: HOME | End: 2024-12-10
Attending: EMERGENCY MEDICINE
Payer: MEDICARE

## 2024-12-10 VITALS
HEIGHT: 64 IN | TEMPERATURE: 98 F | OXYGEN SATURATION: 96 % | WEIGHT: 246.25 LBS | DIASTOLIC BLOOD PRESSURE: 92 MMHG | BODY MASS INDEX: 42.04 KG/M2 | HEART RATE: 80 BPM | RESPIRATION RATE: 20 BRPM | SYSTOLIC BLOOD PRESSURE: 139 MMHG

## 2024-12-10 DIAGNOSIS — Z86.59 HISTORY OF ANXIETY: ICD-10-CM

## 2024-12-10 DIAGNOSIS — J40 BRONCHITIS: Primary | ICD-10-CM

## 2024-12-10 DIAGNOSIS — R06.02 SHORTNESS OF BREATH: ICD-10-CM

## 2024-12-10 DIAGNOSIS — E66.01 MORBID OBESITY: ICD-10-CM

## 2024-12-10 LAB
ALBUMIN SERPL BCP-MCNC: 3.9 G/DL (ref 3.5–5.2)
ALP SERPL-CCNC: 72 U/L (ref 40–150)
ALT SERPL W/O P-5'-P-CCNC: 15 U/L (ref 10–44)
ANION GAP SERPL CALC-SCNC: 10 MMOL/L (ref 8–16)
AST SERPL-CCNC: 14 U/L (ref 10–40)
BASOPHILS # BLD AUTO: 0.03 K/UL (ref 0–0.2)
BASOPHILS NFR BLD: 0.5 % (ref 0–1.9)
BILIRUB SERPL-MCNC: 0.4 MG/DL (ref 0.1–1)
BNP SERPL-MCNC: 13 PG/ML (ref 0–99)
BUN SERPL-MCNC: 25 MG/DL (ref 6–20)
CALCIUM SERPL-MCNC: 9.9 MG/DL (ref 8.7–10.5)
CHLORIDE SERPL-SCNC: 104 MMOL/L (ref 95–110)
CO2 SERPL-SCNC: 23 MMOL/L (ref 23–29)
CREAT SERPL-MCNC: 0.8 MG/DL (ref 0.5–1.4)
DIFFERENTIAL METHOD BLD: ABNORMAL
EOSINOPHIL # BLD AUTO: 0.2 K/UL (ref 0–0.5)
EOSINOPHIL NFR BLD: 2.3 % (ref 0–8)
ERYTHROCYTE [DISTWIDTH] IN BLOOD BY AUTOMATED COUNT: 13.9 % (ref 11.5–14.5)
EST. GFR  (NO RACE VARIABLE): >60 ML/MIN/1.73 M^2
GLUCOSE SERPL-MCNC: 95 MG/DL (ref 70–110)
HCT VFR BLD AUTO: 37.6 % (ref 37–48.5)
HGB BLD-MCNC: 12.2 G/DL (ref 12–16)
IMM GRANULOCYTES # BLD AUTO: 0.04 K/UL (ref 0–0.04)
IMM GRANULOCYTES NFR BLD AUTO: 0.6 % (ref 0–0.5)
INFLUENZA A, MOLECULAR: NEGATIVE
INFLUENZA B, MOLECULAR: NEGATIVE
LYMPHOCYTES # BLD AUTO: 0.9 K/UL (ref 1–4.8)
LYMPHOCYTES NFR BLD: 13.2 % (ref 18–48)
MCH RBC QN AUTO: 28.8 PG (ref 27–31)
MCHC RBC AUTO-ENTMCNC: 32.4 G/DL (ref 32–36)
MCV RBC AUTO: 89 FL (ref 82–98)
MONOCYTES # BLD AUTO: 0.5 K/UL (ref 0.3–1)
MONOCYTES NFR BLD: 7.1 % (ref 4–15)
NEUTROPHILS # BLD AUTO: 5 K/UL (ref 1.8–7.7)
NEUTROPHILS NFR BLD: 76.3 % (ref 38–73)
NRBC BLD-RTO: 0 /100 WBC
PLATELET # BLD AUTO: 338 K/UL (ref 150–450)
PMV BLD AUTO: 10.2 FL (ref 9.2–12.9)
POTASSIUM SERPL-SCNC: 4.6 MMOL/L (ref 3.5–5.1)
PROT SERPL-MCNC: 7.9 G/DL (ref 6–8.4)
RBC # BLD AUTO: 4.23 M/UL (ref 4–5.4)
SARS-COV-2 RDRP RESP QL NAA+PROBE: NEGATIVE
SODIUM SERPL-SCNC: 137 MMOL/L (ref 136–145)
SPECIMEN SOURCE: NORMAL
TROPONIN I SERPL DL<=0.01 NG/ML-MCNC: <0.006 NG/ML (ref 0–0.03)
WBC # BLD AUTO: 6.58 K/UL (ref 3.9–12.7)

## 2024-12-10 PROCEDURE — 99285 EMERGENCY DEPT VISIT HI MDM: CPT | Mod: 25

## 2024-12-10 PROCEDURE — 84484 ASSAY OF TROPONIN QUANT: CPT | Performed by: EMERGENCY MEDICINE

## 2024-12-10 PROCEDURE — 80053 COMPREHEN METABOLIC PANEL: CPT | Performed by: EMERGENCY MEDICINE

## 2024-12-10 PROCEDURE — 85025 COMPLETE CBC W/AUTO DIFF WBC: CPT | Performed by: EMERGENCY MEDICINE

## 2024-12-10 PROCEDURE — 83880 ASSAY OF NATRIURETIC PEPTIDE: CPT | Performed by: EMERGENCY MEDICINE

## 2024-12-10 PROCEDURE — 87502 INFLUENZA DNA AMP PROBE: CPT | Performed by: EMERGENCY MEDICINE

## 2024-12-10 PROCEDURE — 87635 SARS-COV-2 COVID-19 AMP PRB: CPT | Performed by: EMERGENCY MEDICINE

## 2024-12-10 NOTE — ED PROVIDER NOTES
SCRIBE #1 NOTE: I, Joyce Worthington, am scribing for, and in the presence of, Gypsy Recio MD. I have scribed the entire note.       History     Chief Complaint   Patient presents with    Shortness of Breath     Since yesterday per [facility], patient also c/o cough; oxygen saturation 92% on room air per [facility]     Review of patient's allergies indicates:   Allergen Reactions    Pcn [penicillins] Swelling    Acetaminophen Itching    Codeine     Latex, natural rubber     Tylox [oxycodone-acetaminophen]          History of Present Illness     HPI    12/10/2024, 3:16 PM  History obtained from the patient      History of Present Illness: Judy Ruelas is a 59 y.o. female patient with a PMHx of NSTEMI (), HTN, migraine headache, anxiety, depression, and obesity who presents to the Emergency Department via [facility] from home for evaluation of constant, moderate SOB which onset last night. No mitigating or exacerbating factors reported. Associated sxs include cough which onset last night. The patient is not O2 dependent. Patient denies any chest pain, dizziness, leg or calf pain, no leg swelling, no fever, nausea, vomiting, and all other sxs at this time. No prior Tx reported. No further complaints or concerns at this time.       Arrival mode: [facility]    PCP: Adithya Quintanilla [facility]        Past Medical History:  Past Medical History:   Diagnosis Date    Anxiety     Depression     Depression with anxiety     Family history of heart disease 2016    Hypertension     Irregular heart beat     Migraine headache     Morbid obesity with BMI of 40.0-44.9, adult 2017    NSTEMI (non-ST elevated myocardial infarction) 2017       Past Surgical History:  Past Surgical History:   Procedure Laterality Date    APPENDECTOMY       SECTION      HYSTERECTOMY           Family History:  Family History   Problem Relation Name Age of Onset    Hypertension Unknown      Heart disease Father  70    Hypertension  Father      Heart attack Mother  40        triple bypass    Diabetes Mother      Hypertension Mother      Stroke Mother      Heart attack Brother  40        CABG x 2       Social History:  Social History     Tobacco Use    Smoking status: Never    Smokeless tobacco: Never   Substance and Sexual Activity    Alcohol use: No     Alcohol/week: 0.0 standard drinks of alcohol     Comment: denies    Drug use: No     Comment: denies    Sexual activity: Not Currently        Review of Systems     Review of Systems   Constitutional:  Negative for fever.   HENT:  Negative for sore throat.    Respiratory:  Positive for cough and shortness of breath.    Cardiovascular:  Negative for chest pain.   Gastrointestinal:  Negative for nausea and vomiting.   Genitourinary:  Negative for dysuria.   Musculoskeletal:  Negative for back pain.   Skin:  Negative for rash.   Neurological:  Negative for weakness.   Hematological:  Does not bruise/bleed easily.   All other systems reviewed and are negative.     Physical Exam     Initial Vitals [12/10/24 1451]   BP Pulse Resp Temp SpO2   125/80 84 (!) 23 98.3 °F (36.8 °C) 97 %      MAP       --          Physical Exam  Nursing Notes and Vital Signs Reviewed.  Constitutional: Patient is in no acute distress. Morbidly obese. Appears older than stated age. Appears chronically ill.  Head: Atraumatic. Normocephalic.  Eyes: PERRL. EOM intact. Conjunctivae are not pale. No scleral icterus.  ENT: Mucous membranes are moist. Oropharynx is clear and symmetric.    Neck: Supple. Full ROM. No lymphadenopathy.  Cardiovascular: Regular rate. Regular rhythm. No murmurs, rubs, or gallops. Distal pulses are 2+ and symmetric.  Pulmonary/Chest: No respiratory distress. Clear to auscultation bilaterally. No wheezing or rales.  Abdominal: Soft and non-distended.  There is no tenderness.  No rebound, guarding, or rigidity. Good bowel sounds.  Musculoskeletal: Moves all extremities. No obvious deformities. No edema. No  "calf tenderness.  Skin: Warm and dry.  Neurological:  Alert, awake, and appropriate.  Normal speech.  No acute focal neurological deficits are appreciated.  Psychiatric: Normal affect. Good eye contact. Appropriate in content.     ED Course   Procedures  ED Vital Signs:  Vitals:    12/10/24 1451 12/10/24 1525 12/10/24 1526 12/10/24 1655   BP: 125/80  127/78 (!) 139/92   Pulse: 84  84 80   Resp: (!) 23  20 20   Temp: 98.3 °F (36.8 °C)  98 °F (36.7 °C) 98.2 °F (36.8 °C)   TempSrc: Oral  Oral Oral   SpO2: 97%  96% 96%   Weight: 111.7 kg (246 lb 4.1 oz)      Height: 5' 4" (1.626 m) 5' 4" (1.626 m)         Abnormal Lab Results:  Labs Reviewed   CBC W/ AUTO DIFFERENTIAL - Abnormal       Result Value    WBC 6.58      RBC 4.23      Hemoglobin 12.2      Hematocrit 37.6      MCV 89      MCH 28.8      MCHC 32.4      RDW 13.9      Platelets 338      MPV 10.2      Immature Granulocytes 0.6 (*)     Gran # (ANC) 5.0      Immature Grans (Abs) 0.04      Lymph # 0.9 (*)     Mono # 0.5      Eos # 0.2      Baso # 0.03      nRBC 0      Gran % 76.3 (*)     Lymph % 13.2 (*)     Mono % 7.1      Eosinophil % 2.3      Basophil % 0.5      Differential Method Automated     COMPREHENSIVE METABOLIC PANEL - Abnormal    Sodium 137      Potassium 4.6      Chloride 104      CO2 23      Glucose 95      BUN 25 (*)     Creatinine 0.8      Calcium 9.9      Total Protein 7.9      Albumin 3.9      Total Bilirubin 0.4      Alkaline Phosphatase 72      AST 14      ALT 15      eGFR >60      Anion Gap 10     INFLUENZA A & B BY MOLECULAR    Influenza A, Molecular Negative      Influenza B, Molecular Negative      Flu A & B Source Nasal swab     TROPONIN I    Troponin I <0.006     B-TYPE NATRIURETIC PEPTIDE    BNP 13     SARS-COV-2 RNA AMPLIFICATION, QUAL    SARS-CoV-2 RNA, Amplification, Qual Negative          All Lab Results:  Results for orders placed or performed during the hospital encounter of 12/10/24   CBC auto differential    Collection Time: 12/10/24  " 3:18 PM   Result Value Ref Range    WBC 6.58 3.90 - 12.70 K/uL    RBC 4.23 4.00 - 5.40 M/uL    Hemoglobin 12.2 12.0 - 16.0 g/dL    Hematocrit 37.6 37.0 - 48.5 %    MCV 89 82 - 98 fL    MCH 28.8 27.0 - 31.0 pg    MCHC 32.4 32.0 - 36.0 g/dL    RDW 13.9 11.5 - 14.5 %    Platelets 338 150 - 450 K/uL    MPV 10.2 9.2 - 12.9 fL    Immature Granulocytes 0.6 (H) 0.0 - 0.5 %    Gran # (ANC) 5.0 1.8 - 7.7 K/uL    Immature Grans (Abs) 0.04 0.00 - 0.04 K/uL    Lymph # 0.9 (L) 1.0 - 4.8 K/uL    Mono # 0.5 0.3 - 1.0 K/uL    Eos # 0.2 0.0 - 0.5 K/uL    Baso # 0.03 0.00 - 0.20 K/uL    nRBC 0 0 /100 WBC    Gran % 76.3 (H) 38.0 - 73.0 %    Lymph % 13.2 (L) 18.0 - 48.0 %    Mono % 7.1 4.0 - 15.0 %    Eosinophil % 2.3 0.0 - 8.0 %    Basophil % 0.5 0.0 - 1.9 %    Differential Method Automated    Comprehensive metabolic panel    Collection Time: 12/10/24  3:18 PM   Result Value Ref Range    Sodium 137 136 - 145 mmol/L    Potassium 4.6 3.5 - 5.1 mmol/L    Chloride 104 95 - 110 mmol/L    CO2 23 23 - 29 mmol/L    Glucose 95 70 - 110 mg/dL    BUN 25 (H) 6 - 20 mg/dL    Creatinine 0.8 0.5 - 1.4 mg/dL    Calcium 9.9 8.7 - 10.5 mg/dL    Total Protein 7.9 6.0 - 8.4 g/dL    Albumin 3.9 3.5 - 5.2 g/dL    Total Bilirubin 0.4 0.1 - 1.0 mg/dL    Alkaline Phosphatase 72 40 - 150 U/L    AST 14 10 - 40 U/L    ALT 15 10 - 44 U/L    eGFR >60 >60 mL/min/1.73 m^2    Anion Gap 10 8 - 16 mmol/L   Troponin I #1    Collection Time: 12/10/24  3:18 PM   Result Value Ref Range    Troponin I <0.006 0.000 - 0.026 ng/mL   BNP    Collection Time: 12/10/24  3:18 PM   Result Value Ref Range    BNP 13 0 - 99 pg/mL   Influenza A & B by Molecular    Collection Time: 12/10/24  3:19 PM    Specimen: Nasopharyngeal Swab   Result Value Ref Range    Influenza A, Molecular Negative Negative    Influenza B, Molecular Negative Negative    Flu A & B Source Nasal swab    COVID-19 Rapid Screening    Collection Time: 12/10/24  3:19 PM   Result Value Ref Range    SARS-CoV-2 RNA,  Amplification, Qual Negative Negative         Imaging Results:  Imaging Results              X-Ray Chest AP Portable (Final result)  Result time 12/10/24 15:18:26      Final result by Wayne Reed MD (12/10/24 15:18:26)                   Impression:      1.  Negative for acute process involving the chest.    2.  Stable findings as noted above.      Electronically signed by: Wayne Reed MD  Date:    12/10/2024  Time:    15:18               Narrative:    EXAMINATION:  XR CHEST AP PORTABLE    CLINICAL HISTORY:  shortness of breath;    COMPARISON:  Studies dating back to November 9, 2022    FINDINGS:  EKG leads overlie the chest.  The lungs are clear. The cardiac silhouette size is normal. The trachea is midline and the mediastinal width is normal. Negative for focal infiltrate, effusion or pneumothorax. Pulmonary vasculature is normal. Negative for osseous abnormalities. Ectatic and tortuous aorta with aortic arch calcifications.  Degenerative changes of the spine.                                       The EKG was ordered, reviewed, and independently interpreted by the ED provider.  Interpretation time: 15:28  Rate: 75 BPM  Rhythm: normal sinus rhythm  Interpretation: Right bundle branch block. No STEMI.           The Emergency Provider reviewed the vital signs and test results, which are outlined above.     ED Discussion     4:40 PM: Reassessed pt at this time. Discussed with pt all pertinent ED information and results. Discussed pt dx and plan of tx. Gave pt all f/u and return to the ED instructions. All questions and concerns were addressed at this time. Pt expresses understanding of information and instructions, and is comfortable with plan to discharge. Pt is stable for discharge.    I discussed with patient and/or family/caretaker that evaluation in the ED does not suggest any emergent or life threatening medical conditions requiring immediate intervention beyond what was provided in the ED, and I believe  patient is safe for discharge.  Regardless, an unremarkable evaluation in the ED does not preclude the development or presence of a serious of life threatening condition. As such, patient was instructed to return immediately for any worsening or change in current symptoms.         Medical Decision Making  DDX: 1. Bronchitis 2. Pneumonia  3. Viral URI 4.. ACS    ECG reviewed and no acute ischemic changes, CXR normal, flu and covid negative, lab work reviewed and otherwise normal, VSS, overall feel she is safe for discharge will tx as bronchitis    Amount and/or Complexity of Data Reviewed  Labs: ordered. Decision-making details documented in ED Course.  Radiology: ordered. Decision-making details documented in ED Course.  ECG/medicine tests: ordered and independent interpretation performed. Decision-making details documented in ED Course.                ED Medication(s):  Medications - No data to display    Discharge Medication List as of 12/10/2024  4:38 PM           Follow-up Information       Center, [facility]. Schedule an appointment as soon as possible for a visit in 2 days.    Why: Return to the Emergency Room, If symptoms worsen  Contact information:  [address] [address] [address]  [phone]                                 Scribe Attestation:   Scribe #1: I performed the above scribed service and the documentation accurately describes the services I performed. I attest to the accuracy of the note.     Attending:   Physician Attestation Statement for Scribe #1: I, Gypsy Recio MD, personally performed the services described in this documentation, as scribed by Joyce Worthington, in my presence, and it is both accurate and complete.           Clinical Impression       ICD-10-CM ICD-9-CM   1. Bronchitis  J40 490   2. Shortness of breath  R06.02 786.05   3. Morbid obesity  E66.01 278.01   4. History of anxiety  Z86.59 V11.8       Disposition:   Disposition:  Discharged  Condition: Stable         Gypsy Recio MD  12/13/24 2095

## 2024-12-17 NOTE — ASSESSMENT & PLAN NOTE
Hypertensive urgency on presentation.  She is on multiple blood pressure medications at home  Will change her atenolol to Coreg 25 mg b.i.d. and add hydralazine 50 mg Q 8 to her current regimen.  12/6  Hypotensive after 3 antihtn  Repeat low  Placed in trendelenburg position  Mri with acute ischemia  Allow permissive htn  Discontinued antihtn      no

## 2025-01-29 ENCOUNTER — HOSPITAL ENCOUNTER (EMERGENCY)
Facility: HOSPITAL | Age: 60
Discharge: HOME | End: 2025-01-30
Attending: EMERGENCY MEDICINE
Payer: MEDICARE

## 2025-01-29 DIAGNOSIS — R07.9 CHEST PAIN: ICD-10-CM

## 2025-01-29 LAB
ALBUMIN SERPL BCP-MCNC: 4.3 G/DL (ref 3.5–5.2)
ALP SERPL-CCNC: 67 U/L (ref 40–150)
ALT SERPL W/O P-5'-P-CCNC: 21 U/L (ref 10–44)
ANION GAP SERPL CALC-SCNC: 10 MMOL/L (ref 8–16)
AST SERPL-CCNC: 17 U/L (ref 10–40)
BASOPHILS # BLD AUTO: 0.05 K/UL (ref 0–0.2)
BASOPHILS NFR BLD: 0.6 % (ref 0–1.9)
BILIRUB SERPL-MCNC: 0.5 MG/DL (ref 0.1–1)
BNP SERPL-MCNC: <10 PG/ML (ref 0–99)
BUN SERPL-MCNC: 16 MG/DL (ref 6–20)
CALCIUM SERPL-MCNC: 10.2 MG/DL (ref 8.7–10.5)
CHLORIDE SERPL-SCNC: 101 MMOL/L (ref 95–110)
CO2 SERPL-SCNC: 25 MMOL/L (ref 23–29)
CREAT SERPL-MCNC: 0.8 MG/DL (ref 0.5–1.4)
DIFFERENTIAL METHOD BLD: ABNORMAL
EOSINOPHIL # BLD AUTO: 0.2 K/UL (ref 0–0.5)
EOSINOPHIL NFR BLD: 2.5 % (ref 0–8)
ERYTHROCYTE [DISTWIDTH] IN BLOOD BY AUTOMATED COUNT: 14 % (ref 11.5–14.5)
EST. GFR  (NO RACE VARIABLE): >60 ML/MIN/1.73 M^2
GLUCOSE SERPL-MCNC: 98 MG/DL (ref 70–110)
HCT VFR BLD AUTO: 40.6 % (ref 37–48.5)
HCV AB SERPL QL IA: NEGATIVE
HEP C VIRUS HOLD SPECIMEN: NORMAL
HGB BLD-MCNC: 12.7 G/DL (ref 12–16)
HIV 1+2 AB+HIV1 P24 AG SERPL QL IA: NEGATIVE
IMM GRANULOCYTES # BLD AUTO: 0.05 K/UL (ref 0–0.04)
IMM GRANULOCYTES NFR BLD AUTO: 0.6 % (ref 0–0.5)
LYMPHOCYTES # BLD AUTO: 1.3 K/UL (ref 1–4.8)
LYMPHOCYTES NFR BLD: 16.5 % (ref 18–48)
MAGNESIUM SERPL-MCNC: 2 MG/DL (ref 1.6–2.6)
MCH RBC QN AUTO: 28 PG (ref 27–31)
MCHC RBC AUTO-ENTMCNC: 31.3 G/DL (ref 32–36)
MCV RBC AUTO: 90 FL (ref 82–98)
MONOCYTES # BLD AUTO: 0.7 K/UL (ref 0.3–1)
MONOCYTES NFR BLD: 8.3 % (ref 4–15)
NEUTROPHILS # BLD AUTO: 5.8 K/UL (ref 1.8–7.7)
NEUTROPHILS NFR BLD: 71.5 % (ref 38–73)
NRBC BLD-RTO: 0 /100 WBC
PLATELET # BLD AUTO: 345 K/UL (ref 150–450)
PMV BLD AUTO: 10 FL (ref 9.2–12.9)
POTASSIUM SERPL-SCNC: 4.8 MMOL/L (ref 3.5–5.1)
PROT SERPL-MCNC: 8.6 G/DL (ref 6–8.4)
RBC # BLD AUTO: 4.53 M/UL (ref 4–5.4)
SODIUM SERPL-SCNC: 136 MMOL/L (ref 136–145)
TROPONIN I SERPL DL<=0.01 NG/ML-MCNC: <0.006 NG/ML (ref 0–0.03)
WBC # BLD AUTO: 8.12 K/UL (ref 3.9–12.7)

## 2025-01-29 PROCEDURE — 99285 EMERGENCY DEPT VISIT HI MDM: CPT | Mod: 25

## 2025-01-29 PROCEDURE — 86803 HEPATITIS C AB TEST: CPT | Performed by: EMERGENCY MEDICINE

## 2025-01-29 PROCEDURE — 85025 COMPLETE CBC W/AUTO DIFF WBC: CPT | Performed by: EMERGENCY MEDICINE

## 2025-01-29 PROCEDURE — 83735 ASSAY OF MAGNESIUM: CPT | Performed by: EMERGENCY MEDICINE

## 2025-01-29 PROCEDURE — 87389 HIV-1 AG W/HIV-1&-2 AB AG IA: CPT | Performed by: EMERGENCY MEDICINE

## 2025-01-29 PROCEDURE — 84484 ASSAY OF TROPONIN QUANT: CPT | Performed by: EMERGENCY MEDICINE

## 2025-01-29 PROCEDURE — 25000242 PHARM REV CODE 250 ALT 637 W/ HCPCS: Performed by: EMERGENCY MEDICINE

## 2025-01-29 PROCEDURE — 80053 COMPREHEN METABOLIC PANEL: CPT | Performed by: EMERGENCY MEDICINE

## 2025-01-29 PROCEDURE — 83880 ASSAY OF NATRIURETIC PEPTIDE: CPT | Performed by: EMERGENCY MEDICINE

## 2025-01-29 PROCEDURE — 93005 ELECTROCARDIOGRAM TRACING: CPT

## 2025-01-29 PROCEDURE — 93010 ELECTROCARDIOGRAM REPORT: CPT | Mod: ,,, | Performed by: INTERNAL MEDICINE

## 2025-01-29 RX ORDER — NITROGLYCERIN 0.4 MG/1
0.4 TABLET SUBLINGUAL
Status: COMPLETED | OUTPATIENT
Start: 2025-01-29 | End: 2025-01-29

## 2025-01-29 RX ADMIN — NITROGLYCERIN 0.4 MG: 0.4 TABLET SUBLINGUAL at 09:01

## 2025-01-30 VITALS
BODY MASS INDEX: 42.83 KG/M2 | SYSTOLIC BLOOD PRESSURE: 126 MMHG | WEIGHT: 250.88 LBS | RESPIRATION RATE: 17 BRPM | OXYGEN SATURATION: 95 % | DIASTOLIC BLOOD PRESSURE: 68 MMHG | HEART RATE: 76 BPM | HEIGHT: 64 IN | TEMPERATURE: 98 F

## 2025-01-30 NOTE — ED PROVIDER NOTES
SCRIBE #1 NOTE: I, Lyssa Hurt, am scribing for, and in the presence of, Mynor Yadav MD. I have scribed the entire note.       History     Chief Complaint   Patient presents with    Chest Pain     Chest pain with left arm numbness and diarrhea     Review of patient's allergies indicates:   Allergen Reactions    Pcn [penicillins] Swelling    Acetaminophen Itching    Codeine     Latex, natural rubber     Tylox [oxycodone-acetaminophen]          History of Present Illness     HPI    2025, 11:53 PM  History obtained from the patient      History of Present Illness: Judy Ruelas is a 59 y.o. female patient with a PMHx of HTN who presents to the Emergency Department for evaluation of intermittent chest pain which began 2 weeks ago. The patient states that her chest pain extends down her left arm. The patient reports watery diarrhea at least 4-5 times per day for the last 2 weeks. She also takes aspirin daily. No mitigating or exacerbating factors reported. Associated sxs include nausea, SOB, and chest numbness. Patient denies any vomiting and diaphoresis during the time of chest pain episodes. Patient denies any recent antibiotic use, past diagnosis of C.diff, or any stents and blockages. No prior Tx specified.  The patient reported having chest pain at the time of examination. No further complaints or concerns at this time.       Arrival mode: Ambulance Service    [facility]        Past Medical History:  Past Medical History:   Diagnosis Date    Anxiety     Depression     Depression with anxiety     Family history of heart disease 2016    Hypertension     Irregular heart beat     Migraine headache     Morbid obesity with BMI of 40.0-44.9, adult 2017    NSTEMI (non-ST elevated myocardial infarction) 2017       Past Surgical History:  Past Surgical History:   Procedure Laterality Date    APPENDECTOMY       SECTION      HYSTERECTOMY           Family  History:  Family History   Problem Relation Name Age of Onset    Hypertension Unknown      Heart disease Father  70    Hypertension Father      Heart attack Mother  40        triple bypass    Diabetes Mother      Hypertension Mother      Stroke Mother      Heart attack Brother  40        CABG x 2       Social History:  Social History     Tobacco Use    Smoking status: Never    Smokeless tobacco: Never   Substance and Sexual Activity    Alcohol use: No     Alcohol/week: 0.0 standard drinks of alcohol     Comment: denies    Drug use: No     Comment: denies    Sexual activity: Not Currently        Review of Systems     Review of Systems   Constitutional:  Negative for diaphoresis and fever.   HENT:  Negative for sore throat.    Respiratory:  Positive for shortness of breath.    Cardiovascular:  Positive for chest pain.   Gastrointestinal:  Positive for diarrhea and nausea. Negative for vomiting.   Genitourinary:  Negative for dysuria.   Musculoskeletal:  Positive for myalgias (LUE). Negative for back pain.   Skin:  Negative for rash.   Neurological:  Positive for numbness (Chest.). Negative for weakness.   Hematological:  Does not bruise/bleed easily.   All other systems reviewed and are negative.       Physical Exam     Initial Vitals [01/29/25 1803]   BP Pulse Resp Temp SpO2   138/82 87 18 98 °F (36.7 °C) 96 %      MAP       --          Physical Exam  Nursing Notes and Vital Signs Reviewed.  Constitutional: Patient is in no acute distress. Well-developed and well-nourished.  Head: Atraumatic. Normocephalic.  Eyes: PERRL. EOM intact. Conjunctivae are not pale. No scleral icterus.  ENT: Mucous membranes are moist.    Neck: Supple. Full ROM.   Cardiovascular: Regular rate. Regular rhythm. No murmurs, rubs, or gallops. Distal pulses are 2+ and symmetric.  Pulmonary/Chest: No respiratory distress. Clear to auscultation bilaterally. No wheezing or rales.  Abdominal: Soft and non-distended.  There is no tenderness.  No  "rebound, guarding, or rigidity.   Genitourinary: No CVA tenderness.  Musculoskeletal: Moves all extremities. No obvious deformities. No edema.   Skin: Warm and dry.  Neurological:  Alert, awake, and appropriate.  Normal speech.  No acute focal neurological deficits are appreciated.  Psychiatric: Normal affect. Good eye contact. Appropriate in content.     ED Course   Procedures  ED Vital Signs:  Vitals:    01/29/25 1803 01/29/25 2032 01/29/25 2033 01/29/25 2045   BP: 138/82 (!) 140/81     Pulse: 87 81     Resp: 18 18     Temp: 98 °F (36.7 °C)      TempSrc: Oral      SpO2: 96%  97%    Weight:    113.8 kg (250 lb 14.1 oz)   Height:        01/29/25 2103 01/29/25 2106 01/29/25 2117 01/29/25 2130   BP: 137/77  (!) 141/66 (!) 150/72   Pulse: 75  80 79   Resp: 19  16 19   Temp:       TempSrc:       SpO2: 96%  (!) 94% 95%   Weight:  113.8 kg (250 lb 14.1 oz)     Height:  5' 4" (1.626 m)      01/29/25 2147 01/29/25 2200 01/29/25 2230 01/29/25 2300   BP: 127/67 127/75 (!) 127/59 127/73   Pulse: 76 76 106 74   Resp: 19 17     Temp:       TempSrc:       SpO2: 96% 96% (!) 94% 96%   Weight:       Height:        01/29/25 2330 01/29/25 2350   BP: 128/70 126/68   Pulse: 76 76   Resp:     Temp:     TempSrc:     SpO2: 95% 95%   Weight:     Height:         Abnormal Lab Results:  Labs Reviewed   CBC W/ AUTO DIFFERENTIAL - Abnormal       Result Value    WBC 8.12      RBC 4.53      Hemoglobin 12.7      Hematocrit 40.6      MCV 90      MCH 28.0      MCHC 31.3 (*)     RDW 14.0      Platelets 345      MPV 10.0      Immature Granulocytes 0.6 (*)     Gran # (ANC) 5.8      Immature Grans (Abs) 0.05 (*)     Lymph # 1.3      Mono # 0.7      Eos # 0.2      Baso # 0.05      nRBC 0      Gran % 71.5      Lymph % 16.5 (*)     Mono % 8.3      Eosinophil % 2.5      Basophil % 0.6      Differential Method Automated      Narrative:     Release to patient->Immediate   COMPREHENSIVE METABOLIC PANEL - Abnormal    Sodium 136      Potassium 4.8      Chloride " 101      CO2 25      Glucose 98      BUN 16      Creatinine 0.8      Calcium 10.2      Total Protein 8.6 (*)     Albumin 4.3      Total Bilirubin 0.5      Alkaline Phosphatase 67      AST 17      ALT 21      eGFR >60      Anion Gap 10      Narrative:     Release to patient->Immediate   B-TYPE NATRIURETIC PEPTIDE    BNP <10      Narrative:     Release to patient->Immediate   TROPONIN I    Troponin I <0.006      Narrative:     Release to patient->Immediate   HEPATITIS C ANTIBODY    Hepatitis C Ab Negative      Narrative:     Release to patient->Immediate   HEP C VIRUS HOLD SPECIMEN    HEP C Virus Hold Specimen Hold for HCV sendout      Narrative:     Release to patient->Immediate   HIV 1 / 2 ANTIBODY    HIV 1/2 Ag/Ab Negative      Narrative:     Release to patient->Immediate   MAGNESIUM   MAGNESIUM    Magnesium 2.0      Narrative:     Release to patient->Immediate        All Lab Results:  Results for orders placed or performed during the hospital encounter of 01/29/25   CBC auto differential    Collection Time: 01/29/25  8:37 PM   Result Value Ref Range    WBC 8.12 3.90 - 12.70 K/uL    RBC 4.53 4.00 - 5.40 M/uL    Hemoglobin 12.7 12.0 - 16.0 g/dL    Hematocrit 40.6 37.0 - 48.5 %    MCV 90 82 - 98 fL    MCH 28.0 27.0 - 31.0 pg    MCHC 31.3 (L) 32.0 - 36.0 g/dL    RDW 14.0 11.5 - 14.5 %    Platelets 345 150 - 450 K/uL    MPV 10.0 9.2 - 12.9 fL    Immature Granulocytes 0.6 (H) 0.0 - 0.5 %    Gran # (ANC) 5.8 1.8 - 7.7 K/uL    Immature Grans (Abs) 0.05 (H) 0.00 - 0.04 K/uL    Lymph # 1.3 1.0 - 4.8 K/uL    Mono # 0.7 0.3 - 1.0 K/uL    Eos # 0.2 0.0 - 0.5 K/uL    Baso # 0.05 0.00 - 0.20 K/uL    nRBC 0 0 /100 WBC    Gran % 71.5 38.0 - 73.0 %    Lymph % 16.5 (L) 18.0 - 48.0 %    Mono % 8.3 4.0 - 15.0 %    Eosinophil % 2.5 0.0 - 8.0 %    Basophil % 0.6 0.0 - 1.9 %    Differential Method Automated    Comprehensive metabolic panel    Collection Time: 01/29/25  8:37 PM   Result Value Ref Range    Sodium 136 136 - 145 mmol/L     Potassium 4.8 3.5 - 5.1 mmol/L    Chloride 101 95 - 110 mmol/L    CO2 25 23 - 29 mmol/L    Glucose 98 70 - 110 mg/dL    BUN 16 6 - 20 mg/dL    Creatinine 0.8 0.5 - 1.4 mg/dL    Calcium 10.2 8.7 - 10.5 mg/dL    Total Protein 8.6 (H) 6.0 - 8.4 g/dL    Albumin 4.3 3.5 - 5.2 g/dL    Total Bilirubin 0.5 0.1 - 1.0 mg/dL    Alkaline Phosphatase 67 40 - 150 U/L    AST 17 10 - 40 U/L    ALT 21 10 - 44 U/L    eGFR >60 >60 mL/min/1.73 m^2    Anion Gap 10 8 - 16 mmol/L   Brain Natriuretic Peptide    Collection Time: 01/29/25  8:37 PM   Result Value Ref Range    BNP <10 0 - 99 pg/mL   Troponin I    Collection Time: 01/29/25  8:37 PM   Result Value Ref Range    Troponin I <0.006 0.000 - 0.026 ng/mL   Hepatitis C Antibody    Collection Time: 01/29/25  8:37 PM   Result Value Ref Range    Hepatitis C Ab Negative Negative   HCV Virus Hold Specimen    Collection Time: 01/29/25  8:37 PM   Result Value Ref Range    HEP C Virus Hold Specimen Hold for HCV sendout    HIV 1/2 Ag/Ab (4th Gen)    Collection Time: 01/29/25  8:37 PM   Result Value Ref Range    HIV 1/2 Ag/Ab Negative Negative   Magnesium    Collection Time: 01/29/25  8:37 PM   Result Value Ref Range    Magnesium 2.0 1.6 - 2.6 mg/dL       Imaging Results:  Imaging Results              X-Ray Chest AP Portable (Final result)  Result time 01/29/25 20:51:41      Final result by Wayne Reed MD (01/29/25 20:51:41)                   Impression:      1.  Negative for acute process involving the chest.    2.  Stable findings as noted above.      Electronically signed by: Wayne Reed MD  Date:    01/29/2025  Time:    20:51               Narrative:    EXAMINATION:  XR CHEST AP PORTABLE    CLINICAL HISTORY:  Chest Pain;    COMPARISON:  Studies dating back to December 26, 2022    FINDINGS:  EKG leads overlie the chest, which is lordotic in position and slightly rotated to the left.  The lungs are clear. The cardiac silhouette size is normal. The trachea is midline and the mediastinal  "width is normal. Negative for focal infiltrate, effusion or pneumothorax. Pulmonary vasculature is normal. Negative for osseous abnormalities. Ectatic and tortuous aorta.  Marginal spondylosis.  Large left cardiophrenic fat pad again seen.                                       The EKG was ordered, reviewed, and independently interpreted by the ED provider.  Interpretation time: 18:03  Rate: 83 BPM  Rhythm: normal sinus rhythm  Interpretation: Right bundle branch block. No STEMI.           The Emergency Provider reviewed the vital signs and test results, which are outlined above.     ED Discussion     12:12 AM: Reassessed pt at this time. Discussed with patient and/or family/caretaker all pertinent ED information and results. Discussed pt dx and plan of tx. Gave patient all f/u and return to the ED instructions. All questions and concerns were addressed at this time. Patient and/or family/caretaker expresses understanding of information and instructions, and is comfortable with plan to discharge. Pt is stable for discharge.     I discussed with patient and/or family/caretaker that evaluation in the ED does not suggest any emergent or life threatening medical conditions requiring immediate intervention beyond what was provided in the ED, and I believe patient is safe for discharge.  Regardless, an unremarkable evaluation in the ED does not preclude the development or presence of a serious of life threatening condition. As such, I instructed that the patient is to return immediately for any worsening or change in current symptoms.      ED Course as of 01/30/25 0316 Wed Jan 29, 2025   2342 Prior EKGs reviewed.  Right bundle-branch block is not new [DP]   2343 Chart reviewed.  Emergency department note from an outside facility reviewed from 2024.  Patient presented with chest pain.  Excerpt from that note " CP workup negative. Many similar presentation for same, followed by Dr. Pro. Review of Dr. Pro's most " "recent note from 11/1/2023 notes that cardiac etiology of recurrent chest pain and dyspnea is unlikely." [DP]   2346 Chart further reviewed.  Patient did have an NSTEMI in 2017.  Left heart catheterization reviewed [DP]   2348 Chart further reviewed.  Cardiologist note (Dr. Pro) from last month reviewed. Excerpt from that note - "Assessment   1. Shortness of breath. I do not think either her shortness of breath or her chest pain are cardiac in origin. She has had multiple emergency room workups with negative BNPs and negative troponins. I do worry about the presence of obesity hypoventilation and obstructive sleep apnea. An echocardiogram May 2024 showed a normal left ventricular systolic function and no significant valvular abnormalities" [DP]      ED Course User Index  [DP] Mynor Yadav MD     Medical Decision Making  59-year-old female presenting with chest pain and diarrhea.  No significant acute abnormalities found in ED workup.  Chart was reviewed extensively including past cardiology notes/tests.  Patient discharged home with outpatient follow-up.  Continue medical management    Amount and/or Complexity of Data Reviewed  External Data Reviewed: notes.     Details: Past medical history, medications, allergies reviewed  Labs: ordered. Decision-making details documented in ED Course.  Radiology: ordered and independent interpretation performed. Decision-making details documented in ED Course.  ECG/medicine tests: ordered and independent interpretation performed. Decision-making details documented in ED Course.    Risk  Prescription drug management.                ED Medication(s):  Medications   nitroGLYCERIN SL tablet 0.4 mg (0.4 mg Sublingual Given 1/29/25 2104)       Discharge Medication List as of 1/29/2025 11:50 PM           Follow-up Information       Follow up with your cardiologist.               [facility] - Emergency Dept..    Specialty: Emergency Medicine  Why: As needed, If symptoms " worsen  Contact information:  [id number] [facility] [address] [phone]                               Scribe Attestation:   Scribe #1: I performed the above scribed service and the documentation accurately describes the services I performed. I attest to the accuracy of the note.     Attending:   Physician Attestation Statement for Scribe #1: I, Mynor Yadav MD, personally performed the services described in this documentation, as scribed by Lyssa Hurt, in my presence, and it is both accurate and complete.           Clinical Impression       ICD-10-CM ICD-9-CM   1. Chest pain  R07.9 786.50       Disposition:   Disposition: Discharged  Condition: Stable        Mynor Yadav MD  01/30/25 3986

## 2025-01-30 NOTE — ED PROVIDER NOTES
"SCRIBE #1 NOTE: I, Tiki Neely and Lyssa Hurt, am scribing for, and in the presence of, Mynor Yadav MD. I have scribed the entire note.       History     Chief Complaint   Patient presents with    Chest Pain     Chest pain with left arm numbness and diarrhea     Review of patient's allergies indicates:   Allergen Reactions    Pcn [penicillins] Swelling    Acetaminophen Itching    Codeine     Latex, natural rubber     Tylox [oxycodone-acetaminophen]          History of Present Illness     HPI    2025, 9:04 PM  History obtained from the {Historian:86383::"patient","medical records"}      History of Present Illness: Judy Ruelas is a 59 y.o. female patient with {PMHx:32002::"a PMHx of"} *** who presents to the Emergency Department for evaluation of *** which began ***. Symptoms are constant*** and moderate*** in severity. No mitigating or exacerbating factors reported***. Associated sxs include ***. Patient denies any *** or ***. {Prior Tx:84693}  No further complaints or concerns at this time.       Arrival mode: {Transportation list:88080}    PCP: [place], [facility]        Past Medical History:  Past Medical History:   Diagnosis Date    Anxiety     Depression     Depression with anxiety     Family history of heart disease 2016    Hypertension     Irregular heart beat     Migraine headache     Morbid obesity with BMI of 40.0-44.9, adult 2017    NSTEMI (non-ST elevated myocardial infarction) 2017       Past Surgical History:  Past Surgical History:   Procedure Laterality Date    APPENDECTOMY       SECTION      HYSTERECTOMY           Family History:  Family History   Problem Relation Name Age of Onset    Hypertension Unknown      Heart disease Father  70    Hypertension Father      Heart attack Mother  40        triple bypass    Diabetes Mother      Hypertension Mother      Stroke Mother      Heart attack Brother  40        CABG x 2       Social " "History:  Social History     Tobacco Use    Smoking status: Never    Smokeless tobacco: Never   Substance and Sexual Activity    Alcohol use: No     Alcohol/week: 0.0 standard drinks of alcohol     Comment: denies    Drug use: No     Comment: denies    Sexual activity: Not Currently        Review of Systems     Review of Systems  ***   Physical Exam     Initial Vitals [01/29/25 1803]   BP Pulse Resp Temp SpO2   138/82 87 18 98 °F (36.7 °C) 96 %      MAP       --          Physical Exam  Nursing Notes and Vital Signs Reviewed.  Constitutional: Patient is in {DISTRESS LEVEL:75026}. Well-developed and well-nourished.***  Head: Atraumatic***. Normocephalic***.  Eyes: PERRL.*** EOM intact.*** Conjunctivae are not pale.*** No scleral icterus.***  ENT: Mucous membranes are {MM:88214::"moist"}. Oropharynx is clear*** and symmetric***.    Neck: Supple.*** Full ROM.*** No lymphadenopathy.***  Cardiovascular: Regular rate***. Regular rhythm***. No murmurs, rubs, or gallops.*** Distal pulses are 2+ and symmetric***.  Pulmonary/Chest: No*** respiratory distress. Clear to auscultation bilaterally***. No wheezing*** or rales.***  Abdominal: Soft and non-distended.***  There is no*** tenderness.  No rebound, guarding, or rigidity. Good bowel sounds***.  Genitourinary: No*** CVA tenderness.  Musculoskeletal: Moves all extremities.*** No obvious deformities***. No edema***. No calf tenderness***.  Skin: Warm and dry.***  Neurological:  Alert***, awake***, and appropriate***.  Normal speech***.  No acute focal neurological deficits are appreciated.***  Psychiatric: Normal affect.*** Good*** eye contact. Appropriate*** in content.     ED Course   Procedures  ED Vital Signs:  Vitals:    01/29/25 1803 01/29/25 2032 01/29/25 2033 01/29/25 2045   BP: 138/82 (!) 140/81     Pulse: 87 81     Resp: 18 18     Temp: 98 °F (36.7 °C)      TempSrc: Oral      SpO2: 96%  97%    Weight:    113.8 kg (250 lb 14.1 oz)    01/29/25 2103   BP: 137/77 "   Pulse: 75   Resp: 19   Temp:    TempSrc:    SpO2: 96%   Weight:        Abnormal Lab Results:  Labs Reviewed   COMPREHENSIVE METABOLIC PANEL - Abnormal       Result Value    Sodium 136      Potassium 4.8      Chloride 101      CO2 25      Glucose 98      BUN 16      Creatinine 0.8      Calcium 10.2      Total Protein 8.6 (*)     Albumin 4.3      Total Bilirubin 0.5      Alkaline Phosphatase 67      AST 17      ALT 21      eGFR >60      Anion Gap 10      Narrative:     Release to patient->Immediate   HEP C VIRUS HOLD SPECIMEN    HEP C Virus Hold Specimen Hold for HCV sendout      Narrative:     Release to patient->Immediate   MAGNESIUM   CBC W/ AUTO DIFFERENTIAL   B-TYPE NATRIURETIC PEPTIDE   TROPONIN I   HEPATITIS C ANTIBODY   HIV 1 / 2 ANTIBODY   MAGNESIUM        All Lab Results:  {LABS TODAY:07375}    Imaging Results:  Imaging Results              X-Ray Chest AP Portable (Final result)  Result time 01/29/25 20:51:41      Final result by Wayne Reed MD (01/29/25 20:51:41)                   Impression:      1.  Negative for acute process involving the chest.    2.  Stable findings as noted above.      Electronically signed by: Wayne Reed MD  Date:    01/29/2025  Time:    20:51               Narrative:    EXAMINATION:  XR CHEST AP PORTABLE    CLINICAL HISTORY:  Chest Pain;    COMPARISON:  Studies dating back to December 26, 2022    FINDINGS:  EKG leads overlie the chest, which is lordotic in position and slightly rotated to the left.  The lungs are clear. The cardiac silhouette size is normal. The trachea is midline and the mediastinal width is normal. Negative for focal infiltrate, effusion or pneumothorax. Pulmonary vasculature is normal. Negative for osseous abnormalities. Ectatic and tortuous aorta.  Marginal spondylosis.  Large left cardiophrenic fat pad again seen.                                       The EKG was ordered, reviewed, and independently interpreted by the ED provider.  Interpretation  "time: ***  Rate: *** BPM  Rhythm: {rhythm:67032}  Interpretation: ***. No STEMI.  When compared to EKG performed ***, there are no significant changes.           The Emergency Provider reviewed the vital signs and test results, which are outlined above.     ED Discussion     {PLAN:98203}    ***    ED Course as of 01/29/25 2350   Wed Jan 29, 2025   2342 Prior EKGs reviewed.  Right bundle-branch block is not new [DP]   2343 Chart reviewed.  Emergency department note from an outside facility reviewed from 2024.  Patient presented with chest pain.  Excerpt from that note " CP workup negative. Many similar presentation for same, followed by Dr. Pro. Review of Dr. Pro's most recent note from 11/1/2023 notes that cardiac etiology of recurrent chest pain and dyspnea is unlikely." [DP]   2346 Chart further reviewed.  Patient did have an NSTEMI in 2017.  Left heart catheterization reviewed [DP]   2348 Chart further reviewed.  Cardiologist note (Dr. Pro) from last month reviewed. Excerpt from that note - "Assessment   1. Shortness of breath. I do not think either her shortness of breath or her chest pain are cardiac in origin. She has had multiple emergency room workups with negative BNPs and negative troponins. I do worry about the presence of obesity hypoventilation and obstructive sleep apnea. An echocardiogram May 2024 showed a normal left ventricular systolic function and no significant valvular abnormalities" [DP]      ED Course User Index  [DP] Mynor Yadav MD     Medical Decision Making  Risk  Prescription drug management.                ED Medication(s):  Medications   nitroGLYCERIN SL tablet 0.4 mg (has no administration in time range)       New Prescriptions    No medications on file               Scribe Attestation:   Scribe #1: I performed the above scribed service and the documentation accurately describes the services I performed. I attest to the accuracy of the note.     Attending:   Physician " Attestation Statement for Scribe #1: I, Mynor Yadav MD, personally performed the services described in this documentation, as scribed by Tiki Neely and Lyssa Hurt, in my presence, and it is both accurate and complete.           Clinical Impression       ICD-10-CM ICD-9-CM   1. Chest pain  R07.9 786.50

## 2025-01-31 LAB
OHS QRS DURATION: 122 MS
OHS QTC CALCULATION: 467 MS
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
## 2025-03-24 ENCOUNTER — HOSPITAL ENCOUNTER (EMERGENCY)
Facility: HOSPITAL | Age: 60
Discharge: HOME | End: 2025-03-25
Attending: EMERGENCY MEDICINE
Payer: MEDICARE

## 2025-03-24 DIAGNOSIS — M94.0 COSTOCHONDRITIS: ICD-10-CM

## 2025-03-24 DIAGNOSIS — R07.9 CHEST PAIN, UNSPECIFIED TYPE: Primary | ICD-10-CM

## 2025-03-24 DIAGNOSIS — R06.02 SHORTNESS OF BREATH: ICD-10-CM

## 2025-03-24 LAB
ABSOLUTE EOSINOPHIL (OHS): 0.21 K/UL
ABSOLUTE MONOCYTE (OHS): 0.63 K/UL (ref 0.3–1)
ABSOLUTE NEUTROPHIL COUNT (OHS): 6.89 K/UL (ref 1.8–7.7)
ALBUMIN SERPL BCP-MCNC: 4 G/DL (ref 3.5–5.2)
ALP SERPL-CCNC: 71 UNIT/L (ref 40–150)
ALT SERPL W/O P-5'-P-CCNC: 17 UNIT/L (ref 10–44)
ANION GAP (OHS): 12 MMOL/L (ref 8–16)
AST SERPL-CCNC: 16 UNIT/L (ref 11–45)
BASOPHILS # BLD AUTO: 0.04 K/UL
BASOPHILS NFR BLD AUTO: 0.4 %
BILIRUB SERPL-MCNC: 0.5 MG/DL (ref 0.1–1)
BNP SERPL-MCNC: 33 PG/ML (ref 0–99)
BUN SERPL-MCNC: 14 MG/DL (ref 6–20)
CALCIUM SERPL-MCNC: 9.5 MG/DL (ref 8.7–10.5)
CHLORIDE SERPL-SCNC: 108 MMOL/L (ref 95–110)
CO2 SERPL-SCNC: 19 MMOL/L (ref 23–29)
CREAT SERPL-MCNC: 0.7 MG/DL (ref 0.5–1.4)
ERYTHROCYTE [DISTWIDTH] IN BLOOD BY AUTOMATED COUNT: 14.2 % (ref 11.5–14.5)
GFR SERPLBLD CREATININE-BSD FMLA CKD-EPI: >60 ML/MIN/1.73/M2
GLUCOSE SERPL-MCNC: 100 MG/DL (ref 70–110)
HCT VFR BLD AUTO: 37.1 % (ref 37–48.5)
HGB BLD-MCNC: 11.8 GM/DL (ref 12–16)
IMM GRANULOCYTES # BLD AUTO: 0.05 K/UL (ref 0–0.04)
IMM GRANULOCYTES NFR BLD AUTO: 0.5 % (ref 0–0.5)
INFLUENZA A MOLECULAR (OHS): NEGATIVE
INFLUENZA B MOLECULAR (OHS): NEGATIVE
LYMPHOCYTES # BLD AUTO: 1.3 K/UL (ref 1–4.8)
MCH RBC QN AUTO: 28.8 PG (ref 27–50)
MCHC RBC AUTO-ENTMCNC: 31.8 G/DL (ref 32–36)
MCV RBC AUTO: 91 FL (ref 82–98)
NUCLEATED RBC (/100WBC) (OHS): 0 /100 WBC
PLATELET # BLD AUTO: 357 K/UL (ref 150–450)
PMV BLD AUTO: 10.2 FL (ref 9.2–12.9)
POTASSIUM SERPL-SCNC: 4 MMOL/L (ref 3.5–5.1)
PROT SERPL-MCNC: 7.9 GM/DL (ref 6–8.4)
RBC # BLD AUTO: 4.1 M/UL (ref 4–5.4)
RELATIVE EOSINOPHIL (OHS): 2.3 %
RELATIVE LYMPHOCYTE (OHS): 14.3 % (ref 18–48)
RELATIVE MONOCYTE (OHS): 6.9 % (ref 4–15)
RELATIVE NEUTROPHIL (OHS): 75.6 % (ref 38–73)
SARS-COV-2 RDRP RESP QL NAA+PROBE: NEGATIVE
SODIUM SERPL-SCNC: 139 MMOL/L (ref 136–145)
TROPONIN I SERPL DL<=0.01 NG/ML-MCNC: <0.006 NG/ML
WBC # BLD AUTO: 9.12 K/UL (ref 3.9–12.7)

## 2025-03-24 PROCEDURE — 85025 COMPLETE CBC W/AUTO DIFF WBC: CPT | Performed by: NURSE PRACTITIONER

## 2025-03-24 PROCEDURE — 87502 INFLUENZA DNA AMP PROBE: CPT | Performed by: NURSE PRACTITIONER

## 2025-03-24 PROCEDURE — U0002 COVID-19 LAB TEST NON-CDC: HCPCS | Performed by: NURSE PRACTITIONER

## 2025-03-24 PROCEDURE — 84484 ASSAY OF TROPONIN QUANT: CPT | Performed by: NURSE PRACTITIONER

## 2025-03-24 PROCEDURE — 87502 INFLUENZA DNA AMP PROBE: CPT

## 2025-03-24 PROCEDURE — 83880 ASSAY OF NATRIURETIC PEPTIDE: CPT | Performed by: NURSE PRACTITIONER

## 2025-03-24 PROCEDURE — 25500020 PHARM REV CODE 255: Performed by: EMERGENCY MEDICINE

## 2025-03-24 PROCEDURE — 94640 AIRWAY INHALATION TREATMENT: CPT

## 2025-03-24 PROCEDURE — 93005 ELECTROCARDIOGRAM TRACING: CPT

## 2025-03-24 PROCEDURE — 25000242 PHARM REV CODE 250 ALT 637 W/ HCPCS: Performed by: EMERGENCY MEDICINE

## 2025-03-24 PROCEDURE — 80053 COMPREHEN METABOLIC PANEL: CPT | Performed by: NURSE PRACTITIONER

## 2025-03-24 PROCEDURE — 93010 ELECTROCARDIOGRAM REPORT: CPT | Mod: ,,, | Performed by: INTERNAL MEDICINE

## 2025-03-24 PROCEDURE — 99285 EMERGENCY DEPT VISIT HI MDM: CPT | Mod: 25

## 2025-03-24 RX ORDER — IPRATROPIUM BROMIDE AND ALBUTEROL SULFATE 2.5; .5 MG/3ML; MG/3ML
3 SOLUTION RESPIRATORY (INHALATION)
Status: COMPLETED | OUTPATIENT
Start: 2025-03-24 | End: 2025-03-24

## 2025-03-24 RX ADMIN — IPRATROPIUM BROMIDE AND ALBUTEROL SULFATE 3 ML: 2.5; .5 SOLUTION RESPIRATORY (INHALATION) at 10:03

## 2025-03-24 RX ADMIN — IOHEXOL 100 ML: 350 INJECTION, SOLUTION INTRAVENOUS at 11:03

## 2025-03-25 VITALS
HEART RATE: 80 BPM | RESPIRATION RATE: 19 BRPM | TEMPERATURE: 98 F | DIASTOLIC BLOOD PRESSURE: 54 MMHG | OXYGEN SATURATION: 96 % | SYSTOLIC BLOOD PRESSURE: 106 MMHG

## 2025-03-25 LAB
OHS QRS DURATION: 112 MS
OHS QTC CALCULATION: 462 MS
TROPONIN I SERPL DL<=0.01 NG/ML-MCNC: <0.006 NG/ML

## 2025-03-25 PROCEDURE — 84484 ASSAY OF TROPONIN QUANT: CPT | Performed by: EMERGENCY MEDICINE

## 2025-03-25 RX ORDER — NAPROXEN 500 MG/1
500 TABLET ORAL 2 TIMES DAILY WITH MEALS
Qty: 20 TABLET | Refills: 0 | Status: SHIPPED | OUTPATIENT
Start: 2025-03-25

## 2025-03-25 NOTE — ED PROVIDER NOTES
SCRIBE #1 NOTE: I, Tiki Neely, am scribing for, and in the presence of, Maco Rocha Jr., MD. I have scribed the entire note.       History     Chief Complaint   Patient presents with    Shortness of Breath     Short of breath started yesterday, worse today. + cough, + chest pain. Denies fever.     Review of patient's allergies indicates:   Allergen Reactions    Pcn [penicillins] Swelling    Acetaminophen Itching    Codeine     Latex, natural rubber     Oxycodone Hives    Tylox [oxycodone-acetaminophen]          History of Present Illness     HPI    3/24/2025, 9:48 PM                                                                          History obtained from the patient and medical records      History of Present Illness: Judy Ruelas is a 59 y.o. female patient with a PMHx of hypertension, depression, anxiety, NSTEMI, and morbid obesity who presents to the Emergency Department for evaluation of worsening shortness of breath which began last night. Pt states she does not use O2 at home. Symptoms are constant and moderate in severity. Associated sxs include dysuria that began 1 week ago, bilateral leg swelling, and chest pain. Patient denies any back pain or fever. No prior Tx specified.  No further complaints or concerns at this time.       Arrival mode: Personal Transportation    PCP: No, Primary Doctor        Past Medical History:  Past Medical History:   Diagnosis Date    Anxiety     Depression     Depression with anxiety     Family history of heart disease 2016    Hypertension     Irregular heart beat     Migraine headache     Morbid obesity with BMI of 40.0-44.9, adult 2017    NSTEMI (non-ST elevated myocardial infarction) 2017       Past Surgical History:  Past Surgical History:   Procedure Laterality Date    APPENDECTOMY       SECTION      HYSTERECTOMY           Family History:  Family History   Problem Relation Name Age of Onset    Hypertension Unknown      Heart disease  Father  70    Hypertension Father      Heart attack Mother  40        triple bypass    Diabetes Mother      Hypertension Mother      Stroke Mother      Heart attack Brother  40        CABG x 2       Social History:  Social History     Tobacco Use    Smoking status: Never    Smokeless tobacco: Never   Substance and Sexual Activity    Alcohol use: No     Alcohol/week: 0.0 standard drinks of alcohol     Comment: denies    Drug use: No     Comment: denies    Sexual activity: Not Currently        Review of Systems     Review of Systems   Constitutional:  Negative for fever.   HENT:  Negative for sore throat.    Respiratory:  Positive for shortness of breath.    Cardiovascular:  Positive for chest pain and leg swelling (bilateral).   Gastrointestinal:  Negative for nausea.   Genitourinary:  Positive for dysuria.   Musculoskeletal:  Negative for back pain.   Skin:  Negative for rash.   Neurological:  Negative for weakness.   Hematological:  Does not bruise/bleed easily.   All other systems reviewed and are negative.       Physical Exam     Initial Vitals [03/24/25 1854]   BP Pulse Resp Temp SpO2   (!) 141/71 89 (!) 44 98.1 °F (36.7 °C) 96 %      MAP       --          Physical Exam  Nursing Notes and Vital Signs Reviewed.  Constitutional: Patient is in no acute distress. Well-developed and well-nourished.  Head: Atraumatic. Normocephalic.  Eyes: PERRL. EOM intact. Conjunctivae are not pale. No scleral icterus.  ENT: Mucous membranes are moist. Oropharynx is clear and symmetric.    Neck: Supple. Full ROM. No lymphadenopathy.  Cardiovascular: Regular rate. Regular rhythm. No murmurs, rubs, or gallops. Distal pulses are 2+ and symmetric.  Pulmonary/Chest: No respiratory distress. Clear to auscultation bilaterally. No wheezing or rales. Right-sided anterior chest wall tenderness to palpation reproducible to pt's complaint  Abdominal: Soft and non-distended.  There is no tenderness.  No rebound, guarding, or  rigidity.  Genitourinary: No CVA tenderness.  Musculoskeletal: Moves all extremities. No obvious deformities. 1+ pitting edema to bilateral mid calf.  Skin: Warm and dry.  Neurological:  Alert, awake, and appropriate.  Normal speech.  No acute focal neurological deficits are appreciated.  Psychiatric: Normal affect. Good eye contact. Appropriate in content.     ED Course   Procedures  ED Vital Signs:  Vitals:    03/24/25 2215 03/24/25 2230 03/24/25 2245 03/24/25 2300   BP: 115/63 119/62 114/67 (!) 109/55   Pulse: 77 82 83 84   Resp: (!) 22 19 (!) 21 20   Temp:       TempSrc:       SpO2: 100% 97% 96% 97%    03/24/25 2317 03/24/25 2345 03/25/25 0000 03/25/25 0015   BP: (!) 127/58 129/69 118/60 (!) 115/57   Pulse: 83  82 80   Resp: (!) 22      Temp:       TempSrc:       SpO2: 96%  97% 97%    03/25/25 0030 03/25/25 0041 03/25/25 0045 03/25/25 0100   BP: (!) 111/56  (!) 101/56 (!) 100/51   Pulse: 79 80 81 79   Resp:       Temp:       TempSrc:       SpO2: 97% 97% 97% 95%    03/25/25 0115 03/25/25 0130 03/25/25 0145   BP: (!) 107/55 (!) 107/55 (!) 106/54   Pulse: 81 82 80   Resp:  19    Temp:  98.3 °F (36.8 °C)    TempSrc:  Oral    SpO2: 96% 95% 96%       Abnormal Lab Results:  Labs Reviewed   COMPREHENSIVE METABOLIC PANEL - Abnormal       Result Value    Sodium 139      Potassium 4.0      Chloride 108      CO2 19 (*)     Glucose 100      BUN 14      Creatinine 0.7      Calcium 9.5      Protein Total 7.9      Albumin 4.0      Bilirubin Total 0.5      ALP 71      AST 16      ALT 17      Anion Gap 12      eGFR >60     CBC WITH DIFFERENTIAL - Abnormal    WBC 9.12      RBC 4.10      HGB 11.8 (*)     HCT 37.1      MCV 91      MCH 28.8      MCHC 31.8 (*)     RDW 14.2      Platelet Count 357      MPV 10.2      Nucleated RBC 0      Neut % 75.6 (*)     Lymph % 14.3 (*)     Mono % 6.9      Eos % 2.3      Basophil % 0.4      Imm Grans % 0.5      Neut # 6.89      Lymph # 1.30      Mono # 0.63      Eos # 0.21      Baso # 0.04      Imm  Grans # 0.05 (*)    INFLUENZA A & B BY MOLECULAR - Normal    INFLUENZA A MOLECULAR Negative      INFLUENZA B MOLECULAR  Negative     TROPONIN I - Normal    Troponin-I <0.006     B-TYPE NATRIURETIC PEPTIDE - Normal    BNP 33     SARS-COV-2 RNA AMPLIFICATION, QUAL - Normal    SARS COV-2 Molecular Negative     TROPONIN I - Normal    Troponin-I <0.006     CBC W/ AUTO DIFFERENTIAL    Narrative:     The following orders were created for panel order CBC Auto Differential.  Procedure                               Abnormality         Status                     ---------                               -----------         ------                     CBC with Differential[[id number]]       Abnormal            Final result                 Please view results for these tests on the individual orders.        All Lab Results:  Results for orders placed or performed during the hospital encounter of 03/24/25   EKG 12-lead    Collection Time: 03/24/25  6:53 PM   Result Value Ref Range    QRS Duration 112 ms    [facility] QTC Calculation 462 ms   Influenza A & B by Molecular    Collection Time: 03/24/25  7:33 PM    Specimen: Nasal Swab   Result Value Ref Range    INFLUENZA A MOLECULAR Negative Negative    INFLUENZA B MOLECULAR  Negative Negative   Comprehensive Metabolic Panel    Collection Time: 03/24/25  7:33 PM   Result Value Ref Range    Sodium 139 136 - 145 mmol/L    Potassium 4.0 3.5 - 5.1 mmol/L    Chloride 108 95 - 110 mmol/L    CO2 19 (L) 23 - 29 mmol/L    Glucose 100 70 - 110 mg/dL    BUN 14 6 - 20 mg/dL    Creatinine 0.7 0.5 - 1.4 mg/dL    Calcium 9.5 8.7 - 10.5 mg/dL    Protein Total 7.9 6.0 - 8.4 gm/dL    Albumin 4.0 3.5 - 5.2 g/dL    Bilirubin Total 0.5 0.1 - 1.0 mg/dL    ALP 71 40 - 150 unit/L    AST 16 11 - 45 unit/L    ALT 17 10 - 44 unit/L    Anion Gap 12 8 - 16 mmol/L    eGFR >60 >60 mL/min/1.73/m2   Troponin I    Collection Time: 03/24/25  7:33 PM   Result Value Ref Range    Troponin-I <0.006 <=0.026 ng/mL   Brain  Natriuretic Peptide    Collection Time: 03/24/25  7:33 PM   Result Value Ref Range    BNP 33 0 - 99 pg/mL   CBC with Differential    Collection Time: 03/24/25  7:33 PM   Result Value Ref Range    WBC 9.12 3.90 - 12.70 K/uL    RBC 4.10 4.00 - 5.40 M/uL    HGB 11.8 (L) 12.0 - 16.0 gm/dL    HCT 37.1 37.0 - 48.5 %    MCV 91 82 - 98 fL    MCH 28.8 27.0 - 50.0 pg    MCHC 31.8 (L) 32.0 - 36.0 g/dL    RDW 14.2 11.5 - 14.5 %    Platelet Count 357 150 - 450 K/uL    MPV 10.2 9.2 - 12.9 fL    Nucleated RBC 0 <=0 /100 WBC    Neut % 75.6 (H) 38 - 73 %    Lymph % 14.3 (L) 18 - 48 %    Mono % 6.9 4 - 15 %    Eos % 2.3 <=8 %    Basophil % 0.4 <=1.9 %    Imm Grans % 0.5 0.0 - 0.5 %    Neut # 6.89 1.8 - 7.7 K/uL    Lymph # 1.30 1 - 4.8 K/uL    Mono # 0.63 0.3 - 1 K/uL    Eos # 0.21 <=0.5 K/uL    Baso # 0.04 <=0.2 K/uL    Imm Grans # 0.05 (H) 0.00 - 0.04 K/uL   COVID-19 Rapid Screening    Collection Time: 03/24/25  7:33 PM   Result Value Ref Range    SARS COV-2 Molecular Negative Negative   Troponin I    Collection Time: 03/25/25 12:48 AM   Result Value Ref Range    Troponin-I <0.006 <=0.026 ng/mL       Imaging Results:  Imaging Results              CTA Chest Non-Coronary (PE Studies) (Final result)  Result time 03/25/25 07:35:22      Final result by Ross Gage MD (03/25/25 07:35:22)                   Impression:      1. No definite acute finding.  Somewhat limited exam but no evidence of significant PE.  2. Right axillary lymphadenopathy of uncertain clinical significance.  Consider further evaluation on a nonemergent outpatient basis.  3. Small hiatal hernia, hepatic steatosis, and other findings as above.      Electronically signed by: Ross Gage  Date:    03/25/2025  Time:    07:35               Narrative:    EXAMINATION:  CTA CHEST NON CORONARY (PE STUDIES)    CLINICAL HISTORY:  Pulmonary embolism (PE) suspected, high prob;, chest pain    COMPARISON:  None available.    TECHNIQUE:  Axial CT images were obtained of the  chest.  Iterative reconstruction technique was used. The CT exam was performed using one or more of the following dose reduction techniques- Automated exposure control, adjustment of the mA and/or kV according to patient size, and/or use of iterative reconstructed technique.  Low dose axial images were obtained, sagittal and coronal reformations were created.  100 mL Omnipaque 350 IV contrast was administered.  Contrast timing was optimized to evaluate the vascular structures.  MIP images were performed.    FINDINGS:  Coronary artery calcification: Present.    Pulmonary Arteries: Somewhat limited evaluation.  No large or central pulmonary embolism.    Aorta: No Aneurysm.    Lungs: Clear.    Pleural space:No pleural effusion or pneumothorax.    Heart: Normal size. No pericardial effusion.    Bones/joints: No acute finding.    Other: Right axillary lymphadenopathy.  Moderately sized hiatal hernia contains the proximal stomach.  Hepatic steatosis.                                       X-Ray Chest 1 View (Final result)  Result time 03/24/25 19:34:44      Final result by Darrell Cole MD (03/24/25 19:34:44)                   Impression:     No acute abnormality.    Finalized on: 3/24/2025 7:34 PM By:  Darrell Cole MD  [facility]# [id number]      2025-03-24 19:36:53.216     [facility]               Narrative:    EXAM:    XR CHEST 1 VIEW    CLINICAL HISTORY:    Shortness of breath    FINDINGS:  Heart size is normal.  The lung fields are clear.  No acute cardiopulmonary infiltrate.  No pleural fluid or pneumothorax.                                         No EKG was ordered.           The Emergency Provider reviewed the vital signs and test results, which are outlined above.     ED Discussion       1:30 AM: Reassessed pt at this time. Discussed with patient and/or family/caretaker all pertinent ED information and results. Discussed pt dx and plan of tx. Gave the patient all f/u and return to the ED instructions. All questions and  concerns were addressed at this time. Patient and/or family/caretaker expresses understanding of information and instructions, and is comfortable with plan to discharge. Pt is stable for discharge.     I discussed with patient and/or family/caretaker that evaluation in the ED does not suggest any emergent or life threatening medical conditions requiring immediate intervention beyond what was provided in the ED, and I believe patient is safe for discharge.  Regardless, an unremarkable evaluation in the ED does not preclude the development or presence of a serious of life threatening condition. As such, I instructed that the patient is to return immediately for any worsening or change in current symptoms.    Regarding CHEST PAIN, I advised the patient that chest pain can be caused by a range of conditions, from not serious to life-threatening such as: heart attack or a blood clot in your lungs, angina indicating poor blood flow to the heart; infection, inflammation, or a fracture in the bones or cartilage in chest wall; a digestive problem, such as acid reflux or a stomach ulcer; or even an anxiety attack.  Instructed patient to follow up with primary healthcare provider for reevaluation and possible diagnostic studies to find the actual cause of the chest pain. Patient was instructed to call 911 or go to the nearest emergency department if they develop any of the following signs of a heart attack: squeezing, pressure, or pain in the chest that lasts longer than five minutes or returns; discomfort or pain in the back, neck, jaw, stomach, or arm; trouble breathing; nausea or vomiting; lightheadedness;  or a sudden cold sweat, especially with chest pain or trouble breathing.  Also return to the emergency department the chest discomfort gets worse (even with medicine); cough or vomit blood; have bowel movements that are black or bloody; cannot stop vomiting; or develop difficulty swallowing.           Medical Decision  Making  Amount and/or Complexity of Data Reviewed  Labs: ordered. Decision-making details documented in ED Course.  Radiology: ordered. Decision-making details documented in ED Course.     Details: Type of Interpretation: Outside Written Report  STAT Radiology Procedure Done:  CT Angiography Chest With Intravenous Contrast  Interpretation:  Limited scan. Grossly negative for pulmonary embolus. Other findings noted in the final report.  Radiologist:  Mynor Vergara MD  03/24/2025  23:54    Risk  OTC drugs.  Prescription drug management.  Parenteral controlled substances.  Risk Details: OTC drugs, prescription drugs and controlled substances considered.  Due to patient's symptoms improving and pain controlled pain medications ordered appropriately.  Differential diagnoses:  Pneumonia, Congestive heart failure, Acute Myocardial infarction, Pleural effusion, Pulmonary edema, Pulmonary embolism, Electrolyte imbalance, Infectious etiology, COPD exacerbation, Asthma exacerbation, Pneumothorax,  Cardiac tamponade, Anemia, Deconditioning, bronchospasm, upper airway obstruction such: Aspiration, Foreign body                  ED Medication(s):  Medications   albuterol-ipratropium 2.5 mg-0.5 mg/3 mL nebulizer solution 3 mL (3 mLs Nebulization Given 3/24/25 2201)   iohexoL (OMNIPAQUE 350) injection 100 mL (100 mLs Intravenous Given 3/24/25 2335)       Discharge Medication List as of 3/25/2025  1:38 AM        START taking these medications    Details   naproxen (NAPROSYN) 500 MG tablet Take 1 tablet (500 mg total) by mouth 2 (two) times daily with meals., Starting Tue 3/25/2025, Print              Follow-up Information       [facility]. Schedule an appointment as soon as possible for a visit in 1 week.    Specialty: Cardiology  Contact information:  [address] [facility] [address] [phone]  Additional information:  Please park on the [address] side and use the Clinic entrance.  Check in on the 3rd floor, to the right.             [facility]. Schedule an appointment as soon as possible for a visit in 1 week.    Contact information:  [address] [facility] [facility] [address] [phone]               [facility] Internal [address]. Schedule an appointment as soon as possible for a visit in 1 week.    Specialty: Internal Medicine  Contact information:  [address] [facility] [address] [phone]  Additional information:  Please park on the [address] side and use the Clinic entrance. Check in on the 2nd floor, to the right.             [facility] - Emergency Dept..    Specialty: Emergency Medicine  Why: As needed, If symptoms worsen  Contact information:  [address]  [facility] [address] [phone]                               Scribe Attestation:   Scribe #1: I performed the above scribed service and the documentation accurately describes the services I performed. I attest to the accuracy of the note.     Attending:   Physician Attestation Statement for Scribe #1: I, Maco Rocha Jr., MD, personally performed the services described in this documentation, as scribed by Tiki Neely, in my presence, and it is both accurate and complete.           Clinical Impression       ICD-10-CM ICD-9-CM   1. Chest pain, unspecified type  R07.9 786.50   2. Shortness of breath  R06.02 786.05   3. Costochondritis  M94.0 733.6       Disposition:   Disposition: Discharged  Condition: Stable       Maco Rocha Jr., MD  03/29/25 1019

## 2025-03-25 NOTE — FIRST PROVIDER EVALUATION
Medical screening examination initiated.  I have conducted a focused provider triage encounter, findings are as follows:    Brief history of present illness:  Complaining of cough and shortness of breath    Vitals:    03/24/25 1854   BP: (!) 141/71   BP Location: Right arm   Pulse: 89   Resp: (!) 44   Temp: 98.1 °F (36.7 °C)   TempSrc: Oral   SpO2: 96%   Weight: Comment: unable to stand       Pertinent physical exam:  Tachypneic in triage    Brief workup plan:  Cardiac workup, further evaluation    Preliminary workup initiated; this workup will be continued and followed by the physician or advanced practice provider that is assigned to the patient when roomed.

## 2025-03-27 ENCOUNTER — HOSPITAL ENCOUNTER (EMERGENCY)
Facility: HOSPITAL | Age: 60
Discharge: HOME | End: 2025-03-27
Attending: EMERGENCY MEDICINE
Payer: MEDICARE

## 2025-03-27 VITALS
TEMPERATURE: 98 F | WEIGHT: 261.13 LBS | OXYGEN SATURATION: 95 % | BODY MASS INDEX: 44.58 KG/M2 | HEART RATE: 76 BPM | RESPIRATION RATE: 19 BRPM | DIASTOLIC BLOOD PRESSURE: 63 MMHG | HEIGHT: 64 IN | SYSTOLIC BLOOD PRESSURE: 120 MMHG

## 2025-03-27 DIAGNOSIS — R07.9 CHEST PAIN: ICD-10-CM

## 2025-03-27 LAB
ABSOLUTE EOSINOPHIL (OHS): 0.13 K/UL
ABSOLUTE MONOCYTE (OHS): 0.51 K/UL (ref 0.3–1)
ABSOLUTE NEUTROPHIL COUNT (OHS): 7.67 K/UL (ref 1.8–7.7)
ALBUMIN SERPL BCP-MCNC: 3.9 G/DL (ref 3.5–5.2)
ALP SERPL-CCNC: 70 UNIT/L (ref 40–150)
ALT SERPL W/O P-5'-P-CCNC: 17 UNIT/L (ref 10–44)
ANION GAP (OHS): 12 MMOL/L (ref 8–16)
AST SERPL-CCNC: 16 UNIT/L (ref 11–45)
BASOPHILS # BLD AUTO: 0.02 K/UL
BASOPHILS NFR BLD AUTO: 0.2 %
BILIRUB SERPL-MCNC: 0.6 MG/DL (ref 0.1–1)
BNP SERPL-MCNC: 67 PG/ML (ref 0–99)
BUN SERPL-MCNC: 17 MG/DL (ref 6–20)
CALCIUM SERPL-MCNC: 9.7 MG/DL (ref 8.7–10.5)
CHLORIDE SERPL-SCNC: 107 MMOL/L (ref 95–110)
CO2 SERPL-SCNC: 22 MMOL/L (ref 23–29)
CREAT SERPL-MCNC: 0.7 MG/DL (ref 0.5–1.4)
ERYTHROCYTE [DISTWIDTH] IN BLOOD BY AUTOMATED COUNT: 14.1 % (ref 11.5–14.5)
GFR SERPLBLD CREATININE-BSD FMLA CKD-EPI: >60 ML/MIN/1.73/M2
GLUCOSE SERPL-MCNC: 103 MG/DL (ref 70–110)
HCT VFR BLD AUTO: 35 % (ref 37–48.5)
HGB BLD-MCNC: 11.3 GM/DL (ref 12–16)
IMM GRANULOCYTES # BLD AUTO: 0.04 K/UL (ref 0–0.04)
IMM GRANULOCYTES NFR BLD AUTO: 0.4 % (ref 0–0.5)
LYMPHOCYTES # BLD AUTO: 0.95 K/UL (ref 1–4.8)
MCH RBC QN AUTO: 29 PG (ref 27–50)
MCHC RBC AUTO-ENTMCNC: 32.3 G/DL (ref 32–36)
MCV RBC AUTO: 90 FL (ref 82–98)
NUCLEATED RBC (/100WBC) (OHS): 0 /100 WBC
PLATELET # BLD AUTO: 298 K/UL (ref 150–450)
PMV BLD AUTO: 9.9 FL (ref 9.2–12.9)
POTASSIUM SERPL-SCNC: 3.9 MMOL/L (ref 3.5–5.1)
PROT SERPL-MCNC: 7.7 GM/DL (ref 6–8.4)
RBC # BLD AUTO: 3.89 M/UL (ref 4–5.4)
RELATIVE EOSINOPHIL (OHS): 1.4 %
RELATIVE LYMPHOCYTE (OHS): 10.2 % (ref 18–48)
RELATIVE MONOCYTE (OHS): 5.5 % (ref 4–15)
RELATIVE NEUTROPHIL (OHS): 82.3 % (ref 38–73)
SODIUM SERPL-SCNC: 141 MMOL/L (ref 136–145)
TROPONIN I SERPL DL<=0.01 NG/ML-MCNC: <0.006 NG/ML
TROPONIN I SERPL DL<=0.01 NG/ML-MCNC: <0.006 NG/ML
WBC # BLD AUTO: 9.32 K/UL (ref 3.9–12.7)

## 2025-03-27 PROCEDURE — 93010 ELECTROCARDIOGRAM REPORT: CPT | Mod: ,,, | Performed by: INTERNAL MEDICINE

## 2025-03-27 PROCEDURE — 99285 EMERGENCY DEPT VISIT HI MDM: CPT | Mod: 25

## 2025-03-27 PROCEDURE — 83880 ASSAY OF NATRIURETIC PEPTIDE: CPT | Performed by: EMERGENCY MEDICINE

## 2025-03-27 PROCEDURE — 82040 ASSAY OF SERUM ALBUMIN: CPT | Performed by: EMERGENCY MEDICINE

## 2025-03-27 PROCEDURE — 96374 THER/PROPH/DIAG INJ IV PUSH: CPT

## 2025-03-27 PROCEDURE — 25000003 PHARM REV CODE 250: Performed by: EMERGENCY MEDICINE

## 2025-03-27 PROCEDURE — 63600175 PHARM REV CODE 636 W HCPCS: Performed by: EMERGENCY MEDICINE

## 2025-03-27 PROCEDURE — 93005 ELECTROCARDIOGRAM TRACING: CPT

## 2025-03-27 PROCEDURE — 84484 ASSAY OF TROPONIN QUANT: CPT | Performed by: EMERGENCY MEDICINE

## 2025-03-27 PROCEDURE — 85025 COMPLETE CBC W/AUTO DIFF WBC: CPT | Performed by: EMERGENCY MEDICINE

## 2025-03-27 RX ORDER — ASPIRIN 325 MG
325 TABLET ORAL
Status: COMPLETED | OUTPATIENT
Start: 2025-03-27 | End: 2025-03-27

## 2025-03-27 RX ORDER — LIDOCAINE HYDROCHLORIDE 20 MG/ML
15 SOLUTION OROPHARYNGEAL ONCE
Status: COMPLETED | OUTPATIENT
Start: 2025-03-27 | End: 2025-03-27

## 2025-03-27 RX ORDER — LORAZEPAM 2 MG/ML
1 INJECTION INTRAMUSCULAR
Status: COMPLETED | OUTPATIENT
Start: 2025-03-27 | End: 2025-03-27

## 2025-03-27 RX ORDER — ALUMINUM HYDROXIDE, MAGNESIUM HYDROXIDE, AND SIMETHICONE 1200; 120; 1200 MG/30ML; MG/30ML; MG/30ML
30 SUSPENSION ORAL ONCE
Status: COMPLETED | OUTPATIENT
Start: 2025-03-27 | End: 2025-03-27

## 2025-03-27 RX ADMIN — ALUMINUM HYDROXIDE, MAGNESIUM HYDROXIDE, AND DIMETHICONE 30 ML: 200; 20; 200 SUSPENSION ORAL at 06:03

## 2025-03-27 RX ADMIN — LIDOCAINE HYDROCHLORIDE 15 ML: 20 SOLUTION ORAL at 06:03

## 2025-03-27 RX ADMIN — LORAZEPAM 1 MG: 2 INJECTION INTRAMUSCULAR; INTRAVENOUS at 06:03

## 2025-03-27 RX ADMIN — ASPIRIN 325 MG ORAL TABLET 325 MG: 325 PILL ORAL at 06:03

## 2025-03-27 NOTE — ED PROVIDER NOTES
SCRIBE #1 NOTE: I, Steven Zaragoza, am scribing for, and in the presence of, Lavinia Hussein DO. I have scribed the entire note.       History     Chief Complaint   Patient presents with    Shortness of Breath     Pt experiencing SOB and slight midsternal CP after family disturbance this AM where PD was called to home. BLE edema.      Review of patient's allergies indicates:   Allergen Reactions    Pcn [penicillins] Swelling    Acetaminophen Itching    Codeine     Latex, natural rubber     Oxycodone Hives    Tylox [oxycodone-acetaminophen]          History of Present Illness     HPI    3/27/2025, 2:08 PM  History obtained from the patient and medical records      History of Present Illness: Judy Ruelas is a 59 y.o. female patient with a PMHx of anxiety, depression, morbid obesity, migraines, NSTEMI, and irregular heart beat who presents to the Emergency Department for evaluation of SOB which began last night. Pt was trying to sleep when sxs onset. Pt denies any smoking hx. Pt has no heart stents. Symptoms are constant and moderate in severity. No mitigating or exacerbating factors reported. Associated sxs include cough, CP, BLE swelling (above baseline), nausea, and dizziness. Cough has been present for ~2 weeks. Patient denies any congestion, fever, vomiting, palpitations or headache. No prior Tx specified.  No further complaints or concerns at this time.       Arrival mode: Ambulance Service    PCP: Domenica, Primary Doctor        Past Medical History:  Past Medical History:   Diagnosis Date    Anxiety     Depression     Depression with anxiety     Family history of heart disease 2016    Hypertension     Irregular heart beat     Migraine headache     Morbid obesity with BMI of 40.0-44.9, adult 2017    NSTEMI (non-ST elevated myocardial infarction) 2017       Past Surgical History:  Past Surgical History:   Procedure Laterality Date    APPENDECTOMY       SECTION      HYSTERECTOMY            Family History:  Family History   Problem Relation Name Age of Onset    Hypertension Unknown      Heart disease Father  70    Hypertension Father      Heart attack Mother  40        triple bypass    Diabetes Mother      Hypertension Mother      Stroke Mother      Heart attack Brother  40        CABG x 2       Social History:  Social History     Tobacco Use    Smoking status: Never    Smokeless tobacco: Never   Substance and Sexual Activity    Alcohol use: No     Alcohol/week: 0.0 standard drinks of alcohol     Comment: denies    Drug use: No     Comment: denies    Sexual activity: Not Currently        Review of Systems     Review of Systems   Constitutional:  Negative for fever.   HENT:  Negative for congestion.    Respiratory:  Positive for cough and shortness of breath.    Cardiovascular:  Positive for chest pain and leg swelling (BLE). Negative for palpitations.   Gastrointestinal:  Positive for nausea. Negative for vomiting.   Neurological:  Positive for dizziness. Negative for headaches.        Physical Exam     Initial Vitals [03/27/25 1217]   BP Pulse Resp Temp SpO2   (!) 150/88 85 (!) 22 98.1 °F (36.7 °C) 98 %      MAP       --          Physical Exam  Nursing Notes and Vital Signs Reviewed.  Constitutional: Patient is in no acute distress. Well-developed and well-nourished.  Head: Atraumatic. Normocephalic.  Eyes: PERRL. EOM intact. Conjunctivae are not pale. No scleral icterus.  ENT: Mucous membranes are moist.   Neck: Supple. Full ROM. No JVD.  Cardiovascular: Regular rate. Regular rhythm. No murmurs, rubs, or gallop.  Pulmonary/Chest: Tachypneic, diminished lung sounds. No respiratory distress. No wheezing or rales.  Abdominal: Soft and non-distended.  There is no tenderness.  No rebound, guarding, or rigidity. Musculoskeletal: Moves all extremities. No obvious deformities. No edema. No calf tenderness.  Skin: Warm and dry.  Neurological:  Alert, awake, and appropriate.  Normal speech.  No acute focal  "neurological deficits are appreciated.  Psychiatric: Normal affect. Good eye contact. Appropriate in content.     ED Course   Procedures  ED Vital Signs:  Vitals:    03/27/25 1217 03/27/25 1300 03/27/25 1330 03/27/25 1400   BP: (!) 150/88 131/82 127/75 138/64   Pulse: 85 83 79 79   Resp: (!) 22 20 20 (!) 21   Temp: 98.1 °F (36.7 °C)      TempSrc: Oral      SpO2: 98% 97% 97% 97%   Weight: 118.4 kg (261 lb 1.6 oz)      Height: 5' 4" (1.626 m)       03/27/25 1430 03/27/25 1500 03/27/25 1530 03/27/25 1600   BP: 139/78 125/69 120/71 124/68   Pulse: 79 82 81 84   Resp: 20 20 (!) 22 20   Temp:       TempSrc:       SpO2: 96% 97% 97% 99%   Weight:       Height:        03/27/25 1630 03/27/25 1700 03/27/25 1730 03/27/25 1800   BP: 126/73 119/66 124/70 120/67   Pulse: 82 80 83 80   Resp: 19 19 20 (!) 21   Temp:       TempSrc:       SpO2: 98% 96% 97% 97%   Weight:       Height:        03/27/25 1830 03/27/25 2038   BP: 127/66 120/63   Pulse: 85 76   Resp: 13 19   Temp:     TempSrc:     SpO2: 97% 95%   Weight:     Height:         Abnormal Lab Results:  Labs Reviewed   COMPREHENSIVE METABOLIC PANEL - Abnormal       Result Value    Sodium 141      Potassium 3.9      Chloride 107      CO2 22 (*)     Glucose 103      BUN 17      Creatinine 0.7      Calcium 9.7      Protein Total 7.7      Albumin 3.9      Bilirubin Total 0.6      ALP 70      AST 16      ALT 17      Anion Gap 12      eGFR >60     CBC WITH DIFFERENTIAL - Abnormal    WBC 9.32      RBC 3.89 (*)     HGB 11.3 (*)     HCT 35.0 (*)     MCV 90      MCH 29.0      MCHC 32.3      RDW 14.1      Platelet Count 298      MPV 9.9      Nucleated RBC 0      Neut % 82.3 (*)     Lymph % 10.2 (*)     Mono % 5.5      Eos % 1.4      Basophil % 0.2      Imm Grans % 0.4      Neut # 7.67      Lymph # 0.95 (*)     Mono # 0.51      Eos # 0.13      Baso # 0.02      Imm Grans # 0.04     TROPONIN I - Normal    Troponin-I <0.006     B-TYPE NATRIURETIC PEPTIDE - Normal    BNP 67     TROPONIN I - Normal "    Troponin-I <0.006     CBC W/ AUTO DIFFERENTIAL    Narrative:     The following orders were created for panel order CBC auto differential.  Procedure                               Abnormality         Status                     ---------                               -----------         ------                     CBC with Differential[[id number]]       Abnormal            Final result                 Please view results for these tests on the individual orders.        All Lab Results:  Results for orders placed or performed during the hospital encounter of 03/27/25   Comprehensive metabolic panel    Collection Time: 03/27/25  2:12 PM   Result Value Ref Range    Sodium 141 136 - 145 mmol/L    Potassium 3.9 3.5 - 5.1 mmol/L    Chloride 107 95 - 110 mmol/L    CO2 22 (L) 23 - 29 mmol/L    Glucose 103 70 - 110 mg/dL    BUN 17 6 - 20 mg/dL    Creatinine 0.7 0.5 - 1.4 mg/dL    Calcium 9.7 8.7 - 10.5 mg/dL    Protein Total 7.7 6.0 - 8.4 gm/dL    Albumin 3.9 3.5 - 5.2 g/dL    Bilirubin Total 0.6 0.1 - 1.0 mg/dL    ALP 70 40 - 150 unit/L    AST 16 11 - 45 unit/L    ALT 17 10 - 44 unit/L    Anion Gap 12 8 - 16 mmol/L    eGFR >60 >60 mL/min/1.73/m2   Troponin I #1    Collection Time: 03/27/25  2:12 PM   Result Value Ref Range    Troponin-I <0.006 <=0.026 ng/mL   BNP    Collection Time: 03/27/25  2:12 PM   Result Value Ref Range    BNP 67 0 - 99 pg/mL   CBC with Differential    Collection Time: 03/27/25  2:12 PM   Result Value Ref Range    WBC 9.32 3.90 - 12.70 K/uL    RBC 3.89 (L) 4.00 - 5.40 M/uL    HGB 11.3 (L) 12.0 - 16.0 gm/dL    HCT 35.0 (L) 37.0 - 48.5 %    MCV 90 82 - 98 fL    MCH 29.0 27.0 - 50.0 pg    MCHC 32.3 32.0 - 36.0 g/dL    RDW 14.1 11.5 - 14.5 %    Platelet Count 298 150 - 450 K/uL    MPV 9.9 9.2 - 12.9 fL    Nucleated RBC 0 <=0 /100 WBC    Neut % 82.3 (H) 38 - 73 %    Lymph % 10.2 (L) 18 - 48 %    Mono % 5.5 4 - 15 %    Eos % 1.4 <=8 %    Basophil % 0.2 <=1.9 %    Imm Grans % 0.4 0.0 - 0.5 %    Neut # 7.67  1.8 - 7.7 K/uL    Lymph # 0.95 (L) 1 - 4.8 K/uL    Mono # 0.51 0.3 - 1 K/uL    Eos # 0.13 <=0.5 K/uL    Baso # 0.02 <=0.2 K/uL    Imm Grans # 0.04 0.00 - 0.04 K/uL   EKG 12-lead    Collection Time: 03/27/25  2:18 PM   Result Value Ref Range    QRS Duration 124 ms    [facility] QTC Calculation 497 ms   Troponin I #2    Collection Time: 03/27/25  4:44 PM   Result Value Ref Range    Troponin-I <0.006 <=0.026 ng/mL       Imaging Results:  Imaging Results              X-Ray Chest AP Portable (Final result)  Result time 03/27/25 14:53:53      Final result by Wayne Reed MD (03/27/25 14:53:53)                   Impression:      1.  Negative for acute process involving the chest.    2.  Stable findings as noted above.      Electronically signed by: Wayne Reed MD  Date:    03/27/2025  Time:    14:53               Narrative:    EXAMINATION:  XR CHEST AP PORTABLE    CLINICAL HISTORY:  Chest Pain;    COMPARISON:  Studies dating back to December 26, 2022    FINDINGS:  EKG leads overlie the chest, which is slightly rotated to the right.  The lungs are clear. The cardiac silhouette size is normal. The trachea is midline and the mediastinal width is normal. Negative for focal infiltrate, effusion or pneumothorax. Pulmonary vasculature is normal. Negative for osseous abnormalities. Ectatic and tortuous aorta.  Degenerative changes of the spine.  Eventration of the hemidiaphragms.  Cardiophrenic fat pads.                                       The EKG was ordered, reviewed, and independently interpreted by the ED provider.  Interpretation time: 14:18  Rate: 86 BPM  Rhythm: normal sinus rhythm  Interpretation: Right bundle branch block. T wave abnormality, consider inferior ischemia. No STEMI.             The Emergency Provider reviewed the vital signs and test results, which are outlined above.     ED Discussion     8:01 PM: Reassessed pt at this time. Discussed with patient and/or family/caretaker all pertinent ED information and  results. Discussed pt dx and plan of tx. Gave the patient all f/u and return to the ED instructions. All questions and concerns were addressed at this time. Patient and/or family/caretaker expresses understanding of information and instructions, and is comfortable with plan to discharge. Pt is stable for discharge.     I discussed with patient and/or family/caretaker that evaluation in the ED does not suggest any emergent or life threatening medical conditions requiring immediate intervention beyond what was provided in the ED, and I believe patient is safe for discharge.  Regardless, an unremarkable evaluation in the ED does not preclude the development or presence of a serious of life threatening condition. As such, I instructed that the patient is to return immediately for any worsening or change in current symptoms.           Medical Decision Making  59-year-old female presents with chest pain and shortness of breath.  EKG shows chronic right bundle-branch with T-wave changes in the inferior leads.  Troponin x2 negative. BNP negative for CHF.  Chest x-ray negative for widened mediastinum to indicate thoracic aortic dissection.  No tachycardia or hypoxia to suggest pulmonary embolism.  H&H is at baseline, no infectious symptoms including fever or leukocytosis.  Normal renal function, LFTs, and electrolytes.  No improvement with aspirin or GI cocktail.  Some improvement after receiving Ativan.  Instructed to follow up with Cardiology.      Amount and/or Complexity of Data Reviewed  Labs: ordered.  Radiology: ordered.    Risk  OTC drugs.  Prescription drug management.       Additional MDM:   Differential Diagnosis:   There is no evidence of ruptured esophagus, pneumothorax, pulmonary embolism, ACS, or thoracic aortic dissection.  Thus I consider the discharge disposition reasonable.  The patient and I discussed diagnosis and risks and we agree with discharging home with close follow up.  We also discussed returning  to the emergency department immediately with new or worsening symptoms including bloody sputum, worsening pain or shortness of breath.              ED Medication(s):  Medications   LORazepam injection 1 mg (1 mg Intravenous Given 3/27/25 1827)   aluminum-magnesium hydroxide-simethicone 200-200-20 mg/5 mL suspension 30 mL (30 mLs Oral Given 3/27/25 1826)     And   LIDOcaine viscous HCl 2% oral solution 15 mL (15 mLs Oral Given 3/27/25 1826)   aspirin tablet 325 mg (325 mg Oral Given 3/27/25 1826)       Discharge Medication List as of 3/27/2025  8:01 PM           Follow-up Information       Tahmina Pro MD On 3/31/2025.    Specialties: Cardiology, Cardiovascular Disease  Why: As needed, If symptoms worsen  Contact information:  [address] [facility] [address]  [facility] [address]  [phone]               [facility] - Emergency Dept..    Specialty: Emergency Medicine  Contact information:  [address] [facility] [address] [phone]                               Scribe Attestation:   Scribe #1: I performed the above scribed service and the documentation accurately describes the services I performed. I attest to the accuracy of the note.     Attending:   Physician Attestation Statement for Scribe #1: I, Lavinia Hussein DO, personally performed the services described in this documentation, as scribed by Steven Zaragoza, in my presence, and it is both accurate and complete.           Clinical Impression       ICD-10-CM ICD-9-CM   1. Chest pain  R07.9 786.50       Disposition:   Disposition: Discharged  Condition: Stable         Lavinia Hussein DO  04/04/25 1602

## 2025-03-28 LAB
OHS QRS DURATION: 124 MS
OHS QTC CALCULATION: 497 MS

## 2025-04-08 ENCOUNTER — TELEPHONE (OUTPATIENT)
Dept: FAMILY MEDICINE | Facility: CLINIC | Age: 60
End: 2025-04-08
Payer: MEDICARE

## 2025-05-31 ENCOUNTER — HOSPITAL ENCOUNTER (EMERGENCY)
Facility: HOSPITAL | Age: 60
Discharge: HOME | End: 2025-05-31
Attending: EMERGENCY MEDICINE
Payer: MEDICARE

## 2025-05-31 VITALS
TEMPERATURE: 98 F | RESPIRATION RATE: 19 BRPM | HEART RATE: 67 BPM | SYSTOLIC BLOOD PRESSURE: 118 MMHG | DIASTOLIC BLOOD PRESSURE: 61 MMHG | OXYGEN SATURATION: 96 %

## 2025-05-31 DIAGNOSIS — R07.9 CHEST PAIN: Primary | ICD-10-CM

## 2025-05-31 LAB
ABSOLUTE EOSINOPHIL (OHS): 0.26 K/UL
ABSOLUTE MONOCYTE (OHS): 0.58 K/UL (ref 0.3–1)
ABSOLUTE NEUTROPHIL COUNT (OHS): 5.79 K/UL (ref 1.8–7.7)
ALBUMIN SERPL BCP-MCNC: 4 G/DL (ref 3.5–5.2)
ALP SERPL-CCNC: 65 UNIT/L (ref 40–150)
ALT SERPL W/O P-5'-P-CCNC: 26 UNIT/L (ref 10–44)
ANION GAP (OHS): 14 MMOL/L (ref 8–16)
AST SERPL-CCNC: 23 UNIT/L (ref 11–45)
BASOPHILS # BLD AUTO: 0.05 K/UL
BASOPHILS NFR BLD AUTO: 0.6 %
BILIRUB SERPL-MCNC: 0.4 MG/DL (ref 0.1–1)
BNP SERPL-MCNC: <10 PG/ML (ref 0–99)
BUN SERPL-MCNC: 21 MG/DL (ref 8–23)
CALCIUM SERPL-MCNC: 9.6 MG/DL (ref 8.7–10.5)
CHLORIDE SERPL-SCNC: 103 MMOL/L (ref 95–110)
CO2 SERPL-SCNC: 21 MMOL/L (ref 23–29)
CREAT SERPL-MCNC: 0.7 MG/DL (ref 0.5–1.4)
ERYTHROCYTE [DISTWIDTH] IN BLOOD BY AUTOMATED COUNT: 13.8 % (ref 11.5–14.5)
GFR SERPLBLD CREATININE-BSD FMLA CKD-EPI: >60 ML/MIN/1.73/M2
GLUCOSE SERPL-MCNC: 95 MG/DL (ref 70–110)
HCT VFR BLD AUTO: 37.6 % (ref 37–48.5)
HGB BLD-MCNC: 12.2 GM/DL (ref 12–16)
IMM GRANULOCYTES # BLD AUTO: 0.03 K/UL (ref 0–0.04)
IMM GRANULOCYTES NFR BLD AUTO: 0.4 % (ref 0–0.5)
LYMPHOCYTES # BLD AUTO: 1.12 K/UL (ref 1–4.8)
MCH RBC QN AUTO: 28.8 PG (ref 27–31)
MCHC RBC AUTO-ENTMCNC: 32.4 G/DL (ref 32–36)
MCV RBC AUTO: 89 FL (ref 82–98)
NUCLEATED RBC (/100WBC) (OHS): 0 /100 WBC
PLATELET # BLD AUTO: 336 K/UL (ref 150–450)
PMV BLD AUTO: 9.9 FL (ref 9.2–12.9)
POTASSIUM SERPL-SCNC: 4.7 MMOL/L (ref 3.5–5.1)
PROT SERPL-MCNC: 8.2 GM/DL (ref 6–8.4)
RBC # BLD AUTO: 4.24 M/UL (ref 4–5.4)
RELATIVE EOSINOPHIL (OHS): 3.3 %
RELATIVE LYMPHOCYTE (OHS): 14.3 % (ref 18–48)
RELATIVE MONOCYTE (OHS): 7.4 % (ref 4–15)
RELATIVE NEUTROPHIL (OHS): 74 % (ref 38–73)
SODIUM SERPL-SCNC: 138 MMOL/L (ref 136–145)
TROPONIN I SERPL DL<=0.01 NG/ML-MCNC: <0.006 NG/ML
TROPONIN I SERPL DL<=0.01 NG/ML-MCNC: <0.006 NG/ML
WBC # BLD AUTO: 7.83 K/UL (ref 3.9–12.7)

## 2025-05-31 PROCEDURE — 84484 ASSAY OF TROPONIN QUANT: CPT | Performed by: EMERGENCY MEDICINE

## 2025-05-31 PROCEDURE — 93005 ELECTROCARDIOGRAM TRACING: CPT

## 2025-05-31 PROCEDURE — 93010 ELECTROCARDIOGRAM REPORT: CPT | Mod: 76,,, | Performed by: INTERNAL MEDICINE

## 2025-05-31 PROCEDURE — 99285 EMERGENCY DEPT VISIT HI MDM: CPT | Mod: 25

## 2025-05-31 PROCEDURE — 85025 COMPLETE CBC W/AUTO DIFF WBC: CPT | Performed by: EMERGENCY MEDICINE

## 2025-05-31 PROCEDURE — 80053 COMPREHEN METABOLIC PANEL: CPT | Performed by: EMERGENCY MEDICINE

## 2025-05-31 PROCEDURE — 83880 ASSAY OF NATRIURETIC PEPTIDE: CPT | Performed by: EMERGENCY MEDICINE

## 2025-06-01 LAB
OHS QRS DURATION: 126 MS
OHS QRS DURATION: 128 MS
OHS QTC CALCULATION: 455 MS
OHS QTC CALCULATION: 462 MS

## 2025-09-03 RX ORDER — NITROFURANTOIN 25; 75 MG/1; MG/1
100 CAPSULE ORAL 2 TIMES DAILY
Qty: 10 CAPSULE | Refills: 0 | Status: SHIPPED | OUTPATIENT
Start: 2025-09-03 | End: 2025-09-08